# Patient Record
Sex: FEMALE | Race: WHITE | Employment: OTHER | URBAN - METROPOLITAN AREA
[De-identification: names, ages, dates, MRNs, and addresses within clinical notes are randomized per-mention and may not be internally consistent; named-entity substitution may affect disease eponyms.]

---

## 2018-01-08 PROBLEM — R09.02 HYPOXIA: Status: ACTIVE | Noted: 2018-01-08

## 2018-01-09 PROBLEM — T39.395A GI BLEED DUE TO NSAIDS: Status: ACTIVE | Noted: 2018-01-09

## 2018-01-09 PROBLEM — K92.2 GI BLEED DUE TO NSAIDS: Status: ACTIVE | Noted: 2018-01-09

## 2018-01-09 PROBLEM — I50.9 CHF (CONGESTIVE HEART FAILURE) (HCC): Status: ACTIVE | Noted: 2018-01-09

## 2018-01-09 PROBLEM — R10.9 ABDOMINAL PAIN: Status: ACTIVE | Noted: 2018-01-09

## 2018-01-10 PROBLEM — I48.91 ATRIAL FIBRILLATION (HCC): Status: ACTIVE | Noted: 2018-01-10

## 2018-01-10 PROBLEM — N28.9 RENAL INSUFFICIENCY: Status: ACTIVE | Noted: 2018-01-10

## 2018-01-10 PROBLEM — I48.0 PAROXYSMAL ATRIAL FIBRILLATION (HCC): Status: ACTIVE | Noted: 2018-01-10

## 2018-01-12 PROBLEM — I51.89 DIASTOLIC DYSFUNCTION: Status: ACTIVE | Noted: 2018-01-12

## 2018-04-26 ENCOUNTER — HOSPITAL ENCOUNTER (OUTPATIENT)
Dept: CARDIOLOGY | Age: 73
Discharge: HOME | End: 2018-04-26
Admitting: SURGERY
Payer: MEDICARE

## 2018-04-26 ENCOUNTER — OFFICE VISIT (OUTPATIENT)
Dept: VASCULAR SURGERY | Age: 73
End: 2018-04-26
Payer: MEDICARE

## 2018-04-26 DIAGNOSIS — I65.22 LEFT CAROTID STENOSIS: ICD-10-CM

## 2018-04-26 DIAGNOSIS — Z98.890 S/P CAROTID ENDARTERECTOMY: ICD-10-CM

## 2018-04-26 DIAGNOSIS — R09.89 BRUIT OF RIGHT CAROTID ARTERY: Primary | ICD-10-CM

## 2018-04-26 PROCEDURE — 99213 OFFICE O/P EST LOW 20 MIN: CPT | Performed by: SURGERY

## 2018-04-26 PROCEDURE — 93880 EXTRACRANIAL BILAT STUDY: CPT

## 2018-04-26 RX ORDER — VENLAFAXINE HYDROCHLORIDE 150 MG/1
300 CAPSULE, EXTENDED RELEASE ORAL DAILY
COMMUNITY
Start: 2018-04-22

## 2018-04-26 RX ORDER — LEVOTHYROXINE SODIUM 0.15 MG/1
175 TABLET ORAL DAILY
Status: ON HOLD | COMMUNITY
Start: 2018-04-22 | End: 2019-09-19 | Stop reason: DRUGHIGH

## 2019-04-30 DIAGNOSIS — Z98.890 S/P CAROTID ENDARTERECTOMY: ICD-10-CM

## 2019-04-30 DIAGNOSIS — I65.22 LEFT CAROTID STENOSIS: Primary | ICD-10-CM

## 2019-05-02 ENCOUNTER — HOSPITAL ENCOUNTER (OUTPATIENT)
Dept: CARDIOLOGY | Age: 74
Discharge: HOME | End: 2019-05-02
Payer: MEDICARE

## 2019-05-02 ENCOUNTER — OFFICE VISIT (OUTPATIENT)
Dept: VASCULAR SURGERY | Age: 74
End: 2019-05-02
Payer: MEDICARE

## 2019-05-02 DIAGNOSIS — R09.89 BRUIT OF RIGHT CAROTID ARTERY: ICD-10-CM

## 2019-05-02 DIAGNOSIS — I65.22 LEFT CAROTID STENOSIS: ICD-10-CM

## 2019-05-02 DIAGNOSIS — Z98.890 S/P CAROTID ENDARTERECTOMY: ICD-10-CM

## 2019-05-02 DIAGNOSIS — I65.22 LEFT CAROTID STENOSIS: Primary | ICD-10-CM

## 2019-05-02 PROCEDURE — 99213 OFFICE O/P EST LOW 20 MIN: CPT | Performed by: SURGERY

## 2019-05-02 PROCEDURE — 93880 EXTRACRANIAL BILAT STUDY: CPT

## 2019-05-02 NOTE — PROGRESS NOTES
Chief Complaint:   Chief Complaint   Patient presents with    Circulatory Problem     Follow up carotid stenosis         HPI:  Patient came to the office for evaluation of carotid artery disease, right carotid endarterectomy, is associated with left carotid stenosis, doing well      Patient denies any focal lateralizing neurological symptoms like loss of speech, vision or loss of function of extremity    Patient can walk a few blocks without difficulty, and denies any symptoms of rest pain    No Known Allergies    Current Outpatient Medications   Medication Sig Dispense Refill    venlafaxine (EFFEXOR XR) 150 MG extended release capsule Take 150 mg by mouth daily      levothyroxine (SYNTHROID) 150 MCG tablet Take 150 mcg by mouth daily      metoprolol succinate (TOPROL XL) 100 MG extended release tablet Take 1 tablet by mouth daily 30 tablet 3    pantoprazole (PROTONIX) 40 MG tablet Take 1 tablet by mouth daily 30 tablet 3    sucralfate (CARAFATE) 1 GM tablet Take 1 tablet by mouth 4 times daily 120 tablet 3    atorvastatin (LIPITOR) 20 MG tablet Take 20 mg by mouth daily      Multiple Vitamins-Minerals (THERAPEUTIC MULTIVITAMIN-MINERALS) tablet Take 1 tablet by mouth daily      Cholecalciferol (VITAMIN D) 2000 UNITS CAPS capsule Take 1 capsule by mouth daily      amLODIPine (NORVASC) 5 MG tablet Take 5 mg by mouth daily.  Levothyroxine Sodium (SYNTHROID PO) Take 150 mcg by mouth daily.  VORTIoxetine (TRINTELLIX) 10 MG TABS tablet Take 10 mg by mouth daily       No current facility-administered medications for this visit.         Past Medical History:   Diagnosis Date    Arthritis     Carotid stenosis, bilateral 5/21/2012    Hyperlipidemia     Hypertension     Left carotid stenosis 4/26/2018    S/P carotid endarterectomy 10/6/2016    Stomach ulcer     Thyroid disease        Past Surgical History:   Procedure Laterality Date    APPENDECTOMY      BACK SURGERY      CHOLECYSTECTOMY      Musculoskeletal:  No arthritis or weakness  Neurologic:  No paralysis, paresis, paresthesia, seizures or headach        Physical Exam:  General appearance:  Alert, awake, oriented x 3. No distress. Eyes:  Conjunctivae appear normal; PERRL  Neck:  No jugular venous distention, lymphadenopathy or thyromegaly. Carotid bruit noted  Lungs:  Clear to ausculation bilaterally. No rhonchi, crackles, wheezes  Heart:  Regular rate and rhythm. No rub or murmur  Abdomen:  Soft, non-tender. No masses, organomegaly. Musculoskeletal : No joint effusions, tenderness swelling    Neuro: Speech is intact. Moving all extremities. No focal motor or sensory deficits      Extremities:  Both feet are warm to touch. The color of both feet is normal.        Pulses Right  Left    Brachial 3 3    Radial    3=normal   Femoral 2 2  2=diminished   Popliteal    1=barely palpable   Dorsalis pedis    0=absent   Posterior tibial    4=aneurysmal             Other pertinent information:1. The past medical records were reviewed. Assessment:    1. Left carotid stenosis    2. Bruit of right carotid artery    3. S/P carotid endarterectomy              Plan:       Discussed with the patient regarding the results of the ultrasound, normal on the right, post endarterectomy, 40% stenosis of left, unchanged from last year, patient was reassured          Patient was instructed to continue walking program and to call if any worsening of symptoms and to call if any focal lateralizing neurological symptoms like loss of speech, vision or loss of function of extremity. All the questions were answered. Indicated follow-up: Return in about 1 year (around 5/2/2020), or if symptoms worsen or fail to improve.

## 2019-05-02 NOTE — PROGRESS NOTES
[facility] Heart & Vascular Lab - [facility]    This is a pre read worksheet - prior to official physician interpretation    Levon Stokes  1945  Date of study: 5/2/19  [id number]    Indication for study:  Carotid artery stenosis  Study : Bilateral Carotid Artery Duplex Examination    Duplex examination of the RIGHT carotid artery system identifies atherosclerotic plaque. The peak systolic velocity in internal carotid artery was 246 centimeters / second. The maximum end diastolic velocity was 50 centimeters / second. The ICA/CCA ratio is 1.5. The right vertebral artery has antegrade flow. Duplex examination of the LEFT carotid artery system identifies atherosclerotic plaque. The peak systolic velocity in internal carotid artery was 180 centimeters / second. The maximum end diastolic velocity was 42 centimeters / second. The ICA/CCA ratio is 1.8. The left vertebral artery has antegrade flow.     RIGHT mild thickening  LEFT 40%  Both distal ICAs very tortuous      LAST STUDY  4/26/2018  Rt mild  Lt 40

## 2019-09-19 ENCOUNTER — APPOINTMENT (OUTPATIENT)
Dept: CT IMAGING | Age: 74
DRG: 292 | End: 2019-09-19
Attending: FAMILY MEDICINE
Payer: MEDICARE

## 2019-09-19 ENCOUNTER — HOSPITAL ENCOUNTER (INPATIENT)
Age: 74
LOS: 7 days | Discharge: HOME | DRG: 292 | End: 2019-09-26
Attending: FAMILY MEDICINE | Admitting: FAMILY MEDICINE
Payer: MEDICARE

## 2019-09-19 ENCOUNTER — APPOINTMENT (OUTPATIENT)
Dept: GENERAL RADIOLOGY | Age: 74
DRG: 292 | End: 2019-09-19
Attending: FAMILY MEDICINE
Payer: MEDICARE

## 2019-09-19 LAB
ALBUMIN SERPL-MCNC: 4 G/DL (ref 3.5–5.2)
ALP BLD-CCNC: 100 U/L (ref 35–104)
ALT SERPL-CCNC: 10 U/L (ref 0–32)
ANION GAP SERPL CALCULATED.3IONS-SCNC: 9 MMOL/L (ref 7–16)
ANISOCYTOSIS: ABNORMAL
AST SERPL-CCNC: 16 U/L (ref 0–31)
B.E.: 6.1 MMOL/L (ref -3–3)
BASOPHILIC STIPPLING: ABNORMAL
BASOPHILS ABSOLUTE: 0.07 E9/L (ref 0–0.2)
BASOPHILS RELATIVE PERCENT: 0.9 % (ref 0–2)
BILIRUB SERPL-MCNC: 1.1 MG/DL (ref 0–1.2)
BUN BLDV-MCNC: 17 MG/DL (ref 8–23)
CALCIUM SERPL-MCNC: 8.8 MG/DL (ref 8.6–10.2)
CHLORIDE BLD-SCNC: 95 MMOL/L (ref 98–107)
CO2: 33 MMOL/L (ref 22–29)
COHB: 3.1 % (ref 0–1.5)
CREAT SERPL-MCNC: 0.7 MG/DL (ref 0.5–1)
CRITICAL: ABNORMAL
DATE ANALYZED: ABNORMAL
DATE OF COLLECTION: ABNORMAL
EOSINOPHILS ABSOLUTE: 0.06 E9/L (ref 0.05–0.5)
EOSINOPHILS RELATIVE PERCENT: 0.7 % (ref 0–6)
GFR AFRICAN AMERICAN: >60
GFR NON-AFRICAN AMERICAN: >60 ML/MIN/1.73
GLUCOSE BLD-MCNC: 121 MG/DL (ref 74–99)
HCO3: 33.1 MMOL/L (ref 22–26)
HCT VFR BLD CALC: 42.4 % (ref 34–48)
HEMOGLOBIN: 11.9 G/DL (ref 11.5–15.5)
HHB: 4.3 % (ref 0–5)
IMMATURE GRANULOCYTES #: 0.02 E9/L
IMMATURE GRANULOCYTES %: 0.2 % (ref 0–5)
INR BLD: 1.1
LAB: ABNORMAL
LYMPHOCYTES ABSOLUTE: 0.98 E9/L (ref 1.5–4)
LYMPHOCYTES RELATIVE PERCENT: 12.1 % (ref 20–42)
Lab: ABNORMAL
MCH RBC QN AUTO: 26.4 PG (ref 26–35)
MCHC RBC AUTO-ENTMCNC: 28.1 % (ref 32–34.5)
MCV RBC AUTO: 94 FL (ref 80–99.9)
METHB: 0.1 % (ref 0–1.5)
MODE: ABNORMAL
MONOCYTES ABSOLUTE: 0.65 E9/L (ref 0.1–0.95)
MONOCYTES RELATIVE PERCENT: 8.1 % (ref 2–12)
NEUTROPHILS ABSOLUTE: 6.29 E9/L (ref 1.8–7.3)
NEUTROPHILS RELATIVE PERCENT: 78 % (ref 43–80)
O2 CONTENT: 17.2 ML/DL
O2 SATURATION: 95.6 % (ref 92–98.5)
O2HB: 92.5 % (ref 94–97)
OPERATOR ID: 3114
OVALOCYTES: ABNORMAL
PATIENT TEMP: 37 C
PCO2: 58.4 MMHG (ref 35–45)
PDW BLD-RTO: 16.1 FL (ref 11.5–15)
PH BLOOD GAS: 7.37 (ref 7.35–7.45)
PLATELET # BLD: 199 E9/L (ref 130–450)
PMV BLD AUTO: 10.7 FL (ref 7–12)
PO2: 81.4 MMHG (ref 60–100)
POIKILOCYTES: ABNORMAL
POLYCHROMASIA: ABNORMAL
POTASSIUM REFLEX MAGNESIUM: 4.2 MMOL/L (ref 3.5–5)
PRO-BNP: 2484 PG/ML (ref 0–450)
PROTHROMBIN TIME: 12.1 SEC (ref 9.3–12.4)
RBC # BLD: 4.51 E12/L (ref 3.5–5.5)
SODIUM BLD-SCNC: 137 MMOL/L (ref 132–146)
SOURCE, BLOOD GAS: ABNORMAL
STOMATOCYTES: ABNORMAL
THB: 13.2 G/DL (ref 11.5–16.5)
TIME ANALYZED: 1834
TOTAL PROTEIN: 7.3 G/DL (ref 6.4–8.3)
TROPONIN: <0.01 NG/ML (ref 0–0.03)
WBC # BLD: 8.1 E9/L (ref 4.5–11.5)

## 2019-09-19 PROCEDURE — 85025 COMPLETE CBC W/AUTO DIFF WBC: CPT

## 2019-09-19 PROCEDURE — 2060000000 HC ICU INTERMEDIATE R&B

## 2019-09-19 PROCEDURE — 85610 PROTHROMBIN TIME: CPT

## 2019-09-19 PROCEDURE — 83880 ASSAY OF NATRIURETIC PEPTIDE: CPT

## 2019-09-19 PROCEDURE — 82805 BLOOD GASES W/O2 SATURATION: CPT

## 2019-09-19 PROCEDURE — 80053 COMPREHEN METABOLIC PANEL: CPT

## 2019-09-19 PROCEDURE — 71275 CT ANGIOGRAPHY CHEST: CPT

## 2019-09-19 PROCEDURE — 6360000002 HC RX W HCPCS: Performed by: FAMILY MEDICINE

## 2019-09-19 PROCEDURE — 6360000004 HC RX CONTRAST MEDICATION: Performed by: RADIOLOGY

## 2019-09-19 PROCEDURE — 36415 COLL VENOUS BLD VENIPUNCTURE: CPT

## 2019-09-19 PROCEDURE — 2580000003 HC RX 258: Performed by: FAMILY MEDICINE

## 2019-09-19 PROCEDURE — 84484 ASSAY OF TROPONIN QUANT: CPT

## 2019-09-19 PROCEDURE — 2700000000 HC OXYGEN THERAPY PER DAY

## 2019-09-19 PROCEDURE — 71046 X-RAY EXAM CHEST 2 VIEWS: CPT

## 2019-09-19 RX ORDER — METOPROLOL SUCCINATE 100 MG/1
100 TABLET, EXTENDED RELEASE ORAL DAILY
Status: DISCONTINUED | OUTPATIENT
Start: 2019-09-19 | End: 2019-09-20

## 2019-09-19 RX ORDER — SODIUM CHLORIDE 0.9 % (FLUSH) 0.9 %
10 SYRINGE (ML) INJECTION PRN
Status: COMPLETED | OUTPATIENT
Start: 2019-09-19 | End: 2019-09-22

## 2019-09-19 RX ORDER — PANTOPRAZOLE SODIUM 40 MG/1
40 TABLET, DELAYED RELEASE ORAL DAILY
Status: DISCONTINUED | OUTPATIENT
Start: 2019-09-19 | End: 2019-09-26 | Stop reason: HOSPADM

## 2019-09-19 RX ORDER — ONDANSETRON 2 MG/ML
4 INJECTION INTRAMUSCULAR; INTRAVENOUS EVERY 6 HOURS PRN
Status: DISCONTINUED | OUTPATIENT
Start: 2019-09-19 | End: 2019-09-26 | Stop reason: HOSPADM

## 2019-09-19 RX ORDER — VENLAFAXINE HYDROCHLORIDE 150 MG/1
150 CAPSULE, EXTENDED RELEASE ORAL DAILY
Status: DISCONTINUED | OUTPATIENT
Start: 2019-09-19 | End: 2019-09-26 | Stop reason: HOSPADM

## 2019-09-19 RX ORDER — SODIUM CHLORIDE 0.9 % (FLUSH) 0.9 %
10 SYRINGE (ML) INJECTION EVERY 12 HOURS SCHEDULED
Status: DISCONTINUED | OUTPATIENT
Start: 2019-09-19 | End: 2019-09-26

## 2019-09-19 RX ORDER — AMLODIPINE BESYLATE 5 MG/1
5 TABLET ORAL DAILY
Status: DISCONTINUED | OUTPATIENT
Start: 2019-09-19 | End: 2019-09-21

## 2019-09-19 RX ORDER — SODIUM CHLORIDE 0.9 % (FLUSH) 0.9 %
10 SYRINGE (ML) INJECTION PRN
Status: DISCONTINUED | OUTPATIENT
Start: 2019-09-19 | End: 2019-09-26 | Stop reason: HOSPADM

## 2019-09-19 RX ORDER — FUROSEMIDE 10 MG/ML
40 INJECTION INTRAMUSCULAR; INTRAVENOUS ONCE
Status: COMPLETED | OUTPATIENT
Start: 2019-09-19 | End: 2019-09-19

## 2019-09-19 RX ORDER — ATORVASTATIN CALCIUM 20 MG/1
20 TABLET, FILM COATED ORAL DAILY
Status: DISCONTINUED | OUTPATIENT
Start: 2019-09-19 | End: 2019-09-26 | Stop reason: HOSPADM

## 2019-09-19 RX ORDER — POTASSIUM CHLORIDE 20 MEQ/1
20 TABLET, EXTENDED RELEASE ORAL 2 TIMES DAILY WITH MEALS
Status: DISCONTINUED | OUTPATIENT
Start: 2019-09-20 | End: 2019-09-22

## 2019-09-19 RX ORDER — M-VIT,TX,IRON,MINS/CALC/FOLIC 27MG-0.4MG
1 TABLET ORAL DAILY
Status: DISCONTINUED | OUTPATIENT
Start: 2019-09-19 | End: 2019-09-26

## 2019-09-19 RX ORDER — CHOLECALCIFEROL (VITAMIN D3) 50 MCG
2000 TABLET ORAL DAILY
Status: DISCONTINUED | OUTPATIENT
Start: 2019-09-19 | End: 2019-09-26

## 2019-09-19 RX ADMIN — IOPAMIDOL 80 ML: 755 INJECTION, SOLUTION INTRAVENOUS at 20:23

## 2019-09-19 RX ADMIN — Medication 10 ML: at 20:20

## 2019-09-19 RX ADMIN — ENOXAPARIN SODIUM 40 MG: 40 INJECTION SUBCUTANEOUS at 22:46

## 2019-09-19 RX ADMIN — FUROSEMIDE 40 MG: 10 INJECTION, SOLUTION INTRAMUSCULAR; INTRAVENOUS at 22:46

## 2019-09-19 ASSESSMENT — PAIN SCALES - GENERAL
PAINLEVEL_OUTOF10: 0

## 2019-09-20 LAB
LV EF: 58 %
LVEF MODALITY: NORMAL

## 2019-09-20 PROCEDURE — 6370000000 HC RX 637 (ALT 250 FOR IP): Performed by: FAMILY MEDICINE

## 2019-09-20 PROCEDURE — 2060000000 HC ICU INTERMEDIATE R&B

## 2019-09-20 PROCEDURE — 2580000003 HC RX 258: Performed by: FAMILY MEDICINE

## 2019-09-20 PROCEDURE — 2700000000 HC OXYGEN THERAPY PER DAY

## 2019-09-20 PROCEDURE — 6360000002 HC RX W HCPCS: Performed by: FAMILY MEDICINE

## 2019-09-20 PROCEDURE — 93306 TTE W/DOPPLER COMPLETE: CPT

## 2019-09-20 RX ORDER — CARVEDILOL 25 MG/1
25 TABLET ORAL 2 TIMES DAILY WITH MEALS
Status: DISCONTINUED | OUTPATIENT
Start: 2019-09-20 | End: 2019-09-22

## 2019-09-20 RX ORDER — FUROSEMIDE 10 MG/ML
40 INJECTION INTRAMUSCULAR; INTRAVENOUS ONCE
Status: COMPLETED | OUTPATIENT
Start: 2019-09-20 | End: 2019-09-20

## 2019-09-20 RX ORDER — VALSARTAN 80 MG/1
80 TABLET ORAL DAILY
Status: DISCONTINUED | OUTPATIENT
Start: 2019-09-20 | End: 2019-09-25

## 2019-09-20 RX ADMIN — VALSARTAN 80 MG: 80 TABLET, FILM COATED ORAL at 17:09

## 2019-09-20 RX ADMIN — POTASSIUM CHLORIDE 20 MEQ: 20 TABLET, EXTENDED RELEASE ORAL at 16:24

## 2019-09-20 RX ADMIN — CHOLECALCIFEROL TAB 50 MCG (2000 UNIT) 2000 UNITS: 50 TAB at 09:48

## 2019-09-20 RX ADMIN — POTASSIUM CHLORIDE 20 MEQ: 20 TABLET, EXTENDED RELEASE ORAL at 09:48

## 2019-09-20 RX ADMIN — ATORVASTATIN CALCIUM 20 MG: 20 TABLET, FILM COATED ORAL at 09:48

## 2019-09-20 RX ADMIN — METOPROLOL SUCCINATE 100 MG: 100 TABLET, EXTENDED RELEASE ORAL at 09:48

## 2019-09-20 RX ADMIN — Medication 10 ML: at 20:15

## 2019-09-20 RX ADMIN — PANTOPRAZOLE SODIUM 40 MG: 40 TABLET, DELAYED RELEASE ORAL at 09:48

## 2019-09-20 RX ADMIN — CARVEDILOL 25 MG: 25 TABLET, FILM COATED ORAL at 16:24

## 2019-09-20 RX ADMIN — LEVOTHYROXINE SODIUM 175 MCG: 125 TABLET ORAL at 05:51

## 2019-09-20 RX ADMIN — AMLODIPINE BESYLATE 5 MG: 5 TABLET ORAL at 09:48

## 2019-09-20 RX ADMIN — Medication 10 ML: at 09:48

## 2019-09-20 RX ADMIN — Medication 1 TABLET: at 09:48

## 2019-09-20 RX ADMIN — ENOXAPARIN SODIUM 40 MG: 40 INJECTION SUBCUTANEOUS at 09:48

## 2019-09-20 RX ADMIN — VENLAFAXINE HYDROCHLORIDE 150 MG: 150 CAPSULE, EXTENDED RELEASE ORAL at 10:28

## 2019-09-20 RX ADMIN — FUROSEMIDE 40 MG: 10 INJECTION, SOLUTION INTRAMUSCULAR; INTRAVENOUS at 16:21

## 2019-09-20 ASSESSMENT — PAIN SCALES - GENERAL
PAINLEVEL_OUTOF10: 0

## 2019-09-21 LAB
ANION GAP SERPL CALCULATED.3IONS-SCNC: 4 MMOL/L (ref 7–16)
BUN BLDV-MCNC: 17 MG/DL (ref 8–23)
CALCIUM SERPL-MCNC: 8.7 MG/DL (ref 8.6–10.2)
CHLORIDE BLD-SCNC: 94 MMOL/L (ref 98–107)
CO2: 40 MMOL/L (ref 22–29)
CREAT SERPL-MCNC: 0.8 MG/DL (ref 0.5–1)
GFR AFRICAN AMERICAN: >60
GFR NON-AFRICAN AMERICAN: >60 ML/MIN/1.73
GLUCOSE BLD-MCNC: 135 MG/DL (ref 74–99)
MAGNESIUM: 1.7 MG/DL (ref 1.6–2.6)
POTASSIUM SERPL-SCNC: 4.4 MMOL/L (ref 3.5–5)
SODIUM BLD-SCNC: 138 MMOL/L (ref 132–146)

## 2019-09-21 PROCEDURE — 6360000002 HC RX W HCPCS: Performed by: FAMILY MEDICINE

## 2019-09-21 PROCEDURE — 2580000003 HC RX 258: Performed by: FAMILY MEDICINE

## 2019-09-21 PROCEDURE — 94664 DEMO&/EVAL PT USE INHALER: CPT

## 2019-09-21 PROCEDURE — 36415 COLL VENOUS BLD VENIPUNCTURE: CPT

## 2019-09-21 PROCEDURE — 80048 BASIC METABOLIC PNL TOTAL CA: CPT

## 2019-09-21 PROCEDURE — 83735 ASSAY OF MAGNESIUM: CPT

## 2019-09-21 PROCEDURE — 2700000000 HC OXYGEN THERAPY PER DAY

## 2019-09-21 PROCEDURE — 2060000000 HC ICU INTERMEDIATE R&B

## 2019-09-21 PROCEDURE — 6370000000 HC RX 637 (ALT 250 FOR IP): Performed by: FAMILY MEDICINE

## 2019-09-21 RX ORDER — ARFORMOTEROL TARTRATE 15 UG/2ML
15 SOLUTION RESPIRATORY (INHALATION) 2 TIMES DAILY
Status: DISCONTINUED | OUTPATIENT
Start: 2019-09-21 | End: 2019-09-21

## 2019-09-21 RX ORDER — BUDESONIDE 0.5 MG/2ML
0.5 INHALANT ORAL 2 TIMES DAILY
Status: DISCONTINUED | OUTPATIENT
Start: 2019-09-21 | End: 2019-09-22

## 2019-09-21 RX ORDER — FUROSEMIDE 10 MG/ML
40 INJECTION INTRAMUSCULAR; INTRAVENOUS ONCE
Status: COMPLETED | OUTPATIENT
Start: 2019-09-21 | End: 2019-09-21

## 2019-09-21 RX ORDER — FORMOTEROL FUMARATE 20 UG/2ML
20 SOLUTION RESPIRATORY (INHALATION) 2 TIMES DAILY
Status: DISCONTINUED | OUTPATIENT
Start: 2019-09-21 | End: 2019-09-23

## 2019-09-21 RX ADMIN — FUROSEMIDE 40 MG: 10 INJECTION, SOLUTION INTRAMUSCULAR; INTRAVENOUS at 13:19

## 2019-09-21 RX ADMIN — Medication 10 ML: at 09:11

## 2019-09-21 RX ADMIN — Medication 10 ML: at 21:05

## 2019-09-21 RX ADMIN — BUDESONIDE 500 MCG: 0.5 SUSPENSION RESPIRATORY (INHALATION) at 21:48

## 2019-09-21 RX ADMIN — POTASSIUM CHLORIDE 20 MEQ: 20 TABLET, EXTENDED RELEASE ORAL at 17:51

## 2019-09-21 RX ADMIN — CHOLECALCIFEROL TAB 50 MCG (2000 UNIT) 2000 UNITS: 50 TAB at 09:10

## 2019-09-21 RX ADMIN — ENOXAPARIN SODIUM 40 MG: 40 INJECTION SUBCUTANEOUS at 09:10

## 2019-09-21 RX ADMIN — AMLODIPINE BESYLATE 5 MG: 5 TABLET ORAL at 09:10

## 2019-09-21 RX ADMIN — PANTOPRAZOLE SODIUM 40 MG: 40 TABLET, DELAYED RELEASE ORAL at 09:10

## 2019-09-21 RX ADMIN — CARVEDILOL 25 MG: 25 TABLET, FILM COATED ORAL at 09:09

## 2019-09-21 RX ADMIN — VENLAFAXINE HYDROCHLORIDE 150 MG: 150 CAPSULE, EXTENDED RELEASE ORAL at 09:10

## 2019-09-21 RX ADMIN — CARVEDILOL 25 MG: 25 TABLET, FILM COATED ORAL at 17:51

## 2019-09-21 RX ADMIN — Medication 1 TABLET: at 09:10

## 2019-09-21 RX ADMIN — ATORVASTATIN CALCIUM 20 MG: 20 TABLET, FILM COATED ORAL at 09:10

## 2019-09-21 RX ADMIN — POTASSIUM CHLORIDE 20 MEQ: 20 TABLET, EXTENDED RELEASE ORAL at 09:10

## 2019-09-21 RX ADMIN — VALSARTAN 80 MG: 80 TABLET, FILM COATED ORAL at 09:10

## 2019-09-21 RX ADMIN — LEVOTHYROXINE SODIUM 175 MCG: 125 TABLET ORAL at 05:12

## 2019-09-21 RX ADMIN — FORMOTEROL FUMARATE DIHYDRATE 20 MCG: 20 SOLUTION RESPIRATORY (INHALATION) at 21:48

## 2019-09-21 ASSESSMENT — PAIN SCALES - GENERAL
PAINLEVEL_OUTOF10: 0

## 2019-09-22 ENCOUNTER — APPOINTMENT (OUTPATIENT)
Dept: GENERAL RADIOLOGY | Age: 74
DRG: 292 | End: 2019-09-22
Attending: FAMILY MEDICINE
Payer: MEDICARE

## 2019-09-22 PROBLEM — K92.2 GI BLEED DUE TO NSAIDS: Status: RESOLVED | Noted: 2018-01-09 | Resolved: 2019-09-22

## 2019-09-22 PROBLEM — T39.395A GI BLEED DUE TO NSAIDS: Status: RESOLVED | Noted: 2018-01-09 | Resolved: 2019-09-22

## 2019-09-22 PROBLEM — I48.0 PAROXYSMAL ATRIAL FIBRILLATION (HCC): Status: RESOLVED | Noted: 2018-01-10 | Resolved: 2019-09-22

## 2019-09-22 PROBLEM — N28.9 RENAL INSUFFICIENCY: Status: RESOLVED | Noted: 2018-01-10 | Resolved: 2019-09-22

## 2019-09-22 PROBLEM — J40 BRONCHITIS: Status: ACTIVE | Noted: 2019-09-22

## 2019-09-22 PROBLEM — R10.9 ABDOMINAL PAIN: Status: RESOLVED | Noted: 2018-01-09 | Resolved: 2019-09-22

## 2019-09-22 PROBLEM — J45.909 ASTHMA: Status: ACTIVE | Noted: 2019-09-22

## 2019-09-22 LAB
ANION GAP SERPL CALCULATED.3IONS-SCNC: 7 MMOL/L (ref 7–16)
BUN BLDV-MCNC: 19 MG/DL (ref 8–23)
CALCIUM SERPL-MCNC: 9.1 MG/DL (ref 8.6–10.2)
CHLORIDE BLD-SCNC: 97 MMOL/L (ref 98–107)
CO2: 39 MMOL/L (ref 22–29)
CREAT SERPL-MCNC: 0.9 MG/DL (ref 0.5–1)
FILM ARRAY ADENOVIRUS: NORMAL
FILM ARRAY BORDETELLA PERTUSSIS: NORMAL
FILM ARRAY CHLAMYDOPHILIA PNEUMONIAE: NORMAL
FILM ARRAY CORONAVIRUS 229E: NORMAL
FILM ARRAY CORONAVIRUS HKU1: NORMAL
FILM ARRAY CORONAVIRUS NL63: NORMAL
FILM ARRAY CORONAVIRUS OC43: NORMAL
FILM ARRAY INFLUENZA A VIRUS 09H1: NORMAL
FILM ARRAY INFLUENZA A VIRUS H1: NORMAL
FILM ARRAY INFLUENZA A VIRUS H3: NORMAL
FILM ARRAY INFLUENZA A VIRUS: NORMAL
FILM ARRAY INFLUENZA B: NORMAL
FILM ARRAY METAPNEUMOVIRUS: NORMAL
FILM ARRAY MYCOPLASMA PNEUMONIAE: NORMAL
FILM ARRAY PARAINFLUENZA VIRUS 1: NORMAL
FILM ARRAY PARAINFLUENZA VIRUS 2: NORMAL
FILM ARRAY PARAINFLUENZA VIRUS 3: NORMAL
FILM ARRAY PARAINFLUENZA VIRUS 4: NORMAL
FILM ARRAY RESPIRATORY SYNCITIAL VIRUS: NORMAL
FILM ARRAY RHINOVIRUS/ENTEROVIRUS: NORMAL
GFR AFRICAN AMERICAN: >60
GFR NON-AFRICAN AMERICAN: >60 ML/MIN/1.73
GLUCOSE BLD-MCNC: 138 MG/DL (ref 74–99)
POTASSIUM SERPL-SCNC: 4.7 MMOL/L (ref 3.5–5)
PRO-BNP: 286 PG/ML (ref 0–450)
SODIUM BLD-SCNC: 143 MMOL/L (ref 132–146)

## 2019-09-22 PROCEDURE — 83880 ASSAY OF NATRIURETIC PEPTIDE: CPT

## 2019-09-22 PROCEDURE — 87633 RESP VIRUS 12-25 TARGETS: CPT

## 2019-09-22 PROCEDURE — 87581 M.PNEUMON DNA AMP PROBE: CPT

## 2019-09-22 PROCEDURE — 36415 COLL VENOUS BLD VENIPUNCTURE: CPT

## 2019-09-22 PROCEDURE — 2580000003 HC RX 258: Performed by: FAMILY MEDICINE

## 2019-09-22 PROCEDURE — 2500000003 HC RX 250 WO HCPCS: Performed by: FAMILY MEDICINE

## 2019-09-22 PROCEDURE — 87486 CHLMYD PNEUM DNA AMP PROBE: CPT

## 2019-09-22 PROCEDURE — 6360000002 HC RX W HCPCS: Performed by: FAMILY MEDICINE

## 2019-09-22 PROCEDURE — 87070 CULTURE OTHR SPECIMN AEROBIC: CPT

## 2019-09-22 PROCEDURE — 87206 SMEAR FLUORESCENT/ACID STAI: CPT

## 2019-09-22 PROCEDURE — 97161 PT EVAL LOW COMPLEX 20 MIN: CPT

## 2019-09-22 PROCEDURE — 2700000000 HC OXYGEN THERAPY PER DAY

## 2019-09-22 PROCEDURE — 2580000003 HC RX 258: Performed by: RADIOLOGY

## 2019-09-22 PROCEDURE — 71046 X-RAY EXAM CHEST 2 VIEWS: CPT

## 2019-09-22 PROCEDURE — 87798 DETECT AGENT NOS DNA AMP: CPT

## 2019-09-22 PROCEDURE — 87450 HC DIRECT STREP B ANTIGEN: CPT

## 2019-09-22 PROCEDURE — 94640 AIRWAY INHALATION TREATMENT: CPT

## 2019-09-22 PROCEDURE — 80048 BASIC METABOLIC PNL TOTAL CA: CPT

## 2019-09-22 PROCEDURE — 2060000000 HC ICU INTERMEDIATE R&B

## 2019-09-22 PROCEDURE — 97530 THERAPEUTIC ACTIVITIES: CPT

## 2019-09-22 PROCEDURE — 6370000000 HC RX 637 (ALT 250 FOR IP): Performed by: FAMILY MEDICINE

## 2019-09-22 RX ORDER — DILTIAZEM HYDROCHLORIDE 240 MG/1
240 CAPSULE, COATED, EXTENDED RELEASE ORAL DAILY
Status: DISCONTINUED | OUTPATIENT
Start: 2019-09-22 | End: 2019-09-26

## 2019-09-22 RX ORDER — METHYLPREDNISOLONE SODIUM SUCCINATE 125 MG/2ML
125 INJECTION, POWDER, LYOPHILIZED, FOR SOLUTION INTRAMUSCULAR; INTRAVENOUS ONCE
Status: COMPLETED | OUTPATIENT
Start: 2019-09-22 | End: 2019-09-22

## 2019-09-22 RX ORDER — GUAIFENESIN 400 MG/1
400 TABLET ORAL 4 TIMES DAILY
Status: DISCONTINUED | OUTPATIENT
Start: 2019-09-22 | End: 2019-09-26

## 2019-09-22 RX ORDER — METHYLPREDNISOLONE SODIUM SUCCINATE 125 MG/2ML
80 INJECTION, POWDER, LYOPHILIZED, FOR SOLUTION INTRAMUSCULAR; INTRAVENOUS EVERY 8 HOURS
Status: DISCONTINUED | OUTPATIENT
Start: 2019-09-22 | End: 2019-09-23

## 2019-09-22 RX ORDER — BUDESONIDE 0.5 MG/2ML
1 INHALANT ORAL 2 TIMES DAILY
Status: DISCONTINUED | OUTPATIENT
Start: 2019-09-22 | End: 2019-09-26 | Stop reason: HOSPADM

## 2019-09-22 RX ADMIN — DILTIAZEM HYDROCHLORIDE 240 MG: 240 CAPSULE, COATED, EXTENDED RELEASE ORAL at 10:29

## 2019-09-22 RX ADMIN — ENOXAPARIN SODIUM 40 MG: 40 INJECTION SUBCUTANEOUS at 10:39

## 2019-09-22 RX ADMIN — GUAIFENESIN 400 MG: 400 TABLET ORAL at 20:48

## 2019-09-22 RX ADMIN — Medication 10 ML: at 23:16

## 2019-09-22 RX ADMIN — METHYLPREDNISOLONE SODIUM SUCCINATE 80 MG: 125 INJECTION, POWDER, LYOPHILIZED, FOR SOLUTION INTRAMUSCULAR; INTRAVENOUS at 23:16

## 2019-09-22 RX ADMIN — LEVOTHYROXINE SODIUM 175 MCG: 125 TABLET ORAL at 06:08

## 2019-09-22 RX ADMIN — METHYLPREDNISOLONE SODIUM SUCCINATE 125 MG: 125 INJECTION, POWDER, LYOPHILIZED, FOR SOLUTION INTRAMUSCULAR; INTRAVENOUS at 10:30

## 2019-09-22 RX ADMIN — FORMOTEROL FUMARATE DIHYDRATE 20 MCG: 20 SOLUTION RESPIRATORY (INHALATION) at 22:01

## 2019-09-22 RX ADMIN — GUAIFENESIN 400 MG: 400 TABLET ORAL at 16:53

## 2019-09-22 RX ADMIN — DOXYCYCLINE 100 MG: 100 INJECTION, POWDER, LYOPHILIZED, FOR SOLUTION INTRAVENOUS at 20:48

## 2019-09-22 RX ADMIN — Medication 10 ML: at 20:51

## 2019-09-22 RX ADMIN — FORMOTEROL FUMARATE DIHYDRATE 20 MCG: 20 SOLUTION RESPIRATORY (INHALATION) at 08:00

## 2019-09-22 RX ADMIN — METHYLPREDNISOLONE SODIUM SUCCINATE 80 MG: 125 INJECTION, POWDER, LYOPHILIZED, FOR SOLUTION INTRAMUSCULAR; INTRAVENOUS at 15:54

## 2019-09-22 RX ADMIN — GUAIFENESIN 400 MG: 400 TABLET ORAL at 10:29

## 2019-09-22 RX ADMIN — PANTOPRAZOLE SODIUM 40 MG: 40 TABLET, DELAYED RELEASE ORAL at 10:32

## 2019-09-22 RX ADMIN — POTASSIUM CHLORIDE 20 MEQ: 20 TABLET, EXTENDED RELEASE ORAL at 07:54

## 2019-09-22 RX ADMIN — DOXYCYCLINE 100 MG: 100 INJECTION, POWDER, LYOPHILIZED, FOR SOLUTION INTRAVENOUS at 10:30

## 2019-09-22 RX ADMIN — BUDESONIDE 500 MCG: 0.5 SUSPENSION RESPIRATORY (INHALATION) at 08:00

## 2019-09-22 RX ADMIN — VENLAFAXINE HYDROCHLORIDE 150 MG: 150 CAPSULE, EXTENDED RELEASE ORAL at 10:29

## 2019-09-22 RX ADMIN — Medication 10 ML: at 10:30

## 2019-09-22 RX ADMIN — GUAIFENESIN 400 MG: 400 TABLET ORAL at 14:32

## 2019-09-22 RX ADMIN — CARVEDILOL 25 MG: 25 TABLET, FILM COATED ORAL at 07:54

## 2019-09-22 RX ADMIN — CHOLECALCIFEROL TAB 50 MCG (2000 UNIT) 2000 UNITS: 50 TAB at 10:29

## 2019-09-22 RX ADMIN — Medication 1 TABLET: at 10:29

## 2019-09-22 RX ADMIN — VALSARTAN 80 MG: 80 TABLET, FILM COATED ORAL at 10:29

## 2019-09-22 RX ADMIN — ATORVASTATIN CALCIUM 20 MG: 20 TABLET, FILM COATED ORAL at 10:29

## 2019-09-22 RX ADMIN — BUDESONIDE 1000 MCG: 0.5 SUSPENSION RESPIRATORY (INHALATION) at 22:00

## 2019-09-22 ASSESSMENT — PAIN SCALES - GENERAL: PAINLEVEL_OUTOF10: 0

## 2019-09-23 ENCOUNTER — APPOINTMENT (OUTPATIENT)
Dept: CT IMAGING | Age: 74
DRG: 292 | End: 2019-09-23
Attending: FAMILY MEDICINE
Payer: MEDICARE

## 2019-09-23 PROBLEM — F09 COGNITIVE DYSFUNCTION: Status: ACTIVE | Noted: 2019-09-23

## 2019-09-23 LAB
ANION GAP SERPL CALCULATED.3IONS-SCNC: 10 MMOL/L (ref 7–16)
BUN BLDV-MCNC: 23 MG/DL (ref 8–23)
CALCIUM SERPL-MCNC: 8.8 MG/DL (ref 8.6–10.2)
CHLORIDE BLD-SCNC: 93 MMOL/L (ref 98–107)
CO2: 34 MMOL/L (ref 22–29)
CREAT SERPL-MCNC: 0.8 MG/DL (ref 0.5–1)
FOLATE: >20 NG/ML (ref 4.8–24.2)
GFR AFRICAN AMERICAN: >60
GFR NON-AFRICAN AMERICAN: >60 ML/MIN/1.73
GLUCOSE BLD-MCNC: 164 MG/DL (ref 74–99)
L. PNEUMOPHILA SEROGP 1 UR AG: NORMAL
POTASSIUM SERPL-SCNC: 5 MMOL/L (ref 3.5–5)
SODIUM BLD-SCNC: 137 MMOL/L (ref 132–146)
TSH SERPL DL<=0.05 MIU/L-ACNC: 0.27 UIU/ML (ref 0.27–4.2)
VITAMIN B-12: 550 PG/ML (ref 211–946)

## 2019-09-23 PROCEDURE — 6360000002 HC RX W HCPCS: Performed by: FAMILY MEDICINE

## 2019-09-23 PROCEDURE — 82607 VITAMIN B-12: CPT

## 2019-09-23 PROCEDURE — 6370000000 HC RX 637 (ALT 250 FOR IP): Performed by: FAMILY MEDICINE

## 2019-09-23 PROCEDURE — 2060000000 HC ICU INTERMEDIATE R&B

## 2019-09-23 PROCEDURE — 2700000000 HC OXYGEN THERAPY PER DAY

## 2019-09-23 PROCEDURE — 6360000004 HC RX CONTRAST MEDICATION: Performed by: RADIOLOGY

## 2019-09-23 PROCEDURE — 97535 SELF CARE MNGMENT TRAINING: CPT

## 2019-09-23 PROCEDURE — 2580000003 HC RX 258: Performed by: RADIOLOGY

## 2019-09-23 PROCEDURE — 84443 ASSAY THYROID STIM HORMONE: CPT

## 2019-09-23 PROCEDURE — 6360000002 HC RX W HCPCS: Performed by: INTERNAL MEDICINE

## 2019-09-23 PROCEDURE — 6370000000 HC RX 637 (ALT 250 FOR IP): Performed by: INTERNAL MEDICINE

## 2019-09-23 PROCEDURE — 82746 ASSAY OF FOLIC ACID SERUM: CPT

## 2019-09-23 PROCEDURE — 94640 AIRWAY INHALATION TREATMENT: CPT

## 2019-09-23 PROCEDURE — 97165 OT EVAL LOW COMPLEX 30 MIN: CPT

## 2019-09-23 PROCEDURE — 2580000003 HC RX 258: Performed by: FAMILY MEDICINE

## 2019-09-23 PROCEDURE — 36415 COLL VENOUS BLD VENIPUNCTURE: CPT

## 2019-09-23 PROCEDURE — 2500000003 HC RX 250 WO HCPCS: Performed by: FAMILY MEDICINE

## 2019-09-23 PROCEDURE — 80048 BASIC METABOLIC PNL TOTAL CA: CPT

## 2019-09-23 PROCEDURE — 70470 CT HEAD/BRAIN W/O & W/DYE: CPT

## 2019-09-23 RX ORDER — IPRATROPIUM BROMIDE AND ALBUTEROL SULFATE 2.5; .5 MG/3ML; MG/3ML
1 SOLUTION RESPIRATORY (INHALATION) EVERY 4 HOURS
Status: DISCONTINUED | OUTPATIENT
Start: 2019-09-23 | End: 2019-09-26

## 2019-09-23 RX ORDER — SODIUM CHLORIDE 0.9 % (FLUSH) 0.9 %
10 SYRINGE (ML) INJECTION ONCE
Status: COMPLETED | OUTPATIENT
Start: 2019-09-23 | End: 2019-09-23

## 2019-09-23 RX ORDER — METHYLPREDNISOLONE SODIUM SUCCINATE 40 MG/ML
40 INJECTION, POWDER, LYOPHILIZED, FOR SOLUTION INTRAMUSCULAR; INTRAVENOUS EVERY 8 HOURS
Status: DISCONTINUED | OUTPATIENT
Start: 2019-09-24 | End: 2019-09-26

## 2019-09-23 RX ADMIN — DILTIAZEM HYDROCHLORIDE 240 MG: 240 CAPSULE, COATED, EXTENDED RELEASE ORAL at 09:42

## 2019-09-23 RX ADMIN — METHYLPREDNISOLONE SODIUM SUCCINATE 40 MG: 40 INJECTION, POWDER, LYOPHILIZED, FOR SOLUTION INTRAMUSCULAR; INTRAVENOUS at 23:49

## 2019-09-23 RX ADMIN — BUDESONIDE 1000 MCG: 0.5 SUSPENSION RESPIRATORY (INHALATION) at 20:21

## 2019-09-23 RX ADMIN — GUAIFENESIN 400 MG: 400 TABLET ORAL at 16:24

## 2019-09-23 RX ADMIN — PANTOPRAZOLE SODIUM 40 MG: 40 TABLET, DELAYED RELEASE ORAL at 09:42

## 2019-09-23 RX ADMIN — VALSARTAN 80 MG: 80 TABLET, FILM COATED ORAL at 09:42

## 2019-09-23 RX ADMIN — GUAIFENESIN 400 MG: 400 TABLET ORAL at 12:17

## 2019-09-23 RX ADMIN — Medication 10 ML: at 22:00

## 2019-09-23 RX ADMIN — VENLAFAXINE HYDROCHLORIDE 150 MG: 150 CAPSULE, EXTENDED RELEASE ORAL at 09:42

## 2019-09-23 RX ADMIN — ATORVASTATIN CALCIUM 20 MG: 20 TABLET, FILM COATED ORAL at 09:42

## 2019-09-23 RX ADMIN — METHYLPREDNISOLONE SODIUM SUCCINATE 80 MG: 125 INJECTION, POWDER, LYOPHILIZED, FOR SOLUTION INTRAMUSCULAR; INTRAVENOUS at 16:24

## 2019-09-23 RX ADMIN — CHOLECALCIFEROL TAB 50 MCG (2000 UNIT) 2000 UNITS: 50 TAB at 09:42

## 2019-09-23 RX ADMIN — LEVOTHYROXINE SODIUM 175 MCG: 125 TABLET ORAL at 05:56

## 2019-09-23 RX ADMIN — Medication 10 ML: at 21:56

## 2019-09-23 RX ADMIN — BUDESONIDE 1000 MCG: 0.5 SUSPENSION RESPIRATORY (INHALATION) at 09:13

## 2019-09-23 RX ADMIN — IPRATROPIUM BROMIDE AND ALBUTEROL SULFATE 1 AMPULE: 2.5; .5 SOLUTION RESPIRATORY (INHALATION) at 20:21

## 2019-09-23 RX ADMIN — IOPAMIDOL 70 ML: 755 INJECTION, SOLUTION INTRAVENOUS at 21:08

## 2019-09-23 RX ADMIN — Medication 10 ML: at 23:49

## 2019-09-23 RX ADMIN — DOXYCYCLINE 100 MG: 100 INJECTION, POWDER, LYOPHILIZED, FOR SOLUTION INTRAVENOUS at 21:56

## 2019-09-23 RX ADMIN — Medication 10 ML: at 09:43

## 2019-09-23 RX ADMIN — METHYLPREDNISOLONE SODIUM SUCCINATE 80 MG: 125 INJECTION, POWDER, LYOPHILIZED, FOR SOLUTION INTRAMUSCULAR; INTRAVENOUS at 09:42

## 2019-09-23 RX ADMIN — FORMOTEROL FUMARATE DIHYDRATE 20 MCG: 20 SOLUTION RESPIRATORY (INHALATION) at 09:14

## 2019-09-23 RX ADMIN — Medication 1 TABLET: at 09:42

## 2019-09-23 RX ADMIN — GUAIFENESIN 400 MG: 400 TABLET ORAL at 09:42

## 2019-09-23 RX ADMIN — ENOXAPARIN SODIUM 40 MG: 40 INJECTION SUBCUTANEOUS at 09:43

## 2019-09-23 RX ADMIN — DOXYCYCLINE 100 MG: 100 INJECTION, POWDER, LYOPHILIZED, FOR SOLUTION INTRAVENOUS at 09:42

## 2019-09-23 RX ADMIN — GUAIFENESIN 400 MG: 400 TABLET ORAL at 21:56

## 2019-09-23 ASSESSMENT — PAIN SCALES - GENERAL
PAINLEVEL_OUTOF10: 0

## 2019-09-24 LAB
ANION GAP SERPL CALCULATED.3IONS-SCNC: 8 MMOL/L (ref 7–16)
BUN BLDV-MCNC: 23 MG/DL (ref 8–23)
CALCIUM SERPL-MCNC: 9.3 MG/DL (ref 8.6–10.2)
CHLORIDE BLD-SCNC: 91 MMOL/L (ref 98–107)
CO2: 35 MMOL/L (ref 22–29)
CREAT SERPL-MCNC: 0.8 MG/DL (ref 0.5–1)
CULTURE, RESPIRATORY: NORMAL
GFR AFRICAN AMERICAN: >60
GFR NON-AFRICAN AMERICAN: >60 ML/MIN/1.73
GLUCOSE BLD-MCNC: 156 MG/DL (ref 74–99)
POTASSIUM SERPL-SCNC: 4.8 MMOL/L (ref 3.5–5)
SMEAR, RESPIRATORY: NORMAL
SODIUM BLD-SCNC: 134 MMOL/L (ref 132–146)

## 2019-09-24 PROCEDURE — 97530 THERAPEUTIC ACTIVITIES: CPT

## 2019-09-24 PROCEDURE — 2580000003 HC RX 258: Performed by: FAMILY MEDICINE

## 2019-09-24 PROCEDURE — 80048 BASIC METABOLIC PNL TOTAL CA: CPT

## 2019-09-24 PROCEDURE — 6360000002 HC RX W HCPCS: Performed by: FAMILY MEDICINE

## 2019-09-24 PROCEDURE — 2700000000 HC OXYGEN THERAPY PER DAY

## 2019-09-24 PROCEDURE — 6370000000 HC RX 637 (ALT 250 FOR IP): Performed by: INTERNAL MEDICINE

## 2019-09-24 PROCEDURE — 6370000000 HC RX 637 (ALT 250 FOR IP): Performed by: NURSE PRACTITIONER

## 2019-09-24 PROCEDURE — 94640 AIRWAY INHALATION TREATMENT: CPT

## 2019-09-24 PROCEDURE — 36415 COLL VENOUS BLD VENIPUNCTURE: CPT

## 2019-09-24 PROCEDURE — 6370000000 HC RX 637 (ALT 250 FOR IP): Performed by: FAMILY MEDICINE

## 2019-09-24 PROCEDURE — 6360000002 HC RX W HCPCS: Performed by: INTERNAL MEDICINE

## 2019-09-24 PROCEDURE — 2500000003 HC RX 250 WO HCPCS: Performed by: FAMILY MEDICINE

## 2019-09-24 PROCEDURE — 2060000000 HC ICU INTERMEDIATE R&B

## 2019-09-24 RX ADMIN — VALSARTAN 80 MG: 80 TABLET, FILM COATED ORAL at 09:16

## 2019-09-24 RX ADMIN — BUDESONIDE 1000 MCG: 0.5 SUSPENSION RESPIRATORY (INHALATION) at 09:06

## 2019-09-24 RX ADMIN — IPRATROPIUM BROMIDE AND ALBUTEROL SULFATE 1 AMPULE: 2.5; .5 SOLUTION RESPIRATORY (INHALATION) at 12:01

## 2019-09-24 RX ADMIN — IPRATROPIUM BROMIDE AND ALBUTEROL SULFATE 1 AMPULE: 2.5; .5 SOLUTION RESPIRATORY (INHALATION) at 16:07

## 2019-09-24 RX ADMIN — Medication 10 ML: at 15:37

## 2019-09-24 RX ADMIN — METHYLPREDNISOLONE SODIUM SUCCINATE 40 MG: 40 INJECTION, POWDER, LYOPHILIZED, FOR SOLUTION INTRAMUSCULAR; INTRAVENOUS at 15:37

## 2019-09-24 RX ADMIN — BUDESONIDE 1000 MCG: 0.5 SUSPENSION RESPIRATORY (INHALATION) at 20:24

## 2019-09-24 RX ADMIN — CHOLECALCIFEROL TAB 50 MCG (2000 UNIT) 2000 UNITS: 50 TAB at 09:16

## 2019-09-24 RX ADMIN — Medication 10 ML: at 08:00

## 2019-09-24 RX ADMIN — GUAIFENESIN 400 MG: 400 TABLET ORAL at 13:53

## 2019-09-24 RX ADMIN — VENLAFAXINE HYDROCHLORIDE 150 MG: 150 CAPSULE, EXTENDED RELEASE ORAL at 09:17

## 2019-09-24 RX ADMIN — LEVOTHYROXINE SODIUM 175 MCG: 125 TABLET ORAL at 06:22

## 2019-09-24 RX ADMIN — ENOXAPARIN SODIUM 40 MG: 40 INJECTION SUBCUTANEOUS at 09:16

## 2019-09-24 RX ADMIN — GUAIFENESIN 400 MG: 400 TABLET ORAL at 15:36

## 2019-09-24 RX ADMIN — DOXYCYCLINE 100 MG: 100 INJECTION, POWDER, LYOPHILIZED, FOR SOLUTION INTRAVENOUS at 21:19

## 2019-09-24 RX ADMIN — IPRATROPIUM BROMIDE AND ALBUTEROL SULFATE 1 AMPULE: 2.5; .5 SOLUTION RESPIRATORY (INHALATION) at 20:24

## 2019-09-24 RX ADMIN — Medication 10 ML: at 21:19

## 2019-09-24 RX ADMIN — GUAIFENESIN 400 MG: 400 TABLET ORAL at 09:16

## 2019-09-24 RX ADMIN — ATORVASTATIN CALCIUM 20 MG: 20 TABLET, FILM COATED ORAL at 09:18

## 2019-09-24 RX ADMIN — APIXABAN 5 MG: 5 TABLET, FILM COATED ORAL at 21:19

## 2019-09-24 RX ADMIN — METHYLPREDNISOLONE SODIUM SUCCINATE 40 MG: 40 INJECTION, POWDER, LYOPHILIZED, FOR SOLUTION INTRAMUSCULAR; INTRAVENOUS at 08:00

## 2019-09-24 RX ADMIN — Medication 1 TABLET: at 09:16

## 2019-09-24 RX ADMIN — IPRATROPIUM BROMIDE AND ALBUTEROL SULFATE 1 AMPULE: 2.5; .5 SOLUTION RESPIRATORY (INHALATION) at 09:07

## 2019-09-24 RX ADMIN — GUAIFENESIN 400 MG: 400 TABLET ORAL at 21:18

## 2019-09-24 RX ADMIN — DILTIAZEM HYDROCHLORIDE 240 MG: 240 CAPSULE, COATED, EXTENDED RELEASE ORAL at 09:16

## 2019-09-24 RX ADMIN — DOXYCYCLINE 100 MG: 100 INJECTION, POWDER, LYOPHILIZED, FOR SOLUTION INTRAVENOUS at 09:16

## 2019-09-24 RX ADMIN — PANTOPRAZOLE SODIUM 40 MG: 40 TABLET, DELAYED RELEASE ORAL at 09:16

## 2019-09-24 ASSESSMENT — PAIN SCALES - GENERAL
PAINLEVEL_OUTOF10: 0

## 2019-09-25 ENCOUNTER — APPOINTMENT (OUTPATIENT)
Dept: GENERAL RADIOLOGY | Age: 74
DRG: 292 | End: 2019-09-25
Attending: FAMILY MEDICINE
Payer: MEDICARE

## 2019-09-25 LAB
ANION GAP SERPL CALCULATED.3IONS-SCNC: 12 MMOL/L (ref 7–16)
BUN BLDV-MCNC: 29 MG/DL (ref 8–23)
CALCIUM SERPL-MCNC: 9 MG/DL (ref 8.6–10.2)
CHLORIDE BLD-SCNC: 94 MMOL/L (ref 98–107)
CO2: 31 MMOL/L (ref 22–29)
CREAT SERPL-MCNC: 0.7 MG/DL (ref 0.5–1)
GFR AFRICAN AMERICAN: >60
GFR NON-AFRICAN AMERICAN: >60 ML/MIN/1.73
GLUCOSE BLD-MCNC: 131 MG/DL (ref 74–99)
POTASSIUM SERPL-SCNC: 4.4 MMOL/L (ref 3.5–5)
SODIUM BLD-SCNC: 137 MMOL/L (ref 132–146)

## 2019-09-25 PROCEDURE — 97530 THERAPEUTIC ACTIVITIES: CPT

## 2019-09-25 PROCEDURE — 6360000002 HC RX W HCPCS: Performed by: INTERNAL MEDICINE

## 2019-09-25 PROCEDURE — 94640 AIRWAY INHALATION TREATMENT: CPT

## 2019-09-25 PROCEDURE — 6370000000 HC RX 637 (ALT 250 FOR IP): Performed by: NURSE PRACTITIONER

## 2019-09-25 PROCEDURE — 2580000003 HC RX 258: Performed by: FAMILY MEDICINE

## 2019-09-25 PROCEDURE — 71045 X-RAY EXAM CHEST 1 VIEW: CPT

## 2019-09-25 PROCEDURE — 6370000000 HC RX 637 (ALT 250 FOR IP): Performed by: INTERNAL MEDICINE

## 2019-09-25 PROCEDURE — 6360000002 HC RX W HCPCS: Performed by: FAMILY MEDICINE

## 2019-09-25 PROCEDURE — 2700000000 HC OXYGEN THERAPY PER DAY

## 2019-09-25 PROCEDURE — 2500000003 HC RX 250 WO HCPCS: Performed by: FAMILY MEDICINE

## 2019-09-25 PROCEDURE — 97535 SELF CARE MNGMENT TRAINING: CPT

## 2019-09-25 PROCEDURE — 36415 COLL VENOUS BLD VENIPUNCTURE: CPT

## 2019-09-25 PROCEDURE — 6370000000 HC RX 637 (ALT 250 FOR IP): Performed by: FAMILY MEDICINE

## 2019-09-25 PROCEDURE — 2060000000 HC ICU INTERMEDIATE R&B

## 2019-09-25 PROCEDURE — 80048 BASIC METABOLIC PNL TOTAL CA: CPT

## 2019-09-25 RX ORDER — VALSARTAN 160 MG/1
160 TABLET ORAL DAILY
Status: DISCONTINUED | OUTPATIENT
Start: 2019-09-25 | End: 2019-09-26 | Stop reason: HOSPADM

## 2019-09-25 RX ORDER — BUDESONIDE 0.5 MG/2ML
1 INHALANT ORAL 2 TIMES DAILY
Qty: 60 AMPULE | Refills: 3 | Status: ON HOLD | OUTPATIENT
Start: 2019-09-25 | End: 2020-08-30 | Stop reason: HOSPADM

## 2019-09-25 RX ORDER — VALSARTAN 160 MG/1
160 TABLET ORAL DAILY
Qty: 30 TABLET | Refills: 3 | Status: SHIPPED | OUTPATIENT
Start: 2019-09-26

## 2019-09-25 RX ORDER — DILTIAZEM HYDROCHLORIDE 240 MG/1
240 CAPSULE, COATED, EXTENDED RELEASE ORAL DAILY
Qty: 30 CAPSULE | Refills: 3 | Status: SHIPPED | OUTPATIENT
Start: 2019-09-26 | End: 2019-09-26 | Stop reason: HOSPADM

## 2019-09-25 RX ORDER — IPRATROPIUM BROMIDE AND ALBUTEROL SULFATE 2.5; .5 MG/3ML; MG/3ML
3 SOLUTION RESPIRATORY (INHALATION) EVERY 4 HOURS
Qty: 360 ML | Refills: 0 | Status: SHIPPED | OUTPATIENT
Start: 2019-09-25 | End: 2019-09-26 | Stop reason: HOSPADM

## 2019-09-25 RX ORDER — GUAIFENESIN 400 MG/1
400 TABLET ORAL 4 TIMES DAILY
Qty: 56 TABLET | Refills: 0 | Status: ON HOLD | OUTPATIENT
Start: 2019-09-25 | End: 2020-08-30 | Stop reason: HOSPADM

## 2019-09-25 RX ORDER — DOXYCYCLINE HYCLATE 100 MG
100 TABLET ORAL 2 TIMES DAILY
Qty: 10 TABLET | Refills: 0 | Status: SHIPPED | OUTPATIENT
Start: 2019-09-25 | End: 2019-09-30

## 2019-09-25 RX ORDER — PREDNISONE 10 MG/1
40 TABLET ORAL DAILY
Qty: 20 TABLET | Refills: 0 | Status: SHIPPED | OUTPATIENT
Start: 2019-09-25 | End: 2019-09-30

## 2019-09-25 RX ADMIN — APIXABAN 5 MG: 5 TABLET, FILM COATED ORAL at 20:05

## 2019-09-25 RX ADMIN — Medication 10 ML: at 17:19

## 2019-09-25 RX ADMIN — Medication 10 ML: at 00:49

## 2019-09-25 RX ADMIN — BUDESONIDE 1000 MCG: 0.5 SUSPENSION RESPIRATORY (INHALATION) at 09:04

## 2019-09-25 RX ADMIN — APIXABAN 5 MG: 5 TABLET, FILM COATED ORAL at 08:52

## 2019-09-25 RX ADMIN — IPRATROPIUM BROMIDE AND ALBUTEROL SULFATE 1 AMPULE: 2.5; .5 SOLUTION RESPIRATORY (INHALATION) at 17:14

## 2019-09-25 RX ADMIN — VALSARTAN 160 MG: 160 TABLET, FILM COATED ORAL at 10:43

## 2019-09-25 RX ADMIN — METHYLPREDNISOLONE SODIUM SUCCINATE 40 MG: 40 INJECTION, POWDER, LYOPHILIZED, FOR SOLUTION INTRAMUSCULAR; INTRAVENOUS at 08:53

## 2019-09-25 RX ADMIN — DOXYCYCLINE 100 MG: 100 INJECTION, POWDER, LYOPHILIZED, FOR SOLUTION INTRAVENOUS at 19:44

## 2019-09-25 RX ADMIN — GUAIFENESIN 400 MG: 400 TABLET ORAL at 20:05

## 2019-09-25 RX ADMIN — Medication 1 TABLET: at 08:52

## 2019-09-25 RX ADMIN — DILTIAZEM HYDROCHLORIDE 240 MG: 240 CAPSULE, COATED, EXTENDED RELEASE ORAL at 08:52

## 2019-09-25 RX ADMIN — ATORVASTATIN CALCIUM 20 MG: 20 TABLET, FILM COATED ORAL at 08:52

## 2019-09-25 RX ADMIN — Medication 10 ML: at 20:05

## 2019-09-25 RX ADMIN — METHYLPREDNISOLONE SODIUM SUCCINATE 40 MG: 40 INJECTION, POWDER, LYOPHILIZED, FOR SOLUTION INTRAMUSCULAR; INTRAVENOUS at 00:49

## 2019-09-25 RX ADMIN — PANTOPRAZOLE SODIUM 40 MG: 40 TABLET, DELAYED RELEASE ORAL at 08:52

## 2019-09-25 RX ADMIN — IPRATROPIUM BROMIDE AND ALBUTEROL SULFATE 1 AMPULE: 2.5; .5 SOLUTION RESPIRATORY (INHALATION) at 09:04

## 2019-09-25 RX ADMIN — GUAIFENESIN 400 MG: 400 TABLET ORAL at 13:27

## 2019-09-25 RX ADMIN — GUAIFENESIN 400 MG: 400 TABLET ORAL at 08:52

## 2019-09-25 RX ADMIN — Medication 10 ML: at 10:06

## 2019-09-25 RX ADMIN — VENLAFAXINE HYDROCHLORIDE 150 MG: 150 CAPSULE, EXTENDED RELEASE ORAL at 08:52

## 2019-09-25 RX ADMIN — CHOLECALCIFEROL TAB 50 MCG (2000 UNIT) 2000 UNITS: 50 TAB at 08:52

## 2019-09-25 RX ADMIN — LEVOTHYROXINE SODIUM 175 MCG: 125 TABLET ORAL at 06:19

## 2019-09-25 RX ADMIN — IPRATROPIUM BROMIDE AND ALBUTEROL SULFATE 1 AMPULE: 2.5; .5 SOLUTION RESPIRATORY (INHALATION) at 21:25

## 2019-09-25 RX ADMIN — DOXYCYCLINE 100 MG: 100 INJECTION, POWDER, LYOPHILIZED, FOR SOLUTION INTRAVENOUS at 08:52

## 2019-09-25 RX ADMIN — IPRATROPIUM BROMIDE AND ALBUTEROL SULFATE 1 AMPULE: 2.5; .5 SOLUTION RESPIRATORY (INHALATION) at 13:12

## 2019-09-25 RX ADMIN — METHYLPREDNISOLONE SODIUM SUCCINATE 40 MG: 40 INJECTION, POWDER, LYOPHILIZED, FOR SOLUTION INTRAMUSCULAR; INTRAVENOUS at 17:19

## 2019-09-25 RX ADMIN — BUDESONIDE 1000 MCG: 0.5 SUSPENSION RESPIRATORY (INHALATION) at 21:25

## 2019-09-25 RX ADMIN — GUAIFENESIN 400 MG: 400 TABLET ORAL at 17:19

## 2019-09-25 RX ADMIN — Medication 10 ML: at 08:53

## 2019-09-25 ASSESSMENT — PAIN SCALES - GENERAL
PAINLEVEL_OUTOF10: 0
PAINLEVEL_OUTOF10: 0

## 2019-09-26 VITALS
DIASTOLIC BLOOD PRESSURE: 74 MMHG | TEMPERATURE: 98 F | BODY MASS INDEX: 40.66 KG/M2 | HEIGHT: 66 IN | OXYGEN SATURATION: 95 % | SYSTOLIC BLOOD PRESSURE: 172 MMHG | RESPIRATION RATE: 18 BRPM | WEIGHT: 253 LBS | HEART RATE: 71 BPM

## 2019-09-26 LAB
ANION GAP SERPL CALCULATED.3IONS-SCNC: 11 MMOL/L (ref 7–16)
BUN BLDV-MCNC: 26 MG/DL (ref 8–23)
CALCIUM SERPL-MCNC: 9.6 MG/DL (ref 8.6–10.2)
CHLORIDE BLD-SCNC: 93 MMOL/L (ref 98–107)
CO2: 32 MMOL/L (ref 22–29)
CREAT SERPL-MCNC: 0.7 MG/DL (ref 0.5–1)
GFR AFRICAN AMERICAN: >60
GFR NON-AFRICAN AMERICAN: >60 ML/MIN/1.73
GLUCOSE BLD-MCNC: 144 MG/DL (ref 74–99)
POTASSIUM SERPL-SCNC: 4.3 MMOL/L (ref 3.5–5)
SODIUM BLD-SCNC: 136 MMOL/L (ref 132–146)

## 2019-09-26 PROCEDURE — 2700000000 HC OXYGEN THERAPY PER DAY

## 2019-09-26 PROCEDURE — 6370000000 HC RX 637 (ALT 250 FOR IP): Performed by: NURSE PRACTITIONER

## 2019-09-26 PROCEDURE — 2580000003 HC RX 258: Performed by: FAMILY MEDICINE

## 2019-09-26 PROCEDURE — 6370000000 HC RX 637 (ALT 250 FOR IP): Performed by: INTERNAL MEDICINE

## 2019-09-26 PROCEDURE — 6370000000 HC RX 637 (ALT 250 FOR IP): Performed by: FAMILY MEDICINE

## 2019-09-26 PROCEDURE — 6360000002 HC RX W HCPCS: Performed by: FAMILY MEDICINE

## 2019-09-26 PROCEDURE — 94640 AIRWAY INHALATION TREATMENT: CPT

## 2019-09-26 PROCEDURE — 36415 COLL VENOUS BLD VENIPUNCTURE: CPT

## 2019-09-26 PROCEDURE — 2500000003 HC RX 250 WO HCPCS: Performed by: FAMILY MEDICINE

## 2019-09-26 PROCEDURE — 80048 BASIC METABOLIC PNL TOTAL CA: CPT

## 2019-09-26 PROCEDURE — 6360000002 HC RX W HCPCS: Performed by: INTERNAL MEDICINE

## 2019-09-26 RX ORDER — IPRATROPIUM BROMIDE AND ALBUTEROL SULFATE 2.5; .5 MG/3ML; MG/3ML
3 SOLUTION RESPIRATORY (INHALATION) 4 TIMES DAILY
Qty: 360 ML | Refills: 5 | Status: ON HOLD | OUTPATIENT
Start: 2019-09-26 | End: 2020-08-30 | Stop reason: HOSPADM

## 2019-09-26 RX ORDER — LEVOTHYROXINE SODIUM 0.07 MG/1
150 TABLET ORAL DAILY
Status: DISCONTINUED | OUTPATIENT
Start: 2019-09-27 | End: 2019-09-26 | Stop reason: HOSPADM

## 2019-09-26 RX ORDER — IPRATROPIUM BROMIDE AND ALBUTEROL SULFATE 2.5; .5 MG/3ML; MG/3ML
1 SOLUTION RESPIRATORY (INHALATION) 4 TIMES DAILY
Status: DISCONTINUED | OUTPATIENT
Start: 2019-09-26 | End: 2019-09-26 | Stop reason: HOSPADM

## 2019-09-26 RX ORDER — NADOLOL 20 MG/1
10 TABLET ORAL ONCE
Status: COMPLETED | OUTPATIENT
Start: 2019-09-26 | End: 2019-09-26

## 2019-09-26 RX ORDER — DILTIAZEM HYDROCHLORIDE 300 MG/1
300 CAPSULE, COATED, EXTENDED RELEASE ORAL DAILY
Qty: 30 CAPSULE | Refills: 3 | Status: SHIPPED
Start: 2019-09-27 | End: 2020-08-20 | Stop reason: DRUGHIGH

## 2019-09-26 RX ADMIN — CHOLECALCIFEROL TAB 50 MCG (2000 UNIT) 2000 UNITS: 50 TAB at 09:46

## 2019-09-26 RX ADMIN — ATORVASTATIN CALCIUM 20 MG: 20 TABLET, FILM COATED ORAL at 09:46

## 2019-09-26 RX ADMIN — Medication 10 ML: at 09:47

## 2019-09-26 RX ADMIN — METHYLPREDNISOLONE SODIUM SUCCINATE 40 MG: 40 INJECTION, POWDER, LYOPHILIZED, FOR SOLUTION INTRAMUSCULAR; INTRAVENOUS at 09:47

## 2019-09-26 RX ADMIN — DOXYCYCLINE 100 MG: 100 INJECTION, POWDER, LYOPHILIZED, FOR SOLUTION INTRAVENOUS at 09:47

## 2019-09-26 RX ADMIN — MAGNESIUM OXIDE TAB 400 MG (241.3 MG ELEMENTAL MG) 800 MG: 400 (241.3 MG) TAB at 01:11

## 2019-09-26 RX ADMIN — METHYLPREDNISOLONE SODIUM SUCCINATE 40 MG: 40 INJECTION, POWDER, LYOPHILIZED, FOR SOLUTION INTRAMUSCULAR; INTRAVENOUS at 00:34

## 2019-09-26 RX ADMIN — IPRATROPIUM BROMIDE AND ALBUTEROL SULFATE 1 AMPULE: 2.5; .5 SOLUTION RESPIRATORY (INHALATION) at 09:41

## 2019-09-26 RX ADMIN — Medication 1 TABLET: at 09:46

## 2019-09-26 RX ADMIN — DILTIAZEM HYDROCHLORIDE 240 MG: 240 CAPSULE, COATED, EXTENDED RELEASE ORAL at 09:46

## 2019-09-26 RX ADMIN — GUAIFENESIN 400 MG: 400 TABLET ORAL at 09:47

## 2019-09-26 RX ADMIN — IPRATROPIUM BROMIDE AND ALBUTEROL SULFATE 1 AMPULE: 2.5; .5 SOLUTION RESPIRATORY (INHALATION) at 13:06

## 2019-09-26 RX ADMIN — BUDESONIDE 1000 MCG: 0.5 SUSPENSION RESPIRATORY (INHALATION) at 09:41

## 2019-09-26 RX ADMIN — VENLAFAXINE HYDROCHLORIDE 150 MG: 150 CAPSULE, EXTENDED RELEASE ORAL at 09:46

## 2019-09-26 RX ADMIN — LEVOTHYROXINE SODIUM 175 MCG: 125 TABLET ORAL at 05:16

## 2019-09-26 RX ADMIN — APIXABAN 5 MG: 5 TABLET, FILM COATED ORAL at 09:47

## 2019-09-26 RX ADMIN — PANTOPRAZOLE SODIUM 40 MG: 40 TABLET, DELAYED RELEASE ORAL at 09:47

## 2019-09-26 RX ADMIN — NADOLOL 10 MG: 20 TABLET ORAL at 04:46

## 2019-09-26 RX ADMIN — VALSARTAN 160 MG: 160 TABLET, FILM COATED ORAL at 09:46

## 2019-09-26 RX ADMIN — GUAIFENESIN 400 MG: 400 TABLET ORAL at 13:42

## 2019-09-26 ASSESSMENT — PAIN SCALES - GENERAL
PAINLEVEL_OUTOF10: 0

## 2020-04-21 ENCOUNTER — HOSPITAL ENCOUNTER (OUTPATIENT)
Age: 75
Discharge: HOME | End: 2020-04-21
Payer: MEDICARE

## 2020-04-21 LAB
ALBUMIN SERPL-MCNC: 4.5 G/DL (ref 3.5–5.2)
ALP BLD-CCNC: 84 U/L (ref 35–104)
ALT SERPL-CCNC: 13 U/L (ref 0–32)
ANION GAP SERPL CALCULATED.3IONS-SCNC: 13 MMOL/L (ref 7–16)
AST SERPL-CCNC: 18 U/L (ref 0–31)
BILIRUB SERPL-MCNC: 0.9 MG/DL (ref 0–1.2)
BUN BLDV-MCNC: 26 MG/DL (ref 8–23)
CALCIUM SERPL-MCNC: 9.9 MG/DL (ref 8.6–10.2)
CHLORIDE BLD-SCNC: 98 MMOL/L (ref 98–107)
CHOLESTEROL, TOTAL: 191 MG/DL (ref 0–199)
CO2: 29 MMOL/L (ref 22–29)
CREAT SERPL-MCNC: 1 MG/DL (ref 0.5–1)
GFR AFRICAN AMERICAN: >60
GFR NON-AFRICAN AMERICAN: 54 ML/MIN/1.73
GLUCOSE BLD-MCNC: 132 MG/DL (ref 74–99)
HBA1C MFR BLD: 5.4 % (ref 4–5.6)
HDLC SERPL-MCNC: 66 MG/DL
LDL CHOLESTEROL CALCULATED: 83 MG/DL (ref 0–99)
POTASSIUM SERPL-SCNC: 4.7 MMOL/L (ref 3.5–5)
SODIUM BLD-SCNC: 140 MMOL/L (ref 132–146)
TOTAL PROTEIN: 7.7 G/DL (ref 6.4–8.3)
TRIGL SERPL-MCNC: 211 MG/DL (ref 0–149)
TSH SERPL DL<=0.05 MIU/L-ACNC: 1.29 UIU/ML (ref 0.27–4.2)
VITAMIN D 25-HYDROXY: 29 NG/ML (ref 30–100)
VLDLC SERPL CALC-MCNC: 42 MG/DL

## 2020-04-21 PROCEDURE — 83036 HEMOGLOBIN GLYCOSYLATED A1C: CPT

## 2020-04-21 PROCEDURE — 80061 LIPID PANEL: CPT

## 2020-04-21 PROCEDURE — 82306 VITAMIN D 25 HYDROXY: CPT

## 2020-04-21 PROCEDURE — 84443 ASSAY THYROID STIM HORMONE: CPT

## 2020-04-21 PROCEDURE — 36415 COLL VENOUS BLD VENIPUNCTURE: CPT

## 2020-04-21 PROCEDURE — 80053 COMPREHEN METABOLIC PANEL: CPT

## 2020-04-24 ENCOUNTER — HOSPITAL ENCOUNTER (OUTPATIENT)
Age: 75
Discharge: HOME | End: 2020-04-24
Payer: MEDICARE

## 2020-04-24 LAB
HCT VFR BLD CALC: 35.1 % (ref 34–48)
HEMOGLOBIN: 10.9 G/DL (ref 11.5–15.5)
MCH RBC QN AUTO: 30.3 PG (ref 26–35)
MCHC RBC AUTO-ENTMCNC: 31.1 % (ref 32–34.5)
MCV RBC AUTO: 97.5 FL (ref 80–99.9)
PDW BLD-RTO: 12.8 FL (ref 11.5–15)
PLATELET # BLD: 187 E9/L (ref 130–450)
PMV BLD AUTO: 11.4 FL (ref 7–12)
RBC # BLD: 3.6 E12/L (ref 3.5–5.5)
WBC # BLD: 7.7 E9/L (ref 4.5–11.5)

## 2020-04-24 PROCEDURE — 36415 COLL VENOUS BLD VENIPUNCTURE: CPT

## 2020-04-24 PROCEDURE — 85027 COMPLETE CBC AUTOMATED: CPT

## 2020-07-10 ENCOUNTER — HOSPITAL ENCOUNTER (OUTPATIENT)
Dept: CARDIOLOGY | Age: 75
Discharge: HOME | End: 2020-07-10
Payer: MEDICARE

## 2020-07-10 PROCEDURE — 93880 EXTRACRANIAL BILAT STUDY: CPT

## 2020-07-10 NOTE — PROGRESS NOTES
[facility] Heart & Vascular Lab - [facility]    This is a pre read worksheet - prior to official physician interpretation    Elisa Mendoza  1945  Date of study: 7/10/20  [id number]    Indication for study:  Carotid artery stenosis  Study : Bilateral Carotid Artery Duplex Examination    Duplex examination of the RIGHT carotid artery system identifies atherosclerotic plaque. The peak systolic velocity in internal carotid artery was 307 centimeters / second. The maximum end diastolic velocity was 47 centimeters / second. The ICA/CCA ratio is 2.0. The right vertebral artery has antegrade flow. Duplex examination of the LEFT carotid artery system identifies atherosclerotic plaque. The peak systolic velocity in internal carotid artery was 178 centimeters / second. The maximum end diastolic velocity was 39 centimeters / second. The ICA/CCA ratio is 1.8. The left vertebral artery has antegrade flow.     RIGHT 40%  LEFT 40%   Both distal ICAS very tortuous        LAST STUDY  5/2/2019  Rt mild  Lt 40

## 2020-07-13 NOTE — PROCEDURES
[address]                  [address] [facility],  [facility]                                VASCULAR REPORT    PATIENT NAME: Zachery Gavin                      :        1945  MED REC NO:   [id number]                            ROOM:  ACCOUNT NO:   [de-identified]                           ADMIT DATE: 07/10/2020  PROVIDER:     Otoniel Iqbal MD    DATE OF PROCEDURE:  07/10/2020    CAROTID ULTRASOUND REPORT    INDICATION:  Bilateral carotid stenosis. FINDINGS:  Duplex scanning of the right carotid artery revealed the  patient has a moderate plaque with a peak internal carotid velocity of  540, diastolic velocity of 40 cm/sec with 40% stenosis. Duplex scanning of the left carotid artery revealed moderate plaque with  a peak internal carotid velocity of 478, diastolic velocity of 40 cm/sec  with 40% stenosis. IMPRESSION:  Bilateral carotid stenosis of 40%, with tortuous internal  carotid arteries, with worsening on the right compared to last year.         Moshe Davis MD    D: 2020 20:49:52       T: 2020 0:03:40     [email or web address]  Job#: [id number]     Doc#: [id number]    CC:

## 2020-07-16 ENCOUNTER — OFFICE VISIT (OUTPATIENT)
Dept: VASCULAR SURGERY | Age: 75
End: 2020-07-16
Payer: MEDICARE

## 2020-07-16 VITALS — HEIGHT: 66 IN | RESPIRATION RATE: 18 BRPM | WEIGHT: 240 LBS | BODY MASS INDEX: 38.57 KG/M2

## 2020-07-16 PROBLEM — I65.23 BILATERAL CAROTID ARTERY STENOSIS: Status: ACTIVE | Noted: 2020-07-16

## 2020-07-16 PROBLEM — Z99.81 O2 DEPENDENT: Status: ACTIVE | Noted: 2020-07-16

## 2020-07-16 PROCEDURE — 99213 OFFICE O/P EST LOW 20 MIN: CPT | Performed by: SURGERY

## 2020-07-16 RX ORDER — SUCRALFATE 1 G/1
1 TABLET ORAL 4 TIMES DAILY
Status: ON HOLD | COMMUNITY
End: 2020-08-30 | Stop reason: HOSPADM

## 2020-07-16 NOTE — PROGRESS NOTES
Chief Complaint:   Chief Complaint   Patient presents with    Circulatory Problem     carotid stenosis         HPI: Patient came to the office, supplemental oxygen, 2 L/min, for chronic obstructive lung disease, doing well, also has underlying coronary artery disease with history of atrial fibrillation      Patient denies any focal lateralizing neurological symptoms like loss of speech, vision or loss of function of extremity    Patient can walk short distances slowly, and denies any symptoms of rest pain    No Known Allergies    Current Outpatient Medications   Medication Sig Dispense Refill    sucralfate (CARAFATE) 1 GM tablet Take 1 g by mouth 4 times daily      ipratropium-albuterol (DUONEB) 0.5-2.5 (3) MG/3ML SOLN nebulizer solution Inhale 3 mLs into the lungs 4 times daily 360 mL 5    diltiazem (CARDIZEM CD) 300 MG extended release capsule Take 1 capsule by mouth daily 30 capsule 3    apixaban (ELIQUIS) 5 MG TABS tablet Take 1 tablet by mouth 2 times daily 60 tablet 2    budesonide (PULMICORT) 0.5 MG/2ML nebulizer suspension Take 4 mLs by nebulization 2 times daily 60 ampule 3    valsartan (DIOVAN) 160 MG tablet Take 1 tablet by mouth daily 30 tablet 3    guaiFENesin 400 MG tablet Take 1 tablet by mouth 4 times daily 56 tablet 0    Respiratory Therapy Supplies (FULL KIT NEBULIZER SET) MISC Use as directed with nebulized medication. 1 each 0    venlafaxine (EFFEXOR XR) 150 MG extended release capsule Take 150 mg by mouth daily      pantoprazole (PROTONIX) 40 MG tablet Take 1 tablet by mouth daily 30 tablet 3    atorvastatin (LIPITOR) 20 MG tablet Take 20 mg by mouth daily      Multiple Vitamins-Minerals (THERAPEUTIC MULTIVITAMIN-MINERALS) tablet Take 1 tablet by mouth daily      Cholecalciferol (VITAMIN D) 2000 UNITS CAPS capsule Take 1 capsule by mouth daily      Levothyroxine Sodium (SYNTHROID PO) Take 175 mcg by mouth daily        No current facility-administered medications for this visit. Past Medical History:   Diagnosis Date    Arthritis     Atrial fibrillation ([facility] [address]     Bilateral carotid artery stenosis 2020    Bronchitis     Carotid stenosis, bilateral 2012    Hyperlipidemia     Hypertension     Left carotid stenosis 2018    O2 dependent 2020    S/P carotid endarterectomy 10/6/2016    Stomach ulcer     Thyroid disease        Past Surgical History:   Procedure Laterality Date    APPENDECTOMY      BACK SURGERY      CHOLECYSTECTOMY      COLONOSCOPY      ENDOSCOPY, COLON, DIAGNOSTIC  01/10/2018    upper endo    EYE SURGERY      BILATERAL CATARACT    HYSTERECTOMY      JOINT REPLACEMENT  2011    RIGHT KNEE       Family History   Problem Relation Age of Onset    Other Mother         rheumatic fever    Cancer Sister         lung       Social History     Socioeconomic History    Marital status:      Spouse name: Not on file    Number of children: Not on file    Years of education: Not on file    Highest education level: Not on file   Occupational History    Not on file   Social Needs    Financial resource strain: Not on file    Food insecurity     Worry: Not on file     Inability: Not on file    Transportation needs     Medical: Not on file     Non-medical: Not on file   Tobacco Use    Smoking status: Former Smoker     Packs/day: 0.50     Types: Cigarettes     Last attempt to quit: 2012     Years since quittin.2    Smokeless tobacco: Never Used   Substance and Sexual Activity    Alcohol use: Not Currently    Drug use: No    Sexual activity: Not on file   Lifestyle    Physical activity     Days per week: Not on file     Minutes per session: Not on file    Stress: Not on file   Relationships    Social connections     Talks on phone: Not on file     Gets together: Not on file     Attends [employer]alism service: Not on file     Active member of club or organization: Not on file     Attends meetings of clubs or organizations: Not on

## 2020-07-21 ENCOUNTER — HOSPITAL ENCOUNTER (OUTPATIENT)
Age: 75
Discharge: HOME | End: 2020-07-23
Payer: MEDICARE

## 2020-07-21 LAB
ALBUMIN SERPL-MCNC: 4.3 G/DL (ref 3.5–5.2)
ALP BLD-CCNC: 74 U/L (ref 35–104)
ALT SERPL-CCNC: 14 U/L (ref 0–32)
ANION GAP SERPL CALCULATED.3IONS-SCNC: 14 MMOL/L (ref 7–16)
AST SERPL-CCNC: 20 U/L (ref 0–31)
BASOPHILS ABSOLUTE: 0.06 E9/L (ref 0–0.2)
BASOPHILS RELATIVE PERCENT: 0.9 % (ref 0–2)
BILIRUB SERPL-MCNC: 0.7 MG/DL (ref 0–1.2)
BUN BLDV-MCNC: 20 MG/DL (ref 8–23)
CALCIUM SERPL-MCNC: 9.7 MG/DL (ref 8.6–10.2)
CHLORIDE BLD-SCNC: 100 MMOL/L (ref 98–107)
CHOLESTEROL, TOTAL: 179 MG/DL (ref 0–199)
CO2: 27 MMOL/L (ref 22–29)
CREAT SERPL-MCNC: 0.9 MG/DL (ref 0.5–1)
EOSINOPHILS ABSOLUTE: 0.19 E9/L (ref 0.05–0.5)
EOSINOPHILS RELATIVE PERCENT: 2.7 % (ref 0–6)
GFR AFRICAN AMERICAN: >60
GFR NON-AFRICAN AMERICAN: >60 ML/MIN/1.73
GLUCOSE BLD-MCNC: 123 MG/DL (ref 74–99)
HBA1C MFR BLD: 6 % (ref 4–5.6)
HCT VFR BLD CALC: 36 % (ref 34–48)
HDLC SERPL-MCNC: 63 MG/DL
HEMOGLOBIN: 11.1 G/DL (ref 11.5–15.5)
IMMATURE GRANULOCYTES #: 0.02 E9/L
IMMATURE GRANULOCYTES %: 0.3 % (ref 0–5)
LDL CHOLESTEROL CALCULATED: 66 MG/DL (ref 0–99)
LYMPHOCYTES ABSOLUTE: 1.48 E9/L (ref 1.5–4)
LYMPHOCYTES RELATIVE PERCENT: 21.3 % (ref 20–42)
MCH RBC QN AUTO: 30.3 PG (ref 26–35)
MCHC RBC AUTO-ENTMCNC: 30.8 % (ref 32–34.5)
MCV RBC AUTO: 98.4 FL (ref 80–99.9)
MONOCYTES ABSOLUTE: 0.57 E9/L (ref 0.1–0.95)
MONOCYTES RELATIVE PERCENT: 8.2 % (ref 2–12)
NEUTROPHILS ABSOLUTE: 4.62 E9/L (ref 1.8–7.3)
NEUTROPHILS RELATIVE PERCENT: 66.6 % (ref 43–80)
PDW BLD-RTO: 12.8 FL (ref 11.5–15)
PLATELET # BLD: 191 E9/L (ref 130–450)
PMV BLD AUTO: 11.9 FL (ref 7–12)
POTASSIUM SERPL-SCNC: 4.9 MMOL/L (ref 3.5–5)
RBC # BLD: 3.66 E12/L (ref 3.5–5.5)
SODIUM BLD-SCNC: 141 MMOL/L (ref 132–146)
TOTAL PROTEIN: 7.3 G/DL (ref 6.4–8.3)
TRIGL SERPL-MCNC: 251 MG/DL (ref 0–149)
TSH SERPL DL<=0.05 MIU/L-ACNC: 0.69 UIU/ML (ref 0.27–4.2)
VITAMIN D 25-HYDROXY: 34 NG/ML (ref 30–100)
VLDLC SERPL CALC-MCNC: 50 MG/DL
WBC # BLD: 6.9 E9/L (ref 4.5–11.5)

## 2020-07-21 PROCEDURE — 80053 COMPREHEN METABOLIC PANEL: CPT

## 2020-07-21 PROCEDURE — 80061 LIPID PANEL: CPT

## 2020-07-21 PROCEDURE — 83036 HEMOGLOBIN GLYCOSYLATED A1C: CPT

## 2020-07-21 PROCEDURE — 82306 VITAMIN D 25 HYDROXY: CPT

## 2020-07-21 PROCEDURE — 84443 ASSAY THYROID STIM HORMONE: CPT

## 2020-07-21 PROCEDURE — 85025 COMPLETE CBC W/AUTO DIFF WBC: CPT

## 2020-08-20 ENCOUNTER — OFFICE VISIT (OUTPATIENT)
Dept: ORTHOPEDIC SURGERY | Age: 75
End: 2020-08-20
Payer: MEDICARE

## 2020-08-20 VITALS — HEIGHT: 66 IN | BODY MASS INDEX: 38.57 KG/M2 | TEMPERATURE: 96.9 F | WEIGHT: 240 LBS

## 2020-08-20 PROBLEM — E66.812 CLASS 2 OBESITY DUE TO EXCESS CALORIES WITH BODY MASS INDEX (BMI) OF 38.0 TO 38.9 IN ADULT: Status: ACTIVE | Noted: 2020-08-20

## 2020-08-20 PROBLEM — M47.816 LUMBAR SPONDYLOSIS: Status: ACTIVE | Noted: 2020-08-20

## 2020-08-20 PROBLEM — M17.12 PRIMARY OSTEOARTHRITIS OF LEFT KNEE: Status: ACTIVE | Noted: 2020-08-20

## 2020-08-20 PROBLEM — E66.09 CLASS 2 OBESITY DUE TO EXCESS CALORIES WITH BODY MASS INDEX (BMI) OF 38.0 TO 38.9 IN ADULT: Status: ACTIVE | Noted: 2020-08-20

## 2020-08-20 PROCEDURE — 99204 OFFICE O/P NEW MOD 45 MIN: CPT | Performed by: ORTHOPAEDIC SURGERY

## 2020-08-20 PROCEDURE — 20610 DRAIN/INJ JOINT/BURSA W/O US: CPT | Performed by: ORTHOPAEDIC SURGERY

## 2020-08-20 RX ORDER — DILTIAZEM HYDROCHLORIDE 360 MG/1
CAPSULE, EXTENDED RELEASE ORAL
Status: ON HOLD | COMMUNITY
Start: 2020-07-24 | End: 2020-08-30 | Stop reason: HOSPADM

## 2020-08-20 RX ORDER — LEVOTHYROXINE SODIUM 175 UG/1
150 TABLET ORAL
COMMUNITY
Start: 2020-07-24 | End: 2021-08-02 | Stop reason: DRUGHIGH

## 2020-08-20 RX ORDER — TRIAMCINOLONE ACETONIDE 40 MG/ML
80 INJECTION, SUSPENSION INTRA-ARTICULAR; INTRAMUSCULAR ONCE
Status: COMPLETED | OUTPATIENT
Start: 2020-08-20 | End: 2020-08-20

## 2020-08-20 RX ORDER — BUPIVACAINE HYDROCHLORIDE 2.5 MG/ML
3 INJECTION, SOLUTION INFILTRATION; PERINEURAL ONCE
Status: COMPLETED | OUTPATIENT
Start: 2020-08-20 | End: 2020-08-20

## 2020-08-20 RX ADMIN — BUPIVACAINE HYDROCHLORIDE 7.5 MG: 2.5 INJECTION, SOLUTION INFILTRATION; PERINEURAL at 10:13

## 2020-08-20 RX ADMIN — TRIAMCINOLONE ACETONIDE 80 MG: 40 INJECTION, SUSPENSION INTRA-ARTICULAR; INTRAMUSCULAR at 10:13

## 2020-08-20 NOTE — PROGRESS NOTES
Chief Complaint:   Chief Complaint   Patient presents with    Knee Pain     Left knee pain radiates down shin, No X-rays. Hx of Right TKA        HPI   49-year-old woman with history of knee osteoarthritis. Right total knee arthroplasty by Dr. Yasmin Luna in 2011. She is complaining of several months of increasing left knee medial aspect pain especially in the past 2 weeks. No new injury recalled. She is walking now with a cane. She is also [place] and [place] [facility] States she has had multiple lumbar injections including \"burning\" followed by 2 additional injections yesterday. She has history of congestive heart failure and presents using home oxygen portable unit.     Patient Active Problem List   Diagnosis    Carotid bruit    Essential hypertension    Mixed hyperlipidemia    S/P carotid endarterectomy    Hypoxia    CHF (congestive heart failure) (HCC)    Atrial fibrillation (HCC)    Diastolic dysfunction    Asthma    Bronchitis    Cognitive dysfunction    Bilateral carotid artery stenosis    O2 dependent    Primary osteoarthritis of left knee    Lumbar spondylosis    Class 2 obesity due to excess calories with body mass index (BMI) of 38.0 to 38.9 in adult       Past Medical History:   Diagnosis Date    Arthritis     Atrial fibrillation ([address]     Bilateral carotid artery stenosis 7/16/2020    Bronchitis     Carotid stenosis, bilateral 5/21/2012    Hyperlipidemia     Hypertension     Left carotid stenosis 4/26/2018    O2 dependent 7/16/2020    S/P carotid endarterectomy 10/6/2016    Stomach ulcer     Thyroid disease        Past Surgical History:   Procedure Laterality Date    APPENDECTOMY      BACK SURGERY      CHOLECYSTECTOMY      COLONOSCOPY      ENDOSCOPY, COLON, DIAGNOSTIC  01/10/2018    upper endo    EYE SURGERY      BILATERAL CATARACT    HYSTERECTOMY      JOINT REPLACEMENT  2011    RIGHT KNEE       Current Outpatient Medications   Medication Sig Dispense Refill    dilTIAZem (CARDIZEM CD) 360 MG extended release capsule TAKE 1 CAPSULE BY MOUTH ONCE DAILY      levothyroxine (SYNTHROID) 175 MCG tablet TAKE 1 TABLET BY MOUTH ONCE DAILY      OXYGEN Inhale into the lungs      sucralfate (CARAFATE) 1 GM tablet Take 1 g by mouth 4 times daily      apixaban (ELIQUIS) 5 MG TABS tablet Take 1 tablet by mouth 2 times daily 60 tablet 2    valsartan (DIOVAN) 160 MG tablet Take 1 tablet by mouth daily 30 tablet 3    venlafaxine (EFFEXOR XR) 150 MG extended release capsule Take 150 mg by mouth daily      atorvastatin (LIPITOR) 20 MG tablet Take 20 mg by mouth daily      ipratropium-albuterol (DUONEB) 0.5-2.5 (3) MG/3ML SOLN nebulizer solution Inhale 3 mLs into the lungs 4 times daily 360 mL 5    budesonide (PULMICORT) 0.5 MG/2ML nebulizer suspension Take 4 mLs by nebulization 2 times daily 60 ampule 3    guaiFENesin 400 MG tablet Take 1 tablet by mouth 4 times daily 56 tablet 0    Respiratory Therapy Supplies (FULL KIT NEBULIZER SET) MISC Use as directed with nebulized medication.  1 each 0    pantoprazole (PROTONIX) 40 MG tablet Take 1 tablet by mouth daily 30 tablet 3    Multiple Vitamins-Minerals (THERAPEUTIC MULTIVITAMIN-MINERALS) tablet Take 1 tablet by mouth daily      Cholecalciferol (VITAMIN D) 2000 UNITS CAPS capsule Take 1 capsule by mouth daily       Current Facility-Administered Medications   Medication Dose Route Frequency Provider Last Rate Last Dose    triamcinolone acetonide (KENALOG-40) injection 80 mg  80 mg Intra-articular Once Bon Mcdonald MD        bupivacaine (MARCAINE) 0.25 % injection 7.5 mg  3 mL Intra-articular Once Bon Mcdonald MD           No Known Allergies    Social History     Socioeconomic History    Marital status:      Spouse name: None    Number of children: None    Years of education: None    Highest education level: None   Occupational History    None Social Needs    Financial resource strain: None    Food insecurity     Worry: None     Inability: None    Transportation needs     Medical: None     Non-medical: None   Tobacco Use    Smoking status: Former Smoker     Packs/day: 0.50     Types: Cigarettes     Last attempt to quit: 2012     Years since quittin.3    Smokeless tobacco: Never Used   Substance and Sexual Activity    Alcohol use: Not Currently    Drug use: No    Sexual activity: None   Lifestyle    Physical activity     Days per week: None     Minutes per session: None    Stress: None   Relationships    Social connections     Talks on phone: None     Gets together: None     Attends Catholic service: None     Active member of club or organization: None     Attends meetings of clubs or organizations: None     Relationship status: None    Intimate partner violence     Fear of current or ex partner: None     Emotionally abused: None     Physically abused: None     Forced sexual activity: None   Other Topics Concern    None   Social History Narrative    None       Family History   Problem Relation Age of Onset    Other Mother         rheumatic fever    Cancer Sister         lung         Review of Systems   No fever, chills, or other constitutionalsymptoms. No numbness or other neuro symptoms. Denies chest pain. Some chronic dyspnea but no acute shortness of breath. [unfilled]   Walking full weightbearing with antalgic limp favoring left knee. No cane with her today. Left knee exam limited by her body habitus obesity. But I am not able to appreciate any acute effusion. Range of motion 0 to 110 degrees. No pain with left hip rotation. Right knee demonstrates old surgical incision no effusion or crepitation with range of motion 0 to 120 degrees without pain. Physical Exam    Patient is alert and oriented. A bit upset and tearful at times during the discussion. Well-developed well-nourished.   Central and peripheral obesity BMI 39  Pupils equal and reactive. Scleraeanicteric. Neck supple  Lungs clear. Slightly dyspneic. Cardiac rate and rhythm regular. Abdomen soft and nontender. Skin warm and dry. XRAY:   Knee x-rays today including bilateral standing AP, flexion weightbearing with lateral view of the left knee. There is a cemented right total knee arthroplasty in place without evidence of loosening or failure. Left knee shows stage IV medial compartment joint space narrowing with varus. There is also severe patellofemoral degenerative narrowing sclerosis and osteophyte formation. Impression: Severe osteoarthritic changes left knee. Intact right total knee arthroplasty. ASSESSMENT/PLAN:    Maura Dubois was seen today for knee pain. Diagnoses and all orders for this visit:    Primary osteoarthritis of left knee  -     WI ARTHROCENTESIS ASPIR&/INJ MAJOR JT/BURSA W/O US  -     Amb External Referral To Physical Therapy    Chronic pain of left knee  -     XR KNEE BILATERAL STANDING; Future  -     XR KNEE LEFT (1-2 VIEWS); Future    Congestive heart failure, unspecified HF chronicity, unspecified heart failure type (HCC)    Lumbar spondylosis    Class 2 obesity due to excess calories with body mass index (BMI) of 38.0 to 38.9 in adult, unspecified whether serious comorbidity present    Other orders  -     triamcinolone acetonide (KENALOG-40) injection 80 mg  -     bupivacaine (MARCAINE) 0.25 % injection 7.5 mg    Findings and images reviewed with the patient. She is reluctant to consider surgical treatment such as knee replacement. I would agree since her obesity her congestive heart failure and need for oxygen therapy and her lumbar spondylosis by history all are all relative risk contraindications to surgical treatment. Nonoperative management includes physical therapy. Steroid injections may be  Also useful.   After lengthy discussion of risks option limitations she agreed to proceed with PT and requested left knee steroid injection today. After review of indications, risks and limitations, after alcohol prep, left knee injection with triamcinolone 80mg and 3 cc 0.25% marcaine given today. Pt tolerated without complication. Return in about 6 weeks (around 10/1/2020).        Martha Alfaro MD    8/20/2020  10:08 AM

## 2020-08-27 ENCOUNTER — APPOINTMENT (OUTPATIENT)
Dept: GENERAL RADIOLOGY | Age: 75
DRG: 309 | End: 2020-08-27
Payer: MEDICARE

## 2020-08-27 ENCOUNTER — HOSPITAL ENCOUNTER (INPATIENT)
Age: 75
LOS: 1 days | Discharge: HOME | DRG: 309 | End: 2020-08-30
Attending: EMERGENCY MEDICINE | Admitting: FAMILY MEDICINE
Payer: MEDICARE

## 2020-08-27 PROBLEM — R06.09 DOE (DYSPNEA ON EXERTION): Status: ACTIVE | Noted: 2020-08-27

## 2020-08-27 LAB
ALBUMIN SERPL-MCNC: 4.5 G/DL (ref 3.5–5.2)
ALP BLD-CCNC: 70 U/L (ref 35–104)
ALT SERPL-CCNC: 27 U/L (ref 0–32)
ANION GAP SERPL CALCULATED.3IONS-SCNC: 15 MMOL/L (ref 7–16)
APTT: 33.5 SEC (ref 24.5–35.1)
AST SERPL-CCNC: 16 U/L (ref 0–31)
BASOPHILS ABSOLUTE: 0.01 E9/L (ref 0–0.2)
BASOPHILS RELATIVE PERCENT: 0.1 % (ref 0–2)
BILIRUB SERPL-MCNC: 0.9 MG/DL (ref 0–1.2)
BUN BLDV-MCNC: 38 MG/DL (ref 8–23)
BURR CELLS: ABNORMAL
CALCIUM SERPL-MCNC: 9.6 MG/DL (ref 8.6–10.2)
CHLORIDE BLD-SCNC: 98 MMOL/L (ref 98–107)
CO2: 20 MMOL/L (ref 22–29)
CREAT SERPL-MCNC: 1.3 MG/DL (ref 0.5–1)
D DIMER: <200 NG/ML DDU
EOSINOPHILS ABSOLUTE: 0.04 E9/L (ref 0.05–0.5)
EOSINOPHILS RELATIVE PERCENT: 0.3 % (ref 0–6)
GFR AFRICAN AMERICAN: 48
GFR NON-AFRICAN AMERICAN: 40 ML/MIN/1.73
GLUCOSE BLD-MCNC: 148 MG/DL (ref 74–99)
HCT VFR BLD CALC: 41.4 % (ref 34–48)
HEMOGLOBIN: 13.6 G/DL (ref 11.5–15.5)
IMMATURE GRANULOCYTES #: 0.13 E9/L
IMMATURE GRANULOCYTES %: 1 % (ref 0–5)
INR BLD: 1.4
LACTIC ACID: 2.9 MMOL/L (ref 0.5–2.2)
LYMPHOCYTES ABSOLUTE: 1.57 E9/L (ref 1.5–4)
LYMPHOCYTES RELATIVE PERCENT: 12.1 % (ref 20–42)
MCH RBC QN AUTO: 30.4 PG (ref 26–35)
MCHC RBC AUTO-ENTMCNC: 32.9 % (ref 32–34.5)
MCV RBC AUTO: 92.6 FL (ref 80–99.9)
MONOCYTES ABSOLUTE: 0.74 E9/L (ref 0.1–0.95)
MONOCYTES RELATIVE PERCENT: 5.7 % (ref 2–12)
NEUTROPHILS ABSOLUTE: 10.45 E9/L (ref 1.8–7.3)
NEUTROPHILS RELATIVE PERCENT: 80.8 % (ref 43–80)
OVALOCYTES: ABNORMAL
PDW BLD-RTO: 12.8 FL (ref 11.5–15)
PLATELET # BLD: 250 E9/L (ref 130–450)
PMV BLD AUTO: 11.8 FL (ref 7–12)
POIKILOCYTES: ABNORMAL
POTASSIUM REFLEX MAGNESIUM: 4.8 MMOL/L (ref 3.5–5)
PROTHROMBIN TIME: 16.1 SEC (ref 9.3–12.4)
RBC # BLD: 4.47 E12/L (ref 3.5–5.5)
SODIUM BLD-SCNC: 133 MMOL/L (ref 132–146)
TOTAL PROTEIN: 7.3 G/DL (ref 6.4–8.3)
TROPONIN: <0.01 NG/ML (ref 0–0.03)
TROPONIN: <0.01 NG/ML (ref 0–0.03)
WBC # BLD: 12.9 E9/L (ref 4.5–11.5)

## 2020-08-27 PROCEDURE — 99284 EMERGENCY DEPT VISIT MOD MDM: CPT

## 2020-08-27 PROCEDURE — G0378 HOSPITAL OBSERVATION PER HR: HCPCS

## 2020-08-27 PROCEDURE — 6370000000 HC RX 637 (ALT 250 FOR IP): Performed by: EMERGENCY MEDICINE

## 2020-08-27 PROCEDURE — 84484 ASSAY OF TROPONIN QUANT: CPT

## 2020-08-27 PROCEDURE — 94664 DEMO&/EVAL PT USE INHALER: CPT

## 2020-08-27 PROCEDURE — 6360000002 HC RX W HCPCS: Performed by: NURSE PRACTITIONER

## 2020-08-27 PROCEDURE — 83605 ASSAY OF LACTIC ACID: CPT

## 2020-08-27 PROCEDURE — 85025 COMPLETE CBC W/AUTO DIFF WBC: CPT

## 2020-08-27 PROCEDURE — 85610 PROTHROMBIN TIME: CPT

## 2020-08-27 PROCEDURE — 85730 THROMBOPLASTIN TIME PARTIAL: CPT

## 2020-08-27 PROCEDURE — 6370000000 HC RX 637 (ALT 250 FOR IP): Performed by: NURSE PRACTITIONER

## 2020-08-27 PROCEDURE — 85378 FIBRIN DEGRADE SEMIQUANT: CPT

## 2020-08-27 PROCEDURE — 2580000003 HC RX 258: Performed by: INTERNAL MEDICINE

## 2020-08-27 PROCEDURE — 36415 COLL VENOUS BLD VENIPUNCTURE: CPT

## 2020-08-27 PROCEDURE — 71045 X-RAY EXAM CHEST 1 VIEW: CPT

## 2020-08-27 PROCEDURE — 2580000003 HC RX 258: Performed by: EMERGENCY MEDICINE

## 2020-08-27 PROCEDURE — 2580000003 HC RX 258: Performed by: NURSE PRACTITIONER

## 2020-08-27 PROCEDURE — 94640 AIRWAY INHALATION TREATMENT: CPT

## 2020-08-27 PROCEDURE — 96374 THER/PROPH/DIAG INJ IV PUSH: CPT

## 2020-08-27 PROCEDURE — 2500000003 HC RX 250 WO HCPCS: Performed by: EMERGENCY MEDICINE

## 2020-08-27 PROCEDURE — 93005 ELECTROCARDIOGRAM TRACING: CPT | Performed by: EMERGENCY MEDICINE

## 2020-08-27 PROCEDURE — 93005 ELECTROCARDIOGRAM TRACING: CPT | Performed by: INTERNAL MEDICINE

## 2020-08-27 PROCEDURE — 80053 COMPREHEN METABOLIC PANEL: CPT

## 2020-08-27 RX ORDER — ACETAMINOPHEN 650 MG/1
650 SUPPOSITORY RECTAL EVERY 6 HOURS PRN
Status: DISCONTINUED | OUTPATIENT
Start: 2020-08-27 | End: 2020-08-30 | Stop reason: HOSPADM

## 2020-08-27 RX ORDER — SODIUM CHLORIDE 0.9 % (FLUSH) 0.9 %
10 SYRINGE (ML) INJECTION PRN
Status: DISCONTINUED | OUTPATIENT
Start: 2020-08-27 | End: 2020-08-30 | Stop reason: HOSPADM

## 2020-08-27 RX ORDER — BUDESONIDE 0.5 MG/2ML
1 INHALANT ORAL 2 TIMES DAILY
Status: DISCONTINUED | OUTPATIENT
Start: 2020-08-27 | End: 2020-08-30 | Stop reason: HOSPADM

## 2020-08-27 RX ORDER — LEVOTHYROXINE SODIUM 175 UG/1
175 TABLET ORAL DAILY
Status: DISCONTINUED | OUTPATIENT
Start: 2020-08-27 | End: 2020-08-30 | Stop reason: HOSPADM

## 2020-08-27 RX ORDER — DILTIAZEM HYDROCHLORIDE 180 MG/1
180 CAPSULE, COATED, EXTENDED RELEASE ORAL DAILY
Status: DISCONTINUED | OUTPATIENT
Start: 2020-08-27 | End: 2020-08-27

## 2020-08-27 RX ORDER — SUCRALFATE 1 G/1
1 TABLET ORAL 4 TIMES DAILY
Status: DISCONTINUED | OUTPATIENT
Start: 2020-08-27 | End: 2020-08-30 | Stop reason: HOSPADM

## 2020-08-27 RX ORDER — VALSARTAN 160 MG/1
160 TABLET ORAL DAILY
Status: DISCONTINUED | OUTPATIENT
Start: 2020-08-27 | End: 2020-08-30 | Stop reason: HOSPADM

## 2020-08-27 RX ORDER — SODIUM CHLORIDE 9 MG/ML
1000 INJECTION, SOLUTION INTRAVENOUS CONTINUOUS
Status: DISCONTINUED | OUTPATIENT
Start: 2020-08-27 | End: 2020-08-28

## 2020-08-27 RX ORDER — DILTIAZEM HYDROCHLORIDE 120 MG/1
120 CAPSULE, COATED, EXTENDED RELEASE ORAL DAILY
Status: DISCONTINUED | OUTPATIENT
Start: 2020-08-27 | End: 2020-08-27

## 2020-08-27 RX ORDER — GUAIFENESIN 400 MG/1
400 TABLET ORAL 4 TIMES DAILY
Status: DISCONTINUED | OUTPATIENT
Start: 2020-08-27 | End: 2020-08-29

## 2020-08-27 RX ORDER — SODIUM CHLORIDE 0.9 % (FLUSH) 0.9 %
10 SYRINGE (ML) INJECTION EVERY 12 HOURS SCHEDULED
Status: DISCONTINUED | OUTPATIENT
Start: 2020-08-27 | End: 2020-08-30 | Stop reason: HOSPADM

## 2020-08-27 RX ORDER — ATORVASTATIN CALCIUM 20 MG/1
20 TABLET, FILM COATED ORAL DAILY
Status: DISCONTINUED | OUTPATIENT
Start: 2020-08-27 | End: 2020-08-30 | Stop reason: HOSPADM

## 2020-08-27 RX ORDER — ASPIRIN 81 MG/1
81 TABLET, CHEWABLE ORAL DAILY
Status: DISCONTINUED | OUTPATIENT
Start: 2020-08-28 | End: 2020-08-27

## 2020-08-27 RX ORDER — IPRATROPIUM BROMIDE AND ALBUTEROL SULFATE 2.5; .5 MG/3ML; MG/3ML
3 SOLUTION RESPIRATORY (INHALATION) 4 TIMES DAILY
Status: DISCONTINUED | OUTPATIENT
Start: 2020-08-27 | End: 2020-08-29

## 2020-08-27 RX ORDER — ONDANSETRON 2 MG/ML
4 INJECTION INTRAMUSCULAR; INTRAVENOUS EVERY 6 HOURS PRN
Status: DISCONTINUED | OUTPATIENT
Start: 2020-08-27 | End: 2020-08-27

## 2020-08-27 RX ORDER — DILTIAZEM HYDROCHLORIDE 5 MG/ML
10 INJECTION INTRAVENOUS ONCE
Status: COMPLETED | OUTPATIENT
Start: 2020-08-27 | End: 2020-08-27

## 2020-08-27 RX ORDER — DOFETILIDE 0.12 MG/1
125 CAPSULE ORAL EVERY 12 HOURS SCHEDULED
Status: DISCONTINUED | OUTPATIENT
Start: 2020-08-27 | End: 2020-08-28

## 2020-08-27 RX ORDER — PANTOPRAZOLE SODIUM 40 MG/1
40 TABLET, DELAYED RELEASE ORAL DAILY
Status: DISCONTINUED | OUTPATIENT
Start: 2020-08-27 | End: 2020-08-30 | Stop reason: HOSPADM

## 2020-08-27 RX ORDER — CHOLECALCIFEROL (VITAMIN D3) 50 MCG
2000 TABLET ORAL DAILY
Status: DISCONTINUED | OUTPATIENT
Start: 2020-08-27 | End: 2020-08-30 | Stop reason: HOSPADM

## 2020-08-27 RX ORDER — DILTIAZEM HYDROCHLORIDE 120 MG/1
120 CAPSULE, COATED, EXTENDED RELEASE ORAL ONCE
Status: COMPLETED | OUTPATIENT
Start: 2020-08-27 | End: 2020-08-27

## 2020-08-27 RX ORDER — ACETAMINOPHEN 325 MG/1
650 TABLET ORAL EVERY 6 HOURS PRN
Status: DISCONTINUED | OUTPATIENT
Start: 2020-08-27 | End: 2020-08-30 | Stop reason: HOSPADM

## 2020-08-27 RX ORDER — VENLAFAXINE HYDROCHLORIDE 150 MG/1
150 CAPSULE, EXTENDED RELEASE ORAL DAILY
Status: DISCONTINUED | OUTPATIENT
Start: 2020-08-27 | End: 2020-08-30 | Stop reason: HOSPADM

## 2020-08-27 RX ORDER — SODIUM CHLORIDE 9 MG/ML
1000 INJECTION, SOLUTION INTRAVENOUS CONTINUOUS
Status: DISCONTINUED | OUTPATIENT
Start: 2020-08-27 | End: 2020-08-27

## 2020-08-27 RX ORDER — POLYETHYLENE GLYCOL 3350 17 G/17G
17 POWDER, FOR SOLUTION ORAL DAILY PRN
Status: DISCONTINUED | OUTPATIENT
Start: 2020-08-27 | End: 2020-08-30 | Stop reason: HOSPADM

## 2020-08-27 RX ORDER — PROMETHAZINE HYDROCHLORIDE 25 MG/1
12.5 TABLET ORAL EVERY 6 HOURS PRN
Status: DISCONTINUED | OUTPATIENT
Start: 2020-08-27 | End: 2020-08-30 | Stop reason: HOSPADM

## 2020-08-27 RX ORDER — M-VIT,TX,IRON,MINS/CALC/FOLIC 27MG-0.4MG
1 TABLET ORAL DAILY
Status: DISCONTINUED | OUTPATIENT
Start: 2020-08-27 | End: 2020-08-30 | Stop reason: HOSPADM

## 2020-08-27 RX ADMIN — SODIUM CHLORIDE, PRESERVATIVE FREE 10 ML: 5 INJECTION INTRAVENOUS at 22:42

## 2020-08-27 RX ADMIN — GUAIFENESIN 400 MG: 400 TABLET, FILM COATED ORAL at 22:42

## 2020-08-27 RX ADMIN — IPRATROPIUM BROMIDE AND ALBUTEROL SULFATE 3 ML: .5; 3 SOLUTION RESPIRATORY (INHALATION) at 20:32

## 2020-08-27 RX ADMIN — APIXABAN 5 MG: 5 TABLET, FILM COATED ORAL at 22:42

## 2020-08-27 RX ADMIN — BUDESONIDE 1000 MCG: 0.5 INHALANT RESPIRATORY (INHALATION) at 20:32

## 2020-08-27 RX ADMIN — SODIUM CHLORIDE 1000 ML: 9 INJECTION, SOLUTION INTRAVENOUS at 13:27

## 2020-08-27 RX ADMIN — DILTIAZEM HYDROCHLORIDE 10 MG: 5 INJECTION INTRAVENOUS at 13:22

## 2020-08-27 RX ADMIN — ATORVASTATIN CALCIUM 20 MG: 20 TABLET, FILM COATED ORAL at 22:42

## 2020-08-27 RX ADMIN — SODIUM CHLORIDE 1000 ML: 9 INJECTION, SOLUTION INTRAVENOUS at 22:44

## 2020-08-27 RX ADMIN — DILTIAZEM HYDROCHLORIDE 120 MG: 120 CAPSULE, COATED, EXTENDED RELEASE ORAL at 14:50

## 2020-08-27 ASSESSMENT — PAIN SCALES - GENERAL
PAINLEVEL_OUTOF10: 0
PAINLEVEL_OUTOF10: 0

## 2020-08-27 NOTE — ED NOTES
Bed: 17B-17  Expected date:   Expected time:   Means of arrival:   Comments:  Mayi Guerrero RN  08/27/20 1523

## 2020-08-27 NOTE — ED PROVIDER NOTES
uvular edema  Neck: Supple, full ROM, non tender to palpation in the midline, no stridor, no crepitus, no meningeal signs  Respiratory: Lungs clear to auscultation bilaterally, no wheezes, rales, or rhonchi. Not in respiratory distress  Cardiovascular:  irr irr rate and rhythm. No murmurs, gallops, or rubs. 2+ distal pulses  Chest: No chest wall tenderness  GI:  Abdomen Soft, Non tender, Non distended. +BS. No organomegaly, no palpable masses,  No rebound, guarding, or rigidity. Musculoskeletal: Moves all extremities x 4. Warm and well perfused, no clubbing, cyanosis, or edema. Capillary refill <3 seconds  Integument: skin warm and dry. No rashes. Lymphatic: no lymphadenopathy noted  Neurologic: GCS 15, no focal deficits, symmetric strength 5/5 in the upper and lower extremities bilaterally  Psychiatric: Normal Affect    -------------------------------------------------- RESULTS -------------------------------------------------  I have personally reviewed all laboratory and imaging results for this patient. Results are listed below.      LABS:  Results for orders placed or performed during the hospital encounter of 08/27/20   CBC Auto Differential   Result Value Ref Range    WBC 12.9 (H) 4.5 - 11.5 E9/L    RBC 4.47 3.50 - 5.50 E12/L    Hemoglobin 13.6 11.5 - 15.5 g/dL    Hematocrit 41.4 34.0 - 48.0 %    MCV 92.6 80.0 - 99.9 fL    MCH 30.4 26.0 - 35.0 pg    MCHC 32.9 32.0 - 34.5 %    RDW 12.8 11.5 - 15.0 fL    Platelets 571 312 - 314 E9/L    MPV 11.8 7.0 - 12.0 fL    Neutrophils % 80.8 (H) 43.0 - 80.0 %    Immature Granulocytes % 1.0 0.0 - 5.0 %    Lymphocytes % 12.1 (L) 20.0 - 42.0 %    Monocytes % 5.7 2.0 - 12.0 %    Eosinophils % 0.3 0.0 - 6.0 %    Basophils % 0.1 0.0 - 2.0 %    Neutrophils Absolute 10.45 (H) 1.80 - 7.30 E9/L    Immature Granulocytes # 0.13 E9/L    Lymphocytes Absolute 1.57 1.50 - 4.00 E9/L    Monocytes Absolute 0.74 0.10 - 0.95 E9/L    Eosinophils Absolute 0.04 (L) 0.05 - 0.50 E9/L Basophils Absolute 0.01 0.00 - 0.20 E9/L    Poikilocytes 1+     Rochester Cells 1+     Ovalocytes 1+    Comprehensive Metabolic Panel w/ Reflex to MG   Result Value Ref Range    Sodium 133 132 - 146 mmol/L    Potassium reflex Magnesium 4.8 3.5 - 5.0 mmol/L    Chloride 98 98 - 107 mmol/L    CO2 20 (L) 22 - 29 mmol/L    Anion Gap 15 7 - 16 mmol/L    Glucose 148 (H) 74 - 99 mg/dL    BUN 38 (H) 8 - 23 mg/dL    CREATININE 1.3 (H) 0.5 - 1.0 mg/dL    GFR Non-African American 40 >=60 mL/min/1.73    GFR African American 48     Calcium 9.6 8.6 - 10.2 mg/dL    Total Protein 7.3 6.4 - 8.3 g/dL    Alb 4.5 3.5 - 5.2 g/dL    Total Bilirubin 0.9 0.0 - 1.2 mg/dL    Alkaline Phosphatase 70 35 - 104 U/L    ALT 27 0 - 32 U/L    AST 16 0 - 31 U/L   Troponin   Result Value Ref Range    Troponin <0.01 0.00 - 0.03 ng/mL   APTT   Result Value Ref Range    aPTT 33.5 24.5 - 35.1 sec   Protime-INR   Result Value Ref Range    Protime 16.1 (H) 9.3 - 12.4 sec    INR 1.4    D-Dimer, Quantitative   Result Value Ref Range    D-Dimer, Quant <200 ng/mL DDU   Lactic Acid, Plasma   Result Value Ref Range    Lactic Acid 2.9 (H) 0.5 - 2.2 mmol/L       RADIOLOGY:  Interpreted by Radiologist.  XR CHEST PORTABLE   Final Result   Limited study, due to a poor inspiratory effort, no gross   abnormality                         EKG:  This EKG is signed and interpreted by the EP. Time: 1221  Rate: 105  Rhythm: Atrial fibrillation  Interpretation: non-specific EKG  Comparison: None      ------------------------- NURSING NOTES AND VITALS REVIEWED ---------------------------   The nursing notes within the ED encounter and vital signs as below have been reviewed by myself. /77   Pulse 77   Temp 98.3 °F (36.8 °C) (Oral)   Resp 18   Ht 5' 6\" (1.676 m)   Wt 230 lb (104.3 kg)   SpO2 100%   BMI 37.12 kg/m²   Oxygen Saturation Interpretation: Normal    The patients available past medical records and past encounters were reviewed. ------------------------------ ED COURSE/MEDICAL DECISION MAKING----------------------  Medications   0.9 % sodium chloride infusion ( Intravenous Rate/Dose Verify 8/27/20 1607)   aspirin EC tablet 325 mg (has no administration in time range)   dilTIAZem injection 10 mg (10 mg Intravenous Given 8/27/20 1322)   dilTIAZem (CARDIZEM CD) extended release capsule 120 mg (120 mg Oral Given 8/27/20 1450)         ED COURSE:       Medical Decision Making:    No cough/fever/signs of covid. Intermittent isaac with diaphoresis, better with rate control afib, concern for anginal equivalent, obs for further care      This patient's ED course included: a personal history and physicial examination    This patient has remained hemodynamically stable during their ED course. Re-Evaluations:             Re-evaluation. Patients symptoms are improving    Re-examination  8/27/20   12:25 PM EDT          Vital Signs:   Vitals:    08/27/20 1211 08/27/20 1318 08/27/20 1450 08/27/20 1829   BP:  (!) 117/54 116/65 120/77   Pulse: 101 90 67 77   Resp: 24 17 18 18   Temp: 98 °F (36.7 °C)   98.3 °F (36.8 °C)   TempSrc: Oral   Oral   SpO2: 97% 99% 99% 100%   Weight: 230 lb (104.3 kg)      Height: 5' 6\" (1.676 m)        Card/Pulm:  Rhythm: regularly irregular. Heart Sounds: no murmurs, gallops, or rubs. clear to auscultation, no wheezes or rales and unlabored breathing. Capillary Refill: normal.  Radial Pulse:  equal.  Skin:  Warm. Consultations:             Dr Zelalem Gray:         Counseling: The emergency provider has spoken with the patient and discussed todays results, in addition to providing specific details for the plan of care and counseling regarding the diagnosis and prognosis. Questions are answered at this time and they are agreeable with the plan.       --------------------------------- IMPRESSION AND DISPOSITION ---------------------------------    IMPRESSION  1. Dyspnea, unspecified type    2. Diaphoresis    3. Longstanding persistent atrial fibrillation        DISPOSITION  Disposition: Admit to telemetry  Patient condition is stable    NOTE: This report was transcribed using voice recognition software.  Every effort was made to ensure accuracy; however, inadvertent computerized transcription errors may be present        Kady Hutchinson MD  08/27/20 5798

## 2020-08-28 ENCOUNTER — APPOINTMENT (OUTPATIENT)
Dept: CT IMAGING | Age: 75
DRG: 309 | End: 2020-08-28
Payer: MEDICARE

## 2020-08-28 LAB
ADENOVIRUS BY PCR: NOT DETECTED
ANION GAP SERPL CALCULATED.3IONS-SCNC: 11 MMOL/L (ref 7–16)
ANION GAP SERPL CALCULATED.3IONS-SCNC: 14 MMOL/L (ref 7–16)
BORDETELLA PARAPERTUSSIS BY PCR: NOT DETECTED
BORDETELLA PERTUSSIS BY PCR: NOT DETECTED
BUN BLDV-MCNC: 40 MG/DL (ref 8–23)
BUN BLDV-MCNC: 45 MG/DL (ref 8–23)
CALCIUM SERPL-MCNC: 8.8 MG/DL (ref 8.6–10.2)
CALCIUM SERPL-MCNC: 8.9 MG/DL (ref 8.6–10.2)
CHLAMYDOPHILIA PNEUMONIAE BY PCR: NOT DETECTED
CHLORIDE BLD-SCNC: 101 MMOL/L (ref 98–107)
CHLORIDE BLD-SCNC: 98 MMOL/L (ref 98–107)
CHOLESTEROL, TOTAL: 139 MG/DL (ref 0–199)
CO2: 19 MMOL/L (ref 22–29)
CO2: 23 MMOL/L (ref 22–29)
CORONAVIRUS 229E BY PCR: NOT DETECTED
CORONAVIRUS HKU1 BY PCR: NOT DETECTED
CORONAVIRUS NL63 BY PCR: NOT DETECTED
CORONAVIRUS OC43 BY PCR: NOT DETECTED
CREAT SERPL-MCNC: 1.1 MG/DL (ref 0.5–1)
CREAT SERPL-MCNC: 1.3 MG/DL (ref 0.5–1)
EKG ATRIAL RATE: 125 BPM
EKG ATRIAL RATE: 62 BPM
EKG ATRIAL RATE: 64 BPM
EKG P AXIS: 55 DEGREES
EKG P AXIS: 57 DEGREES
EKG P-R INTERVAL: 140 MS
EKG P-R INTERVAL: 146 MS
EKG Q-T INTERVAL: 304 MS
EKG Q-T INTERVAL: 402 MS
EKG Q-T INTERVAL: 414 MS
EKG QRS DURATION: 72 MS
EKG QRS DURATION: 74 MS
EKG QRS DURATION: 74 MS
EKG QTC CALCULATION (BAZETT): 401 MS
EKG QTC CALCULATION (BAZETT): 408 MS
EKG QTC CALCULATION (BAZETT): 427 MS
EKG R AXIS: 36 DEGREES
EKG R AXIS: 39 DEGREES
EKG R AXIS: 47 DEGREES
EKG T AXIS: 46 DEGREES
EKG T AXIS: 55 DEGREES
EKG T AXIS: 66 DEGREES
EKG VENTRICULAR RATE: 105 BPM
EKG VENTRICULAR RATE: 62 BPM
EKG VENTRICULAR RATE: 64 BPM
GFR AFRICAN AMERICAN: 48
GFR AFRICAN AMERICAN: 58
GFR NON-AFRICAN AMERICAN: 40 ML/MIN/1.73
GFR NON-AFRICAN AMERICAN: 48 ML/MIN/1.73
GLUCOSE BLD-MCNC: 100 MG/DL (ref 74–99)
GLUCOSE BLD-MCNC: 105 MG/DL (ref 74–99)
HCT VFR BLD CALC: 37.5 % (ref 34–48)
HDLC SERPL-MCNC: 69 MG/DL
HEMOGLOBIN: 12.1 G/DL (ref 11.5–15.5)
HUMAN METAPNEUMOVIRUS BY PCR: NOT DETECTED
HUMAN RHINOVIRUS/ENTEROVIRUS BY PCR: NOT DETECTED
INFLUENZA A BY PCR: NOT DETECTED
INFLUENZA B BY PCR: NOT DETECTED
LDL CHOLESTEROL CALCULATED: 44 MG/DL (ref 0–99)
MAGNESIUM: 2 MG/DL (ref 1.6–2.6)
MCH RBC QN AUTO: 30.3 PG (ref 26–35)
MCHC RBC AUTO-ENTMCNC: 32.3 % (ref 32–34.5)
MCV RBC AUTO: 93.8 FL (ref 80–99.9)
MYCOPLASMA PNEUMONIAE BY PCR: NOT DETECTED
PARAINFLUENZA VIRUS 1 BY PCR: NOT DETECTED
PARAINFLUENZA VIRUS 2 BY PCR: NOT DETECTED
PARAINFLUENZA VIRUS 3 BY PCR: NOT DETECTED
PARAINFLUENZA VIRUS 4 BY PCR: NOT DETECTED
PDW BLD-RTO: 12.8 FL (ref 11.5–15)
PLATELET # BLD: 214 E9/L (ref 130–450)
PMV BLD AUTO: 11.3 FL (ref 7–12)
POTASSIUM REFLEX MAGNESIUM: 5.1 MMOL/L (ref 3.5–5)
POTASSIUM SERPL-SCNC: 5.5 MMOL/L (ref 3.5–5)
PRO-BNP: 374 PG/ML (ref 0–450)
RBC # BLD: 4 E12/L (ref 3.5–5.5)
RESPIRATORY SYNCYTIAL VIRUS BY PCR: NOT DETECTED
SARS-COV-2, NAAT: NOT DETECTED
SODIUM BLD-SCNC: 132 MMOL/L (ref 132–146)
SODIUM BLD-SCNC: 134 MMOL/L (ref 132–146)
TRIGL SERPL-MCNC: 129 MG/DL (ref 0–149)
TSH SERPL DL<=0.05 MIU/L-ACNC: 0.56 UIU/ML (ref 0.27–4.2)
VLDLC SERPL CALC-MCNC: 26 MG/DL
WBC # BLD: 16 E9/L (ref 4.5–11.5)

## 2020-08-28 PROCEDURE — 2700000000 HC OXYGEN THERAPY PER DAY

## 2020-08-28 PROCEDURE — 80061 LIPID PANEL: CPT

## 2020-08-28 PROCEDURE — G0378 HOSPITAL OBSERVATION PER HR: HCPCS | Performed by: FAMILY MEDICINE

## 2020-08-28 PROCEDURE — 83735 ASSAY OF MAGNESIUM: CPT

## 2020-08-28 PROCEDURE — 83880 ASSAY OF NATRIURETIC PEPTIDE: CPT

## 2020-08-28 PROCEDURE — 6360000002 HC RX W HCPCS: Performed by: NURSE PRACTITIONER

## 2020-08-28 PROCEDURE — 6370000000 HC RX 637 (ALT 250 FOR IP): Performed by: NURSE PRACTITIONER

## 2020-08-28 PROCEDURE — 84443 ASSAY THYROID STIM HORMONE: CPT

## 2020-08-28 PROCEDURE — 6370000000 HC RX 637 (ALT 250 FOR IP): Performed by: INTERNAL MEDICINE

## 2020-08-28 PROCEDURE — G0378 HOSPITAL OBSERVATION PER HR: HCPCS

## 2020-08-28 PROCEDURE — 0100U HC RESPIRPTHGN MULT REV TRANS & AMP PRB TECH 21 TRGT: CPT

## 2020-08-28 PROCEDURE — 85027 COMPLETE CBC AUTOMATED: CPT

## 2020-08-28 PROCEDURE — 36415 COLL VENOUS BLD VENIPUNCTURE: CPT

## 2020-08-28 PROCEDURE — 2580000003 HC RX 258: Performed by: NURSE PRACTITIONER

## 2020-08-28 PROCEDURE — 80048 BASIC METABOLIC PNL TOTAL CA: CPT

## 2020-08-28 PROCEDURE — 93005 ELECTROCARDIOGRAM TRACING: CPT | Performed by: INTERNAL MEDICINE

## 2020-08-28 PROCEDURE — 94640 AIRWAY INHALATION TREATMENT: CPT

## 2020-08-28 PROCEDURE — U0002 COVID-19 LAB TEST NON-CDC: HCPCS

## 2020-08-28 PROCEDURE — 71250 CT THORAX DX C-: CPT

## 2020-08-28 RX ORDER — DOFETILIDE 0.12 MG/1
125 CAPSULE ORAL EVERY 12 HOURS SCHEDULED
Status: DISCONTINUED | OUTPATIENT
Start: 2020-08-28 | End: 2020-08-30 | Stop reason: HOSPADM

## 2020-08-28 RX ORDER — NADOLOL 20 MG/1
20 TABLET ORAL ONCE
Status: DISCONTINUED | OUTPATIENT
Start: 2020-08-29 | End: 2020-08-29

## 2020-08-28 RX ORDER — AMLODIPINE BESYLATE 5 MG/1
5 TABLET ORAL DAILY
Status: DISCONTINUED | OUTPATIENT
Start: 2020-08-28 | End: 2020-08-29

## 2020-08-28 RX ADMIN — MULTIPLE VITAMINS W/ MINERALS TAB 1 TABLET: TAB at 08:56

## 2020-08-28 RX ADMIN — AMLODIPINE BESYLATE 5 MG: 5 TABLET ORAL at 20:20

## 2020-08-28 RX ADMIN — BUDESONIDE 1000 MCG: 0.5 INHALANT RESPIRATORY (INHALATION) at 19:49

## 2020-08-28 RX ADMIN — SODIUM CHLORIDE, PRESERVATIVE FREE 10 ML: 5 INJECTION INTRAVENOUS at 20:20

## 2020-08-28 RX ADMIN — APIXABAN 5 MG: 5 TABLET, FILM COATED ORAL at 20:20

## 2020-08-28 RX ADMIN — GUAIFENESIN 400 MG: 400 TABLET, FILM COATED ORAL at 15:49

## 2020-08-28 RX ADMIN — DOFETILIDE 125 MCG: 0.12 CAPSULE ORAL at 18:39

## 2020-08-28 RX ADMIN — SUCRALFATE 1 G: 1 TABLET ORAL at 15:49

## 2020-08-28 RX ADMIN — GUAIFENESIN 400 MG: 400 TABLET, FILM COATED ORAL at 20:20

## 2020-08-28 RX ADMIN — IPRATROPIUM BROMIDE AND ALBUTEROL SULFATE 3 ML: .5; 3 SOLUTION RESPIRATORY (INHALATION) at 19:50

## 2020-08-28 RX ADMIN — GUAIFENESIN 400 MG: 400 TABLET, FILM COATED ORAL at 08:56

## 2020-08-28 RX ADMIN — SODIUM CHLORIDE, PRESERVATIVE FREE 10 ML: 5 INJECTION INTRAVENOUS at 08:56

## 2020-08-28 RX ADMIN — IPRATROPIUM BROMIDE AND ALBUTEROL SULFATE 3 ML: .5; 3 SOLUTION RESPIRATORY (INHALATION) at 11:54

## 2020-08-28 RX ADMIN — SUCRALFATE 1 G: 1 TABLET ORAL at 08:56

## 2020-08-28 RX ADMIN — LEVOTHYROXINE SODIUM 175 MCG: 0.17 TABLET ORAL at 08:56

## 2020-08-28 RX ADMIN — VENLAFAXINE HYDROCHLORIDE 150 MG: 150 CAPSULE, EXTENDED RELEASE ORAL at 08:56

## 2020-08-28 RX ADMIN — IPRATROPIUM BROMIDE AND ALBUTEROL SULFATE 3 ML: .5; 3 SOLUTION RESPIRATORY (INHALATION) at 16:19

## 2020-08-28 RX ADMIN — PANTOPRAZOLE SODIUM 40 MG: 40 TABLET, DELAYED RELEASE ORAL at 08:56

## 2020-08-28 RX ADMIN — VALSARTAN 160 MG: 160 TABLET, FILM COATED ORAL at 08:56

## 2020-08-28 RX ADMIN — ACETAMINOPHEN 650 MG: 325 TABLET, FILM COATED ORAL at 22:58

## 2020-08-28 RX ADMIN — ATORVASTATIN CALCIUM 20 MG: 20 TABLET, FILM COATED ORAL at 08:56

## 2020-08-28 RX ADMIN — Medication 2000 UNITS: at 08:56

## 2020-08-28 RX ADMIN — SUCRALFATE 1 G: 1 TABLET ORAL at 20:20

## 2020-08-28 RX ADMIN — DOFETILIDE 125 MCG: 0.12 CAPSULE ORAL at 06:02

## 2020-08-28 RX ADMIN — ACETAMINOPHEN 650 MG: 325 TABLET, FILM COATED ORAL at 15:51

## 2020-08-28 RX ADMIN — APIXABAN 5 MG: 5 TABLET, FILM COATED ORAL at 08:56

## 2020-08-28 ASSESSMENT — PAIN SCALES - GENERAL
PAINLEVEL_OUTOF10: 0
PAINLEVEL_OUTOF10: 7
PAINLEVEL_OUTOF10: 0
PAINLEVEL_OUTOF10: 2

## 2020-08-28 ASSESSMENT — PAIN DESCRIPTION - LOCATION: LOCATION: HEAD

## 2020-08-28 ASSESSMENT — PAIN DESCRIPTION - PAIN TYPE: TYPE: ACUTE PAIN

## 2020-08-28 ASSESSMENT — PAIN DESCRIPTION - ONSET: ONSET: GRADUAL

## 2020-08-28 ASSESSMENT — PAIN - FUNCTIONAL ASSESSMENT: PAIN_FUNCTIONAL_ASSESSMENT: ACTIVITIES ARE NOT PREVENTED

## 2020-08-28 ASSESSMENT — PAIN DESCRIPTION - FREQUENCY: FREQUENCY: INTERMITTENT

## 2020-08-28 ASSESSMENT — PAIN DESCRIPTION - DESCRIPTORS: DESCRIPTORS: HEADACHE;ACHING;DISCOMFORT

## 2020-08-28 ASSESSMENT — PAIN DESCRIPTION - PROGRESSION: CLINICAL_PROGRESSION: NOT CHANGED

## 2020-08-28 ASSESSMENT — PAIN DESCRIPTION - ORIENTATION: ORIENTATION: MID

## 2020-08-28 NOTE — PROGRESS NOTES
Dr. Jeanmarie Dang M.D. [facility])  Nurse Practitioner Progress Note    Subjective: The patient is awake and alert. No problems overnight. Denies chest pain, angina, and dyspnea. Denies abdominal pain. Tolerating diet. No nausea or vomiting. States yesterday evening, she started coughing, non productive.        Current Facility-Administered Medications   Medication Dose Route Frequency Provider Last Rate Last Dose    dofetilide (TIKOSYN) capsule 125 mcg  125 mcg Oral 2 times per day Winifred Pizano DO   125 mcg at 08/28/20 0602    apixaban (ELIQUIS) tablet 5 mg  5 mg Oral BID GIO Diaz CNP   5 mg at 08/27/20 2242    atorvastatin (LIPITOR) tablet 20 mg  20 mg Oral Daily GIO Diaz CNP   20 mg at 08/27/20 2242    budesonide (PULMICORT) nebulizer suspension 1,000 mcg  1 mg Nebulization BID GIO Diaz CNP   1,000 mcg at 08/27/20 2032    vitamin D (CHOLECALCIFEROL) tablet 2,000 Units  2,000 Units Oral Daily GIO Diaz CNP        guaiFENesin tablet 400 mg  400 mg Oral 4x Daily GIO Diaz CNP   400 mg at 08/27/20 2242    ipratropium-albuterol (DUONEB) nebulizer solution 3 mL  3 mL Inhalation 4x daily GIO Diaz CNP   3 mL at 08/27/20 2032    levothyroxine (SYNTHROID) tablet 175 mcg  175 mcg Oral Daily GIO Diaz CNP        therapeutic multivitamin-minerals 1 tablet  1 tablet Oral Daily GIO Diaz CNP        pantoprazole (PROTONIX) tablet 40 mg  40 mg Oral Daily GIO Diaz CNP        sucralfate (CARAFATE) tablet 1 g  1 g Oral 4x Daily GIO Diaz CNP        valsartan (DIOVAN) tablet 160 mg  160 mg Oral Daily GIO Diaz CNP        venlafaxine (EFFEXOR XR) extended release capsule 150 mg  150 mg Oral Daily GIO Diaz CNP        sodium chloride flush 0.9 % injection 10 mL  10 mL Intravenous 2 times per day GIO Diaz CNP   10 mL at 20 2242    sodium chloride flush 0.9 % injection 10 mL  10 mL Intravenous PRN Cody Olsen, APRN - CNP        acetaminophen (TYLENOL) tablet 650 mg  650 mg Oral Q6H PRN Cody Olsen, APRN - CNP        Or    acetaminophen (TYLENOL) suppository 650 mg  650 mg Rectal Q6H PRN Cody Olsen, APRN - CNP        polyethylene glycol (GLYCOLAX) packet 17 g  17 g Oral Daily PRN Cody Olsen, APRN - CNP        promethazine (PHENERGAN) tablet 12.5 mg  12.5 mg Oral Q6H PRN Cody Olsen, APRN - CNP        0.9 % sodium chloride infusion  1,000 mL Intravenous Continuous Collie Search, DO   Stopped at 20 06      sodium chloride Stopped (20 06)       Objective:    Vital Signs:  /60   Pulse 67   Temp 98.6 °F (37 °C) (Temporal)   Resp 18   Ht 5' 6\" (1.676 m)   Wt 240 lb 9.6 oz (109.1 kg)   SpO2 98%   BMI 38.83 kg/m²   Temp: 98.6 °F (37 °C) Temp  Av.9 °F (36.6 °C)  Min: 96.9 °F (36.1 °C)  Max: 98.6 °F (37 °C)  Systolic (19YBI), VNV:905 , Min:116 , SIJ:786    Diastolic (22HOQ), MXR:20, Min:54, Max:77      Intake/Output:    Intake/Output Summary (Last 24 hours) at 2020 0834  Last data filed at 2020 0606  Gross per 24 hour   Intake 100 ml   Output 200 ml   Net -100 ml     No intake/output data recorded.     Weight:     Wt Readings from Last 3 Encounters:   20 240 lb 9.6 oz (109.1 kg)   20 240 lb (108.9 kg)   20 240 lb (108.9 kg)         Physical Exam:   General Appearance: alert and oriented to person, place and time, well developed and well- nourished, in no acute distress  Skin: warm and dry, no rash or erythema  Head: normocephalic and atraumatic  Eyes: pupils equal, round, and reactive to light, extraocular eye movements intact, conjunctivae normal  ENT: hearing is  normal bilaterally, nose without deformity, nasal mucosa and turbinates normal without polyps  Neck: supple and non-tender without mass, no thyromegaly , no cervical lymphadenopathy  Pulmonary/Chest:  Bilateral air entry is equal - diminished bilateral lower bases  Cardiovascular: normal rate, regular rhythm, normal S1 and S2, no murmurs,  Abdomen: soft, non-tender, non-distended, normal bowel sounds, no masses or organomegaly,  Extremities: no cyanosis, clubbing or edema    Labs:    CBC:   Recent Labs     20  1320 20  0452   WBC 12.9* 16.0*   HGB 13.6 12.1   HCT 41.4 37.5    214     BMP:   Recent Labs     20  0115 20  0452    134   K 5.5* 5.1*   CO2 23 19*   BUN 45* 40*   CREATININE 1.3* 1.1*   LABGLOM 40 48   CALCIUM 8.9 8.8     Mag:   Recent Labs     20  0115   MG 2.0     Phos: No results for input(s): PHOS in the last 72 hours. TSH:   Recent Labs     20  0452   TSH 0.562     HgA1c:   Lab Results   Component Value Date    LABA1C 6.0 (H) 2020     No results found for: EAG    BNP: No results for input(s): BNP in the last 72 hours. PT/INR:   Recent Labs     20  1320   PROTIME 16.1*   INR 1.4     APTT:  Recent Labs     20  1320   APTT 33.5     CARDIAC ENZYMES:  Recent Labs     20  1320 20  2000   TROPONINI <0.01 <0.01     FASTING LIPID PANEL:  Lab Results   Component Value Date    CHOL 139 2020    HDL 69 2020    LDLCALC 44 2020    TRIG 129 2020     LIVER PROFILE:  Recent Labs     20  1320   AST 16   ALT 27   LABALBU 4.5     URINALYSIS: No results for input(s): BACTERIA, BLOODU, CLARITYU, COLORU, PHUR, PROTEINU, RBCUA, SPECGRAV, BILIRUBINUR, NITRU, WBCUA, LEUKOCYTESUR, GLUCOSEU in the last 72 hours. Radiology:    Xr Chest Portable    Result Date: 2020  Patient MRN: [id number] : 1945 Age:  66 years Gender: Female Order Date: 2020 1:40 PM Exam: XR CHEST PORTABLE Number of Images: 1 view Indication:   sob sob Comparison: 2019 Findings:  The heart appears prominent The pulmonary vasculature appears to be crowded The aorta appears to be tortuous There is a poor inspiratory effort     Limited study, due to a poor inspiratory effort, no gross abnormality   Assessment:    Patient Active Problem List   Diagnosis Code    Carotid bruit R09.89    Essential hypertension I10    Mixed hyperlipidemia E78.2    S/P carotid endarterectomy Z98.890    Hypoxia R09.02    CHF (congestive heart failure) ([facility]) I50.9    Atrial fibrillation ([facility]) O33.64    Diastolic dysfunction N30.06    Asthma J45.909    Bronchitis J40    Cognitive dysfunction F09    Bilateral carotid artery stenosis I65.23    O2 dependent Z99.81    Primary osteoarthritis of left knee M17.12    Lumbar spondylosis M47.816    Class 2 obesity due to excess calories with body mass index (BMI) of 38.0 to 38.9 in adult E66.09, Z68.38    MCCLELLAND (dyspnea on exertion) R06.09       Plan:    CT chest due to lekuocytosis, diminished lung sounds. R/o pneumonia. Viral panel, covid-19 swab    All chart data thoroughly reviewed during today's visit. Case discussed with Dr. Anival Stone, who agrees with the above plan.     Electronically signed by GIO Soriano CNP on 8/28/2020 at 8:34 AM

## 2020-08-28 NOTE — PROGRESS NOTES
Received patient at 0000 as a transfer from a unit that does not initate Tikosyn therapy. Tikosyn was due to be given at 8/27 at  2100. Per protocol to initiate Tikosyn, stat ekg done and stat labs ordered. STat labs resulted at 0200 and posted. Dr Shanon Pike paged at answering serve and messaged left to call unit. To call about Tikosyn and then for staff to ask about as to see what time he wants to initiate therapy as dosing time was 5 hours ago and Pharmacy wants to populate administration instructions for mar at proper times.   Enio Deshpande

## 2020-08-28 NOTE — CONSULTS
CARDIOLOGY CONSULTATION    Patient Name:  Owen Javier    :  1945    Reason for Consultation:   CHF MI: Elevated proBNP    History of Present Illness:   Owen Javier presents to [facility]  Upon the recommendation of her primary physician Dr. Ghulam Huitron is to find a history of recurrent cervical diaphoretic spells associated with a feeling of dyspnea. She  specifically denies any angina-like symptoms however. She has known carotid artery disease and underwent a previous right carotid endarterectomy by Dr. Karina Cisneros. Likewise her SaO2 was well below 90% and only after oxygen supplementation It has mproved. In review of previous cardiac testing, a nuclear stress test in  demonstrated a left ventricular ejection fraction of >80% with normal perfusion. Additionally a two-dimensional echocardiogram in 2018 demonstrated well preserved left ventricular systolic function with an estimated left ventricular ejection fraction >65%. Additionally there was no evidence of pulmonary hypertension. She does however have stage I diastolic dysfunction. Upon arrival to the emergency room she was placed on diltiazem drip following the discovery of atrial fibrillation with a rapid ventricular response. Despite her long-standing history of vascular disease she has continued her habit of smoking. She is now admitted for further observation and  therapeutic intervention. Past Medical History:   has a past medical history of Arthritis, Atrial fibrillation ([address] Bilateral carotid artery stenosis, Bronchitis, Carotid stenosis, bilateral, Hyperlipidemia, Hypertension, Left carotid stenosis, O2 dependent, S/P carotid endarterectomy, Stomach ulcer, and Thyroid disease. Surgical History:   has a past surgical history that includes Hysterectomy; Cholecystectomy; Appendectomy; back surgery;  Colonoscopy; joint replacement (); eye surgery; and Endoscopy, colon, diagnostic suspension Take 4 mLs by nebulization 2 times daily  Patient not taking: Reported on 8/27/2020 9/25/19   GIO Fried CNP   guaiFENesin 400 MG tablet Take 1 tablet by mouth 4 times daily  Patient not taking: Reported on 8/27/2020 9/25/19   GIO Fried CNP   Respiratory Therapy Supplies (FULL KIT NEBULIZER SET) MISC Use as directed with nebulized medication. 9/25/19   GIO Fried CNP   pantoprazole (PROTONIX) 40 MG tablet Take 1 tablet by mouth daily  Patient not taking: Reported on 8/27/2020 1/12/18   Avinash Robert MD       Allergies:  Patient has no known allergies. Review of Systems:   · Constitutional: there has been no unanticipated weight loss. There's been no significant change in energy level, sleep pattern or activity level. No fever chills or rigors. · Eyes: No visual changes or diplopia. No scleral icterus. · ENT: No Headaches, hearing loss or vertigo. No mouth sores or sore throat. No change in taste or smell. · Cardiovascular: No chest discomfort,  + dyspnea on minimal exertion,occasional palpitations, but no loss of consciousness, no phlebitis, no claudication. · Respiratory: No cough or wheezing, no sputum production. No hemoptysis, pleuritic pain. · Gastrointestinal: No abdominal pain, appetite loss, blood in stools. No change in bowel habits. No hematemesis  · Genitourinary: No dysuria, trouble voiding or hematuria. No nocturia or increased frequency. · Musculoskeletal:  No gait disturbance, weakness or joint complaints. · Integumentary: No rash or pruritis. · Neurological: No headache, diplopia, change in muscle strength, numbness or tingling. No change in gait, balance, coordination, mood, affect, memory, mentation, behavior. · Psychiatric: No anxiety or depression. · Endocrine: No temperature intolerance. No excessive thirst, fluid intake, or urination. No tremor.   · Hematologic/Lymphatic: No abnormal bruising or bleeding, blood clots or swollen lymph nodes. · Allergic/Immunologic: No nasal congestion or hives. Physical Examination:    Vital Signs: /77   Pulse 77   Temp 98.3 °F (36.8 °C) (Oral)   Resp 18   Ht 5' 6\" (1.676 m)   Wt 230 lb (104.3 kg)   SpO2 100%   BMI 37.12 kg/m²   General appearance: Well preserved, mesomorphic body habitus, alert, no distress. Skin: Skin color, texture, turgor normal. No rashes or lesions. No induration or tightening palpated. Head: Normocephalic. No masses, lesions, tenderness or abnormalities  Eyes: Conjunctivae/corneas clear. PERRL, EOMs intact. Sclera non icteric. Ears: External ears normal. Canals clear. TM's clear bilaterally. Hearing normal to finger rub. Nose/Sinuses: Nares normal. Septum midline. Mucosa normal. No drainage or sinus tenderness. Oropharynx: Lips, mucosa, and tongue normal. Oropharynx clear with no exudate seen. Neck: Neck supple and symmetric. No adenopathy. Thyroid symmetric, normal size, without nodules. Trachea is midline. Carotids brisk in upstroke without bruits, no abnormal JVP noted at 45°. Right carotid cicatrix noted. Chest: Even excursion  Lungs: Lungs grossly clear to auscultation bilaterally. No retractions or use of accessory muscles. No tactile vocal fremitus. No rhonchi, crackles or rales. Heart:  S1 > S2. Regular rhythm. No gallop or murmur. No rub, palpable thrill or heave noted. PMI 5th intercostal space midclavicular line. Abdomen: Abdomen soft, moderately protuberant, non-tender. BS normal. No masses, organomegaly. No hernia noted. Extremities: Extremities normal. No deformities, edema, or skin discoloration. No cyanosis or clubbing noted to the nails. Peripheral pulses present 2+ upper extremities and present 1+  lower extremities. Musculoskeletal: Spine ROM normal. Muscular strength intact. Neuro: Cranial nerves intact. Motor: Strength 5/5 in all extremities. Reflexes 2+ in all extremities. No focal weakness.  Sensory: grossly normal to touch. Coordination intact. Pertinent Labs:  CBC:   Ref Range & Units 19     WBC 4.5 - 11.5 E9/L 8.1    RBC 3.50 - 5.50 E12/L 4.51    Hemoglobin 11.5 - 15.5 g/dL 11.9    Hematocrit 34.0 - 48.0 % 42.4    MCV 80.0 - 99.9 fL 94.0    MCH 26.0 - 35.0 pg 26.4    MCHC 32.0 - 34.5 % 28.1Low     RDW 11.5 - 15.0 fL 16.1High     Platelets 423 - 632 O4/E 199    MPV 7.0 - 12.0 fL 10.7      BMP:   Ref Range & Units 19     Sodium 132 - 146 mmol/L 137    Potassium reflex Magnesium 3.5 - 5.0 mmol/L 4.2    Chloride 98 - 107 mmol/L 95Low     CO2 22 - 29 mmol/L 33High     Anion Gap 7 - 16 mmol/L 9    Glucose 74 - 99 mg/dL 121High     BUN 8 - 23 mg/dL 17    CREATININE 0.5 - 1.0 mg/dL 0.7    GFR Non-African American >=60 mL/min/1.73 >60          ABGs:   Lab Results   Component Value Date    PH 7.371 2019    PO2 81.4 2019    PCO2 58.4 2019     INR:   Recent Labs     20  1320   INR 1.4     PRO-BNP:   Lab Results   Component Value Date    PROBNP 2,484 (H) 2019      Cardiac Injury Profile:   Recent Labs     20  1320   TROPONINI <0.01      Lipid Profile:   Lab Results   Component Value Date    TRIG 251 2020    HDL 63 2020    LDLCALC 66 2020    CHOL 179 2020      Hemoglobin A1C: No components found for: HGBA1C   ECG:  See report  ECHO: 2018  Summary   Normal left ventricular systolic function.   Ejection fraction is visually estimated at > 65%.   Mild left ventricular hypertrophy.   Normal right ventricular size and function.   There is doppler evidence of stage I diastolic dysfunction.   Physiologic and/or trace tricuspid regurgitation.   PASP is estimated at 22 mmHg (normal estimated PASP).  The inferior vena cava is normal in size with normal respiratory   variation.   Radiology:  Xr Chest Standard (2 Vw)    Result Date: 2019  Patient MRN:  [id number] : 1945 Age: 74 years Gender: Female Order Date:  2019 8:30 PM TECHNIQUE/NUMBER OF adenopathy. Interstitial lung disease with patchy groundglass infiltrates bilaterally. Large hiatal hernia and partially intrathoracic stomach. Assessment:    Active Problems:    MCCLELLAND (dyspnea on exertion)  Resolved Problems:    * No resolved hospital problems. *      Plan:  After reviewing Mrs. Flynn's history , examining her as well as reviewing her laboratory data and two-dimensional echocardiogram Of 1/09/2018, it would appear that her nt proBNP is elevated despite having apparent normal left ventricular systolic function and merely grade 1 diastolic dysfunction and the presence of normal renal function. Thus a repeat two-dimensional cardiogram will be obtained and further adjustment of her medical regimen will be considered at that point. In the interim I have placed her on dofetilide 125 mcg twice a day and continue long-term anticoagulant therapy as her [id number] Score is >2. Continue to carefully monitor her electrolytes and initiate appropriate pulmonary intervention as well. We will also initiate dofetilide therapy with the hope of long-term suppression of her present  Recurrent persistent atrial fibrillation    I have spent more than 45 minutes face to face with Wesley Keith reviewing notes and laboratory data with greater than 50% of this time instructing and counseling the patient regarding my findings and recommendations and I have answered all questions as posed to me by Ms. Flynn. Thank you, Dotty Gamino MD for allowing me to consult in the care of this patient. Sophia Calloway DO, FACP, FACC, FSCAI    NOTE:  This report was transcribed using voice recognition software. Every effort was made to ensure accuracy; however, inadvertent computerized transcription errors may be present.

## 2020-08-28 NOTE — PROGRESS NOTES
Spoke with Dr. Jose Maria Velasquez about Tikosyn therapy, order obtained to initiate at 0600 this am. Time adjusted, message sent to pharmacy.  Gopi Castano

## 2020-08-28 NOTE — PLAN OF CARE
Problem: Falls - Risk of:  Goal: Will remain free from falls  Description: Will remain free from falls  Outcome: Met This Shift  Goal: Absence of physical injury  Description: Absence of physical injury  Outcome: Met This Shift     Problem: Cardiac Output - Decreased:  Goal: Hemodynamic stability will improve  Description: Hemodynamic stability will improve  8/28/2020 1502 by Jacqui Clifton RN  Outcome: Met This Shift  8/28/2020 0227 by Treasure Swift RN  Outcome: Met This Shift

## 2020-08-28 NOTE — PROGRESS NOTES
BP (!) 145/65   Pulse 72   Temp 96.3 °F (35.7 °C) (Temporal)   Resp 18   Ht 5' 6\" (1.676 m)   Wt 240 lb 9.6 oz (109.1 kg)   SpO2 99%   BMI 38.83 kg/m²   Patient Vitals for the past 96 hrs (Last 3 readings):   Weight   08/28/20 0446 240 lb 9.6 oz (109.1 kg)   08/27/20 2342 241 lb (109.3 kg)   08/27/20 1211 230 lb (104.3 kg)     OBJECTIVE:    HEENT: PERRL, EOM  Intact; sclera non-icteric, conjunctiva pink. Carotids are brisk in upstroke with normal contour. No carotid bruits. Normal jugular venous pulsation at 45°. No palpable cervical nor supraclavicular nodes. Thyroid not palpable. Trachea midline. Chest: Even excursion  Lungs: CTA B, no expiratory wheezes or rhonchi, no decreased tactile fremitus without inspiratory rales. Heart: Regular  rhythm; S1 > S2, no gallop or murmur. No clicks, rub, palpable thrills   or heaves. PMI nondisplaced, 5th intercostal space MCL. Abdomen: Soft, nontender, nondistended,  grossly protuberant, no masses or organomegaly. Bowel sounds active. Extremities: Without clubbing, cyanosis or edema. Pulses present 3+ upper extermities bilaterally; present 1+ DP and present 1+ PT bilaterally.      Data:   Scheduled Meds: Reviewed  Continuous Infusions:     Intake/Output Summary (Last 24 hours) at 8/28/2020 1749  Last data filed at 8/28/2020 1429  Gross per 24 hour   Intake 520 ml   Output 550 ml   Net -30 ml     CBC:   Recent Labs     08/27/20  1320 08/28/20  0452   WBC 12.9* 16.0*   HGB 13.6 12.1   HCT 41.4 37.5    214     BMP:  Recent Labs     08/27/20  1320 08/28/20  0115 08/28/20  0452    132 134   K 4.8 5.5* 5.1*   CL 98 98 101   CO2 20* 23 19*   BUN 38* 45* 40*   CREATININE 1.3* 1.3* 1.1*   LABGLOM 40 40 48     ABGs:   Lab Results   Component Value Date    PH 7.371 09/19/2019    PO2 81.4 09/19/2019    PCO2 58.4 09/19/2019     INR:   Recent Labs 2020  Patient MRN: [id number] : 1945 Age:  66 years Gender: Female Order Date: 2020 1:40 PM Exam: XR CHEST PORTABLE Number of Images: 1 view Indication:   sob sob Comparison: 2019 Findings: The heart appears prominent The pulmonary vasculature appears to be crowded The aorta appears to be tortuous There is a poor inspiratory effort     Limited study, due to a poor inspiratory effort, no gross abnormality       EKG: See Report  Echo: See Report      IMPRESSIONS:  Active Problems:    MCCLELLAND (dyspnea on exertion)  Resolved Problems:    * No resolved hospital problems. *      RECOMMENDATIONS:   It would appear that Mrs. Maria A Chacon  Indeed has symptomatic paroxysmal atrial fibrillation with a rapid ventricular response. Fortunately she is not experiencing any symptoms secondary to her medical treatment but I am also concerned as to why her BUN and creatinine are elevated presently. Will observe for the next 48 hours for QTc interval prolongation following initiation of dofetilide antiarrhythmic therapy    I have spent more than  25 minutes face to face with Umu Ryan and reviewing notes and laboratory data, with greater than 50% of this time instructing and counseling the patient  face to face regarding my findings and recommendations and I have answered all questions as posed to me by Ms. Flynn. Alejandra Cade, DO FACP,FACC,FSCAI      NOTE:  This report was transcribed using voice recognition software.   Every effort was made to ensure accuracy; however, inadvertent computerized transcription errors may be present

## 2020-08-28 NOTE — H&P
Dr. Kallie Vaz M.D. [facility])  Nurse Practitioner History and Physical  Date: 8/28/20,   Time: 8:37 AM EDT     CHIEF COMPLAINT:    Chief Complaint   Patient presents with    Shortness of Breath     always wears oxygen at home; started getting worse about a week ago    Palpitations     hx of Afib       Informant for H&P: patient      HISTORY OF PRESENT ILLNESS:     Petra Gann is a 66 y.o. female patient of Dr. Suleiman Palma who presented to the emergency room from home with complaints of 4 day history of worsening shortness of breath and intermitted diaphoresis. She denies any chest pain, light headedness, nausea, fever, or chills. She denies upper respiratory symptoms, but does have underlying COPD and does use supplemental oxygen when needed. In the ED, her initial troponin was negative. She was found to be in afib with RVR and was given a cardizem bolus. She will be admitted to telemetry with consultation to cardiology.     Past Medical History:    Past Medical History:   Diagnosis Date    Arthritis     Atrial fibrillation ([address]     Bilateral carotid artery stenosis 7/16/2020    Bronchitis     Carotid stenosis, bilateral 5/21/2012    Hyperlipidemia     Hypertension     Left carotid stenosis 4/26/2018    O2 dependent 7/16/2020    S/P carotid endarterectomy 10/6/2016    Stomach ulcer     Thyroid disease          Past Surgical History:    Past Surgical History:   Procedure Laterality Date    APPENDECTOMY      BACK SURGERY      CHOLECYSTECTOMY      COLONOSCOPY      ENDOSCOPY, COLON, DIAGNOSTIC  01/10/2018    upper endo    EYE SURGERY      BILATERAL CATARACT    HYSTERECTOMY      JOINT REPLACEMENT  2011    RIGHT KNEE       Medications Prior to Admission:    Medications Prior to Admission: dilTIAZem (CARDIZEM CD) 360 MG extended release capsule, TAKE 1 CAPSULE BY MOUTH ONCE DAILY  levothyroxine (SYNTHROID) 175 MCG tablet, TAKE 1 TABLET BY MOUTH ONCE DAILY  apixaban (ELIQUIS) 5 MG TABS tablet, Take 1 tablet by mouth 2 times daily  valsartan (DIOVAN) 160 MG tablet, Take 1 tablet by mouth daily  venlafaxine (EFFEXOR XR) 150 MG extended release capsule, Take 150 mg by mouth daily  atorvastatin (LIPITOR) 20 MG tablet, Take 20 mg by mouth daily  Multiple Vitamins-Minerals (THERAPEUTIC MULTIVITAMIN-MINERALS) tablet, Take 1 tablet by mouth daily  Cholecalciferol (VITAMIN D) 2000 UNITS CAPS capsule, Take 1 capsule by mouth daily  OXYGEN, Inhale into the lungs  sucralfate (CARAFATE) 1 GM tablet, Take 1 g by mouth 4 times daily  ipratropium-albuterol (DUONEB) 0.5-2.5 (3) MG/3ML SOLN nebulizer solution, Inhale 3 mLs into the lungs 4 times daily (Patient not taking: Reported on 2020)  budesonide (PULMICORT) 0.5 MG/2ML nebulizer suspension, Take 4 mLs by nebulization 2 times daily (Patient not taking: Reported on 2020)  guaiFENesin 400 MG tablet, Take 1 tablet by mouth 4 times daily (Patient not taking: Reported on 2020)  Respiratory Therapy Supplies (FULL KIT NEBULIZER SET) MISC, Use as directed with nebulized medication. pantoprazole (PROTONIX) 40 MG tablet, Take 1 tablet by mouth daily (Patient not taking: Reported on 2020)    Allergies:    Patient has no known allergies.     Social History:   Social History     Socioeconomic History    Marital status:      Spouse name: Not on file    Number of children: Not on file    Years of education: Not on file    Highest education level: Not on file   Occupational History    Not on file   Social Needs    Financial resource strain: Not on file    Food insecurity     Worry: Not on file     Inability: Not on file    Transportation needs     Medical: Not on file     Non-medical: Not on file   Tobacco Use    Smoking status: Current Every Day Smoker     Packs/day: 1.00     Types: Cigarettes     Last attempt to quit: 2012     Years since quittin.3    Smokeless tobacco: Never Used   Substance and Sexual Activity    Alcohol use: rashes [ - ]  Ulcers [ - ] . All the 14 systems reviewed in detail . PHYSICAL EXAM:    Vitals:     /60   Pulse 67   Temp 98.6 °F (37 °C) (Temporal)   Resp 18   Ht 5' 6\" (1.676 m)   Wt 240 lb 9.6 oz (109.1 kg)   SpO2 98%   BMI 38.83 kg/m²          Physical Exam:   General Appearance: alert and oriented to person, place and time, well developed and well- nourished, in no acute distress  Skin: warm and dry, no rash or erythema  Head: normocephalic and atraumatic  Eyes: pupils equal, round, and reactive to light, extraocular eye movements intact, conjunctivae normal  ENT: hearing is  normal bilaterally, nose without deformity, nasal mucosa and turbinates normal without polyps  Neck: supple and non-tender without mass, no thyromegaly , no cervical lymphadenopathy  Pulmonary/Chest:  Bilateral air entry is equal - diminished bilateral bases.    Cardiovascular: normal rate, regular rhythm, normal S1 and S2, no murmurs,  Abdomen: soft, non-tender, non-distended, normal bowel sounds, no masses or organomegaly,  Extremities: no cyanosis, clubbing or edema  Musculoskeletal: normal range of motion, no joint swelling, deformity or tenderness  Neurologic: reflexes normal and symmetric, no cranial nerve deficit, gait, coordination and speech normal  Rectal: Deferred  Genitalia: Deferred    LABS:    Results for orders placed or performed during the hospital encounter of 08/27/20   CBC Auto Differential   Result Value Ref Range    WBC 12.9 (H) 4.5 - 11.5 E9/L    RBC 4.47 3.50 - 5.50 E12/L    Hemoglobin 13.6 11.5 - 15.5 g/dL    Hematocrit 41.4 34.0 - 48.0 %    MCV 92.6 80.0 - 99.9 fL    MCH 30.4 26.0 - 35.0 pg    MCHC 32.9 32.0 - 34.5 %    RDW 12.8 11.5 - 15.0 fL    Platelets 397 616 - 311 E9/L    MPV 11.8 7.0 - 12.0 fL    Neutrophils % 80.8 (H) 43.0 - 80.0 %    Immature Granulocytes % 1.0 0.0 - 5.0 %    Lymphocytes % 12.1 (L) 20.0 - 42.0 %    Monocytes % 5.7 2.0 - 12.0 %    Eosinophils % 0.3 0.0 - 6.0 % Basophils % 0.1 0.0 - 2.0 %    Neutrophils Absolute 10.45 (H) 1.80 - 7.30 E9/L    Immature Granulocytes # 0.13 E9/L    Lymphocytes Absolute 1.57 1.50 - 4.00 E9/L    Monocytes Absolute 0.74 0.10 - 0.95 E9/L    Eosinophils Absolute 0.04 (L) 0.05 - 0.50 E9/L    Basophils Absolute 0.01 0.00 - 0.20 E9/L    Poikilocytes 1+     [place] Cells 1+     Ovalocytes 1+    Comprehensive Metabolic Panel w/ Reflex to MG   Result Value Ref Range    Sodium 133 132 - 146 mmol/L    Potassium reflex Magnesium 4.8 3.5 - 5.0 mmol/L    Chloride 98 98 - 107 mmol/L    CO2 20 (L) 22 - 29 mmol/L    Anion Gap 15 7 - 16 mmol/L    Glucose 148 (H) 74 - 99 mg/dL    BUN 38 (H) 8 - 23 mg/dL    CREATININE 1.3 (H) 0.5 - 1.0 mg/dL    GFR Non-African American 40 >=60 mL/min/1.73    GFR African American 48     Calcium 9.6 8.6 - 10.2 mg/dL    Total Protein 7.3 6.4 - 8.3 g/dL    Alb 4.5 3.5 - 5.2 g/dL    Total Bilirubin 0.9 0.0 - 1.2 mg/dL    Alkaline Phosphatase 70 35 - 104 U/L    ALT 27 0 - 32 U/L    AST 16 0 - 31 U/L   Troponin   Result Value Ref Range    Troponin <0.01 0.00 - 0.03 ng/mL   APTT   Result Value Ref Range    aPTT 33.5 24.5 - 35.1 sec   Protime-INR   Result Value Ref Range    Protime 16.1 (H) 9.3 - 12.4 sec    INR 1.4    D-Dimer, Quantitative   Result Value Ref Range    D-Dimer, Quant <200 ng/mL DDU   Lactic Acid, Plasma   Result Value Ref Range    Lactic Acid 2.9 (H) 0.5 - 2.2 mmol/L   Troponin   Result Value Ref Range    Troponin <0.01 0.00 - 0.03 ng/mL   CBC   Result Value Ref Range    WBC 16.0 (H) 4.5 - 11.5 E9/L    RBC 4.00 3.50 - 5.50 E12/L    Hemoglobin 12.1 11.5 - 15.5 g/dL    Hematocrit 37.5 34.0 - 48.0 %    MCV 93.8 80.0 - 99.9 fL    MCH 30.3 26.0 - 35.0 pg    MCHC 32.3 32.0 - 34.5 %    RDW 12.8 11.5 - 15.0 fL    Platelets 744 061 - 510 E9/L    MPV 11.3 7.0 - 12.0 fL   Lipid panel - fasting   Result Value Ref Range    Cholesterol, Total 139 0 - 199 mg/dL    Triglycerides 129 0 - 149 mg/dL    HDL 69 >40 mg/dL    LDL Calculated 44 0 - 99 mg/dL    VLDL Cholesterol Calculated 26 mg/dL   Basic Metabolic Panel w/ Reflex to MG   Result Value Ref Range    Sodium 134 132 - 146 mmol/L    Potassium reflex Magnesium 5.1 (H) 3.5 - 5.0 mmol/L    Chloride 101 98 - 107 mmol/L    CO2 19 (L) 22 - 29 mmol/L    Anion Gap 14 7 - 16 mmol/L    Glucose 100 (H) 74 - 99 mg/dL    BUN 40 (H) 8 - 23 mg/dL    CREATININE 1.1 (H) 0.5 - 1.0 mg/dL    GFR Non-African American 48 >=60 mL/min/1.73    GFR African American 58     Calcium 8.8 8.6 - 10.2 mg/dL   Brain Natriuretic Peptide   Result Value Ref Range    Pro- 0 - 450 pg/mL   TSH without Reflex   Result Value Ref Range    TSH 0.562 0.270 - 4.200 uIU/mL   MAGNESIUM   Result Value Ref Range    Magnesium 2.0 1.6 - 2.6 mg/dL   Basic metabolic panel   Result Value Ref Range    Sodium 132 132 - 146 mmol/L    Potassium 5.5 (H) 3.5 - 5.0 mmol/L    Chloride 98 98 - 107 mmol/L    CO2 23 22 - 29 mmol/L    Anion Gap 11 7 - 16 mmol/L    Glucose 105 (H) 74 - 99 mg/dL    BUN 45 (H) 8 - 23 mg/dL    CREATININE 1.3 (H) 0.5 - 1.0 mg/dL    GFR Non-African American 40 >=60 mL/min/1.73    GFR African American 48     Calcium 8.9 8.6 - 10.2 mg/dL   EKG 12 Lead   Result Value Ref Range    Ventricular Rate 62 BPM    Atrial Rate 62 BPM    P-R Interval 140 ms    QRS Duration 74 ms    Q-T Interval 402 ms    QTc Calculation (Bazett) 408 ms    P Axis 57 degrees    R Axis 39 degrees    T Axis 55 degrees       RADIOLOGY:    Xr Chest Portable    Result Date: 2020  Patient MRN: [id number] : 1945 Age:  66 years Gender: Female Order Date: 2020 1:40 PM Exam: XR CHEST PORTABLE Number of Images: 1 view Indication:   sob sob Comparison: 2019 Findings:  The heart appears prominent The pulmonary vasculature appears to be crowded The aorta appears to be tortuous There is a poor inspiratory effort     Limited study, due to a poor inspiratory effort, no gross abnormality           ASSESSMENT:      Patient Active Problem List   Diagnosis Code    Carotid bruit R09.89    Essential hypertension I10    Mixed hyperlipidemia E78.2    S/P carotid endarterectomy Z98.890    Hypoxia R09.02    CHF (congestive heart failure) (HCC) I50.9    Atrial fibrillation (HCC) P02.34    Diastolic dysfunction Y80.16    Asthma J45.909    Bronchitis J40    Cognitive dysfunction F09    Bilateral carotid artery stenosis I65.23    O2 dependent Z99.81    Primary osteoarthritis of left knee M17.12    Lumbar spondylosis M47.816    Class 2 obesity due to excess calories with body mass index (BMI) of 38.0 to 38.9 in adult E66.09, Z68.38    MCCLELLAND (dyspnea on exertion) R06.09       PLAN:    Cycle troponin  Consultation to cardiology  Supplemental oxygen    Review the  old chart , Case discussed  with other consultants and Dr. Nehal Valdez as required.       Electronically signed by GIO Rapp CNP on 8/28/2020 at 8:37 AM

## 2020-08-29 PROBLEM — F09 COGNITIVE DYSFUNCTION: Status: RESOLVED | Noted: 2019-09-23 | Resolved: 2020-08-29

## 2020-08-29 PROBLEM — I48.91 A-FIB (HCC): Status: ACTIVE | Noted: 2020-08-29

## 2020-08-29 PROBLEM — I65.23 BILATERAL CAROTID ARTERY STENOSIS: Status: RESOLVED | Noted: 2020-07-16 | Resolved: 2020-08-29

## 2020-08-29 PROBLEM — N18.30 CKD (CHRONIC KIDNEY DISEASE) STAGE 3, GFR 30-59 ML/MIN (HCC): Status: ACTIVE | Noted: 2020-08-29

## 2020-08-29 PROBLEM — I48.91 ATRIAL FIBRILLATION (HCC): Status: RESOLVED | Noted: 2018-01-10 | Resolved: 2020-08-29

## 2020-08-29 PROBLEM — J40 BRONCHITIS: Status: RESOLVED | Noted: 2019-09-22 | Resolved: 2020-08-29

## 2020-08-29 LAB
BACTERIA: ABNORMAL /HPF
BILIRUBIN URINE: NEGATIVE
BLOOD, URINE: NEGATIVE
CLARITY: CLEAR
COLOR: YELLOW
GLUCOSE URINE: NEGATIVE MG/DL
KETONES, URINE: NEGATIVE MG/DL
LACTIC ACID, SEPSIS: 0.9 MMOL/L (ref 0.5–1.9)
LACTIC ACID, SEPSIS: 1.2 MMOL/L (ref 0.5–1.9)
LEUKOCYTE ESTERASE, URINE: ABNORMAL
NITRITE, URINE: NEGATIVE
PH UA: 6 (ref 5–9)
PROTEIN UA: NEGATIVE MG/DL
RBC UA: ABNORMAL /HPF (ref 0–2)
SPECIFIC GRAVITY UA: 1.02 (ref 1–1.03)
UROBILINOGEN, URINE: 0.2 E.U./DL
WBC UA: >20 /HPF (ref 0–5)

## 2020-08-29 PROCEDURE — 81001 URINALYSIS AUTO W/SCOPE: CPT

## 2020-08-29 PROCEDURE — 6370000000 HC RX 637 (ALT 250 FOR IP): Performed by: NURSE PRACTITIONER

## 2020-08-29 PROCEDURE — 87040 BLOOD CULTURE FOR BACTERIA: CPT

## 2020-08-29 PROCEDURE — G0378 HOSPITAL OBSERVATION PER HR: HCPCS | Performed by: FAMILY MEDICINE

## 2020-08-29 PROCEDURE — 93005 ELECTROCARDIOGRAM TRACING: CPT | Performed by: INTERNAL MEDICINE

## 2020-08-29 PROCEDURE — 36415 COLL VENOUS BLD VENIPUNCTURE: CPT

## 2020-08-29 PROCEDURE — 87088 URINE BACTERIA CULTURE: CPT

## 2020-08-29 PROCEDURE — 2580000003 HC RX 258: Performed by: NURSE PRACTITIONER

## 2020-08-29 PROCEDURE — 6360000002 HC RX W HCPCS: Performed by: FAMILY MEDICINE

## 2020-08-29 PROCEDURE — 6370000000 HC RX 637 (ALT 250 FOR IP): Performed by: INTERNAL MEDICINE

## 2020-08-29 PROCEDURE — G0378 HOSPITAL OBSERVATION PER HR: HCPCS

## 2020-08-29 PROCEDURE — 2580000003 HC RX 258: Performed by: FAMILY MEDICINE

## 2020-08-29 PROCEDURE — 6360000002 HC RX W HCPCS: Performed by: NURSE PRACTITIONER

## 2020-08-29 PROCEDURE — 1200000000 HC SEMI PRIVATE

## 2020-08-29 PROCEDURE — 83605 ASSAY OF LACTIC ACID: CPT

## 2020-08-29 PROCEDURE — 87186 SC STD MICRODIL/AGAR DIL: CPT

## 2020-08-29 PROCEDURE — 2700000000 HC OXYGEN THERAPY PER DAY

## 2020-08-29 PROCEDURE — 94640 AIRWAY INHALATION TREATMENT: CPT

## 2020-08-29 RX ORDER — SODIUM CHLORIDE 0.9 % (FLUSH) 0.9 %
10 SYRINGE (ML) INJECTION EVERY 12 HOURS SCHEDULED
Status: DISCONTINUED | OUTPATIENT
Start: 2020-08-29 | End: 2020-08-29 | Stop reason: SDUPTHER

## 2020-08-29 RX ORDER — SODIUM CHLORIDE 0.9 % (FLUSH) 0.9 %
10 SYRINGE (ML) INJECTION PRN
Status: DISCONTINUED | OUTPATIENT
Start: 2020-08-29 | End: 2020-08-29 | Stop reason: SDUPTHER

## 2020-08-29 RX ORDER — AMLODIPINE BESYLATE 2.5 MG/1
2.5 TABLET ORAL DAILY
Status: DISCONTINUED | OUTPATIENT
Start: 2020-08-30 | End: 2020-08-30 | Stop reason: HOSPADM

## 2020-08-29 RX ORDER — ARFORMOTEROL TARTRATE 15 UG/2ML
15 SOLUTION RESPIRATORY (INHALATION) 2 TIMES DAILY
Status: DISCONTINUED | OUTPATIENT
Start: 2020-08-29 | End: 2020-08-30 | Stop reason: HOSPADM

## 2020-08-29 RX ORDER — NADOLOL 20 MG/1
20 TABLET ORAL ONCE
Status: COMPLETED | OUTPATIENT
Start: 2020-08-29 | End: 2020-08-29

## 2020-08-29 RX ADMIN — SODIUM CHLORIDE, PRESERVATIVE FREE 10 ML: 5 INJECTION INTRAVENOUS at 08:12

## 2020-08-29 RX ADMIN — SUCRALFATE 1 G: 1 TABLET ORAL at 21:09

## 2020-08-29 RX ADMIN — BUDESONIDE 1000 MCG: 0.5 INHALANT RESPIRATORY (INHALATION) at 19:22

## 2020-08-29 RX ADMIN — VALSARTAN 160 MG: 160 TABLET, FILM COATED ORAL at 08:13

## 2020-08-29 RX ADMIN — SODIUM CHLORIDE, PRESERVATIVE FREE 10 ML: 5 INJECTION INTRAVENOUS at 21:09

## 2020-08-29 RX ADMIN — ATORVASTATIN CALCIUM 20 MG: 20 TABLET, FILM COATED ORAL at 08:14

## 2020-08-29 RX ADMIN — VENLAFAXINE HYDROCHLORIDE 150 MG: 150 CAPSULE, EXTENDED RELEASE ORAL at 08:13

## 2020-08-29 RX ADMIN — MULTIPLE VITAMINS W/ MINERALS TAB 1 TABLET: TAB at 08:13

## 2020-08-29 RX ADMIN — AMLODIPINE BESYLATE 5 MG: 5 TABLET ORAL at 08:13

## 2020-08-29 RX ADMIN — DOFETILIDE 125 MCG: 0.12 CAPSULE ORAL at 06:00

## 2020-08-29 RX ADMIN — IPRATROPIUM BROMIDE AND ALBUTEROL SULFATE 3 ML: .5; 3 SOLUTION RESPIRATORY (INHALATION) at 09:00

## 2020-08-29 RX ADMIN — Medication 2000 UNITS: at 08:13

## 2020-08-29 RX ADMIN — PANTOPRAZOLE SODIUM 40 MG: 40 TABLET, DELAYED RELEASE ORAL at 08:12

## 2020-08-29 RX ADMIN — GUAIFENESIN 400 MG: 400 TABLET, FILM COATED ORAL at 08:13

## 2020-08-29 RX ADMIN — NADOLOL 20 MG: 20 TABLET ORAL at 06:54

## 2020-08-29 RX ADMIN — APIXABAN 5 MG: 5 TABLET, FILM COATED ORAL at 08:12

## 2020-08-29 RX ADMIN — ARFORMOTEROL TARTRATE 15 MCG: 15 SOLUTION RESPIRATORY (INHALATION) at 19:22

## 2020-08-29 RX ADMIN — SUCRALFATE 1 G: 1 TABLET ORAL at 13:03

## 2020-08-29 RX ADMIN — BUDESONIDE 1000 MCG: 0.5 INHALANT RESPIRATORY (INHALATION) at 09:00

## 2020-08-29 RX ADMIN — SODIUM CHLORIDE, PRESERVATIVE FREE 10 ML: 5 INJECTION INTRAVENOUS at 13:02

## 2020-08-29 RX ADMIN — SUCRALFATE 1 G: 1 TABLET ORAL at 08:17

## 2020-08-29 RX ADMIN — LEVOTHYROXINE SODIUM 175 MCG: 0.17 TABLET ORAL at 08:13

## 2020-08-29 RX ADMIN — DOFETILIDE 125 MCG: 0.12 CAPSULE ORAL at 18:10

## 2020-08-29 RX ADMIN — SUCRALFATE 1 G: 1 TABLET ORAL at 17:23

## 2020-08-29 RX ADMIN — APIXABAN 5 MG: 5 TABLET, FILM COATED ORAL at 21:09

## 2020-08-29 ASSESSMENT — PAIN SCALES - GENERAL
PAINLEVEL_OUTOF10: 0

## 2020-08-29 NOTE — PROGRESS NOTES
Patient instructed on need for urine speciman, container at bedside and patient verbalized understanding. New orders explained to patient .

## 2020-08-29 NOTE — PROGRESS NOTES
PROGRESS NOTE       PATIENT PROBLEM LIST:  Active Problems:    MCCLELLAND (dyspnea on exertion)    A-fib ([facility])    CKD (chronic kidney disease) stage 3, GFR 30-59 ml/min ([facility])  Resolved Problems:    * No resolved hospital problems. *      SUBJECTIVE:  Thais Mood states she feels fine and is anxious to be discharged. She denies any further posterior cervical diaphoresis nor palpitations or lightheadedness. Her blood pressure is now well controlled. REVIEW OF SYSTEMS:  General ROS: negative for - fatigue, malaise,  weight gain or weight loss  Psychological ROS: negative for - anxiety , depression  Ophthalmic ROS: negative for - decreased vision or visual distortion. ENT ROS: negative  Allergy and Immunology ROS: negative  Hematological and Lymphatic ROS: negative  Endocrine: no heat or cold intolerance and no polyphagia, polydipsia, or polyuria  Respiratory ROS: positive for - shortness of breath  Cardiovascular ROS: positive for - dyspnea on exertion, irregular heartbeat, rapid heart rate and shortness of breath. Gastrointestinal ROS: no abdominal pain, change in bowel habits, or black or bloody stools  Genito-Urinary ROS: no nocturia, dysuria, trouble voiding, frequency or hematuria  Musculoskeletal ROS: negative for- myalgias, arthralgias, or claudication  Neurological ROS: no TIA or stroke symptoms otherwise no significant change in symptoms or problems since yesterday as documented in previous progress notes.     SCHEDULED MEDICATIONS:   sodium chloride flush  10 mL Intravenous 2 times per day    Arformoterol Tartrate  15 mcg Nebulization BID    [START ON 8/30/2020] amLODIPine  2.5 mg Oral Daily    dofetilide  125 mcg Oral 2 times per day    apixaban  5 mg Oral BID    atorvastatin  20 mg Oral Daily    budesonide  1 mg Nebulization BID    vitamin D  2,000 Units Oral Daily    levothyroxine  175 mcg Oral Daily    therapeutic multivitamin-minerals  1 tablet Oral Daily    pantoprazole  40 mg Oral Daily    sucralfate  1 g Oral 4x Daily    valsartan  160 mg Oral Daily    venlafaxine  150 mg Oral Daily    sodium chloride flush  10 mL Intravenous 2 times per day       VITAL SIGNS:                                                                                                                          BP (!) 114/59   Pulse 66   Temp 97.9 °F (36.6 °C) (Oral)   Resp 18   Ht 5' 6\" (1.676 m)   Wt 240 lb 6.4 oz (109 kg)   SpO2 98%   BMI 38.80 kg/m²   Patient Vitals for the past 96 hrs (Last 3 readings):   Weight   08/29/20 0526 240 lb 6.4 oz (109 kg)   08/28/20 0446 240 lb 9.6 oz (109.1 kg)   08/27/20 2342 241 lb (109.3 kg)     OBJECTIVE:    HEENT: PERRL, EOM  Intact; sclera non-icteric, conjunctiva pink. Carotids are brisk in upstroke with normal contour. No carotid bruits. Normal jugular venous pulsation at 45°. No palpable cervical nor supraclavicular nodes. Thyroid not palpable. Trachea midline. Chest: Even excursion  Lungs: CTA B, no expiratory wheezes or rhonchi, no decreased tactile fremitus without inspiratory rales. Heart: Regular  rhythm; S1 > S2, no gallop or murmur. No clicks, rub, palpable thrills   or heaves. PMI nondisplaced, 5th intercostal space MCL. Abdomen: Soft, nontender, nondistended,  grossly protuberant, no masses or organomegaly. Bowel sounds active. Extremities: Without clubbing, cyanosis or edema. Pulses present 3+ upper extermities bilaterally; present 1+ DP and present 1+ PT bilaterally.      Data:   Scheduled Meds: Reviewed  Continuous Infusions:     Intake/Output Summary (Last 24 hours) at 8/29/2020 1951  Last data filed at 8/29/2020 1924  Gross per 24 hour   Intake 1020 ml   Output 2360 ml   Net -1340 ml     CBC:   Recent Labs     08/27/20  1320 08/28/20  0452   WBC 12.9* 16.0*   HGB 13.6 12.1   HCT 41.4 37.5    214     BMP:  Recent Labs     08/27/20  1320 08/28/20  0115 08/28/20  0452    132 134   K 4.8 5.5* 5.1*   CL 98 98 101   CO2 20* 23 19*   BUN 38* 45* 40* CREATININE 1.3* 1.3* 1.1*   LABGLOM 40 40 48     ABGs:   Lab Results   Component Value Date    PH 7.371 2019    PO2 81.4 2019    PCO2 58.4 2019     INR:   Recent Labs     20  1320   INR 1.4     PRO-BNP:   Lab Results   Component Value Date    PROBNP 374 2020    PROBNP 286 2019      TSH:   Lab Results   Component Value Date    TSH 0.562 2020      Cardiac Injury Profile:   Recent Labs     20  1320 20   TROPONINI <0.01 <0.01      Lipid Profile:   Lab Results   Component Value Date    TRIG 129 2020    HDL 69 2020    LDLCALC 44 2020    CHOL 139 2020      Hemoglobin A1C: No components found for: HGBA1C     RAD:   Xr Knee Left (1-2 Views)    Result Date: 2020  Radiology exam is complete. No Radiologist dictation. Please follow up with ordering provider. Xr Knee Bilateral Standing    Result Date: 2020  Radiology exam is complete. No Radiologist dictation. Please follow up with ordering provider. Ct Chest Wo Contrast    Result Date: 2020  Patient MRN:  [id number] : 1945 Age: 66 years Gender: Female Order Date:  2020 10:52 AM EXAM: CT CHEST WO CONTRAST number of images 291. Technique: Low-dose CT  acquisition technique included one of following options; 1 . Automated exposure control, 2. Adjustment of MA and or KV according to patient's size or 3. Use of iterative reconstruction. INDICATION:  leukocytosis, diminished lung sounds leukocytosis, diminished lung sounds COMPARISON: 2019 FINDINGS: There is cardiomegaly with  coronary artery calcification. There is mild ectasia of the ascending thoracic aorta measuring 3.5 cm. The trachea and major bronchi are patent. There is COPD with atelectasis in the lung bases. There is no focal consolidation or pleural effusion. The liver is of normal architecture. There is biliary dilatation. Degenerative changes are identified in the thoracic spine.  Large

## 2020-08-29 NOTE — PROGRESS NOTES
Subjective: The patient is awake and alert. No problems overnight. Denies chest pain, angina, and dyspnea. Denies abdominal pain. Tolerating diet. No nausea or vomiting. Objective:    Vitals:  BP (!) 118/59   Pulse 68   Temp 96.6 °F (35.9 °C) (Temporal)   Resp 18   Ht 5' 6\" (1.676 m)   Wt 240 lb 6.4 oz (109 kg)   SpO2 99%   BMI 38.80 kg/m²     Physical Exam:  Heart:  RRR, no murmurs, gallops, or rubs. Lungs:  CTA bilaterally, no wheeze, rales or rhonchi  Abd: bowel sounds present, nontender, nondistended, no masses  Extrem:  No clubbing, cyanosis, or edema    Labs:  CBC:   Lab Results   Component Value Date    WBC 16.0 2020    RBC 4.00 2020    HGB 12.1 2020    HCT 37.5 2020    MCV 93.8 2020    MCH 30.3 2020    MCHC 32.3 2020    RDW 12.8 2020     2020    MPV 11.3 2020     BMP:    Lab Results   Component Value Date     2020    K 5.1 2020     2020    CO2 19 2020    BUN 40 2020    LABALBU 4.5 2020    LABALBU 4.8 2012    CREATININE 1.1 2020    CALCIUM 8.8 2020    GFRAA 58 2020    LABGLOM 48 2020    GLUCOSE 100 2020    GLUCOSE 156 2012        Radiology:  Ct Chest Wo Contrast    Result Date: 2020  Patient MRN:  [id number] : 1945 Age: 66 years Gender: Female Order Date:  2020 10:52 AM EXAM: CT CHEST WO CONTRAST number of images 291. Technique: Low-dose CT  acquisition technique included one of following options; 1 . Automated exposure control, 2. Adjustment of MA and or KV according to patient's size or 3. Use of iterative reconstruction. INDICATION:  leukocytosis, diminished lung sounds leukocytosis, diminished lung sounds COMPARISON: 2019 FINDINGS: There is cardiomegaly with  coronary artery calcification. There is mild ectasia of the ascending thoracic aorta measuring 3.5 cm. The trachea and major bronchi are patent.

## 2020-08-29 NOTE — PROGRESS NOTES
Dr Zully Britt paged re: EKG results. Will await return call. Kavitha Fenton RN    2893: EKG image sent to Dr Zully Britt. Order to obtain EKG prior to am dose. If qtc remains >500; hold am dose of Tikosyn. Kavitha Fenton RN

## 2020-08-29 NOTE — PLAN OF CARE
Problem: Falls - Risk of:  Goal: Will remain free from falls  Description: Will remain free from falls  8/28/2020 2015 by Sadia Neal RN  Outcome: Met This Shift  8/28/2020 1502 by Declan Abraham RN  Outcome: Met This Shift  Goal: Absence of physical injury  Description: Absence of physical injury  8/28/2020 1502 by Declan Abraham RN  Outcome: Met This Shift     Problem: Cardiac Output - Decreased:  Goal: Hemodynamic stability will improve  Description: Hemodynamic stability will improve  8/28/2020 2015 by Sadia Neal RN  Outcome: Met This Shift  8/28/2020 1502 by Declan Abraham RN  Outcome: Met This Shift     Problem: Falls - Risk of:  Goal: Will remain free from falls  Description: Will remain free from falls  8/28/2020 2015 by Sadia Neal RN  Outcome: Met This Shift  8/28/2020 1502 by Declan Abraham RN  Outcome: Met This Shift  Goal: Absence of physical injury  Description: Absence of physical injury  8/28/2020 1502 by Declan Abraham RN  Outcome: Met This Shift     Problem: Cardiac Output - Decreased:  Goal: Hemodynamic stability will improve  Description: Hemodynamic stability will improve  8/28/2020 2015 by Sadia Neal RN  Outcome: Met This Shift  8/28/2020 1502 by Declan Abraham RN  Outcome: Met This Shift

## 2020-08-30 VITALS
TEMPERATURE: 97.2 F | HEART RATE: 79 BPM | RESPIRATION RATE: 18 BRPM | WEIGHT: 238.2 LBS | OXYGEN SATURATION: 99 % | SYSTOLIC BLOOD PRESSURE: 113 MMHG | BODY MASS INDEX: 38.28 KG/M2 | DIASTOLIC BLOOD PRESSURE: 57 MMHG | HEIGHT: 66 IN

## 2020-08-30 PROBLEM — I48.91 ATRIAL FIBRILLATION WITH RVR (HCC): Status: ACTIVE | Noted: 2020-08-30

## 2020-08-30 PROBLEM — N39.0 UTI (URINARY TRACT INFECTION): Status: ACTIVE | Noted: 2020-08-30

## 2020-08-30 LAB
ANION GAP SERPL CALCULATED.3IONS-SCNC: 12 MMOL/L (ref 7–16)
BUN BLDV-MCNC: 36 MG/DL (ref 8–23)
CALCIUM SERPL-MCNC: 9.2 MG/DL (ref 8.6–10.2)
CHLORIDE BLD-SCNC: 101 MMOL/L (ref 98–107)
CO2: 21 MMOL/L (ref 22–29)
CREAT SERPL-MCNC: 1.1 MG/DL (ref 0.5–1)
EKG ATRIAL RATE: 300 BPM
EKG ATRIAL RATE: 64 BPM
EKG ATRIAL RATE: 68 BPM
EKG ATRIAL RATE: 73 BPM
EKG ATRIAL RATE: 82 BPM
EKG P AXIS: -2 DEGREES
EKG P AXIS: 50 DEGREES
EKG P AXIS: 51 DEGREES
EKG P-R INTERVAL: 142 MS
EKG P-R INTERVAL: 144 MS
EKG P-R INTERVAL: 150 MS
EKG P-R INTERVAL: 150 MS
EKG Q-T INTERVAL: 368 MS
EKG Q-T INTERVAL: 376 MS
EKG Q-T INTERVAL: 376 MS
EKG Q-T INTERVAL: 386 MS
EKG Q-T INTERVAL: 398 MS
EKG QRS DURATION: 66 MS
EKG QRS DURATION: 72 MS
EKG QRS DURATION: 76 MS
EKG QTC CALCULATION (BAZETT): 379 MS
EKG QTC CALCULATION (BAZETT): 414 MS
EKG QTC CALCULATION (BAZETT): 423 MS
EKG QTC CALCULATION (BAZETT): 439 MS
EKG QTC CALCULATION (BAZETT): 507 MS
EKG R AXIS: 142 DEGREES
EKG R AXIS: 33 DEGREES
EKG R AXIS: 41 DEGREES
EKG R AXIS: 42 DEGREES
EKG R AXIS: 53 DEGREES
EKG T AXIS: 119 DEGREES
EKG T AXIS: 52 DEGREES
EKG T AXIS: 55 DEGREES
EKG T AXIS: 58 DEGREES
EKG T AXIS: 63 DEGREES
EKG VENTRICULAR RATE: 104 BPM
EKG VENTRICULAR RATE: 64 BPM
EKG VENTRICULAR RATE: 68 BPM
EKG VENTRICULAR RATE: 73 BPM
EKG VENTRICULAR RATE: 82 BPM
GFR AFRICAN AMERICAN: 58
GFR NON-AFRICAN AMERICAN: 48 ML/MIN/1.73
GLUCOSE BLD-MCNC: 94 MG/DL (ref 74–99)
HCT VFR BLD CALC: 36.6 % (ref 34–48)
HEMOGLOBIN: 11.8 G/DL (ref 11.5–15.5)
MCH RBC QN AUTO: 30 PG (ref 26–35)
MCHC RBC AUTO-ENTMCNC: 32.2 % (ref 32–34.5)
MCV RBC AUTO: 93.1 FL (ref 80–99.9)
PDW BLD-RTO: 12.7 FL (ref 11.5–15)
PLATELET # BLD: 197 E9/L (ref 130–450)
PMV BLD AUTO: 10.7 FL (ref 7–12)
POTASSIUM SERPL-SCNC: 5.2 MMOL/L (ref 3.5–5)
RBC # BLD: 3.93 E12/L (ref 3.5–5.5)
SODIUM BLD-SCNC: 134 MMOL/L (ref 132–146)
WBC # BLD: 10.4 E9/L (ref 4.5–11.5)

## 2020-08-30 PROCEDURE — 2700000000 HC OXYGEN THERAPY PER DAY

## 2020-08-30 PROCEDURE — 80048 BASIC METABOLIC PNL TOTAL CA: CPT

## 2020-08-30 PROCEDURE — 6360000002 HC RX W HCPCS: Performed by: NURSE PRACTITIONER

## 2020-08-30 PROCEDURE — 93005 ELECTROCARDIOGRAM TRACING: CPT | Performed by: INTERNAL MEDICINE

## 2020-08-30 PROCEDURE — 6370000000 HC RX 637 (ALT 250 FOR IP): Performed by: NURSE PRACTITIONER

## 2020-08-30 PROCEDURE — 85027 COMPLETE CBC AUTOMATED: CPT

## 2020-08-30 PROCEDURE — 6370000000 HC RX 637 (ALT 250 FOR IP): Performed by: INTERNAL MEDICINE

## 2020-08-30 PROCEDURE — 36415 COLL VENOUS BLD VENIPUNCTURE: CPT

## 2020-08-30 PROCEDURE — 94640 AIRWAY INHALATION TREATMENT: CPT

## 2020-08-30 PROCEDURE — 2580000003 HC RX 258: Performed by: NURSE PRACTITIONER

## 2020-08-30 RX ORDER — DOFETILIDE 0.12 MG/1
125 CAPSULE ORAL EVERY 12 HOURS SCHEDULED
Qty: 60 CAPSULE | Refills: 3 | Status: SHIPPED | OUTPATIENT
Start: 2020-08-30 | End: 2020-08-30

## 2020-08-30 RX ORDER — CEFDINIR 300 MG/1
300 CAPSULE ORAL EVERY 12 HOURS SCHEDULED
Status: DISCONTINUED | OUTPATIENT
Start: 2020-08-30 | End: 2020-08-30 | Stop reason: HOSPADM

## 2020-08-30 RX ORDER — DOFETILIDE 0.12 MG/1
125 CAPSULE ORAL EVERY 12 HOURS SCHEDULED
Qty: 180 CAPSULE | Refills: 3 | Status: ON HOLD | OUTPATIENT
Start: 2020-08-30 | End: 2022-03-25 | Stop reason: HOSPADM

## 2020-08-30 RX ORDER — CEFDINIR 300 MG/1
300 CAPSULE ORAL EVERY 12 HOURS SCHEDULED
Qty: 20 CAPSULE | Refills: 0 | Status: SHIPPED | OUTPATIENT
Start: 2020-08-30 | End: 2020-09-09

## 2020-08-30 RX ORDER — AMLODIPINE BESYLATE 2.5 MG/1
2.5 TABLET ORAL DAILY
Qty: 30 TABLET | Refills: 3 | Status: SHIPPED | OUTPATIENT
Start: 2020-08-31 | End: 2022-08-23 | Stop reason: DRUGHIGH

## 2020-08-30 RX ADMIN — LEVOTHYROXINE SODIUM 175 MCG: 0.17 TABLET ORAL at 06:14

## 2020-08-30 RX ADMIN — SODIUM CHLORIDE, PRESERVATIVE FREE 10 ML: 5 INJECTION INTRAVENOUS at 08:15

## 2020-08-30 RX ADMIN — VALSARTAN 160 MG: 160 TABLET, FILM COATED ORAL at 08:14

## 2020-08-30 RX ADMIN — VENLAFAXINE HYDROCHLORIDE 150 MG: 150 CAPSULE, EXTENDED RELEASE ORAL at 08:14

## 2020-08-30 RX ADMIN — APIXABAN 5 MG: 5 TABLET, FILM COATED ORAL at 08:21

## 2020-08-30 RX ADMIN — BUDESONIDE 1000 MCG: 0.5 INHALANT RESPIRATORY (INHALATION) at 08:20

## 2020-08-30 RX ADMIN — ATORVASTATIN CALCIUM 20 MG: 20 TABLET, FILM COATED ORAL at 08:14

## 2020-08-30 RX ADMIN — ACETAMINOPHEN 650 MG: 325 TABLET, FILM COATED ORAL at 01:44

## 2020-08-30 RX ADMIN — DOFETILIDE 125 MCG: 0.12 CAPSULE ORAL at 06:14

## 2020-08-30 RX ADMIN — ARFORMOTEROL TARTRATE 15 MCG: 15 SOLUTION RESPIRATORY (INHALATION) at 08:20

## 2020-08-30 RX ADMIN — PANTOPRAZOLE SODIUM 40 MG: 40 TABLET, DELAYED RELEASE ORAL at 06:14

## 2020-08-30 RX ADMIN — AMLODIPINE BESYLATE 2.5 MG: 2.5 TABLET ORAL at 08:14

## 2020-08-30 RX ADMIN — MULTIPLE VITAMINS W/ MINERALS TAB 1 TABLET: TAB at 08:15

## 2020-08-30 RX ADMIN — SUCRALFATE 1 G: 1 TABLET ORAL at 12:38

## 2020-08-30 RX ADMIN — SUCRALFATE 1 G: 1 TABLET ORAL at 08:15

## 2020-08-30 RX ADMIN — Medication 2000 UNITS: at 08:14

## 2020-08-30 ASSESSMENT — PAIN SCALES - GENERAL
PAINLEVEL_OUTOF10: 0
PAINLEVEL_OUTOF10: 7

## 2020-08-30 NOTE — PROGRESS NOTES
PROGRESS NOTE       PATIENT PROBLEM LIST:  Principal Problem:    Atrial fibrillation with RVR (Formerly [facility])  Active Problems:    Essential hypertension    Mixed hyperlipidemia    Class 2 obesity due to excess calories with body mass index (BMI) of 38.0 to 38.9 in adult    MCCLELLAND (dyspnea on exertion)    A-fib ([facility])    CKD (chronic kidney disease) stage 3, GFR 30-59 ml/min ([facility])    UTI (urinary tract infection)  Resolved Problems:    * No resolved hospital problems. *      SUBJECTIVE:  Matt Heck states she feels generally okay but did complained of mild shortness of breath this morning but without posterior cervical diaphoresis. A recheck of her rhythm monitor at that time did not demonstrate any evidence of arrhythmia. REVIEW OF SYSTEMS:  General ROS: negative for - fatigue, malaise,  weight gain or weight loss  Psychological ROS: negative for - anxiety , depression  Ophthalmic ROS: negative for - decreased vision or visual distortion. ENT ROS: negative  Allergy and Immunology ROS: negative  Hematological and Lymphatic ROS: negative  Endocrine: no heat or cold intolerance and no polyphagia, polydipsia, or polyuria  Respiratory ROS: positive for - shortness of breath  Cardiovascular ROS: positive for - dyspnea on exertion, irregular heartbeat, rapid heart rate and shortness of breath. Gastrointestinal ROS: no abdominal pain, change in bowel habits, or black or bloody stools  Genito-Urinary ROS: no nocturia, dysuria, trouble voiding, frequency or hematuria  Musculoskeletal ROS: negative for- myalgias, arthralgias, or claudication  Neurological ROS: no TIA or stroke symptoms otherwise no significant change in symptoms or problems since yesterday as documented in previous progress notes.     SCHEDULED MEDICATIONS:   cefdinir  300 mg Oral 2 times per day    Arformoterol Tartrate  15 mcg Nebulization BID    amLODIPine  2.5 mg Oral Daily    dofetilide  125 mcg Oral 2 times per day    apixaban  5 mg Oral BID    atorvastatin  20 mg Oral Daily    budesonide  1 mg Nebulization BID    vitamin D  2,000 Units Oral Daily    levothyroxine  175 mcg Oral Daily    therapeutic multivitamin-minerals  1 tablet Oral Daily    pantoprazole  40 mg Oral Daily    sucralfate  1 g Oral 4x Daily    valsartan  160 mg Oral Daily    venlafaxine  150 mg Oral Daily    sodium chloride flush  10 mL Intravenous 2 times per day       VITAL SIGNS:                                                                                                                          BP (!) 113/57   Pulse 79   Temp 97.2 °F (36.2 °C) (Temporal)   Resp 18   Ht 5' 6\" (1.676 m)   Wt 238 lb 3.2 oz (108 kg)   SpO2 99%   BMI 38.45 kg/m²   Patient Vitals for the past 96 hrs (Last 3 readings):   Weight   08/30/20 0657 238 lb 3.2 oz (108 kg)   08/29/20 0526 240 lb 6.4 oz (109 kg)   08/28/20 0446 240 lb 9.6 oz (109.1 kg)     OBJECTIVE:    HEENT: PERRL, EOM  Intact; sclera non-icteric, conjunctiva pink. Carotids are brisk in upstroke with normal contour. No carotid bruits. Normal jugular venous pulsation at 45°. No palpable cervical nor supraclavicular nodes. Thyroid not palpable. Trachea midline. Chest: Even excursion  Lungs: CTA B, no expiratory wheezes or rhonchi, no decreased tactile fremitus without inspiratory rales. Heart: Regular  rhythm; S1 > S2, no gallop or murmur. No clicks, rub, palpable thrills   or heaves. PMI nondisplaced, 5th intercostal space MCL. Abdomen: Soft, nontender, nondistended,  grossly protuberant, no masses or organomegaly. Bowel sounds active. Extremities: Without clubbing, cyanosis or edema. Pulses present 3+ upper extermities bilaterally; present 1+ DP and present 1+ PT bilaterally.      Data:   Scheduled Meds: Reviewed  Continuous Infusions:     Intake/Output Summary (Last 24 hours) at 8/30/2020 1434  Last data filed at 8/30/2020 1405  Gross per 24 hour   Intake 720 ml   Output 1350 ml   Net -630 ml     CBC:   Recent Labs 20  0452 20  0533   WBC 16.0* 10.4   HGB 12.1 11.8   HCT 37.5 36.6    197     BMP:  Recent Labs     20  0115 20  0452 20  0532    134 134   K 5.5* 5.1* 5.2*   CL 98 101 101   CO2 23 19* 21*   BUN 45* 40* 36*   CREATININE 1.3* 1.1* 1.1*   LABGLOM 40 48 48     ABGs:   Lab Results   Component Value Date    PH 7.371 2019    PO2 81.4 2019    PCO2 58.4 2019     INR:   No results for input(s): INR in the last 72 hours. PRO-BNP:   Lab Results   Component Value Date    PROBNP 374 2020    PROBNP 286 2019      TSH:   Lab Results   Component Value Date    TSH 0.562 2020      Cardiac Injury Profile:   Recent Labs     20   TROPONINI <0.01      Lipid Profile:   Lab Results   Component Value Date    TRIG 129 2020    HDL 69 2020    LDLCALC 44 2020    CHOL 139 2020      Hemoglobin A1C: No components found for: HGBA1C     RAD:   Xr Knee Left (1-2 Views)    Result Date: 2020  Radiology exam is complete. No Radiologist dictation. Please follow up with ordering provider. Xr Knee Bilateral Standing    Result Date: 2020  Radiology exam is complete. No Radiologist dictation. Please follow up with ordering provider. Ct Chest Wo Contrast    Result Date: 2020  Patient MRN:  [id number] : 1945 Age: 66 years Gender: Female Order Date:  2020 10:52 AM EXAM: CT CHEST WO CONTRAST number of images 291. Technique: Low-dose CT  acquisition technique included one of following options; 1 . Automated exposure control, 2. Adjustment of MA and or KV according to patient's size or 3. Use of iterative reconstruction. INDICATION:  leukocytosis, diminished lung sounds leukocytosis, diminished lung sounds COMPARISON: 2019 FINDINGS: There is cardiomegaly with  coronary artery calcification. There is mild ectasia of the ascending thoracic aorta measuring 3.5 cm. The trachea and major bronchi are patent. There is COPD with atelectasis in the lung bases. There is no focal consolidation or pleural effusion. The liver is of normal architecture. There is biliary dilatation. Degenerative changes are identified in the thoracic spine. Large hiatal hernia is present. COPD with atelectasis in lung bases. There is no focal consolidation. Cardiomegaly with coronary artery calcification. Xr Chest Portable    Result Date: 2020  Patient MRN: [id number] : 1945 Age:  66 years Gender: Female Order Date: 2020 1:40 PM Exam: XR CHEST PORTABLE Number of Images: 1 view Indication:   sob sob Comparison: 2019 Findings: The heart appears prominent The pulmonary vasculature appears to be crowded The aorta appears to be tortuous There is a poor inspiratory effort     Limited study, due to a poor inspiratory effort, no gross abnormality       EKG: See Report  Echo: See Report      IMPRESSIONS:  Principal Problem:    Atrial fibrillation with RVR ([address]  Active Problems:    Essential hypertension    Mixed hyperlipidemia    Class 2 obesity due to excess calories with body mass index (BMI) of 38.0 to 38.9 in adult    MCCLELLAND (dyspnea on exertion)    A-fib (HCC)    CKD (chronic kidney disease) stage 3, GFR 30-59 ml/min (HCC)    UTI (urinary tract infection)  Resolved Problems:    * No resolved hospital problems. *      RECOMMENDATIONS:  Mrs. Symone Velarde seems significantly improved since admission with the exception that she does experience mild shortness of breath and indeed has underlying COPD as well. Blood pressure is now well controlled and she is on amlodipine with the hope of coronary microvascular dilatation considering her CAT scan that suggests coronary artery calcifications. She was instructed to follow-up within 1 week with Dr. Nehal Valdez and I will see her again in 1-2 months or sooner as clinically warranted. QTc interval remains < 500 ms.   I have spent more than  25 minutes face to face with Angelina Samano and reviewing notes and laboratory data, with greater than 50% of this time instructing and counseling the patient  face to face regarding my findings and recommendations and I have answered all questions as posed to me by Ms. Flynn. Michelle Kurtz, DO FACP,FACC,[facility]      NOTE:  This report was transcribed using voice recognition software.   Every effort was made to ensure accuracy; however, inadvertent computerized transcription errors may be present

## 2020-08-30 NOTE — PLAN OF CARE
Problem: Falls - Risk of:  Goal: Will remain free from falls  Description: Will remain free from falls  8/29/2020 2308 by Orlando Louise RN  Outcome: Met This Shift  8/29/2020 1704 by Dorothy Bradley RN  Outcome: Met This Shift     Problem: Cardiac Output - Decreased:  Goal: Hemodynamic stability will improve  Description: Hemodynamic stability will improve  Outcome: Met This Shift     Problem: Pain:  Goal: Pain level will decrease  Description: Pain level will decrease  8/29/2020 2308 by Orlando Louise RN  Outcome: Met This Shift  8/29/2020 1704 by Dorothy Bradley RN  Outcome: Met This Shift

## 2020-08-31 LAB
ORGANISM: ABNORMAL
ORGANISM: ABNORMAL
URINE CULTURE, ROUTINE: ABNORMAL
URINE CULTURE, ROUTINE: ABNORMAL

## 2020-08-31 NOTE — DISCHARGE SUMMARY
[address]                  [address] Formerly [facility],  [facility]                               DISCHARGE SUMMARY    PATIENT NAME: Kenny Telles                      :        1945  MED REC NO:   [id number]                            ROOM:       6505  ACCOUNT NO:   [de-identified]                           ADMIT DATE: 2020  PROVIDER:     Ladan Dietz MD                  DISCHARGE DATE:  2020    The patient was admitted because of dyspnea on exertion and was found to  be in atrial fib/flutter with RVR. Dr. Micah Wise was consulted and  started the patient on Tikosyn which the patient tolerated well. At the  time of discharge, she was in sinus rhythm and was asymptomatic. During  her stay, she was found to have a urinary tract infection and there was  greater than 100,000 E. coli with the sensitivities pending from her  urine specimen. At the time of discharge, her BUN was 36 and creatinine  1.1. White count 10,400; hemoglobin 11.8; and platelet count 277,402. She did have a CT scan of her thorax which revealed COPD with  atelectasis in the bases but no evidence of focal consolidation. DISCHARGE DIAGNOSES:  As follows:  1. Dyspnea on exertion. 2.  Atrial fibrillation with RVR. 3.  CKD stage III. 4.  UTI. 5.  Hyperlipidemia. 6.  Hypertension. 7.  Diastolic dysfunction. 8.  Class II obesity. 9.  CHF. DISCHARGE MEDICATIONS:  Tikosyn 125 mcg every 12 hours, amlodipine 2.5  mg daily, Omnicef 300 mg b.i.d. for 10 days, levothyroxine 175 mcg  daily, Eliquis 5 mg b.i.d., valsartan 160 mg daily, venlafaxine   mg daily, atorvastatin 20 mg daily, a multivitamin daily, vitamin D3  2000 units per day, and pantoprazole 40 mg daily. The patient is to continue oxygen at 2 liters nasal cannula continuously  and to follow up in the office in one week. Her condition at discharge  was stable and she was discharged home.         Nina Tenorio MD    D: 08/30/2020 14:04:15       T: 08/30/2020 14:05:57     [email or web address]  Job#: [id number]     Doc#: [id number]    CC:

## 2020-09-03 LAB — BLOOD CULTURE, ROUTINE: NORMAL

## 2020-09-08 ENCOUNTER — HOSPITAL ENCOUNTER (OUTPATIENT)
Age: 75
Discharge: HOME | End: 2020-09-10
Payer: MEDICARE

## 2020-09-08 PROCEDURE — 85025 COMPLETE CBC W/AUTO DIFF WBC: CPT

## 2020-09-08 PROCEDURE — 80053 COMPREHEN METABOLIC PANEL: CPT

## 2020-09-09 LAB
ALBUMIN SERPL-MCNC: 4 G/DL (ref 3.5–5.2)
ALP BLD-CCNC: 63 U/L (ref 35–104)
ALT SERPL-CCNC: 33 U/L (ref 0–32)
ANION GAP SERPL CALCULATED.3IONS-SCNC: 10 MMOL/L (ref 7–16)
AST SERPL-CCNC: 24 U/L (ref 0–31)
BASOPHILS ABSOLUTE: 0.04 E9/L (ref 0–0.2)
BASOPHILS RELATIVE PERCENT: 0.8 % (ref 0–2)
BILIRUB SERPL-MCNC: 0.7 MG/DL (ref 0–1.2)
BUN BLDV-MCNC: 18 MG/DL (ref 8–23)
CALCIUM SERPL-MCNC: 9.6 MG/DL (ref 8.6–10.2)
CHLORIDE BLD-SCNC: 101 MMOL/L (ref 98–107)
CO2: 25 MMOL/L (ref 22–29)
CREAT SERPL-MCNC: 1 MG/DL (ref 0.5–1)
EOSINOPHILS ABSOLUTE: 0.15 E9/L (ref 0.05–0.5)
EOSINOPHILS RELATIVE PERCENT: 2.9 % (ref 0–6)
GFR AFRICAN AMERICAN: >60
GFR NON-AFRICAN AMERICAN: 54 ML/MIN/1.73
GLUCOSE BLD-MCNC: 94 MG/DL (ref 74–99)
HCT VFR BLD CALC: 36.1 % (ref 34–48)
HEMOGLOBIN: 11.2 G/DL (ref 11.5–15.5)
IMMATURE GRANULOCYTES #: 0.02 E9/L
IMMATURE GRANULOCYTES %: 0.4 % (ref 0–5)
LYMPHOCYTES ABSOLUTE: 0.82 E9/L (ref 1.5–4)
LYMPHOCYTES RELATIVE PERCENT: 15.8 % (ref 20–42)
MCH RBC QN AUTO: 30.4 PG (ref 26–35)
MCHC RBC AUTO-ENTMCNC: 31 % (ref 32–34.5)
MCV RBC AUTO: 97.8 FL (ref 80–99.9)
MONOCYTES ABSOLUTE: 0.53 E9/L (ref 0.1–0.95)
MONOCYTES RELATIVE PERCENT: 10.2 % (ref 2–12)
NEUTROPHILS ABSOLUTE: 3.64 E9/L (ref 1.8–7.3)
NEUTROPHILS RELATIVE PERCENT: 69.9 % (ref 43–80)
PDW BLD-RTO: 13.2 FL (ref 11.5–15)
PLATELET # BLD: 151 E9/L (ref 130–450)
PMV BLD AUTO: 12.2 FL (ref 7–12)
POTASSIUM SERPL-SCNC: 5.4 MMOL/L (ref 3.5–5)
RBC # BLD: 3.69 E12/L (ref 3.5–5.5)
SODIUM BLD-SCNC: 136 MMOL/L (ref 132–146)
TOTAL PROTEIN: 6.5 G/DL (ref 6.4–8.3)
WBC # BLD: 5.2 E9/L (ref 4.5–11.5)

## 2020-09-29 PROBLEM — N39.0 UTI (URINARY TRACT INFECTION): Status: RESOLVED | Noted: 2020-08-30 | Resolved: 2020-09-29

## 2020-10-23 ENCOUNTER — HOSPITAL ENCOUNTER (OUTPATIENT)
Age: 75
Discharge: HOME | End: 2020-10-25
Payer: MEDICARE

## 2020-10-23 LAB
ALBUMIN SERPL-MCNC: 4.1 G/DL (ref 3.5–5.2)
ALP BLD-CCNC: 71 U/L (ref 35–104)
ALT SERPL-CCNC: 19 U/L (ref 0–32)
ANION GAP SERPL CALCULATED.3IONS-SCNC: 14 MMOL/L (ref 7–16)
AST SERPL-CCNC: 23 U/L (ref 0–31)
BILIRUB SERPL-MCNC: 0.7 MG/DL (ref 0–1.2)
BUN BLDV-MCNC: 16 MG/DL (ref 8–23)
CALCIUM SERPL-MCNC: 9.5 MG/DL (ref 8.6–10.2)
CHLORIDE BLD-SCNC: 99 MMOL/L (ref 98–107)
CHOLESTEROL, TOTAL: 146 MG/DL (ref 0–199)
CO2: 24 MMOL/L (ref 22–29)
CREAT SERPL-MCNC: 0.8 MG/DL (ref 0.5–1)
GFR AFRICAN AMERICAN: >60
GFR NON-AFRICAN AMERICAN: >60 ML/MIN/1.73
GLUCOSE BLD-MCNC: 118 MG/DL (ref 74–99)
HDLC SERPL-MCNC: 69 MG/DL
LDL CHOLESTEROL CALCULATED: 55 MG/DL (ref 0–99)
POTASSIUM SERPL-SCNC: 5 MMOL/L (ref 3.5–5)
SODIUM BLD-SCNC: 137 MMOL/L (ref 132–146)
TOTAL PROTEIN: 6.8 G/DL (ref 6.4–8.3)
TRIGL SERPL-MCNC: 109 MG/DL (ref 0–149)
TSH SERPL DL<=0.05 MIU/L-ACNC: 0.02 UIU/ML (ref 0.27–4.2)
VITAMIN D 25-HYDROXY: 40 NG/ML (ref 30–100)
VLDLC SERPL CALC-MCNC: 22 MG/DL

## 2020-10-23 PROCEDURE — 82306 VITAMIN D 25 HYDROXY: CPT

## 2020-10-23 PROCEDURE — 84443 ASSAY THYROID STIM HORMONE: CPT

## 2020-10-23 PROCEDURE — 80061 LIPID PANEL: CPT

## 2020-10-23 PROCEDURE — 80053 COMPREHEN METABOLIC PANEL: CPT

## 2020-11-03 PROBLEM — I48.91 A-FIB (HCC): Status: RESOLVED | Noted: 2020-08-29 | Resolved: 2020-11-03

## 2020-12-07 LAB — TSH SERPL DL<=0.05 MIU/L-ACNC: 8.98 UIU/ML (ref 0.27–4.2)

## 2021-01-06 ENCOUNTER — OFFICE VISIT (OUTPATIENT)
Dept: ORTHOPEDIC SURGERY | Age: 76
End: 2021-01-06
Payer: MEDICARE

## 2021-01-06 VITALS — BODY MASS INDEX: 33.75 KG/M2 | TEMPERATURE: 96.6 F | HEIGHT: 66 IN | WEIGHT: 210 LBS

## 2021-01-06 DIAGNOSIS — M17.12 PRIMARY OSTEOARTHRITIS OF LEFT KNEE: Primary | ICD-10-CM

## 2021-01-06 PROCEDURE — 20610 DRAIN/INJ JOINT/BURSA W/O US: CPT | Performed by: ORTHOPAEDIC SURGERY

## 2021-01-06 PROCEDURE — 99213 OFFICE O/P EST LOW 20 MIN: CPT | Performed by: ORTHOPAEDIC SURGERY

## 2021-01-06 RX ORDER — BUPIVACAINE HYDROCHLORIDE 2.5 MG/ML
3 INJECTION, SOLUTION INFILTRATION; PERINEURAL ONCE
Status: COMPLETED | OUTPATIENT
Start: 2021-01-06 | End: 2021-01-06

## 2021-01-06 RX ORDER — TRIAMCINOLONE ACETONIDE 40 MG/ML
80 INJECTION, SUSPENSION INTRA-ARTICULAR; INTRAMUSCULAR ONCE
Status: COMPLETED | OUTPATIENT
Start: 2021-01-06 | End: 2021-01-06

## 2021-01-06 RX ORDER — UMECLIDINIUM BROMIDE AND VILANTEROL TRIFENATATE 62.5; 25 UG/1; UG/1
POWDER RESPIRATORY (INHALATION)
COMMUNITY
Start: 2020-12-14

## 2021-01-06 RX ADMIN — BUPIVACAINE HYDROCHLORIDE 7.5 MG: 2.5 INJECTION, SOLUTION INFILTRATION; PERINEURAL at 15:43

## 2021-01-06 RX ADMIN — TRIAMCINOLONE ACETONIDE 80 MG: 40 INJECTION, SUSPENSION INTRA-ARTICULAR; INTRAMUSCULAR at 15:43

## 2021-01-06 NOTE — PROGRESS NOTES
Chief Complaint:   Chief Complaint   Patient presents with    Knee Pain     FU left knee pain. Requesting injection. Injection given 8/20/2020 lasted until mid December. HPI   38-year-old woman with known osteoarthritis left knee. Did well after steroid injection last August until recently. She has history of failed lumbar surgery, has considered but is trying to avoid revision surgery. On home oxygen therapy. Does not wish to consider knee replacement, understandably so.     She does state that she has lost 33 pounds since she saw me last.    Patient Active Problem List   Diagnosis    Essential hypertension    Mixed hyperlipidemia    Hypoxia    CHF (congestive heart failure) ([facility])    Diastolic dysfunction    Asthma    O2 dependent    Primary osteoarthritis of left knee    Lumbar spondylosis    Class 2 obesity due to excess calories with body mass index (BMI) of 38.0 to 38.9 in adult    MCCLELLAND (dyspnea on exertion)    CKD (chronic kidney disease) stage 3, GFR 30-59 ml/min    Atrial fibrillation with RVR ([facility])       Past Medical History:   Diagnosis Date    Arthritis     Atrial fibrillation ([address]     Bilateral carotid artery stenosis 7/16/2020    Bronchitis     Carotid stenosis, bilateral 5/21/2012    Hyperlipidemia     Hypertension     Left carotid stenosis 4/26/2018    O2 dependent 7/16/2020    S/P carotid endarterectomy 10/6/2016    Stomach ulcer     Thyroid disease        Past Surgical History:   Procedure Laterality Date    APPENDECTOMY      BACK SURGERY      CHOLECYSTECTOMY      COLONOSCOPY      ENDOSCOPY, COLON, DIAGNOSTIC  01/10/2018    upper endo    EYE SURGERY      BILATERAL CATARACT    HYSTERECTOMY      JOINT REPLACEMENT  2011    RIGHT KNEE       Current Outpatient Medications   Medication Sig Dispense Refill    ANORO ELLIPTA 62.5-25 MCG/INH AEPB inhaler INHALE 1 PUFF BY MOUTH ONCE DAILY      amLODIPine (NORVASC) 2.5 MG tablet Take 1 tablet by mouth daily 30 tablet 3    dofetilide (TIKOSYN) 125 MCG capsule Take 1 capsule by mouth every 12 hours 180 capsule 3    levothyroxine (SYNTHROID) 175 MCG tablet TAKE 1 TABLET BY MOUTH ONCE DAILY      OXYGEN Inhale into the lungs      apixaban (ELIQUIS) 5 MG TABS tablet Take 1 tablet by mouth 2 times daily 60 tablet 2    valsartan (DIOVAN) 160 MG tablet Take 1 tablet by mouth daily 30 tablet 3    venlafaxine (EFFEXOR XR) 150 MG extended release capsule Take 150 mg by mouth daily      pantoprazole (PROTONIX) 40 MG tablet Take 1 tablet by mouth daily 30 tablet 3    atorvastatin (LIPITOR) 20 MG tablet Take 20 mg by mouth daily      Multiple Vitamins-Minerals (THERAPEUTIC MULTIVITAMIN-MINERALS) tablet Take 1 tablet by mouth daily      Cholecalciferol (VITAMIN D) 2000 UNITS CAPS capsule Take 1 capsule by mouth daily       Current Facility-Administered Medications   Medication Dose Route Frequency Provider Last Rate Last Admin    triamcinolone acetonide (KENALOG-40) injection 80 mg  80 mg Intra-articular Once Vanessa Rosenbaum MD        bupivacaine (MARCAINE) 0.25 % injection 7.5 mg  3 mL Intra-articular Once Vanessa Rosenbaum MD           No Known Allergies    Social History     Socioeconomic History    Marital status:      Spouse name: None    Number of children: None    Years of education: None    Highest education level: None   Occupational History    None   Social Needs    Financial resource strain: None    Food insecurity     Worry: None     Inability: None    Transportation needs     Medical: None     Non-medical: None   Tobacco Use    Smoking status: Former Smoker     Packs/day: 1.00     Years: 30.00     Pack years: 30.00     Types: Cigarettes     Quit date: 2018     Years since quittin.5    Smokeless tobacco: Never Used   Substance and Sexual Activity    Alcohol use: Not Currently    Drug use: No    Sexual activity: None   Lifestyle    Physical activity     Days per week: None Minutes per session: None    Stress: None   Relationships    Social connections     Talks on phone: None     Gets together: None     Attends Buddhism service: None     Active member of club or organization: None     Attends meetings of clubs or organizations: None     Relationship status: None    Intimate partner violence     Fear of current or ex partner: None     Emotionally abused: None     Physically abused: None     Forced sexual activity: None   Other Topics Concern    None   Social History Narrative    None       Family History   Problem Relation Age of Onset    Other Mother         rheumatic fever    Cancer Sister         lung         Review of Systems   No fever, chills, or other constitutionalsymptoms. No numbness or other neuro symptoms. No chest pain. No dyspnea. [unfilled]   Left knee has no effusion or erythema. Full range of motion. Clinical varus suggested. No pain with left hip rotation. She ambulates with a cane. Physical Exam    Patient is alert and oriented. Well-developed well-nourished. BMI 34. Pupils equal and reactive. Scleraeanicteric. Neck supple  Lungs clear. Carries nasal oxygen supply. Cardiac rate and rhythm regular. Abdomen soft and nontender. Skin warm and dry. XRAY: Previous left knee x-rays have demonstrated significant osteoarthritic changes left knee. ASSESSMENT/PLAN:    Rasheed Luke was seen today for knee pain. Diagnoses and all orders for this visit:    Primary osteoarthritis of left knee  -     NM ARTHROCENTESIS ASPIR&/INJ MAJOR JT/BURSA W/O US    Other orders  -     triamcinolone acetonide (KENALOG-40) injection 80 mg  -     bupivacaine (MARCAINE) 0.25 % injection 7.5 mg    Agree with current nonoperative management. Has been an appropriate interval for repeat steroid injection as she requests.   After review of indications, risks and limitations, after alcohol prep, left knee injection with triamcinolone 80mg and 3 cc

## 2021-01-19 LAB
ALBUMIN SERPL-MCNC: 4 G/DL (ref 3.5–5.2)
ALP BLD-CCNC: 72 U/L (ref 35–104)
ALT SERPL-CCNC: 23 U/L (ref 0–32)
ANION GAP SERPL CALCULATED.3IONS-SCNC: 10 MMOL/L (ref 7–16)
AST SERPL-CCNC: 18 U/L (ref 0–31)
BILIRUB SERPL-MCNC: 0.5 MG/DL (ref 0–1.2)
BUN BLDV-MCNC: 21 MG/DL (ref 8–23)
CALCIUM SERPL-MCNC: 9.4 MG/DL (ref 8.6–10.2)
CHLORIDE BLD-SCNC: 101 MMOL/L (ref 98–107)
CHOLESTEROL, TOTAL: 162 MG/DL (ref 0–199)
CO2: 27 MMOL/L (ref 22–29)
CREAT SERPL-MCNC: 0.8 MG/DL (ref 0.5–1)
GFR AFRICAN AMERICAN: >60
GFR NON-AFRICAN AMERICAN: >60 ML/MIN/1.73
GLUCOSE BLD-MCNC: 103 MG/DL (ref 74–99)
HDLC SERPL-MCNC: 81 MG/DL
LDL CHOLESTEROL CALCULATED: 65 MG/DL (ref 0–99)
POTASSIUM SERPL-SCNC: 4.9 MMOL/L (ref 3.5–5)
SODIUM BLD-SCNC: 138 MMOL/L (ref 132–146)
TOTAL PROTEIN: 6.8 G/DL (ref 6.4–8.3)
TRIGL SERPL-MCNC: 78 MG/DL (ref 0–149)
TSH SERPL DL<=0.05 MIU/L-ACNC: 0.8 UIU/ML (ref 0.27–4.2)
VITAMIN D 25-HYDROXY: 38 NG/ML (ref 30–100)
VLDLC SERPL CALC-MCNC: 16 MG/DL

## 2021-04-19 LAB
ALBUMIN SERPL-MCNC: 3.9 G/DL (ref 3.5–5.2)
ALP BLD-CCNC: 62 U/L (ref 35–104)
ALT SERPL-CCNC: 11 U/L (ref 0–32)
ANION GAP SERPL CALCULATED.3IONS-SCNC: 14 MMOL/L (ref 7–16)
AST SERPL-CCNC: 22 U/L (ref 0–31)
BILIRUB SERPL-MCNC: 0.5 MG/DL (ref 0–1.2)
BUN BLDV-MCNC: 15 MG/DL (ref 8–23)
CALCIUM SERPL-MCNC: 9.2 MG/DL (ref 8.6–10.2)
CHLORIDE BLD-SCNC: 100 MMOL/L (ref 98–107)
CHOLESTEROL, TOTAL: 177 MG/DL (ref 0–199)
CO2: 26 MMOL/L (ref 22–29)
CREAT SERPL-MCNC: 0.7 MG/DL (ref 0.5–1)
GFR AFRICAN AMERICAN: >60
GFR NON-AFRICAN AMERICAN: >60 ML/MIN/1.73
GLUCOSE BLD-MCNC: 95 MG/DL (ref 74–99)
HDLC SERPL-MCNC: 71 MG/DL
LDL CHOLESTEROL CALCULATED: 71 MG/DL (ref 0–99)
POTASSIUM SERPL-SCNC: 4.5 MMOL/L (ref 3.5–5)
SODIUM BLD-SCNC: 140 MMOL/L (ref 132–146)
TOTAL PROTEIN: 6.5 G/DL (ref 6.4–8.3)
TRIGL SERPL-MCNC: 173 MG/DL (ref 0–149)
TSH SERPL DL<=0.05 MIU/L-ACNC: 7.47 UIU/ML (ref 0.27–4.2)
VITAMIN D 25-HYDROXY: 34 NG/ML (ref 30–100)
VLDLC SERPL CALC-MCNC: 35 MG/DL

## 2021-04-23 LAB
HBA1C MFR BLD: 4.9 % (ref 4–5.6)
PARATHYROID HORMONE INTACT: 33 PG/ML (ref 15–65)

## 2021-06-07 LAB — TSH SERPL DL<=0.05 MIU/L-ACNC: 1.31 UIU/ML (ref 0.27–4.2)

## 2021-07-15 DIAGNOSIS — Z98.890 S/P CAROTID ENDARTERECTOMY: ICD-10-CM

## 2021-07-15 DIAGNOSIS — I65.23 BILATERAL CAROTID ARTERY STENOSIS: Primary | ICD-10-CM

## 2021-07-21 ENCOUNTER — TELEPHONE (OUTPATIENT)
Dept: VASCULAR SURGERY | Age: 76
End: 2021-07-21

## 2021-07-21 LAB
ALBUMIN SERPL-MCNC: 4.2 G/DL (ref 3.5–5.2)
ALP BLD-CCNC: 66 U/L (ref 35–104)
ALT SERPL-CCNC: 11 U/L (ref 0–32)
ANION GAP SERPL CALCULATED.3IONS-SCNC: 10 MMOL/L (ref 7–16)
AST SERPL-CCNC: 21 U/L (ref 0–31)
BILIRUB SERPL-MCNC: 0.6 MG/DL (ref 0–1.2)
BUN BLDV-MCNC: 18 MG/DL (ref 6–23)
CALCIUM SERPL-MCNC: 9.4 MG/DL (ref 8.6–10.2)
CHLORIDE BLD-SCNC: 101 MMOL/L (ref 98–107)
CHOLESTEROL, TOTAL: 160 MG/DL (ref 0–199)
CO2: 26 MMOL/L (ref 22–29)
CREAT SERPL-MCNC: 0.9 MG/DL (ref 0.5–1)
GFR AFRICAN AMERICAN: >60
GFR NON-AFRICAN AMERICAN: >60 ML/MIN/1.73
GLUCOSE BLD-MCNC: 94 MG/DL (ref 74–99)
HDLC SERPL-MCNC: 76 MG/DL
LDL CHOLESTEROL CALCULATED: 55 MG/DL (ref 0–99)
POTASSIUM SERPL-SCNC: 5.2 MMOL/L (ref 3.5–5)
SODIUM BLD-SCNC: 137 MMOL/L (ref 132–146)
TOTAL PROTEIN: 6.8 G/DL (ref 6.4–8.3)
TRIGL SERPL-MCNC: 147 MG/DL (ref 0–149)
TSH SERPL DL<=0.05 MIU/L-ACNC: 1.07 UIU/ML (ref 0.27–4.2)
VITAMIN D 25-HYDROXY: 34 NG/ML (ref 30–100)
VLDLC SERPL CALC-MCNC: 29 MG/DL

## 2021-07-22 ENCOUNTER — OFFICE VISIT (OUTPATIENT)
Dept: VASCULAR SURGERY | Age: 76
End: 2021-07-22
Payer: MEDICARE

## 2021-07-22 ENCOUNTER — HOSPITAL ENCOUNTER (OUTPATIENT)
Dept: CARDIOLOGY | Age: 76
Discharge: HOME | End: 2021-07-22
Payer: MEDICARE

## 2021-07-22 VITALS — HEIGHT: 65 IN | WEIGHT: 200 LBS | BODY MASS INDEX: 33.32 KG/M2

## 2021-07-22 DIAGNOSIS — I65.23 BILATERAL CAROTID ARTERY STENOSIS: Primary | ICD-10-CM

## 2021-07-22 DIAGNOSIS — I65.23 BILATERAL CAROTID ARTERY STENOSIS: ICD-10-CM

## 2021-07-22 DIAGNOSIS — E78.2 MIXED HYPERLIPIDEMIA: ICD-10-CM

## 2021-07-22 DIAGNOSIS — Z98.890 S/P CAROTID ENDARTERECTOMY: ICD-10-CM

## 2021-07-22 DIAGNOSIS — Z99.81 O2 DEPENDENT: ICD-10-CM

## 2021-07-22 PROCEDURE — 99213 OFFICE O/P EST LOW 20 MIN: CPT | Performed by: SURGERY

## 2021-07-22 PROCEDURE — 93880 EXTRACRANIAL BILAT STUDY: CPT

## 2021-07-22 NOTE — PROGRESS NOTES
[facility] - [facility]    This is a pre read worksheet - prior to official physician interpretation    Fatoumata Marie  1945  Date of study: 7/22/21    Indication for study:  Carotid artery stenosis  Study : Bilateral Carotid Artery Duplex Examination    Duplex examination of the RIGHT carotid artery system identifies atherosclerotic plaque. The peak systolic velocity in internal carotid artery was 240 centimeters / second. The maximum end diastolic velocity was 41 centimeters / second. The ICA/CCA ratio is 1.8. The right vertebral artery has antegrade flow. Duplex examination of the LEFT carotid artery system identifies atherosclerotic plaque. The peak systolic velocity in internal carotid artery was 172 centimeters / second. The maximum end diastolic velocity was 37 centimeters / second. The ICA/CCA ratio is 1.7. The left vertebral artery has antegrade flow.     BOTH distal ICAS very tortuous  RIGHT 40%  LEFT 40%        LAST STUDY  7/10/2020  Rt 36  Lt 40

## 2021-07-22 NOTE — PROGRESS NOTES
Chief Complaint:   Chief Complaint   Patient presents with    Circulatory Problem     s/p carotid endaterectomy         HPI: Patient came to the office, for evaluation of carotid artery disease, recurrent mild to moderate stenosis of the right associated 40% left carotid stenosis, overall doing well, recently lost about 40 pounds weight, trying to lose additional 40 pounds    Her cardiopulmonary condition is stable, currently uses 2 L of oxygen per minute      Patient denies any focal lateralizing neurological symptoms like loss of speech, vision or loss of function of extremity    Patient can walk short distances slowly, and denies any symptoms of rest pain    No Known Allergies    Current Outpatient Medications   Medication Sig Dispense Refill    ANORO ELLIPTA 62.5-25 MCG/INH AEPB inhaler INHALE 1 PUFF BY MOUTH ONCE DAILY      amLODIPine (NORVASC) 2.5 MG tablet Take 1 tablet by mouth daily 30 tablet 3    dofetilide (TIKOSYN) 125 MCG capsule Take 1 capsule by mouth every 12 hours 180 capsule 3    levothyroxine (SYNTHROID) 175 MCG tablet 150 mcg       OXYGEN Inhale into the lungs      apixaban (ELIQUIS) 5 MG TABS tablet Take 1 tablet by mouth 2 times daily 60 tablet 2    valsartan (DIOVAN) 160 MG tablet Take 1 tablet by mouth daily 30 tablet 3    venlafaxine (EFFEXOR XR) 150 MG extended release capsule Take 150 mg by mouth daily      pantoprazole (PROTONIX) 40 MG tablet Take 1 tablet by mouth daily 30 tablet 3    atorvastatin (LIPITOR) 20 MG tablet Take 20 mg by mouth daily      Multiple Vitamins-Minerals (THERAPEUTIC MULTIVITAMIN-MINERALS) tablet Take 1 tablet by mouth daily      Cholecalciferol (VITAMIN D) 2000 UNITS CAPS capsule Take 1 capsule by mouth daily       No current facility-administered medications for this visit.        Past Medical History:   Diagnosis Date    Arthritis     Atrial fibrillation ([facility] [address]     Bilateral carotid artery stenosis 7/16/2020    Bronchitis     Carotid stenosis, bilateral 5/21/2012    Hyperlipidemia     Hypertension     Left carotid stenosis 4/26/2018    O2 dependent 7/16/2020    S/P carotid endarterectomy 10/6/2016    Stomach ulcer     Thyroid disease        Past Surgical History:   Procedure Laterality Date    APPENDECTOMY      BACK SURGERY      CHOLECYSTECTOMY      COLONOSCOPY      ENDOSCOPY, COLON, DIAGNOSTIC  01/10/2018    upper endo    EYE SURGERY      BILATERAL CATARACT    HYSTERECTOMY      JOINT REPLACEMENT  2011    RIGHT KNEE       Family History   Problem Relation Age of Onset    Other Mother         rheumatic fever    Cancer Sister         lung       Social History     Socioeconomic History    Marital status:      Spouse name: Not on file    Number of children: Not on file    Years of education: Not on file    Highest education level: Not on file   Occupational History    Not on file   Tobacco Use    Smoking status: Former Smoker     Packs/day: 1.00     Years: 30.00     Pack years: 30.00     Types: Cigarettes     Quit date: 7/4/2018     Years since quitting: 3.0    Smokeless tobacco: Never Used   Vaping Use    Vaping Use: Never used   Substance and Sexual Activity    Alcohol use: Not Currently    Drug use: No    Sexual activity: Not on file   Other Topics Concern    Not on file   Social History Narrative    Not on file     Social Determinants of Health     Financial Resource Strain:     Difficulty of Paying Living Expenses:    Food Insecurity:     Worried About Running Out of Food in the Last Year:     Ran Out of Food in the Last Year:    Transportation Needs:     Lack of Transportation (Medical):      Lack of Transportation (Non-Medical):    Physical Activity:     Days of Exercise per Week:     Minutes of Exercise per Session:    Stress:     Feeling of Stress :    Social Connections:     Frequency of Communication with Friends and Family:     Frequency of Social Gatherings with Friends and Family:     Attends Spiritism Services:     Active Member of Clubs or Organizations:     Attends Club or Organization Meetings:     Marital Status:    Intimate Partner Violence:     Fear of Current or Ex-Partner:     Emotionally Abused:     Physically Abused:     Sexually Abused:        Review of Systems:    Eyes:  No blurring, diplopia or vision loss. Respiratory:  No cough, pleuritic chest pain, dyspnea, or wheezing. Chronic obstructive lung disease, 2 L of oxygen  Cardiovascular: No angina, palpitations . Coronary artery disease, history of congestive heart failure, atrial fibrillation, hypertension, hyperlipidemia  Musculoskeletal:  No arthritis or weakness. Neurologic:  No paralysis, paresis, paresthesia, seizures or headache. Endocrinology:           Physical Exam:  General appearance:  Alert, awake, oriented x 3. No distress. Eyes:  Conjunctivae appear normal; PERRL  Neck:  No jugular venous distention, lymphadenopathy or thyromegaly. Carotid bruit noted  Lungs:  Clear to ausculation bilaterally. No rhonchi, crackles, wheezes  Heart:  Regular rate and rhythm. No rub or murmur  Abdomen:  Soft, non-tender. No masses, organomegaly. Musculoskeletal : No joint effusions, tenderness swelling    Neuro: Speech is intact. Moving all extremities. No focal motor or sensory deficits      Extremities:  Both feet are warm to touch. The color of both feet is normal.        Pulses Right  Left    Brachial 3 3    Radial    3=normal   Femoral 2 2  2=diminished   Popliteal    1=barely palpable   Dorsalis pedis    0=absent   Posterior tibial    4=aneurysmal             Other pertinent information:1. The past medical records were reviewed. Assessment:    1. Bilateral carotid artery stenosis    2. Mixed hyperlipidemia    3. O2 dependent    4.  S/P carotid endarterectomy              Plan:       Discussed the patient regarding the rest of the carotid ultrasound, bilaterally 40%, unchanged from last year, the option of evaluation every 2 years were discussed, patient is concerned, wanted to come to see me every year, to call me as needed if any symptoms, explained              Patient was instructed to continue walking program and to call if any worsening of symptoms and to call if any focal lateralizing neurological symptoms like loss of speech, vision or loss of function of extremity. All the questions were answered. Indicated follow-up: Return in about 1 year (around 7/22/2022), or if symptoms worsen or fail to improve.

## 2021-07-26 NOTE — PROCEDURES
[address]                  [address] [facility],  [facility]                                VASCULAR REPORT    PATIENT NAME: Jeremias Bryant                      :        1945  MED REC NO:   [id number]                            ROOM:  ACCOUNT NO:   [de-identified]                           ADMIT DATE: 2021  PROVIDER:     Marina Siegel MD    DATE OF PROCEDURE:  2021    CAROTID ULTRASOUND REPORT    INDICATION:  Bilateral carotid stenosis. Duplex scan of the right carotid artery revealed the patient does have  moderate plaque with a peak internal carotid velocity of 558, diastolic  velocity of 41 cm/sec with approximately 40% stenosis. Duplex scan of the left carotid artery revealed moderate plaque with a  peak internal carotid velocity of 050, diastolic velocity of 37 cm/sec  with approximately 40% stenosis. IMPRESSION:  Bilateral carotid stenosis of 40%, unchanged from last  study.         Benny Velázquez MD    D: 2021 16:54:30       T: 2021 16:56:28     [id number]  Job#: [id number]     Doc#: [id number]

## 2021-08-02 ENCOUNTER — OFFICE VISIT (OUTPATIENT)
Dept: ORTHOPEDIC SURGERY | Age: 76
End: 2021-08-02
Payer: MEDICARE

## 2021-08-02 VITALS — WEIGHT: 200 LBS | BODY MASS INDEX: 33.32 KG/M2 | HEIGHT: 65 IN

## 2021-08-02 DIAGNOSIS — M17.12 PRIMARY OSTEOARTHRITIS OF LEFT KNEE: Primary | ICD-10-CM

## 2021-08-02 DIAGNOSIS — I50.9 CONGESTIVE HEART FAILURE, UNSPECIFIED HF CHRONICITY, UNSPECIFIED HEART FAILURE TYPE (HCC): ICD-10-CM

## 2021-08-02 DIAGNOSIS — M47.816 LUMBAR SPONDYLOSIS: ICD-10-CM

## 2021-08-02 PROCEDURE — 99213 OFFICE O/P EST LOW 20 MIN: CPT | Performed by: ORTHOPAEDIC SURGERY

## 2021-08-02 PROCEDURE — 20610 DRAIN/INJ JOINT/BURSA W/O US: CPT | Performed by: ORTHOPAEDIC SURGERY

## 2021-08-02 RX ORDER — LEVOTHYROXINE SODIUM 0.15 MG/1
TABLET ORAL
COMMUNITY
Start: 2021-07-26

## 2021-08-02 RX ORDER — BUPIVACAINE HYDROCHLORIDE 2.5 MG/ML
3 INJECTION, SOLUTION INFILTRATION; PERINEURAL ONCE
Status: COMPLETED | OUTPATIENT
Start: 2021-08-02 | End: 2021-08-02

## 2021-08-02 RX ORDER — TRIAMCINOLONE ACETONIDE 40 MG/ML
80 INJECTION, SUSPENSION INTRA-ARTICULAR; INTRAMUSCULAR ONCE
Status: COMPLETED | OUTPATIENT
Start: 2021-08-02 | End: 2021-08-02

## 2021-08-02 RX ADMIN — TRIAMCINOLONE ACETONIDE 80 MG: 40 INJECTION, SUSPENSION INTRA-ARTICULAR; INTRAMUSCULAR at 10:52

## 2021-08-02 RX ADMIN — BUPIVACAINE HYDROCHLORIDE 7.5 MG: 2.5 INJECTION, SOLUTION INFILTRATION; PERINEURAL at 10:52

## 2021-08-02 NOTE — PROGRESS NOTES
Chief Complaint:   Chief Complaint   Patient presents with    Knee Pain     Left knee pain. Requesting injection left knee. States good relief of pain with previous injection 1/6/2021         HPI 24-year-old woman severe osteoarthritis left knee. Has had relief from steroid injections most recently about 6 months ago. Recent increased recurrent left knee pain. Requesting left knee steroid injection today.     Patient Active Problem List   Diagnosis    Essential hypertension    Mixed hyperlipidemia    S/P carotid endarterectomy    Hypoxia    [facility]    Diastolic dysfunction    Asthma    Bilateral carotid artery stenosis    O2 dependent    Primary osteoarthritis of left knee    Lumbar spondylosis    Class 2 obesity due to excess calories with body mass index (BMI) of 38.0 to 38.9 in adult    MCCLELLAND (dyspnea on exertion)    CKD (chronic kidney disease) stage 3, GFR 30-59 ml/min ([facility])    Atrial fibrillation with RVR ([facility])       Past Medical History:   Diagnosis Date    Arthritis     Atrial fibrillation ([facility] [address]     Bilateral carotid artery stenosis 7/16/2020    Bronchitis     Carotid stenosis, bilateral 5/21/2012    Hyperlipidemia     Hypertension     Left carotid stenosis 4/26/2018    O2 dependent 7/16/2020    S/P carotid endarterectomy 10/6/2016    Stomach ulcer     Thyroid disease        Past Surgical History:   Procedure Laterality Date    APPENDECTOMY      BACK SURGERY      CHOLECYSTECTOMY      COLONOSCOPY      ENDOSCOPY, COLON, DIAGNOSTIC  01/10/2018    upper endo    EYE SURGERY      BILATERAL CATARACT    HYSTERECTOMY      JOINT REPLACEMENT  2011    RIGHT KNEE       Current Outpatient Medications   Medication Sig Dispense Refill    levothyroxine (SYNTHROID) 150 MCG tablet TAKE 1 TABLET BY MOUTH ONCE DAILY      ANORO ELLIPTA 62.5-25 MCG/INH AEPB inhaler INHALE 1 PUFF BY MOUTH ONCE DAILY      amLODIPine (NORVASC) 2.5 MG tablet Take 1 tablet by mouth Social Connections:     Frequency of Communication with Friends and Family:     Frequency of Social Gatherings with Friends and Family:     Attends Temple Services:     Active Member of Clubs or Organizations:     Attends Club or Organization Meetings:     Marital Status:    Intimate Partner Violence:     Fear of Current or Ex-Partner:     Emotionally Abused:     Physically Abused:     Sexually Abused:        Family History   Problem Relation Age of Onset    Other Mother         rheumatic fever    Cancer Sister         lung         Review of Systems   No fever, chills, or other constitutionalsymptoms. No numbness or other neuro symptoms. No chest pain. No dyspnea. [unfilled]   No acute distress. Antalgic gait full weightbearing left knee. Left knee has synovitis but no erythema or drainable effusion. Range of motion 0 to 120 degrees. No pain with left hip rotation. Physical Exam    Patient is alert and oriented. Well-developed well-nourished. BMI 33. Working on weight reduction. Pupils equal and reactive. Scleraeanicteric. Neck supple  Lungs clear. Cardiac rate and rhythm regular. Abdomen soft and nontender. Skin warm and dry. XRAY: Previous x-rays have demonstrated significant osteoarthritic changes left knee. ASSESSMENT/PLAN:    Chris Bajwa was seen today for knee pain. Diagnoses and all orders for this visit:    Primary osteoarthritis of left knee  -     MD ARTHROCENTESIS ASPIR&/INJ MAJOR JT/BURSA W/O US    Lumbar spondylosis    Congestive heart failure, unspecified HF chronicity, unspecified heart failure type (HCC)    Other orders  -     triamcinolone acetonide (KENALOG-40) injection 80 mg  -     bupivacaine (MARCAINE) 0.25 % injection 7.5 mg    Patient does not wish to consider knee replacement surgery, at least in part due to her comorbidities. She request left knee steroid injection today.   After review of indications, risks and limitations, after alcohol prep, left knee injection with triamcinolone 80mg and 3 cc 0.25% marcaine given today. Pt tolerated without complication. Return if symptoms worsen or fail to improve.        Glo Estrella MD    8/2/2021  12:59 PM

## 2021-10-19 LAB
ALBUMIN SERPL-MCNC: 4.1 G/DL (ref 3.5–5.2)
ALP BLD-CCNC: 67 U/L (ref 35–104)
ALT SERPL-CCNC: 9 U/L (ref 0–32)
ANION GAP SERPL CALCULATED.3IONS-SCNC: 11 MMOL/L (ref 7–16)
AST SERPL-CCNC: 21 U/L (ref 0–31)
BASOPHILS ABSOLUTE: 0.05 E9/L (ref 0–0.2)
BASOPHILS RELATIVE PERCENT: 0.9 % (ref 0–2)
BILIRUB SERPL-MCNC: 0.6 MG/DL (ref 0–1.2)
BUN BLDV-MCNC: 15 MG/DL (ref 6–23)
CALCIUM SERPL-MCNC: 9.7 MG/DL (ref 8.6–10.2)
CHLORIDE BLD-SCNC: 99 MMOL/L (ref 98–107)
CHOLESTEROL, TOTAL: 187 MG/DL (ref 0–199)
CO2: 26 MMOL/L (ref 22–29)
CREAT SERPL-MCNC: 0.8 MG/DL (ref 0.5–1)
EOSINOPHILS ABSOLUTE: 0.16 E9/L (ref 0.05–0.5)
EOSINOPHILS RELATIVE PERCENT: 2.9 % (ref 0–6)
GFR AFRICAN AMERICAN: >60
GFR NON-AFRICAN AMERICAN: >60 ML/MIN/1.73
GLUCOSE BLD-MCNC: 98 MG/DL (ref 74–99)
HCT VFR BLD CALC: 32.8 % (ref 34–48)
HDLC SERPL-MCNC: 77 MG/DL
HEMOGLOBIN: 9.9 G/DL (ref 11.5–15.5)
IMMATURE GRANULOCYTES #: 0.01 E9/L
IMMATURE GRANULOCYTES %: 0.2 % (ref 0–5)
LDL CHOLESTEROL CALCULATED: 65 MG/DL (ref 0–99)
LYMPHOCYTES ABSOLUTE: 1.36 E9/L (ref 1.5–4)
LYMPHOCYTES RELATIVE PERCENT: 24.8 % (ref 20–42)
MCH RBC QN AUTO: 28.4 PG (ref 26–35)
MCHC RBC AUTO-ENTMCNC: 30.2 % (ref 32–34.5)
MCV RBC AUTO: 94.3 FL (ref 80–99.9)
MONOCYTES ABSOLUTE: 0.42 E9/L (ref 0.1–0.95)
MONOCYTES RELATIVE PERCENT: 7.7 % (ref 2–12)
NEUTROPHILS ABSOLUTE: 3.48 E9/L (ref 1.8–7.3)
NEUTROPHILS RELATIVE PERCENT: 63.5 % (ref 43–80)
PDW BLD-RTO: 13.4 FL (ref 11.5–15)
PLATELET # BLD: 223 E9/L (ref 130–450)
PMV BLD AUTO: 12.3 FL (ref 7–12)
POTASSIUM SERPL-SCNC: 4.8 MMOL/L (ref 3.5–5)
RBC # BLD: 3.48 E12/L (ref 3.5–5.5)
SODIUM BLD-SCNC: 136 MMOL/L (ref 132–146)
TOTAL PROTEIN: 6.8 G/DL (ref 6.4–8.3)
TRIGL SERPL-MCNC: 224 MG/DL (ref 0–149)
TSH SERPL DL<=0.05 MIU/L-ACNC: 1.83 UIU/ML (ref 0.27–4.2)
VITAMIN D 25-HYDROXY: 30 NG/ML (ref 30–100)
VLDLC SERPL CALC-MCNC: 45 MG/DL
WBC # BLD: 5.5 E9/L (ref 4.5–11.5)

## 2021-10-29 LAB
BASOPHILS ABSOLUTE: 0.07 E9/L (ref 0–0.2)
BASOPHILS RELATIVE PERCENT: 1.4 % (ref 0–2)
EOSINOPHILS ABSOLUTE: 0.21 E9/L (ref 0.05–0.5)
EOSINOPHILS RELATIVE PERCENT: 4.3 % (ref 0–6)
FERRITIN: 18 NG/ML
HCT VFR BLD CALC: 32.9 % (ref 34–48)
HEMOGLOBIN: 9.8 G/DL (ref 11.5–15.5)
IMMATURE GRANULOCYTES #: 0.01 E9/L
IMMATURE GRANULOCYTES %: 0.2 % (ref 0–5)
IRON SATURATION: 10 % (ref 15–50)
IRON: 39 MCG/DL (ref 37–145)
LYMPHOCYTES ABSOLUTE: 1.36 E9/L (ref 1.5–4)
LYMPHOCYTES RELATIVE PERCENT: 27.8 % (ref 20–42)
MCH RBC QN AUTO: 28.2 PG (ref 26–35)
MCHC RBC AUTO-ENTMCNC: 29.8 % (ref 32–34.5)
MCV RBC AUTO: 94.5 FL (ref 80–99.9)
MONOCYTES ABSOLUTE: 0.37 E9/L (ref 0.1–0.95)
MONOCYTES RELATIVE PERCENT: 7.6 % (ref 2–12)
NEUTROPHILS ABSOLUTE: 2.87 E9/L (ref 1.8–7.3)
NEUTROPHILS RELATIVE PERCENT: 58.7 % (ref 43–80)
PDW BLD-RTO: 13.4 FL (ref 11.5–15)
PLATELET # BLD: 203 E9/L (ref 130–450)
PMV BLD AUTO: 12.5 FL (ref 7–12)
RBC # BLD: 3.48 E12/L (ref 3.5–5.5)
TOTAL IRON BINDING CAPACITY: 402 MCG/DL (ref 250–450)
VITAMIN B-12: 326 PG/ML (ref 211–946)
WBC # BLD: 4.9 E9/L (ref 4.5–11.5)

## 2022-01-19 LAB
BASOPHILS ABSOLUTE: 0.06 E9/L (ref 0–0.2)
BASOPHILS RELATIVE PERCENT: 1 % (ref 0–2)
EOSINOPHILS ABSOLUTE: 0.23 E9/L (ref 0.05–0.5)
EOSINOPHILS RELATIVE PERCENT: 3.8 % (ref 0–6)
FERRITIN: 31 NG/ML
HCT VFR BLD CALC: 37.2 % (ref 34–48)
HEMOGLOBIN: 11.4 G/DL (ref 11.5–15.5)
IMMATURE GRANULOCYTES #: 0.02 E9/L
IMMATURE GRANULOCYTES %: 0.3 % (ref 0–5)
IRON SATURATION: 19 % (ref 15–50)
IRON: 73 MCG/DL (ref 37–145)
LYMPHOCYTES ABSOLUTE: 1.28 E9/L (ref 1.5–4)
LYMPHOCYTES RELATIVE PERCENT: 21 % (ref 20–42)
MCH RBC QN AUTO: 29.3 PG (ref 26–35)
MCHC RBC AUTO-ENTMCNC: 30.6 % (ref 32–34.5)
MCV RBC AUTO: 95.6 FL (ref 80–99.9)
MONOCYTES ABSOLUTE: 0.44 E9/L (ref 0.1–0.95)
MONOCYTES RELATIVE PERCENT: 7.2 % (ref 2–12)
NEUTROPHILS ABSOLUTE: 4.06 E9/L (ref 1.8–7.3)
NEUTROPHILS RELATIVE PERCENT: 66.7 % (ref 43–80)
PDW BLD-RTO: 14 FL (ref 11.5–15)
PLATELET # BLD: 200 E9/L (ref 130–450)
PMV BLD AUTO: 12 FL (ref 7–12)
RBC # BLD: 3.89 E12/L (ref 3.5–5.5)
TOTAL IRON BINDING CAPACITY: 375 MCG/DL (ref 250–450)
VITAMIN B-12: 357 PG/ML (ref 211–946)
WBC # BLD: 6.1 E9/L (ref 4.5–11.5)

## 2022-02-08 ENCOUNTER — OFFICE VISIT (OUTPATIENT)
Dept: ORTHOPEDIC SURGERY | Age: 77
End: 2022-02-08
Payer: MEDICARE

## 2022-02-08 VITALS — BODY MASS INDEX: 33.32 KG/M2 | HEIGHT: 65 IN | WEIGHT: 200 LBS

## 2022-02-08 DIAGNOSIS — M25.562 CHRONIC PAIN OF LEFT KNEE: ICD-10-CM

## 2022-02-08 DIAGNOSIS — G89.29 CHRONIC PAIN OF LEFT KNEE: ICD-10-CM

## 2022-02-08 DIAGNOSIS — M17.12 PRIMARY OSTEOARTHRITIS OF LEFT KNEE: Primary | ICD-10-CM

## 2022-02-08 PROCEDURE — 99213 OFFICE O/P EST LOW 20 MIN: CPT | Performed by: ORTHOPAEDIC SURGERY

## 2022-02-08 PROCEDURE — 20610 DRAIN/INJ JOINT/BURSA W/O US: CPT | Performed by: ORTHOPAEDIC SURGERY

## 2022-02-08 RX ORDER — IRON POLYSACCHARIDE COMPLEX 150 MG
CAPSULE ORAL
COMMUNITY
Start: 2022-01-06 | End: 2022-07-21

## 2022-02-08 RX ORDER — SUCRALFATE 1 G/1
TABLET ORAL
COMMUNITY
Start: 2022-01-24 | End: 2022-07-21

## 2022-02-08 RX ORDER — BUPIVACAINE HYDROCHLORIDE 2.5 MG/ML
3 INJECTION, SOLUTION INFILTRATION; PERINEURAL ONCE
Status: COMPLETED | OUTPATIENT
Start: 2022-02-08 | End: 2022-02-08

## 2022-02-08 RX ORDER — TRIAMCINOLONE ACETONIDE 40 MG/ML
80 INJECTION, SUSPENSION INTRA-ARTICULAR; INTRAMUSCULAR ONCE
Status: COMPLETED | OUTPATIENT
Start: 2022-02-08 | End: 2022-02-08

## 2022-02-08 RX ADMIN — TRIAMCINOLONE ACETONIDE 80 MG: 40 INJECTION, SUSPENSION INTRA-ARTICULAR; INTRAMUSCULAR at 12:14

## 2022-02-08 RX ADMIN — BUPIVACAINE HYDROCHLORIDE 7.5 MG: 2.5 INJECTION, SOLUTION INFILTRATION; PERINEURAL at 12:13

## 2022-02-09 NOTE — PROGRESS NOTES
Chief Complaint:   Chief Complaint   Patient presents with    Knee Pain     TSB for RJB Left knee pain. Requesting repeat injection. Last injection given 8/2/2021 gave her 5 months of pain relief. Denny Lee presents with recurring left knee pain, had injection about 5 months ago that she said gave her lasting relief. Pain is recurring more constant and more frequently disabling her from simple ADLs including stand walk stairs and transfers. No new injury, no other joint complaints. Patient request for injection left knee today. Allergies; medications; past medical, surgical, family, and social history; and problem list have been reviewed today and updated as indicated in this encounter seen below. Exam: Leg lengths equal hip motion painless right knee benign, left knee tender to palpation more at the medial and lateral joint line, there is some synovitis but no erythema or effusion. Knee is stable through range of motion of 0 to 120 degrees of flexion. Radiographs: X-rays of the knees today show intact right total knee, left knee shows varus deformity with complete loss of medial joint space subchondral sclerosis and osteophyte formation consistent with end-stage varus DJD left knee. Ruth Ann Drummond was seen today for knee pain.     Diagnoses and all orders for this visit:    Primary osteoarthritis of left knee  -     [place] ARTHROCENTESIS ASPIR&/INJ MAJOR JT/BURSA W/O US    Chronic pain of left knee  -     XR KNEE BILATERAL STANDING  -     XR KNEE LEFT (1-2 VIEWS)    Other orders  -     triamcinolone acetonide (KENALOG-40) injection 80 mg  -     bupivacaine (MARCAINE) 0.25 % injection 7.5 mg       Diagnosis and treatment options were again reviewed with the patient, although she did well with the right total knee she prefers to avoid surgery if possible as long as injections are helping so at her request I performed injection of Kenalog and Marcaine into the left knee today no difficulty or complication tolerated well. Questions asked and answered follow-up as needed. Return if symptoms worsen or fail to improve. Current Outpatient Medications   Medication Sig Dispense Refill    FERREX 150 150 MG capsule TAKE 1 CAPSULE BY MOUTH TWICE DAILY      sucralfate (CARAFATE) 1 GM tablet TAKE 1 TABLET BY MOUTH ONCE DAILY      levothyroxine (SYNTHROID) 150 MCG tablet TAKE 1 TABLET BY MOUTH ONCE DAILY      ANORO ELLIPTA 62.5-25 MCG/INH AEPB inhaler INHALE 1 PUFF BY MOUTH ONCE DAILY      amLODIPine (NORVASC) 2.5 MG tablet Take 1 tablet by mouth daily 30 tablet 3    dofetilide (TIKOSYN) 125 MCG capsule Take 1 capsule by mouth every 12 hours 180 capsule 3    OXYGEN Inhale into the lungs      apixaban (ELIQUIS) 5 MG TABS tablet Take 1 tablet by mouth 2 times daily 60 tablet 2    valsartan (DIOVAN) 160 MG tablet Take 1 tablet by mouth daily 30 tablet 3    venlafaxine (EFFEXOR XR) 150 MG extended release capsule Take 150 mg by mouth daily      pantoprazole (PROTONIX) 40 MG tablet Take 1 tablet by mouth daily 30 tablet 3    atorvastatin (LIPITOR) 20 MG tablet Take 20 mg by mouth daily      Multiple Vitamins-Minerals (THERAPEUTIC MULTIVITAMIN-MINERALS) tablet Take 1 tablet by mouth daily      Cholecalciferol (VITAMIN D) 2000 UNITS CAPS capsule Take 1 capsule by mouth daily       No current facility-administered medications for this visit.        Patient Active Problem List   Diagnosis    Essential hypertension    Mixed hyperlipidemia    S/P carotid endarterectomy    Hypoxia    [facility]est[facility] failure[facility]    Diastolic dysfunction    Asthma    Bilateral carotid artery stenosis    O2 dependent    Primary osteoarthritis of left knee    Lumbar spondylosis    Class 2 obesity due to excess calories with body mass index (BMI) of 38.0 to 38.9 in adult    MCCLELLAND (dyspnea on exertion)    CKD (chronic kidney disease) stage 3, GFR 30-59 ml/min ([facility])    Atrial fibrillation with RVR ([facility] [address] Past Medical History:   Diagnosis Date    Arthritis     Atrial fibrillation ([facility] [address]     Bilateral carotid artery stenosis 7/16/2020    Bronchitis     Carotid stenosis, bilateral 5/21/2012    Hyperlipidemia     Hypertension     Left carotid stenosis 4/26/2018    O2 dependent 7/16/2020    S/P carotid endarterectomy 10/6/2016    Stomach ulcer     Thyroid disease        Past Surgical History:   Procedure Laterality Date    APPENDECTOMY      BACK SURGERY      CHOLECYSTECTOMY      COLONOSCOPY      ENDOSCOPY, COLON, DIAGNOSTIC  01/10/2018    upper endo    EYE SURGERY      BILATERAL CATARACT    HYSTERECTOMY      JOINT REPLACEMENT  2011    RIGHT KNEE       No Known Allergies    Social History     Socioeconomic History    Marital status:      Spouse name: None    Number of children: None    Years of education: None    Highest education level: None   Occupational History    None   Tobacco Use    Smoking status: Former Smoker     Packs/day: 1.00     Years: 30.00     Pack years: 30.00     Types: Cigarettes     Quit date: 7/4/2018     Years since quitting: 3.6    Smokeless tobacco: Never Used   Vaping Use    Vaping Use: Never used   Substance and Sexual Activity    Alcohol use: Not Currently    Drug use: No    Sexual activity: None   Other Topics Concern    None   Social History Narrative    None     Social Determinants of Health     Financial Resource Strain:     Difficulty of Paying Living Expenses: Not on file   Food Insecurity:     Worried About Running Out of Food in the Last Year: Not on file    Diann of Food in the Last Year: Not on file   Transportation Needs:     Lack of Transportation (Medical): Not on file    Lack of Transportation (Non-Medical):  Not on file   Physical Activity:     Days of Exercise per Week: Not on file    Minutes of Exercise per Session: Not on file   Stress:     Feeling of Stress : Not on file   Social Connections:     Frequency of Communication with Friends and Family: Not on file    Frequency of Social Gatherings with Friends and Family: Not on file    Attends Muslim Services: Not on file    Active Member of Clubs or Organizations: Not on file    Attends Club or Organization Meetings: Not on file    Marital Status: Not on file   Intimate Partner Violence:     Fear of Current or Ex-Partner: Not on file    Emotionally Abused: Not on file    Physically Abused: Not on file    Sexually Abused: Not on file   Housing Stability:     Unable to Pay for Housing in the Last Year: Not on file    Number of [place] in the Last Year: Not on file    Unstable Housing in the Last Year: Not on file       Family History   Problem Relation Age of Onset    Other Mother         rheumatic fever    Cancer Sister         lung         Review of Systems  As follows except as previously noted in HPI:  Constitutional: Negative for chills, diaphoresis, fatigue, fever and unexpected weight change. Respiratory: Negative for cough, shortness of breath and wheezing. Cardiovascular: Negative for chest pain and palpitations. Neurological: Negative for dizziness, syncope, cephalgia. GI / : negative  Musculoskeletal: see HPI       Objective:   Physical Exam   Constitutional: Oriented to person, place, and time. and appears well-developed and well-nourished. :   Head: Normocephalic and atraumatic. Eyes: EOM are normal.   Neck: Neck supple. Cardiovascular: Normal rate and regular rhythm. Pulmonary/Chest: Effort normal. No stridor. No respiratory distress, no wheezes. Abdominal:  No abnormal distension. Neurological: Alert and oriented to person, place, and time. Skin: Skin is warm and dry. Psychiatric: Normal mood and affect.  Behavior is normal. Thought content normal.    2/8/2022  8:13 PM

## 2022-03-22 ENCOUNTER — HOSPITAL ENCOUNTER (INPATIENT)
Age: 77
LOS: 4 days | Discharge: HOME | DRG: 309 | End: 2022-03-26
Attending: EMERGENCY MEDICINE | Admitting: INTERNAL MEDICINE
Payer: MEDICARE

## 2022-03-22 ENCOUNTER — APPOINTMENT (OUTPATIENT)
Dept: GENERAL RADIOLOGY | Age: 77
DRG: 309 | End: 2022-03-22
Payer: MEDICARE

## 2022-03-22 DIAGNOSIS — R79.89 ELEVATED BRAIN NATRIURETIC PEPTIDE (BNP) LEVEL: ICD-10-CM

## 2022-03-22 DIAGNOSIS — I48.91 ATRIAL FIBRILLATION WITH RAPID VENTRICULAR RESPONSE (HCC): Primary | ICD-10-CM

## 2022-03-22 LAB
ANION GAP SERPL CALCULATED.3IONS-SCNC: 12 MMOL/L (ref 7–16)
BUN BLDV-MCNC: 23 MG/DL (ref 6–23)
CALCIUM SERPL-MCNC: 9.1 MG/DL (ref 8.6–10.2)
CHLORIDE BLD-SCNC: 98 MMOL/L (ref 98–107)
CO2: 26 MMOL/L (ref 22–29)
CREAT SERPL-MCNC: 1 MG/DL (ref 0.5–1)
GFR AFRICAN AMERICAN: >60
GFR NON-AFRICAN AMERICAN: 54 ML/MIN/1.73
GLUCOSE BLD-MCNC: 107 MG/DL (ref 74–99)
HCT VFR BLD CALC: 36.3 % (ref 34–48)
HEMOGLOBIN: 11.5 G/DL (ref 11.5–15.5)
MCH RBC QN AUTO: 31.2 PG (ref 26–35)
MCHC RBC AUTO-ENTMCNC: 31.7 % (ref 32–34.5)
MCV RBC AUTO: 98.4 FL (ref 80–99.9)
PDW BLD-RTO: 13.2 FL (ref 11.5–15)
PLATELET # BLD: 199 E9/L (ref 130–450)
PMV BLD AUTO: 10.6 FL (ref 7–12)
POTASSIUM SERPL-SCNC: 4.7 MMOL/L (ref 3.5–5)
PRO-BNP: 869 PG/ML (ref 0–450)
RBC # BLD: 3.69 E12/L (ref 3.5–5.5)
SODIUM BLD-SCNC: 136 MMOL/L (ref 132–146)
TROPONIN, HIGH SENSITIVITY: 17 NG/L (ref 0–9)
TROPONIN, HIGH SENSITIVITY: 18 NG/L (ref 0–9)
WBC # BLD: 6.2 E9/L (ref 4.5–11.5)

## 2022-03-22 PROCEDURE — 6370000000 HC RX 637 (ALT 250 FOR IP): Performed by: PHYSICIAN ASSISTANT

## 2022-03-22 PROCEDURE — 94664 DEMO&/EVAL PT USE INHALER: CPT

## 2022-03-22 PROCEDURE — 84484 ASSAY OF TROPONIN QUANT: CPT

## 2022-03-22 PROCEDURE — 99284 EMERGENCY DEPT VISIT MOD MDM: CPT

## 2022-03-22 PROCEDURE — 85027 COMPLETE CBC AUTOMATED: CPT

## 2022-03-22 PROCEDURE — 94640 AIRWAY INHALATION TREATMENT: CPT

## 2022-03-22 PROCEDURE — 6360000002 HC RX W HCPCS: Performed by: NURSE PRACTITIONER

## 2022-03-22 PROCEDURE — 83880 ASSAY OF NATRIURETIC PEPTIDE: CPT

## 2022-03-22 PROCEDURE — 71046 X-RAY EXAM CHEST 2 VIEWS: CPT

## 2022-03-22 PROCEDURE — 2060000000 HC ICU INTERMEDIATE R&B

## 2022-03-22 PROCEDURE — 93005 ELECTROCARDIOGRAM TRACING: CPT | Performed by: PHYSICIAN ASSISTANT

## 2022-03-22 PROCEDURE — 80048 BASIC METABOLIC PNL TOTAL CA: CPT

## 2022-03-22 PROCEDURE — 2580000003 HC RX 258: Performed by: NURSE PRACTITIONER

## 2022-03-22 PROCEDURE — 6370000000 HC RX 637 (ALT 250 FOR IP): Performed by: NURSE PRACTITIONER

## 2022-03-22 PROCEDURE — 6360000002 HC RX W HCPCS

## 2022-03-22 RX ORDER — ONDANSETRON 2 MG/ML
4 INJECTION INTRAMUSCULAR; INTRAVENOUS EVERY 6 HOURS PRN
Status: CANCELLED | OUTPATIENT
Start: 2022-03-22

## 2022-03-22 RX ORDER — ACETAMINOPHEN 650 MG/1
650 SUPPOSITORY RECTAL EVERY 6 HOURS PRN
Status: DISCONTINUED | OUTPATIENT
Start: 2022-03-22 | End: 2022-03-26 | Stop reason: HOSPADM

## 2022-03-22 RX ORDER — SODIUM CHLORIDE 0.9 % (FLUSH) 0.9 %
10 SYRINGE (ML) INJECTION PRN
Status: DISCONTINUED | OUTPATIENT
Start: 2022-03-22 | End: 2022-03-26 | Stop reason: HOSPADM

## 2022-03-22 RX ORDER — SODIUM CHLORIDE 0.9 % (FLUSH) 0.9 %
10 SYRINGE (ML) INJECTION EVERY 12 HOURS SCHEDULED
Status: DISCONTINUED | OUTPATIENT
Start: 2022-03-22 | End: 2022-03-26 | Stop reason: HOSPADM

## 2022-03-22 RX ORDER — SODIUM CHLORIDE 9 MG/ML
25 INJECTION, SOLUTION INTRAVENOUS PRN
Status: DISCONTINUED | OUTPATIENT
Start: 2022-03-22 | End: 2022-03-26 | Stop reason: HOSPADM

## 2022-03-22 RX ORDER — IRON POLYSACCHARIDE COMPLEX 150 MG
150 CAPSULE ORAL DAILY
Status: DISCONTINUED | OUTPATIENT
Start: 2022-03-22 | End: 2022-03-26 | Stop reason: HOSPADM

## 2022-03-22 RX ORDER — ACETAMINOPHEN 325 MG/1
650 TABLET ORAL EVERY 6 HOURS PRN
Status: DISCONTINUED | OUTPATIENT
Start: 2022-03-22 | End: 2022-03-26 | Stop reason: HOSPADM

## 2022-03-22 RX ORDER — ARFORMOTEROL TARTRATE 15 UG/2ML
15 SOLUTION RESPIRATORY (INHALATION) 2 TIMES DAILY
Status: DISCONTINUED | OUTPATIENT
Start: 2022-03-22 | End: 2022-03-26 | Stop reason: HOSPADM

## 2022-03-22 RX ORDER — AMLODIPINE BESYLATE 5 MG/1
2.5 TABLET ORAL DAILY
Status: DISCONTINUED | OUTPATIENT
Start: 2022-03-22 | End: 2022-03-26 | Stop reason: HOSPADM

## 2022-03-22 RX ORDER — POTASSIUM CHLORIDE 7.45 MG/ML
10 INJECTION INTRAVENOUS PRN
Status: DISCONTINUED | OUTPATIENT
Start: 2022-03-22 | End: 2022-03-26 | Stop reason: HOSPADM

## 2022-03-22 RX ORDER — DOFETILIDE 0.12 MG/1
125 CAPSULE ORAL
Status: COMPLETED | OUTPATIENT
Start: 2022-03-22 | End: 2022-03-22

## 2022-03-22 RX ORDER — LEVOTHYROXINE SODIUM 0.07 MG/1
150 TABLET ORAL DAILY
Status: DISCONTINUED | OUTPATIENT
Start: 2022-03-23 | End: 2022-03-26 | Stop reason: HOSPADM

## 2022-03-22 RX ORDER — PANTOPRAZOLE SODIUM 40 MG/1
40 TABLET, DELAYED RELEASE ORAL DAILY
Status: DISCONTINUED | OUTPATIENT
Start: 2022-03-22 | End: 2022-03-26 | Stop reason: HOSPADM

## 2022-03-22 RX ORDER — DOFETILIDE 0.12 MG/1
125 CAPSULE ORAL EVERY 12 HOURS SCHEDULED
Status: DISCONTINUED | OUTPATIENT
Start: 2022-03-23 | End: 2022-03-23

## 2022-03-22 RX ORDER — KETOROLAC TROMETHAMINE 30 MG/ML
15 INJECTION, SOLUTION INTRAMUSCULAR; INTRAVENOUS ONCE
Status: COMPLETED | OUTPATIENT
Start: 2022-03-22 | End: 2022-03-22

## 2022-03-22 RX ORDER — VENLAFAXINE HYDROCHLORIDE 150 MG/1
150 CAPSULE, EXTENDED RELEASE ORAL DAILY
Status: DISCONTINUED | OUTPATIENT
Start: 2022-03-22 | End: 2022-03-26 | Stop reason: HOSPADM

## 2022-03-22 RX ORDER — DOFETILIDE 0.12 MG/1
125 CAPSULE ORAL EVERY 12 HOURS SCHEDULED
Status: DISCONTINUED | OUTPATIENT
Start: 2022-03-22 | End: 2022-03-22

## 2022-03-22 RX ORDER — ONDANSETRON 4 MG/1
4 TABLET, ORALLY DISINTEGRATING ORAL EVERY 8 HOURS PRN
Status: CANCELLED | OUTPATIENT
Start: 2022-03-22

## 2022-03-22 RX ORDER — POTASSIUM CHLORIDE 20 MEQ/1
40 TABLET, EXTENDED RELEASE ORAL PRN
Status: DISCONTINUED | OUTPATIENT
Start: 2022-03-22 | End: 2022-03-26 | Stop reason: HOSPADM

## 2022-03-22 RX ORDER — VITAMIN B COMPLEX
2000 TABLET ORAL DAILY
Status: DISCONTINUED | OUTPATIENT
Start: 2022-03-22 | End: 2022-03-26 | Stop reason: HOSPADM

## 2022-03-22 RX ORDER — SENNA PLUS 8.6 MG/1
1 TABLET ORAL DAILY PRN
Status: DISCONTINUED | OUTPATIENT
Start: 2022-03-22 | End: 2022-03-26 | Stop reason: HOSPADM

## 2022-03-22 RX ORDER — ATORVASTATIN CALCIUM 40 MG/1
20 TABLET, FILM COATED ORAL DAILY
Status: DISCONTINUED | OUTPATIENT
Start: 2022-03-22 | End: 2022-03-26 | Stop reason: HOSPADM

## 2022-03-22 RX ORDER — M-VIT,TX,IRON,MINS/CALC/FOLIC 27MG-0.4MG
1 TABLET ORAL DAILY
Status: DISCONTINUED | OUTPATIENT
Start: 2022-03-22 | End: 2022-03-26 | Stop reason: HOSPADM

## 2022-03-22 RX ORDER — VALSARTAN 160 MG/1
160 TABLET ORAL DAILY
Status: DISCONTINUED | OUTPATIENT
Start: 2022-03-22 | End: 2022-03-26 | Stop reason: HOSPADM

## 2022-03-22 RX ORDER — SUCRALFATE 1 G/1
1 TABLET ORAL DAILY
Status: DISCONTINUED | OUTPATIENT
Start: 2022-03-22 | End: 2022-03-26 | Stop reason: HOSPADM

## 2022-03-22 RX ADMIN — ATORVASTATIN CALCIUM 20 MG: 40 TABLET, FILM COATED ORAL at 22:02

## 2022-03-22 RX ADMIN — PANTOPRAZOLE SODIUM 40 MG: 40 TABLET, DELAYED RELEASE ORAL at 22:02

## 2022-03-22 RX ADMIN — IPRATROPIUM BROMIDE 0.5 MG: 0.5 SOLUTION RESPIRATORY (INHALATION) at 23:23

## 2022-03-22 RX ADMIN — Medication 10 ML: at 22:03

## 2022-03-22 RX ADMIN — KETOROLAC TROMETHAMINE 15 MG: 30 INJECTION, SOLUTION INTRAMUSCULAR at 20:37

## 2022-03-22 RX ADMIN — APIXABAN 5 MG: 5 TABLET, FILM COATED ORAL at 22:02

## 2022-03-22 RX ADMIN — DOFETILIDE 125 MCG: 0.12 CAPSULE ORAL at 19:41

## 2022-03-22 RX ADMIN — ARFORMOTEROL TARTRATE 15 MCG: 15 SOLUTION RESPIRATORY (INHALATION) at 23:24

## 2022-03-22 RX ADMIN — Medication 150 MG: at 22:03

## 2022-03-22 ASSESSMENT — ENCOUNTER SYMPTOMS
WHEEZING: 0
BLOOD IN STOOL: 0
COUGH: 0
CONSTIPATION: 0
ABDOMINAL PAIN: 0
BACK PAIN: 0
SHORTNESS OF BREATH: 1
VOMITING: 0
DIARRHEA: 0
CHEST TIGHTNESS: 0
NAUSEA: 0

## 2022-03-22 ASSESSMENT — PAIN SCALES - GENERAL
PAINLEVEL_OUTOF10: 0
PAINLEVEL_OUTOF10: 6

## 2022-03-22 NOTE — ED PROVIDER NOTES
[facility] [address] [facility] [address] [address] ENCOUNTER      Pt Name: Jesusita Choi  MRN: [id number]  [place] 1945  Date of evaluation: 3/22/2022      CHIEF COMPLAINT       Chief Complaint   Patient presents with    Atrial Fibrillation     HR 140s, hx of afib, takes eliquis     Dizziness    Shortness of Breath     94% 2L, wears all the time         HPI  Jesusita Choi is a 64 y.o. female afib(on tikosyn, eliquis), HTN, HLD, CKD, hypothyroidism, COPD(Home 3L NC) presents from Dr. Imani Serrato office due to afib with RVR(). Patient endorses shortness of breath, fatigue, palpitations for the past week. Had some headache today but completely subsided after tylenol. Describes symptoms moderate in severity with no known alleviating or exacerbating factors. Denies any fever, chills, n/v, dizziness, weakness, cp, cough, abd pain, dysuria, hematuria, diarrhea, constipation. Except as noted above the remainder of the review of systems was reviewed and negative. Review of Systems   Constitutional: Positive for fatigue. Negative for activity change, appetite change, chills and fever. HENT: Negative for congestion, nosebleeds and tinnitus. Eyes: Negative for visual disturbance. Respiratory: Positive for shortness of breath. Negative for cough, chest tightness and wheezing. Cardiovascular: Positive for palpitations. Negative for chest pain and leg swelling. Gastrointestinal: Negative for abdominal pain, blood in stool, constipation, diarrhea, nausea and vomiting. Endocrine: Negative for polydipsia and polyphagia. Genitourinary: Negative for dysuria, flank pain, hematuria, vaginal bleeding, vaginal discharge and vaginal pain. Musculoskeletal: Negative for back pain, gait problem, joint swelling and myalgias. Skin: Negative for rash. Allergic/Immunologic: Negative for immunocompromised state. Neurological: Negative for dizziness, syncope, weakness, numbness and headaches. Hematological: Negative for adenopathy. Psychiatric/Behavioral: Negative for behavioral problems, confusion and hallucinations. Physical Exam  Constitutional:       General: She is not in acute distress. Appearance: Normal appearance. She is normal weight. She is not ill-appearing, toxic-appearing or diaphoretic. HENT:      Head: Normocephalic and atraumatic. Right Ear: External ear normal.      Left Ear: External ear normal.      Nose: Nose normal. No congestion or rhinorrhea. Mouth/Throat:      Mouth: Mucous membranes are moist.      Pharynx: Oropharynx is clear. No oropharyngeal exudate or posterior oropharyngeal erythema. Eyes:      Conjunctiva/sclera: Conjunctivae normal.      Pupils: Pupils are equal, round, and reactive to light. Cardiovascular:      Rate and Rhythm: Tachycardia present. Rhythm irregular. Pulses: Normal pulses. Heart sounds: Normal heart sounds. Pulmonary:      Effort: Pulmonary effort is normal.      Breath sounds: Decreased breath sounds and rhonchi present. No wheezing or rales. Abdominal:      General: Abdomen is flat. Bowel sounds are normal. There is no distension. Palpations: Abdomen is soft. There is no mass. Tenderness: There is no abdominal tenderness. There is no right CVA tenderness, left CVA tenderness or guarding. Hernia: No hernia is present. Musculoskeletal:      Cervical back: Normal range of motion. Right lower leg: No tenderness. Left lower leg: No tenderness. Skin:     General: Skin is warm and dry. Capillary Refill: Capillary refill takes less than 2 seconds. Neurological:      General: No focal deficit present. Mental Status: She is alert and oriented to person, place, and time. Mental status is at baseline. Psychiatric:         Mood and Affect: Mood normal.         Behavior: Behavior normal.         Thought Content: Thought content normal.          Procedures     [facility]    64 y.o. female afib(on tikosyn, eliquis), HTN, HLD, CKD, hypothyroidism, COPD(Home 3L NC) presents from Dr. Yulissa Jeffers office due to afib with RVR(). Patient endorses shortness of breath, fatigue, palpitations for the past week. While in the ED patient was hemodynamically stable, nontoxic-appearing, in no respiratory distress. Physical exam remarkable for tachycardia with a regular rhythm concerning for A. fib with RVR. Diminished breath sounds with scattered rhonchi bilaterally. Labs remarkable for , elevated troponin with delta troponin trending down. EKG A. fib with RVR with a rate of 133. Chest X-ray negative for any acute pathologies. Patient was sent from Dr. Yulissa Jeffers, who directed us to only give patient Tikosyn and Bumex in case of an elevated BNP over 2000. He stated that he would like patient to be admitted to Dr. Jacobo Obregon and he will follow during this admission. Patient's heart rate normalized after Tikosyn. Patient is in agreement with plan of admission. She is admitted to Dr. Nikky Landeros service. ED Course as of 03/23/22 0110   Tue Mar 22, 2022   1846 EKG: This EKG is signed by emergency department physician. Rate: 133  Rhythm: Atrial fibrillation and Rapid ventricular response  Interpretation: no acute changes  Comparison: changes compared to previous EKG      [TC]      ED Course User Index  [TC] Hussein Finch MD       --------------------------------------------- PAST HISTORY ---------------------------------------------  Past Medical History:  has a past medical history of Arthritis, Atrial fibrillation ([address] Bilateral carotid artery stenosis, Bronchitis, Carotid stenosis, bilateral, Hyperlipidemia, Hypertension, Left carotid stenosis, O2 dependent, S/P carotid endarterectomy, Stomach ulcer, and Thyroid disease. Past Surgical History:  has a past surgical history that includes Hysterectomy; Cholecystectomy; Appendectomy; back surgery;  Colonoscopy; joint replacement (2011); eye surgery; and Endoscopy, colon, diagnostic (01/10/2018). Social History:  reports that she quit smoking about 3 years ago. Her smoking use included cigarettes. She has a 30.00 pack-year smoking history. She has never used smokeless tobacco. She reports previous alcohol use. She reports that she does not use drugs. Family History: family history includes Cancer in her sister; Other in her mother. The patients home medications have been reviewed. Allergies: Patient has no known allergies.     -------------------------------------------------- RESULTS -------------------------------------------------    LABS:  Results for orders placed or performed during the hospital encounter of 91/19/21   Basic metabolic panel   Result Value Ref Range    Sodium 136 132 - 146 mmol/L    Potassium 4.7 3.5 - 5.0 mmol/L    Chloride 98 98 - 107 mmol/L    CO2 26 22 - 29 mmol/L    Anion Gap 12 7 - 16 mmol/L    Glucose 107 (H) 74 - 99 mg/dL    BUN 23 6 - 23 mg/dL    CREATININE 1.0 0.5 - 1.0 mg/dL    GFR Non-African American 54 >=60 mL/min/1.73    GFR African American >60     Calcium 9.1 8.6 - 10.2 mg/dL   CBC   Result Value Ref Range    WBC 6.2 4.5 - 11.5 E9/L    RBC 3.69 3.50 - 5.50 E12/L    Hemoglobin 11.5 11.5 - 15.5 g/dL    Hematocrit 36.3 34.0 - 48.0 %    MCV 98.4 80.0 - 99.9 fL    MCH 31.2 26.0 - 35.0 pg    MCHC 31.7 (L) 32.0 - 34.5 %    RDW 13.2 11.5 - 15.0 fL    Platelets 570 286 - 424 E9/L    MPV 10.6 7.0 - 12.0 fL   Troponin I   Result Value Ref Range    Troponin, High Sensitivity 18 (H) 0 - 9 ng/L   Brain Natriuretic Peptide   Result Value Ref Range    Pro- (H) 0 - 450 pg/mL   Troponin   Result Value Ref Range    Troponin, High Sensitivity 17 (H) 0 - 9 ng/L   EKG 12 Lead   Result Value Ref Range    Ventricular Rate 133 BPM    Atrial Rate 166 BPM    QRS Duration 72 ms    Q-T Interval 304 ms    QTc Calculation (Bazett) 452 ms    R Axis 36 degrees    T Axis 93 degrees       RADIOLOGY:  XR CHEST (2 VW)   Final Result   No pneumonia or pleural effusion.             ------------------------- NURSING NOTES AND VITALS REVIEWED ---------------------------  Date / Time Roomed:  3/22/2022  6:09 PM  ED Bed Assignment:  [id number]    The nursing notes within the ED encounter and vital signs as below have been reviewed. Patient Vitals for the past 24 hrs:   BP Temp Temp src Pulse Resp SpO2 Height Weight   03/22/22 2230 (!) 116/54 98.5 °F (36.9 °C) -- 79 16 96 % -- --   03/22/22 2000 122/62 97.2 °F (36.2 °C) -- 105 16 99 % -- --   03/22/22 1909 (!) 140/68 -- -- 121 16 99 % -- --   03/22/22 1757 (!) 142/77 96.9 °F (36.1 °C) Temporal 130 16 97 % 5' 6\" (1.676 m) 200 lb (90.7 kg)       Oxygen Saturation Interpretation: Normal on 3L home oxygen    ------------------------------------------ PROGRESS NOTES ------------------------------------------  Re-evaluation(s):  Time: 0700  Patients symptoms are improving  Repeat physical examination is improved    Counseling:  I have spoken with the patient and discussed todays results, in addition to providing specific details for the plan of care and counseling regarding the diagnosis and prognosis. Their questions are answered at this time and they are agreeable with the plan of admission.    --------------------------------- ADDITIONAL PROVIDER NOTES ---------------------------------  Consultations:  Spoke with Claudia for Dr. Robbie Thompson. Discussed case. They will admit the patient. This patient's ED course included: a personal history and physicial examination, re-evaluation prior to disposition, multiple bedside re-evaluations, IV medications, cardiac monitoring and continuous pulse oximetry    This patient has remained hemodynamically stable during their ED course. Diagnosis:  1.  Atrial fibrillation with rapid ventricular response (HCC)    2. Elevated brain natriuretic peptide (BNP) level        Disposition:  Patient's disposition: Admit to telemetry  Patient's condition is stable.           .1     Iraida More MD  Resident  03/23/22 6187

## 2022-03-22 NOTE — CARE COORDINATION
Social Work/Transition of Care:    PT discharged home this AM and returned to the ED this evening. SW met with pt at bedside introduced self and role. Pt reports when she arrived home from the hospital she was very weak and lost her balance which caused her to fall. SW discussed pt going to a [facility] for [facility] if she qualifies, pt was agreeable. SW provided pt with a freedom of choice list and recommended she review and provide 3 choices to the assigned SW/CM that will follow up with pt in the AM.  Plan is for pt to be re-admitted and will need to be evaluated by PT/OT in order to assist with disposition.     Electronically signed by Paula Quijano on 2/07/1662 at 7:11 PM

## 2022-03-23 LAB
ADENOVIRUS BY PCR: NOT DETECTED
ALBUMIN SERPL-MCNC: 4 G/DL (ref 3.5–5.2)
ALP BLD-CCNC: 69 U/L (ref 35–104)
ALT SERPL-CCNC: 12 U/L (ref 0–32)
ANION GAP SERPL CALCULATED.3IONS-SCNC: 7 MMOL/L (ref 7–16)
AST SERPL-CCNC: 17 U/L (ref 0–31)
BASOPHILS ABSOLUTE: 0.04 E9/L (ref 0–0.2)
BASOPHILS RELATIVE PERCENT: 0.7 % (ref 0–2)
BILIRUB SERPL-MCNC: 0.5 MG/DL (ref 0–1.2)
BORDETELLA PARAPERTUSSIS BY PCR: NOT DETECTED
BORDETELLA PERTUSSIS BY PCR: NOT DETECTED
BUN BLDV-MCNC: 23 MG/DL (ref 6–23)
CALCIUM SERPL-MCNC: 8.8 MG/DL (ref 8.6–10.2)
CHLAMYDOPHILIA PNEUMONIAE BY PCR: NOT DETECTED
CHLORIDE BLD-SCNC: 100 MMOL/L (ref 98–107)
CO2: 28 MMOL/L (ref 22–29)
CORONAVIRUS 229E BY PCR: NOT DETECTED
CORONAVIRUS HKU1 BY PCR: NOT DETECTED
CORONAVIRUS NL63 BY PCR: NOT DETECTED
CORONAVIRUS OC43 BY PCR: NOT DETECTED
CREAT SERPL-MCNC: 0.9 MG/DL (ref 0.5–1)
EKG ATRIAL RATE: 166 BPM
EKG Q-T INTERVAL: 304 MS
EKG QRS DURATION: 72 MS
EKG QTC CALCULATION (BAZETT): 452 MS
EKG R AXIS: 36 DEGREES
EKG T AXIS: 93 DEGREES
EKG VENTRICULAR RATE: 133 BPM
EOSINOPHILS ABSOLUTE: 0.24 E9/L (ref 0.05–0.5)
EOSINOPHILS RELATIVE PERCENT: 4 % (ref 0–6)
FOLATE: >20 NG/ML (ref 4.8–24.2)
GFR AFRICAN AMERICAN: >60
GFR NON-AFRICAN AMERICAN: >60 ML/MIN/1.73
GLUCOSE BLD-MCNC: 103 MG/DL (ref 74–99)
HCT VFR BLD CALC: 33.4 % (ref 34–48)
HEMOGLOBIN: 10.3 G/DL (ref 11.5–15.5)
HUMAN METAPNEUMOVIRUS BY PCR: NOT DETECTED
HUMAN RHINOVIRUS/ENTEROVIRUS BY PCR: NOT DETECTED
IMMATURE GRANULOCYTES #: 0.03 E9/L
IMMATURE GRANULOCYTES %: 0.5 % (ref 0–5)
INFLUENZA A BY PCR: NOT DETECTED
INFLUENZA B BY PCR: NOT DETECTED
LYMPHOCYTES ABSOLUTE: 1.34 E9/L (ref 1.5–4)
LYMPHOCYTES RELATIVE PERCENT: 22.3 % (ref 20–42)
MCH RBC QN AUTO: 30.5 PG (ref 26–35)
MCHC RBC AUTO-ENTMCNC: 30.8 % (ref 32–34.5)
MCV RBC AUTO: 98.8 FL (ref 80–99.9)
MONOCYTES ABSOLUTE: 0.57 E9/L (ref 0.1–0.95)
MONOCYTES RELATIVE PERCENT: 9.5 % (ref 2–12)
MYCOPLASMA PNEUMONIAE BY PCR: NOT DETECTED
NEUTROPHILS ABSOLUTE: 3.8 E9/L (ref 1.8–7.3)
NEUTROPHILS RELATIVE PERCENT: 63 % (ref 43–80)
PARAINFLUENZA VIRUS 1 BY PCR: NOT DETECTED
PARAINFLUENZA VIRUS 2 BY PCR: NOT DETECTED
PARAINFLUENZA VIRUS 3 BY PCR: NOT DETECTED
PARAINFLUENZA VIRUS 4 BY PCR: NOT DETECTED
PDW BLD-RTO: 13.3 FL (ref 11.5–15)
PLATELET # BLD: 180 E9/L (ref 130–450)
PMV BLD AUTO: 10.9 FL (ref 7–12)
POTASSIUM REFLEX MAGNESIUM: 4.7 MMOL/L (ref 3.5–5)
PROCALCITONIN: 0.06 NG/ML (ref 0–0.08)
RBC # BLD: 3.38 E12/L (ref 3.5–5.5)
RESPIRATORY SYNCYTIAL VIRUS BY PCR: NOT DETECTED
SARS-COV-2, PCR: NOT DETECTED
SODIUM BLD-SCNC: 135 MMOL/L (ref 132–146)
T4 TOTAL: 6.1 MCG/DL (ref 4.5–11.7)
TOTAL PROTEIN: 6.6 G/DL (ref 6.4–8.3)
TSH SERPL DL<=0.05 MIU/L-ACNC: 5.45 UIU/ML (ref 0.27–4.2)
VITAMIN B-12: 394 PG/ML (ref 211–946)
VITAMIN D 25-HYDROXY: 28 NG/ML (ref 30–100)
WBC # BLD: 6 E9/L (ref 4.5–11.5)

## 2022-03-23 PROCEDURE — 36415 COLL VENOUS BLD VENIPUNCTURE: CPT

## 2022-03-23 PROCEDURE — 84145 PROCALCITONIN (PCT): CPT

## 2022-03-23 PROCEDURE — 94640 AIRWAY INHALATION TREATMENT: CPT

## 2022-03-23 PROCEDURE — 2700000000 HC OXYGEN THERAPY PER DAY

## 2022-03-23 PROCEDURE — 82607 VITAMIN B-12: CPT

## 2022-03-23 PROCEDURE — 97165 OT EVAL LOW COMPLEX 30 MIN: CPT

## 2022-03-23 PROCEDURE — 2580000003 HC RX 258: Performed by: NURSE PRACTITIONER

## 2022-03-23 PROCEDURE — 84436 ASSAY OF TOTAL THYROXINE: CPT

## 2022-03-23 PROCEDURE — 2060000000 HC ICU INTERMEDIATE R&B

## 2022-03-23 PROCEDURE — 6370000000 HC RX 637 (ALT 250 FOR IP): Performed by: INTERNAL MEDICINE

## 2022-03-23 PROCEDURE — 6360000002 HC RX W HCPCS: Performed by: NURSE PRACTITIONER

## 2022-03-23 PROCEDURE — 82746 ASSAY OF FOLIC ACID SERUM: CPT

## 2022-03-23 PROCEDURE — 85025 COMPLETE CBC W/AUTO DIFF WBC: CPT

## 2022-03-23 PROCEDURE — 6370000000 HC RX 637 (ALT 250 FOR IP): Performed by: NURSE PRACTITIONER

## 2022-03-23 PROCEDURE — 82306 VITAMIN D 25 HYDROXY: CPT

## 2022-03-23 PROCEDURE — 93005 ELECTROCARDIOGRAM TRACING: CPT | Performed by: INTERNAL MEDICINE

## 2022-03-23 PROCEDURE — 80053 COMPREHEN METABOLIC PANEL: CPT

## 2022-03-23 PROCEDURE — 97161 PT EVAL LOW COMPLEX 20 MIN: CPT

## 2022-03-23 PROCEDURE — 0202U NFCT DS 22 TRGT SARS-COV-2: CPT

## 2022-03-23 PROCEDURE — 84443 ASSAY THYROID STIM HORMONE: CPT

## 2022-03-23 RX ORDER — DOFETILIDE 0.25 MG/1
250 CAPSULE ORAL EVERY MORNING
Status: DISCONTINUED | OUTPATIENT
Start: 2022-03-24 | End: 2022-03-25

## 2022-03-23 RX ORDER — DOFETILIDE 0.12 MG/1
125 CAPSULE ORAL ONCE
Status: COMPLETED | OUTPATIENT
Start: 2022-03-23 | End: 2022-03-23

## 2022-03-23 RX ORDER — DOFETILIDE 0.12 MG/1
125 CAPSULE ORAL NIGHTLY
Status: DISCONTINUED | OUTPATIENT
Start: 2022-03-23 | End: 2022-03-25

## 2022-03-23 RX ORDER — ONDANSETRON 2 MG/ML
4 INJECTION INTRAMUSCULAR; INTRAVENOUS EVERY 6 HOURS PRN
Status: DISCONTINUED | OUTPATIENT
Start: 2022-03-23 | End: 2022-03-25

## 2022-03-23 RX ADMIN — AMLODIPINE BESYLATE 2.5 MG: 5 TABLET ORAL at 08:00

## 2022-03-23 RX ADMIN — Medication 2000 UNITS: at 08:02

## 2022-03-23 RX ADMIN — DOFETILIDE 125 MCG: 0.12 CAPSULE ORAL at 12:07

## 2022-03-23 RX ADMIN — APIXABAN 5 MG: 5 TABLET, FILM COATED ORAL at 08:00

## 2022-03-23 RX ADMIN — Medication 150 MG: at 08:01

## 2022-03-23 RX ADMIN — LEVOTHYROXINE SODIUM 150 MCG: 75 TABLET ORAL at 06:31

## 2022-03-23 RX ADMIN — ATORVASTATIN CALCIUM 20 MG: 40 TABLET, FILM COATED ORAL at 21:18

## 2022-03-23 RX ADMIN — PANTOPRAZOLE SODIUM 40 MG: 40 TABLET, DELAYED RELEASE ORAL at 08:01

## 2022-03-23 RX ADMIN — Medication 10 ML: at 09:23

## 2022-03-23 RX ADMIN — MULTIPLE VITAMINS W/ MINERALS TAB 1 TABLET: TAB at 08:01

## 2022-03-23 RX ADMIN — IPRATROPIUM BROMIDE 0.5 MG: 0.5 SOLUTION RESPIRATORY (INHALATION) at 12:31

## 2022-03-23 RX ADMIN — IPRATROPIUM BROMIDE 0.5 MG: 0.5 SOLUTION RESPIRATORY (INHALATION) at 09:21

## 2022-03-23 RX ADMIN — ARFORMOTEROL TARTRATE 15 MCG: 15 SOLUTION RESPIRATORY (INHALATION) at 20:06

## 2022-03-23 RX ADMIN — Medication 10 ML: at 21:13

## 2022-03-23 RX ADMIN — ARFORMOTEROL TARTRATE 15 MCG: 15 SOLUTION RESPIRATORY (INHALATION) at 09:20

## 2022-03-23 RX ADMIN — SUCRALFATE 1 G: 1 TABLET ORAL at 08:01

## 2022-03-23 RX ADMIN — APIXABAN 5 MG: 5 TABLET, FILM COATED ORAL at 21:12

## 2022-03-23 RX ADMIN — DOFETILIDE 125 MCG: 0.12 CAPSULE ORAL at 08:01

## 2022-03-23 RX ADMIN — VALSARTAN 160 MG: 160 TABLET, FILM COATED ORAL at 08:00

## 2022-03-23 RX ADMIN — IPRATROPIUM BROMIDE 0.5 MG: 0.5 SOLUTION RESPIRATORY (INHALATION) at 20:06

## 2022-03-23 RX ADMIN — VENLAFAXINE HYDROCHLORIDE 150 MG: 150 CAPSULE, EXTENDED RELEASE ORAL at 08:02

## 2022-03-23 RX ADMIN — DOFETILIDE 125 MCG: 0.12 CAPSULE ORAL at 21:12

## 2022-03-23 ASSESSMENT — PAIN SCALES - GENERAL
PAINLEVEL_OUTOF10: 0

## 2022-03-23 NOTE — PROGRESS NOTES
Occupational Therapy  OCCUPATIONAL THERAPY INITIAL EVALUATION     Vanessa Gillespie [facility], [place]        Date:3/23/2022                                                  Patient Name: Kaylah Montes    MRN: [id number]    : 1945    Room: [address] [place], [place] [address]      Evaluating OT: Jasmyne Sotelo, OTR/L [id number]      Referring Provider: GIO Jones CNP    Specific Provider Orders/Date: OT eval and treat 3/22/22      Diagnosis: Atrial fibrillation with rapid ventricular response ([facility] [address] [I48.91]      Pertinent Medical History: arthritis, a-fib, HTN       Precautions:  Fall Risk, O2     Assessment of current deficits    [x] Functional mobility  [x]ADLs  [x] Strength               []Cognition    [x] Functional transfers   [x] IADLs         [x] Safety Awareness   [x]Endurance    [] Fine Coordination              [x] Balance      [] Vision/perception   []Sensation     []Gross Motor Coordination  [] ROM  [] Delirium                   [] Motor Control     OT PLAN OF CARE   OT POC based on physician orders, patient diagnosis and results of clinical assessment    Frequency/Duration 1-3 days/wk for 2 weeks PRN   Specific OT Treatment Interventions to include:   * Instruction/training on adapted ADL techniques and AE recommendations to increase functional independence within precautions       * Training on energy conservation strategies, correct breathing pattern and techniques to improve independence/tolerance for self-care routine  * Functional transfer/mobility training/DME recommendations for increased independence, safety, and fall prevention  * Patient/Family education to increase follow through with safety techniques and functional independence  * Recommendation of environmental modifications for increased safety with functional transfers/mobility and ADLs  * Therapeutic exercise to improve motor endurance, ROM, and functional strength for ADLs/functional transfers  * Therapeutic activities to facilitate/challenge dynamic balance, stand tolerance for increased safety and independence with ADLs        Recommended Adaptive Equipment: TBD     Home Living: Pt lives with  in 2 story house . Pt's bedroom and bathroom are on the 1st floor. Bathroom setup: tub/shower with grab bars, elevated commode   Equipment owned: Home O2 2L    Prior Level of Function: independent with ADLs , independent with IADLs; functional mobility: no device    Pain Level: Pt did not report pain this date; pt agreeable to therapy  Cognition: A&O: 4/4; WFL command follow demonstrated. Memory:  fair   Sequencing:  fair   Problem solving:  fair   Judgement/safety:  fair     Functional Assessment:  AM-PAC Daily Activity Raw Score: 19/24   Initial Eval Status  Date: 3/23/22 Treatment Status  Date: STGs = LTGs  Time frame: 10-14 days   Feeding Independent      Grooming Sup  Mod I/I   UB Dressing Sup  Mod I/I   LB Dressing Min A  To adjust socks sitting EOB   Mod I/I-with use of AD as appropriate/needed   Bathing Min A  Mod I/I -with use of AD as appropriate/needed   Toileting Sup  Mod I/I    Bed Mobility  Supine to sit: independent    Sit to supine: independent       Functional Transfers Sup with no device  Sit to stand from EOB  Stand to sit to chair  Independent     Functional Mobility Sup with no device  Short distance in room  Independent  -with device as needed to maximize independence with ADLs and functional task completion   Balance Sitting:     Static:  Independent     Dynamic:Sup  Standing: Sup  I for dynamic sitting balance to maximize independence with ADLs and functional task completion    I for standing balance to maximize independence with ADLs and functional task completion   Activity Tolerance Fair with light activity  good with ADL activity.  Pt will demonstrate good understanding of education provided on EC/WS techniques    Visual/  Perceptual Glasses: yes Additional long-term goal: Pt will increase functional independence to PLOF to allow pt to live in least restrictive environment. Hand Dominance R   AROM (PROM) Strength Additional Info:    RUE  WFL WFL good  and wfl FMC/dexterity noted during ADL tasks and functional bed mobility     LUE WFL WFL good  and wfl FMC/dexterity noted during ADL tasks and functional bed mobility     Hearing: WFL   Sensation:  No c/o numbness or tingling   Tone: WFL   Edema: none noted    Comments: Upon arrival patient lying in bed. At end of session, patient returned to bed with call light and phone within reach, all lines and tubes intact. Overall patient demonstrated decreased independence and safety during completion of ADL/functional transfer/mobility tasks. Pt would benefit from continued skilled OT to increase safety and independence with completion of ADL/IADL tasks for functional independence and quality of life. Rehab Potential: Good for established goals     Patient / Family Goal: none stated    Patient and/or family were instructed on functional diagnosis, prognosis/goals and OT plan of care. Demonstrated fair understanding. Eval Complexity: Low    Time In: 0907  Time Out: 0921    Min Units   OT Eval Low 97165  x  1   OT Eval Medium 81052      OT Eval High 84838      OT Re-Eval [id number]       Therapeutic Ex [id number]       Therapeutic Activities 07135       ADL/Self Care 81040       Orthotic Management 22872       Manual 17171     Neuro Re-Ed [id number]       Non-Billable Time          Evaluation Time additionally includes thorough review of current medical information, gathering information on past medical history/social history and prior level of function, interpretation of standardized testing/informal observation of tasks, assessment of data and development of plan of care and goals.             Bianka Howe, OTR/L, [id number]

## 2022-03-23 NOTE — PROGRESS NOTES
I was called by the ED because the patient presented from Dr. Pam Fernandez office earlier today with afib RVR. The patient has a history of afib on tikosyn and eliquis. The patient was given an extra dose of tikosyn in the ED. Per ED the patient has had shortness of breath, fatigue, palpitations and headache for about 1 week. The patient also has a history of COPD and wears 3L NC at home. I have placed admission orders. Dr. Trixie Woo or Dr. Mitali Godoy will see the patient in the morning. Please contact me with any urgent matters.

## 2022-03-23 NOTE — CARE COORDINATION
Met w/ patient. Explained role of  and plan of care. Lives w/ her  in a 2 story house- resides on 1st floor- no steps to entrance. Independent PTA. Uses cane occasionally. Requiring O2 2l NC- wears home O2 3lNC provided by [facility] [employer] portable concentrator that can go up to 5 liters. PT/OT evals pending. [facility]. On Eliquis and Tikosyn PTA. Per pt, plan is to return home on discharge- states has no needs. Will follow Magi Rosenbaum RN case manager    ER  notified of documentation error placed on chart 3/22/22 @ 5127.  Magi Rosenbaum RN case manager

## 2022-03-23 NOTE — H&P
Internal Medicine History & Physical     Name: Kaylah Montes  : 1945  Chief Complaint: Atrial Fibrillation (HR 140s, hx of afib, takes eliquis ), Dizziness, and Shortness of Breath (94% 2L, wears all the time )  Primary Care Physician: GIO Mueller CNP  Admission date: 3/22/2022  Date of service: 3/23/2022     History of Present Illness  Stuart Verma is a 64y.o. year old female with a PMH of Afib, HTN, HLD, CKD, hypothyroidism, COPD chronically on 3L NC who presented from Dr. Valentine Morales office yesterday to the ED due to A. fib RVR with heart rate of 154. Patient states that for the past 2 weeks she has been getting intermittent episodes of palpitations associated with shortness of breath as well as fatigue. She states that her symptoms are moderate in severity and progressively getting worse over time. She states that her symptoms are exacerbated with exertion and somewhat better with rest.  She also states reports associated dizziness that she describes as a room spinning sensation. She denies any syncopal episodes but states that she has to sit down because she feels like she is going to pass out. She denies any recent changes to her medications and has been compliant with as well as with her outpatient visits. In the ED patient was initially in A. fib RVR. Per Dr. Valentine Morales recommendation patient was given an additional dose of her home Tikosyn. Patient was hemodynamically stable.     The patient was admitted for uncontrolled A. fib RVR      Past Medical History:   Diagnosis Date    Arthritis     Atrial fibrillation ([address]     Bilateral carotid artery stenosis 2020    Bronchitis     Carotid stenosis, bilateral 2012    Hyperlipidemia     Hypertension     Left carotid stenosis 2018    O2 dependent 2020    S/P carotid endarterectomy 10/6/2016    Stomach ulcer     Thyroid disease        Past Surgical History:   Procedure Laterality Date    APPENDECTOMY      BACK SURGERY      CHOLECYSTECTOMY      COLONOSCOPY      ENDOSCOPY, COLON, DIAGNOSTIC  01/10/2018    upper endo    EYE SURGERY      BILATERAL CATARACT    HYSTERECTOMY      JOINT REPLACEMENT  2011    RIGHT KNEE       Family History   Problem Relation Age of Onset    Other Mother         rheumatic fever    Cancer Sister         lung         Social History  Patient lives at home with her . At baseline patient ambulates with no assitance  Illicit drugs: Denies   TOBACCO:   reports that she quit smoking about 3 years ago. Her smoking use included cigarettes. She has a 30.00 pack-year smoking history. She has never used smokeless tobacco.  ETOH:   reports previous alcohol use. Home Medications  Prior to Admission medications    Medication Sig Start Date End Date Taking?  Authorizing Provider   FERREX 150 150 MG capsule TAKE 1 CAPSULE BY MOUTH TWICE DAILY 1/6/22   Historical Provider, MD   sucralfate (CARAFATE) 1 GM tablet TAKE 1 TABLET BY MOUTH ONCE DAILY 1/24/22   Historical Provider, MD   levothyroxine (SYNTHROID) 150 MCG tablet TAKE 1 TABLET BY MOUTH ONCE DAILY 7/26/21   Historical Provider, MD Macrina Garcia ELLIPTA 62.5-25 MCG/INH AEPB inhaler INHALE 1 PUFF BY MOUTH ONCE DAILY 12/14/20   Historical Provider, MD   amLODIPine (NORVASC) 2.5 MG tablet Take 1 tablet by mouth daily 8/31/20   Daysi Gtz MD   dofetilide [facility]) 125 MCG capsule Take 1 capsule by mouth every 12 hours 8/30/20   Olga Curry DO   OXYGEN Inhale into the lungs    Historical Provider, MD   apixaban (ELIQUIS) 5 MG TABS tablet Take 1 tablet by mouth 2 times daily 9/25/19   Piper Milner APRN - CNP   valsartan (DIOVAN) 160 MG tablet Take 1 tablet by mouth daily 9/26/19   GIO Abdul - CNP   venlafaxine (EFFEXOR XR) 150 MG extended release capsule Take 150 mg by mouth daily 4/22/18   Historical Provider, MD   pantoprazole (PROTONIX) 40 MG tablet Take 1 tablet by mouth daily 1/12/18   Daysi Gtz MD   atorvastatin (LIPITOR) 20 MG tablet Take 20 mg by mouth daily    Historical Provider, MD   Multiple Vitamins-Minerals (THERAPEUTIC MULTIVITAMIN-MINERALS) tablet Take 1 tablet by mouth daily    Historical Provider, MD   Cholecalciferol (VITAMIN D) 2000 UNITS CAPS capsule Take 1 capsule by mouth daily    Historical Provider, MD       Allergies  No Known Allergies    Review of Systems  Please see HPI above. All bolded are positive. All un-bolded are negative.   Constitutional Symptoms: fever, chills, fatigue, generalized weakness, diaphoresis, increase in thirst, loss of appetite  Eyes: vision change   Ears, Nose, Mouth, Throat: hearing loss, nasal congestion, sores in the mouth  Cardiovascular: chest pain, chest heaviness, palpitations  Respiratory: shortness of breath, wheezing, coughing  Gastrointestinal: abdominal pain, nausea, vomiting, diarrhea, constipation, melena, hematochezia, hematemesis  Genitourinary: dysuria, hematuria, increased frequency  Musculoskeletal: lower extremity edema, myalgias, arthralgias, back pain  Integumentary: rashes, itching   Neurological: headache, lightheadedness, dizziness, confusion, syncope, numbness, tingling, focal weakness  Psychiatric: depression, suicidal ideation, anxiety  Endocrine: unintentional weight change  Hematologic/Lymphatic: lymphadenopathy, easy bruising, easy bleeding   Allergic/Immunologic: recurrent infections      Objective  VITALS:  /63   Pulse 59   Temp 97.7 °F (36.5 °C)   Resp 18   Ht 5' 6\" (1.676 m)   Wt 200 lb (90.7 kg)   SpO2 99%   BMI 32.28 kg/m²     Physical Exam:  General: awake, alert, oriented to person, place, time, and purpose, appears stated age, cooperative, no acute distress, pleasant, appropriate mood  Eyes: conjunctivae/corneas clear, sclera non icteric, EOMI  Ears: no obvious scars, no lesions, no masses, hearing intact  Mouth: mucous membranes moist, no obvious oral sores  Head: normocephalic, atraumatic  Neck: no JVD, no adenopathy, no thyromegaly, neck is supple, trachea is midline  Back: ROM normal, no CVA tenderness. Chest: no pain on palpation  Lungs: clear to auscultation bilaterally, without rhonchi, crackle, wheezing, or rale, no retractions or use of accessory muscles  Heart: regular rate and regular rhythm, no murmur, normal S1, S2  Abdomen: soft, non-tender; bowel sounds normal; no masses, no organomegaly  : Deferred   Extremities: no lower extremity edema, extremities atraumatic, no cyanosis, no clubbing, 2+ pedal pulses palpated  Skin: normal color, normal texture, normal turgor, no rashes, no lesions  Neurologic:5/5 muscle strength throughout, normal muscle tone throughout, face symmetric, hearing intact, tongue midline, speech appropriate without slurring, sensation to fine touch intact in upper and lower extremities    Labs-   Lab Results   Component Value Date    WBC 6.2 03/22/2022    HGB 11.5 03/22/2022    HCT 36.3 03/22/2022     03/22/2022     03/22/2022    K 4.7 03/22/2022    CL 98 03/22/2022    CREATININE 1.0 03/22/2022    BUN 23 03/22/2022    CO2 26 03/22/2022    GLUCOSE 107 (H) 03/22/2022    ALT 9 10/19/2021    AST 21 10/19/2021    INR 1.4 08/27/2020     Lab Results   Component Value Date    TROPONINI <0.01 08/27/2020       Last echocardiogram: 2019      Recent Radiological Studies:  XR CHEST (2 VW)   Final Result   No pneumonia or pleural effusion.              Assessment  Active Hospital Problems    Diagnosis     Atrial fibrillation with rapid ventricular response (HCC) [I48.91]     CKD (chronic kidney disease) stage 3, GFR 30-59 ml/min ([facility]) [N18.30]     MCCLELLAND (dyspnea on exertion) [R06.00]     Bilateral carotid artery stenosis [I65.23]     Asthma [W47.162]     Diastolic dysfunction [I25.69]     CHF (congestive heart failure) ([facility] [I50.9]     S/P carotid endarterectomy [Z98.890]     Essential hypertension [I10]     Mixed hyperlipidemia [E78.2]        Patient Active Problem List    Diagnosis Date Noted    [facility] with [facility] ([facility] 03/22/2022    Atrial fibrillation with RVR ([facility] 08/30/2020    CKD (chronic kidney disease) stage 3, GFR 30-59 ml/min ([facility]) 08/29/2020    MCCLELLAND (dyspnea on exertion) 08/27/2020    Primary osteoarthritis of left knee 08/20/2020    Lumbar spondylosis 08/20/2020    Class 2 obesity due to excess calories with body mass index (BMI) of 38.0 to 38.9 in adult 08/20/2020    Bilateral carotid artery stenosis 07/16/2020    O2 dependent 07/16/2020    Asthma 42/86/9147    Diastolic dysfunction 34/95/9980    CHF (congestive heart failure) ([facility] 01/09/2018    Hypoxia 01/08/2018    S/P carotid endarterectomy 10/06/2016    Essential hypertension 09/13/2016    Mixed hyperlipidemia 09/13/2016       Plan  · Atrial Fibrillation with RVR  · Telemetry, continuous   · Check TSH, T4   · ECHO not done since 2019 defer repeat to cardiology   · Check Viral PCR to rule out COVID infection  · Dr Debra Edwards on board   · Continue Eliquis   · Fatigue   · Likely related to above  · Check B12, folate, thyroid function, vitamin d   · Consider op MIKHAIL testing   · Asthma   · Continue Brovana and Duoneb as needed   · Hypothyroidism   · Check Levels   · Continue home 150 mcg dose for now   · COPD   · PT/OT  · Consults-cardiology  · Home medications to be reconciled and confirmed prior to being ordered  · DVT prophylaxis   · Code Status   · Discharge plan: TBD pending clinical improvement     Fred Hawkins MD  Internal medicine   3/23/2022  8:00 AM     Igor Sheth, PGY-1  3/23/2022 8:27 AM  Patient was seen and evaluated independently prior to being seen with attending physician Dr. Mirian Burns. Patient was independently examined and discussed with Dr. Mirian Burns. All plans discussed with Dr. Mirian Burns. Dr. Mirian Burns  can be reached through [facility]. NOTE:  This report was transcribed using voice recognition software.   Every effort was made to ensure accuracy; however, inadvertent computerized transcription errors may be present. Addendum: I have personally participated in a face-to-face history and physical exam on the date of service with the patient. I have discussed the case with the resident. I also participated in medical decision making with the resident on the date of service and I agree with all of the pertinent clinical information unless indicated in my editing of the note. I have reviewed and edited the note above based on my findings during my history, exam, and decision making on the same day of service. The vitals, labs, imaging, medications and treatment plan were reviewed independently by myself in addition to with the resident doctor. I agree with the above documentation and treatment plan     Electronically signed by James Pinon MD on 3/23/2022 at 8:33 PM    I can be reached through [facility].

## 2022-03-23 NOTE — PROGRESS NOTES
Physical Therapy    Facility/Department: [address] INTERNAL MEDICINE 2  Initial Assessment    NAME: Devyn Vega  : 1945  MRN: [id number]    Date of Service: 3/23/2022      Patient Diagnosis(es): The primary encounter diagnosis was Atrial fibrillation with rapid ventricular response ([address] A diagnosis of Elevated brain natriuretic peptide (BNP) level was also pertinent to this visit. has a past medical history of Arthritis, Atrial fibrillation ([address] Bilateral carotid artery stenosis, Bronchitis, Carotid stenosis, bilateral, Hyperlipidemia, Hypertension, Left carotid stenosis, O2 dependent, S/P carotid endarterectomy, Stomach ulcer, and Thyroid disease. has a past surgical history that includes Hysterectomy; Cholecystectomy; Appendectomy; back surgery; Colonoscopy; joint replacement (); eye surgery; and Endoscopy, colon, diagnostic (01/10/2018). Evaluating Therapist: Brandon Dominguez PT      Room #:  6604/3579-W  Diagnosis:  Atrial fibrillation with rapid ventricular response ([facility] [I48.91]  Precautions:  Falls, O2      Social:  Pt lives with  in a 2 floor plan but has first floor setup. Prior to admission independent without device uses home O2     Initial Evaluation  Date: 3/23/22 Treatment      Short Term/ Long Term   Goals   Was pt agreeable to Eval/treatment?  yes     Does pt have pain? no     Bed Mobility  Rolling: independent  Supine to sit: independent  Sit to supine: NT  Scooting: independent  independent   Transfers Sit to stand: supervision  Stand to sit: supervision  Stand pivot: supervsion  independent   Ambulation    40 feet with no device with supervison  100 feet with no device independent   Stair Negotiation  Ascended and descended  NT   4 steps with 1 rail independent   LE strength     4-/5    4/5   balance      good     AM-PAC Raw score               20/24         Pt is alert and Oriented   LE ROM: WFL  Sensation: intact  Edema: none  Endurance: fair ASSESSMENT:    Pt displays functional ability as noted in the objective portion of this evaluation. Patient education  Pt educated on PT objectives    Patient response to education:   Pt verbalized understanding Pt demonstrated skill Pt requires further education in this area   yes           Comments:  No loss of balance with ambulation, cues for attention to O2 line. Pt's/ family goals   1. To return home    Conditions Requiring Skilled Therapeutic Intervention:    [x]Decreased strength     []Decreased ROM  [x]Decreased functional mobility  []Decreased balance   [x]Decreased endurance   []Decreased posture  []Decreased sensation  []Decreased coordination   []Decreased vision  []Decreased safety awareness   []Increased pain       Patient and or family understand(s) diagnosis, prognosis, and plan of care. Prognosis is good for reaching above PT goals    PHYSICAL THERAPY PLAN OF CARE:    PT POC is established based on physician order and patient diagnosis     Referring provider/PT Order: GIO Lopez CNP/ PT eval and treat      Current Treatment Recommendations:     [x] Strengthening to improve independence with functional mobility   [] ROM to improve independence with functional mobility   [x] Balance Training to improve static/dynamic balance and to reduce fall risk  [x] Endurance Training to improve activity tolerance during functional mobility   [x] Transfer Training to improve safety and independence with all functional transfers   [x] Gait Training to improve gait mechanics, endurance and assess need for appropriate assistive device  [x] Stair Training in preparation for safe discharge home and/or into the community   [] Positioning to prevent skin breakdown and contractures  [x] Safety and Education Training   [x] Patient/Caregiver Education   [] HEP  [] Other      PT long term treatment goals are located in above grid    Frequency of treatments: 2-5x/week x 3 days.     Time in 7738  Time out  0920        Evaluation Time includes thorough review of current medical information, gathering information on past medical history/social history and prior level of function, completion of standardized testing/informal observation of tasks, assessment of data and education on plan of care and goals.       CPT codes:  [x] Low Complexity PT evaluation 87001  [] Moderate Complexity PT evaluation 33993  [] High Complexity PT evaluation 63652  [] PT Re-evaluation 80051  [] Gait training 67072 minutes  [] Manual therapy 50699 minutes  [] Therapeutic activities 59799 minutes  [] Therapeutic exercises 65141 minutes  [] Neuromuscular reeducation 43527 minutes     [facility] PT [id number]

## 2022-03-24 LAB
ALBUMIN SERPL-MCNC: 4 G/DL (ref 3.5–5.2)
ALP BLD-CCNC: 68 U/L (ref 35–104)
ALT SERPL-CCNC: 12 U/L (ref 0–32)
ANION GAP SERPL CALCULATED.3IONS-SCNC: 11 MMOL/L (ref 7–16)
AST SERPL-CCNC: 16 U/L (ref 0–31)
BASOPHILS ABSOLUTE: 0.03 E9/L (ref 0–0.2)
BASOPHILS RELATIVE PERCENT: 0.5 % (ref 0–2)
BILIRUB SERPL-MCNC: 0.7 MG/DL (ref 0–1.2)
BUN BLDV-MCNC: 26 MG/DL (ref 6–23)
CALCIUM SERPL-MCNC: 9.2 MG/DL (ref 8.6–10.2)
CHLORIDE BLD-SCNC: 99 MMOL/L (ref 98–107)
CO2: 27 MMOL/L (ref 22–29)
CREAT SERPL-MCNC: 1 MG/DL (ref 0.5–1)
EOSINOPHILS ABSOLUTE: 0.17 E9/L (ref 0.05–0.5)
EOSINOPHILS RELATIVE PERCENT: 2.6 % (ref 0–6)
GFR AFRICAN AMERICAN: >60
GFR NON-AFRICAN AMERICAN: 54 ML/MIN/1.73
GLUCOSE BLD-MCNC: 95 MG/DL (ref 74–99)
HCT VFR BLD CALC: 32.9 % (ref 34–48)
HEMOGLOBIN: 10.5 G/DL (ref 11.5–15.5)
IMMATURE GRANULOCYTES #: 0.02 E9/L
IMMATURE GRANULOCYTES %: 0.3 % (ref 0–5)
LV EF: 64 %
LVEF MODALITY: NORMAL
LYMPHOCYTES ABSOLUTE: 1.37 E9/L (ref 1.5–4)
LYMPHOCYTES RELATIVE PERCENT: 21 % (ref 20–42)
MCH RBC QN AUTO: 30.8 PG (ref 26–35)
MCHC RBC AUTO-ENTMCNC: 31.9 % (ref 32–34.5)
MCV RBC AUTO: 96.5 FL (ref 80–99.9)
MONOCYTES ABSOLUTE: 0.57 E9/L (ref 0.1–0.95)
MONOCYTES RELATIVE PERCENT: 8.7 % (ref 2–12)
NEUTROPHILS ABSOLUTE: 4.37 E9/L (ref 1.8–7.3)
NEUTROPHILS RELATIVE PERCENT: 66.9 % (ref 43–80)
PDW BLD-RTO: 13.2 FL (ref 11.5–15)
PLATELET # BLD: 175 E9/L (ref 130–450)
PMV BLD AUTO: 10.7 FL (ref 7–12)
POTASSIUM REFLEX MAGNESIUM: 4.8 MMOL/L (ref 3.5–5)
RBC # BLD: 3.41 E12/L (ref 3.5–5.5)
SODIUM BLD-SCNC: 137 MMOL/L (ref 132–146)
T4 FREE: 1.07 NG/DL (ref 0.93–1.7)
TOTAL PROTEIN: 6.5 G/DL (ref 6.4–8.3)
WBC # BLD: 6.5 E9/L (ref 4.5–11.5)

## 2022-03-24 PROCEDURE — 6370000000 HC RX 637 (ALT 250 FOR IP): Performed by: NURSE PRACTITIONER

## 2022-03-24 PROCEDURE — 2700000000 HC OXYGEN THERAPY PER DAY

## 2022-03-24 PROCEDURE — 6360000002 HC RX W HCPCS: Performed by: NURSE PRACTITIONER

## 2022-03-24 PROCEDURE — 94640 AIRWAY INHALATION TREATMENT: CPT

## 2022-03-24 PROCEDURE — 2580000003 HC RX 258: Performed by: NURSE PRACTITIONER

## 2022-03-24 PROCEDURE — 2060000000 HC ICU INTERMEDIATE R&B

## 2022-03-24 PROCEDURE — 85025 COMPLETE CBC W/AUTO DIFF WBC: CPT

## 2022-03-24 PROCEDURE — 80053 COMPREHEN METABOLIC PANEL: CPT

## 2022-03-24 PROCEDURE — 36415 COLL VENOUS BLD VENIPUNCTURE: CPT

## 2022-03-24 PROCEDURE — 84439 ASSAY OF FREE THYROXINE: CPT

## 2022-03-24 PROCEDURE — 6370000000 HC RX 637 (ALT 250 FOR IP): Performed by: INTERNAL MEDICINE

## 2022-03-24 PROCEDURE — 93306 TTE W/DOPPLER COMPLETE: CPT

## 2022-03-24 RX ADMIN — Medication 10 ML: at 07:46

## 2022-03-24 RX ADMIN — DOFETILIDE 250 MCG: 0.25 CAPSULE ORAL at 08:16

## 2022-03-24 RX ADMIN — VENLAFAXINE HYDROCHLORIDE 150 MG: 150 CAPSULE, EXTENDED RELEASE ORAL at 08:15

## 2022-03-24 RX ADMIN — IPRATROPIUM BROMIDE 0.5 MG: 0.5 SOLUTION RESPIRATORY (INHALATION) at 15:57

## 2022-03-24 RX ADMIN — SUCRALFATE 1 G: 1 TABLET ORAL at 08:15

## 2022-03-24 RX ADMIN — VALSARTAN 160 MG: 160 TABLET, FILM COATED ORAL at 08:14

## 2022-03-24 RX ADMIN — APIXABAN 5 MG: 5 TABLET, FILM COATED ORAL at 21:06

## 2022-03-24 RX ADMIN — AMLODIPINE BESYLATE 2.5 MG: 5 TABLET ORAL at 08:15

## 2022-03-24 RX ADMIN — Medication 2000 UNITS: at 08:16

## 2022-03-24 RX ADMIN — MULTIPLE VITAMINS W/ MINERALS TAB 1 TABLET: TAB at 08:16

## 2022-03-24 RX ADMIN — DOFETILIDE 125 MCG: 0.12 CAPSULE ORAL at 21:06

## 2022-03-24 RX ADMIN — IPRATROPIUM BROMIDE 0.5 MG: 0.5 SOLUTION RESPIRATORY (INHALATION) at 08:53

## 2022-03-24 RX ADMIN — PANTOPRAZOLE SODIUM 40 MG: 40 TABLET, DELAYED RELEASE ORAL at 08:16

## 2022-03-24 RX ADMIN — ATORVASTATIN CALCIUM 20 MG: 40 TABLET, FILM COATED ORAL at 21:06

## 2022-03-24 RX ADMIN — IPRATROPIUM BROMIDE 0.5 MG: 0.5 SOLUTION RESPIRATORY (INHALATION) at 19:41

## 2022-03-24 RX ADMIN — ARFORMOTEROL TARTRATE 15 MCG: 15 SOLUTION RESPIRATORY (INHALATION) at 19:41

## 2022-03-24 RX ADMIN — ACETAMINOPHEN 650 MG: 325 TABLET ORAL at 22:45

## 2022-03-24 RX ADMIN — Medication 10 ML: at 21:07

## 2022-03-24 RX ADMIN — APIXABAN 5 MG: 5 TABLET, FILM COATED ORAL at 08:15

## 2022-03-24 RX ADMIN — IPRATROPIUM BROMIDE 0.5 MG: 0.5 SOLUTION RESPIRATORY (INHALATION) at 12:24

## 2022-03-24 RX ADMIN — Medication 150 MG: at 08:16

## 2022-03-24 RX ADMIN — LEVOTHYROXINE SODIUM 150 MCG: 75 TABLET ORAL at 06:22

## 2022-03-24 RX ADMIN — ARFORMOTEROL TARTRATE 15 MCG: 15 SOLUTION RESPIRATORY (INHALATION) at 08:53

## 2022-03-24 ASSESSMENT — PAIN SCALES - GENERAL
PAINLEVEL_OUTOF10: 0
PAINLEVEL_OUTOF10: 4
PAINLEVEL_OUTOF10: 0
PAINLEVEL_OUTOF10: 4
PAINLEVEL_OUTOF10: 0
PAINLEVEL_OUTOF10: 0

## 2022-03-24 NOTE — PROGRESS NOTES
Physical Therapy  Facility/Department: [facility] INTERNAL MEDICINE 2    NAME: Leah Lindsay  : 1945  MRN: [id number]    Chart reviewed and PT treatment attempted this pm.  Pt kindly declined at this time stating she has been ambulating already today. Will continue to follow.      Jesus Rosas, [phone]

## 2022-03-24 NOTE — CONSULTS
CARDIOLOGY CONSULTATION    Patient Name:  Sherly Vitale    :  1945    Reason for Consultation:   Recurrent atrial fibrillation rapid ventricular response    History of Present Illness:   Sherly Vitale presents to [facility] following history of feeling tired and short of breath and upon visitation to my office as an outpatient was found to be in atrial fibrillation with a rapid ventricular response despite being on dofetilide 125 mcg every 12 hours. She was advised to go to the hospital for reassessment and adjustment of her antiarrhythmic medication. She denies exertional chest discomfort but does note profound fatigue. She is now admitted for up titration of her dofetilide dosage in accordance with FDA requirements. Past Medical History:   has a past medical history of Arthritis, Atrial fibrillation ([facility] [address] Bilateral carotid artery stenosis, Bronchitis, Carotid stenosis, bilateral, Hyperlipidemia, Hypertension, Left carotid stenosis, O2 dependent, On continuous oral anticoagulation, S/P carotid endarterectomy, Stomach ulcer, and Thyroid disease. Surgical History:   has a past surgical history that includes Hysterectomy; Cholecystectomy; Appendectomy; back surgery; Colonoscopy; joint replacement (); eye surgery; and Endoscopy, colon, diagnostic (01/10/2018). Social History:   reports that she quit smoking about 3 years ago. Her smoking use included cigarettes. She has a 30.00 pack-year smoking history. She has never used smokeless tobacco. She reports previous alcohol use. She reports that she does not use drugs. Family History:  family history includes Cancer in her sister; Other in her mother. Mother  secondary to complications of rheumatic heart disease and father  in his [de-identified] secondary to complications of Alzheimer's disease and infirmities of old age. Medications:  Prior to Admission medications    Medication Sig Start Date End Date Taking? Authorizing Provider   FERREX 150 150 MG capsule TAKE 1 CAPSULE BY MOUTH TWICE DAILY 1/6/22   Historical Provider, MD   sucralfate (CARAFATE) 1 GM tablet TAKE 1 TABLET BY MOUTH ONCE DAILY 1/24/22   Historical Provider, MD   levothyroxine (SYNTHROID) 150 MCG tablet TAKE 1 TABLET BY MOUTH ONCE DAILY 7/26/21   Historical Provider, MD Silvano Wang ELLIPTA 62.5-25 MCG/INH AEPB inhaler INHALE 1 PUFF BY MOUTH ONCE DAILY 12/14/20   Historical Provider, MD   amLODIPine (NORVASC) 2.5 MG tablet Take 1 tablet by mouth daily 8/31/20   Clint Mendoza MD   dofetilide formerly [facility]) 125 MCG capsule Take 1 capsule by mouth every 12 hours 8/30/20   Piyush Monteiro DO   OXYGEN Inhale into the lungs    Historical Provider, MD   apixaban (ELIQUIS) 5 MG TABS tablet Take 1 tablet by mouth 2 times daily 9/25/19   GIO Lepe CNP   valsartan (DIOVAN) 160 MG tablet Take 1 tablet by mouth daily 9/26/19   GIO Lepe - CNP   venlafaxine (EFFEXOR XR) 150 MG extended release capsule Take 150 mg by mouth daily 4/22/18   Historical Provider, MD   pantoprazole (PROTONIX) 40 MG tablet Take 1 tablet by mouth daily 1/12/18   Clint Mendoza MD   atorvastatin (LIPITOR) 20 MG tablet Take 20 mg by mouth daily    Historical Provider, MD   Multiple Vitamins-Minerals (THERAPEUTIC MULTIVITAMIN-MINERALS) tablet Take 1 tablet by mouth daily    Historical Provider, MD   Cholecalciferol (VITAMIN D) 2000 UNITS CAPS capsule Take 1 capsule by mouth daily    Historical Provider, MD       Allergies:  Patient has no known allergies. Review of Systems:   · Constitutional: there has been no unanticipated weight loss. There's been no significant change in energy level, sleep pattern or activity level. No fever chills or rigors. · Eyes: No visual changes or diplopia. No scleral icterus. · ENT: No Headaches, hearing loss or vertigo. No mouth sores or sore throat. No change in taste or smell.   · Cardiovascular: No chest discomfort,  + dyspnea on minimal exertion,occasional palpitations, but no loss of consciousness, no phlebitis, no claudication. · Respiratory: No cough or wheezing, no sputum production. No hemoptysis, pleuritic pain. · Gastrointestinal: No abdominal pain, appetite loss, blood in stools. No change in bowel habits. No hematemesis  · Genitourinary: No dysuria, trouble voiding or hematuria. No nocturia or increased frequency. · Musculoskeletal:  No gait disturbance, weakness or joint complaints. · Integumentary: No rash or pruritis. · Neurological: No headache, diplopia, change in muscle strength, numbness or tingling. No change in gait, balance, coordination, mood, affect, memory, mentation, behavior. · Psychiatric: No anxiety or depression. · Endocrine: No temperature intolerance. No excessive thirst, fluid intake, or urination. No tremor. · Hematologic/Lymphatic: No abnormal bruising or bleeding, blood clots or swollen lymph nodes. · Allergic/Immunologic: No nasal congestion or hives. Physical Examination:    Vital Signs: BP (!) 137/52   Pulse 68   Temp 98 °F (36.7 °C) (Oral)   Resp 17   Ht 5' 6\" (1.676 m)   Wt 200 lb (90.7 kg)   SpO2 96%   BMI 32.28 kg/m²   General appearance: Well preserved, mesomorphic body habitus, alert, no distress. Skin: Skin color, texture, turgor normal. No rashes or lesions. No induration or tightening palpated. Head: Normocephalic. No masses, lesions, tenderness or abnormalities  Eyes: Conjunctivae/corneas clear. PERRL, EOMs intact. Sclera non icteric. Ears: External ears normal. Canals clear. TM's clear bilaterally. Hearing normal to finger rub. Nose/Sinuses: Nares normal. Septum midline. Mucosa normal. No drainage or sinus tenderness. Oropharynx: Lips, mucosa, and tongue normal. Oropharynx clear with no exudate seen. Neck: Neck supple and symmetric. No adenopathy. Thyroid symmetric, normal size, without nodules. Trachea is midline.  Carotids brisk in upstroke without bruits, no abnormal JVP noted at 45°. Right carotid cicatrix noted. Chest: Even excursion  Lungs: Lungs grossly clear to auscultation bilaterally. No retractions or use of accessory muscles. No tactile vocal fremitus. No rhonchi, crackles or rales. Heart:  S1 > S2. Irregular, irregular rhythm. No gallop grade 2/6 systolic murmur second right and left intercostal space. No rub, palpable thrill or heave noted. PMI 5th intercostal space midclavicular line. Abdomen: Abdomen soft, moderately protuberant, non-tender. BS normal. No masses, organomegaly. No hernia noted. Extremities: Extremities normal. No deformities, edema, or skin discoloration. No cyanosis or clubbing noted to the nails. Peripheral pulses present 2+ upper extremities and present 1+  lower extremities. Musculoskeletal: Spine ROM normal. Muscular strength intact. Neuro: Cranial nerves intact. Motor: Strength 5/5 in all extremities. Reflexes 2+ in all extremities. No focal weakness. Sensory: grossly normal to touch. Coordination intact. Pertinent Labs:  CBC:     03/22/22  1859 03/23/22  0820   WBC 6.2 6.0   RBC 3.69 3.38*   HGB 11.5 10.3*   HCT 36.3 33.4*   MCV 98.4 98.8   MCH 31.2 30.5   MCHC 31.7* 30.8*   RDW 13.2 13.3    180   MPV 10.6 10.9     BMP:     03/22/22  1859 03/23/22  0820    135   K 4.7 4.7   CL 98 100   CO2 26 28   BUN 23 23   CREATININE 1.0 0.9   GLUCOSE 107* 103*   CALCIUM 9.1 8.8         ABGs:   Lab Results   Component Value Date    PH 7.371 09/19/2019    PO2 81.4 09/19/2019    PCO2 58.4 09/19/2019     INR:   No results for input(s): INR in the last 72 hours. PRO-BNP:   Lab Results   Component Value Date    PROBNP 2,484 (H) 09/19/2019      Cardiac Injury Profile:   No results for input(s): CKTOTAL, CKMB, CKMBINDEX, TROPONINI in the last 72 hours.    Lipid Profile:   Lab Results   Component Value Date    TRIG 224 10/19/2021    HDL 77 10/19/2021    LDLCALC 65 10/19/2021    CHOL 187 10/19/2021      Hemoglobin A1C: No components found for: HGBA1C   ECG:  See report  ECHO: 1/9/2018  Summary   Normal left ventricular systolic function.   Ejection fraction is visually estimated at > 65%.   Mild left ventricular hypertrophy.   Normal right ventricular size and function.   There is doppler evidence of stage I diastolic dysfunction.   Physiologic and/or trace tricuspid regurgitation.   PASP is estimated at 22 mmHg (normal estimated PASP).  The inferior vena cava is normal in size with normal respiratory   variation. Radiology:  XR CHEST (2 VW)    Result Date: 3/22/2022  No pneumonia or pleural effusion. Assessment:    Principal Problem:    Atrial fibrillation with rapid ventricular response ([facility])  Active Problems:    Essential hypertension    Mixed hyperlipidemia    S/P carotid endarterectomy    CHF (congestive heart failure) ([facility])    Diastolic dysfunction    Asthma    Bilateral carotid artery stenosis    MCCLELLAND (dyspnea on exertion)    CKD (chronic kidney disease) stage 3, GFR 30-59 ml/min ([facility])  Resolved Problems:    * No resolved hospital problems. *      Plan:  After reviewing Mrs. Flynn's most recent history it would appear that she has been in atrial fibrillation with a rapid ventricular response despite being on dofetilide. Thus her dosage of dofetilide will be increased to 250 mcg a.m. and 125 mcg p.m. with the hope of avoiding any significant increase in QTC or increase in bradycardia. Will continue to monitor over the next 72 hours. Will obtain ECG 2 hours following each dose. Maintain strict anticoagulation indefinitely. I have spent more than 50 minutes face to face with Peace Hoff reviewing notes and laboratory data with greater than 50% of this time instructing and counseling the patient regarding my findings and recommendations and I have answered all questions as posed to me by Ms. Flynn. Thank you, GIO Goldstein - CNP for allowing me to consult in the care of this patient.     Merna Farmer,  DO, FACP, [address] [facility], FSCAI    NOTE:  This report was transcribed using voice recognition software. Every effort was made to ensure accuracy; however, inadvertent computerized transcription errors may be present.

## 2022-03-24 NOTE — PLAN OF CARE
Problem: Safety:  Goal: Free from accidental physical injury  Description: Free from accidental physical injury  3/24/2022 1401 by Shayan Land RN  Outcome: Met This Shift  3/24/2022 0356 by Kristyn Roblero  Outcome: Met This Shift     Problem: Safety:  Goal: Free from intentional harm  Description: Free from intentional harm  3/24/2022 1401 by Shayan Land RN  Outcome: Met This Shift  3/24/2022 0356 by Kristyn Roblero  Outcome: Met This Shift     Problem: Daily Care:  Goal: Daily care needs are met  Description: Daily care needs are met  3/24/2022 1401 by Shayan Land RN  Outcome: Met This Shift  3/24/2022 0356 by Kristyn Roblero  Outcome: Met This Shift     Problem: Pain:  Goal: Patient's pain/discomfort is manageable  Description: Patient's pain/discomfort is manageable  3/24/2022 1401 by Shayan Land RN  Outcome: Met This Shift  3/24/2022 0356 by Kristyn Roblero  Outcome: Met This Shift

## 2022-03-24 NOTE — PROGRESS NOTES
PROGRESS NOTE       PATIENT PROBLEM LIST:  Principal Problem:    Atrial fibrillation with rapid ventricular response ([facility])  Active Problems:    Essential hypertension    Mixed hyperlipidemia    S/P carotid endarterectomy    CHF (congestive heart failure) ([facility])    Diastolic dysfunction    Asthma    Bilateral carotid artery stenosis    MCCLELLAND (dyspnea on exertion)    CKD (chronic kidney disease) stage 3, GFR 30-59 ml/min ([facility])  Resolved Problems:    * No resolved hospital problems. *      SUBJECTIVE:  Denny Lee states she feels much better but her lower back is aching from her previous surgery. She denies any shortness of breath today and denies any obvious side effects from her increased dosage of dofetilide. Likewise she is maintaining a QTc interval of 425 ms. REVIEW OF SYSTEMS:  General ROS: negative for - fatigue, malaise,  weight gain or weight loss  Psychological ROS: negative for - anxiety , depression  Ophthalmic ROS: positive for - decreased vision and utilizes corrective lenses for visual acuity. ENT ROS: negative  Allergy and Immunology ROS: negative  Hematological and Lymphatic ROS: negative  Endocrine: no heat or cold intolerance and no polyphagia, polydipsia, or polyuria  Respiratory ROS: positive for - shortness of breath  Cardiovascular ROS: positive for - dyspnea on exertion, irregular heartbeat, palpitations and shortness of breath. Gastrointestinal ROS: no abdominal pain, change in bowel habits, or black or bloody stools  Genito-Urinary ROS: no nocturia, dysuria, trouble voiding, frequency or hematuria  Musculoskeletal ROS: negative for- myalgias, arthralgias, or claudication  Neurological ROS: no TIA or stroke symptoms otherwise no significant change in symptoms or problems since yesterday as documented in previous progress notes.     SCHEDULED MEDICATIONS:   dofetilide  250 mcg Oral QAM    dofetilide  125 mcg Oral Nightly    sodium chloride flush  10 mL IntraVENous 2 times per day    amLODIPine  2.5 mg Oral Daily    apixaban  5 mg Oral BID    atorvastatin  20 mg Oral Daily    Vitamin D  2,000 Units Oral Daily    levothyroxine  150 mcg Oral Daily    therapeutic multivitamin-minerals  1 tablet Oral Daily    pantoprazole  40 mg Oral Daily    sucralfate  1 g Oral Daily    valsartan  160 mg Oral Daily    venlafaxine  150 mg Oral Daily    iron polysaccharides  150 mg Oral Daily    ipratropium  0.5 mg Nebulization 4x daily    Arformoterol Tartrate  15 mcg Nebulization BID       VITAL SIGNS:                                                                                                                          BP (!) 137/52   Pulse 68   Temp 98 °F (36.7 °C) (Oral)   Resp 17   Ht 5' 6\" (1.676 m)   Wt 200 lb (90.7 kg)   SpO2 96%   BMI 32.28 kg/m²   Patient Vitals for the past 96 hrs (Last 3 readings):   Weight   03/22/22 1757 200 lb (90.7 kg)     OBJECTIVE:    HEENT: PERRL, EOM  Intact; sclera non-icteric, conjunctiva pink. Carotids are brisk in upstroke with normal contour. No carotid bruits. Normal jugular venous pulsation at 45°. No palpable cervical nor supraclavicular nodes. Thyroid not palpable. Trachea midline. Chest: Even excursion  Lungs: CTA B, no expiratory wheezes or rhonchi, no decreased tactile fremitus without inspiratory rales. Heart: Regular, irregular rhythm; S1 > S2, no gallop grade 2/6 early systolic murmur second right and left intercostal space. No clicks, rub, palpable thrills   or heaves. PMI nondisplaced, 5th intercostal space MCL. Abdomen: Soft, nontender, nondistended,  moderately protuberant, no masses or organomegaly. Bowel sounds active. Extremities: Without clubbing, cyanosis 1-2+ bilateral lower pretibial edema. Pulses present 3+ upper extermities bilaterally; present 1+ DP and present 1+ PT bilaterally.      Data:   Scheduled Meds: Reviewed  Continuous Infusions:    sodium chloride         Intake/Output Summary (Last 24 hours) at 3/24/2022 Cameron filed at 3/24/2022 0826  Gross per 24 hour   Intake 240 ml   Output --   Net 240 ml     CBC:   Recent Labs     03/22/22  1859 03/23/22  0820 03/24/22  0425   WBC 6.2 6.0 6.5   HGB 11.5 10.3* 10.5*   HCT 36.3 33.4* 32.9*    180 175     BMP:  Recent Labs     03/22/22  1859 03/22/22  1859 03/23/22  0820 03/23/22  0820 03/24/22  0425     --  135  --  137   K 4.7  --  4.7  --  4.8   CL 98  --  100  --  99   CO2 26  --  28  --  27   BUN 23  --  23  --  26*   CREATININE 1.0  --  0.9  --  1.0   LABGLOM 54   < > >60   < > 54    < > = values in this interval not displayed. ABGs:   Lab Results   Component Value Date    PH 7.371 09/19/2019    PO2 81.4 09/19/2019    PCO2 58.4 09/19/2019     INR: No results for input(s): INR in the last 72 hours. PRO-BNP:   Lab Results   Component Value Date    PROBNP 869 (H) 03/22/2022    PROBNP 374 08/28/2020      TSH:   Lab Results   Component Value Date    TSH 5.450 (H) 03/23/2022      Cardiac Injury Profile:   Recent Labs     03/22/22 1859 03/22/22  2011   TROPHS 18* 17*      Lipid Profile:   Lab Results   Component Value Date    TRIG 224 10/19/2021    HDL 77 10/19/2021    LDLCALC 65 10/19/2021    CHOL 187 10/19/2021      Hemoglobin A1C: No components found for: HGBA1C      RAD:   XR CHEST (2 VW)    Result Date: 3/22/2022  EXAMINATION: TWO XRAY VIEWS OF THE CHEST 3/22/2022 7:26 pm COMPARISON: August 27, 2020 HISTORY: ORDERING SYSTEM PROVIDED HISTORY: chf TECHNOLOGIST PROVIDED HISTORY: Reason for exam:->chf FINDINGS: No airspace opacity or pleural effusion. The heart is normal size. No pneumothorax. No free air beneath the hemidiaphragms. No pneumonia or pleural effusion.          EKG: See Report  Echo: See Report      IMPRESSIONS:  Principal Problem:    Atrial fibrillation with rapid ventricular response ([facility])  Active Problems:    Essential hypertension    Mixed hyperlipidemia    S/P carotid endarterectomy    [facility] Diastolic dysfunction    Asthma    Bilateral carotid artery stenosis    MCCLELLAND (dyspnea on exertion)    CKD (chronic kidney disease) stage 3, GFR 30-59 ml/min ([facility])  Resolved Problems:    * No resolved hospital problems. *      RECOMMENDATIONS:  Will continue to monitor for total of 72 hours following increase dosage of dofetilide and discharge in the morning. I have also answered all questions asked regarding her lower back pain is well which is unrelated to her underlying atrial fibrillation. Continue to monitor QTc interval.    I have spent more than 25 minutes face to face with Heidi Cea and reviewing notes and laboratory data, with greater than 50% of this time instructing and counseling the patient face to face regarding my findings and recommendations and I have answered all questions as posed to me by Ms. Flynn. Fredy Lacy, DO FACP,FACC,FSCAI      NOTE:  This report was transcribed using voice recognition software.   Every effort was made to ensure accuracy; however, inadvertent computerized transcription errors may be present

## 2022-03-24 NOTE — CARE COORDINATION
Requiring O2 2lNC - pt's home O2 baseline is 3lNC provided by [facility]. Plan remains to return home w/  on discharge. On Eliquis PTA. No needs.  Will follow Mike Kim RN case manager

## 2022-03-24 NOTE — PROGRESS NOTES
Internal Medicine Progress Note    Patient's name: Brayden Edwards  : 1945  Chief complaints (on day of admission): Atrial Fibrillation (HR 140s, hx of afib, takes eliquis ), Dizziness, and Shortness of Breath (94% 2L, wears all the time )  Admission date: 3/22/2022  Date of service: 3/24/2022   Room: [address] INTERNAL MEDICINE   Primary care physician: GIO Oden CNP  Reason for visit: Follow-up for a fib rvr     Subjective  Jorge Jewell was seen and examined at bedside     She is doing quite well today   She has no new complaints   We discussed her treatment plan and I answered all of her questions  Discussed with nursing staff no new issues      Current treatment plan discussed and all questions answered     Current medications being prescribed discussed and patient expresses verbal understanding     Review of Systems  There are no new complaints of chest pain, shortness of breath, abdominal pain, nausea, vomiting, diarrhea, constipation unless otherwise mentioned above.      Hospital Medications  Current Facility-Administered Medications   Medication Dose Route Frequency Provider Last Rate Last Admin    dofetilide (TIKOSYN) capsule 250 mcg  250 mcg Oral QAM Miguel Ángel Armas,         dofetilide (TIKOSYN) capsule 125 mcg  125 mcg Oral Nightly Rohitalylolly Armas, DO   125 mcg at 22    perflutren lipid microspheres (DEFINITY) injection 1.65 mg  1.5 mL IntraVENous ONCE PRN Miguel Ángel Armas,         [facility]etron [facility]) injection 4 mg  4 mg IntraVENous Q6H PRN Jimy Chadwick MD        trimethobenzamide Chevis Smoker) injection 200 mg  200 mg IntraMUSCular Q6H PRN Jimy Chadwick MD        sodium chloride flush 0.9 % injection 10 mL  10 mL IntraVENous PRN REJI Jackson        sodium chloride flush 0.9 % injection 10 mL  10 mL IntraVENous 2 times per day GIO Pantoja CNP   10 mL at 22    sodium chloride flush 0.9 % injection 10 mL  10 mL IntraVENous PRN GIO Pantoja - CNP        0.9 % sodium chloride infusion  25 mL IntraVENous PRN Dejanee Olsen, APRN - CNP        potassium chloride (KLOR-CON M) extended release tablet 40 mEq  40 mEq Oral PRN Melanee Olsen, APRN - CNP        Or    potassium bicarb-citric acid (EFFER-K) effervescent tablet 40 mEq  40 mEq Oral PRN Melanee Oslen, APRN - CNP        Or    potassium chloride 10 mEq/100 mL IVPB (Peripheral Line)  10 mEq IntraVENous PRN Dejanee Olsen, APRN - CNP        senna (SENOKOT) tablet 8.6 mg  1 tablet Oral Daily PRN Melanee Olsen, APRN - CNP        acetaminophen (TYLENOL) tablet 650 mg  650 mg Oral Q6H PRN Dejanee Olsen, APRN - CNP        Or    acetaminophen (TYLENOL) suppository 650 mg  650 mg Rectal Q6H PRN Dejanee Olsen, APRN - CNP        amLODIPine (NORVASC) tablet 2.5 mg  2.5 mg Oral Daily Dejanparas Olsen, APRN - CNP   2.5 mg at 03/23/22 0800    apixaban (ELIQUIS) tablet 5 mg  5 mg Oral BID Dejanparas Hutchinsoner, APRN - CNP   5 mg at 03/23/22 2112    atorvastatin (LIPITOR) tablet 20 mg  20 mg Oral Daily Dejanparas Hutchinsoner, APRN - CNP   20 mg at 03/23/22 2118    Vitamin D (CHOLECALCIFEROL) tablet 2,000 Units  2,000 Units Oral Daily Dejanee Olsen, APRN - CNP   2,000 Units at 03/23/22 0802    levothyroxine (SYNTHROID) tablet 150 mcg  150 mcg Oral Daily Dejanparas Olsen, APRN - CNP   150 mcg at 03/23/22 0631    therapeutic multivitamin-minerals 1 tablet  1 tablet Oral Daily Dejanee Olsen, APRN - CNP   1 tablet at 03/23/22 0801    pantoprazole (PROTONIX) tablet 40 mg  40 mg Oral Daily Dejanee Olsen, APRN - CNP   40 mg at 03/23/22 0801    sucralfate (CARAFATE) tablet 1 g  1 g Oral Daily Dejanee Olsen, APRN - CNP   1 g at 03/23/22 0801    valsartan (DIOVAN) tablet 160 mg  160 mg Oral Daily Dejanee Olsen, APRN - CNP   160 mg at 03/23/22 0800    venlafaxine (EFFEXOR XR) extended release capsule 150 mg  150 mg Oral Daily GIO oRsario CNP   150 mg at 03/23/22 0802    iron polysaccharides (NIFEREX) capsule 150 mg  150 mg Oral Daily GIO Brown - CNP   150 mg at 03/23/22 0801    ipratropium (ATROVENT) 0.02 % nebulizer solution 0.5 mg  0.5 mg Nebulization 4x daily GIO Brown - CNP   0.5 mg at 03/23/22 2006    Arformoterol Tartrate (BROVANA) nebulizer solution 15 mcg  15 mcg Nebulization BID GIO Brown - CNP   15 mcg at 03/23/22 2006       PRN Medications  perflutren lipid microspheres, ondansetron, trimethobenzamide, sodium chloride flush, sodium chloride flush, sodium chloride, potassium chloride **OR** potassium alternative oral replacement **OR** potassium chloride, senna, acetaminophen **OR** acetaminophen    Objective  Most Recent Recorded Vitals  BP 97/77   Pulse 68   Temp 98.1 °F (36.7 °C) (Oral)   Resp 18   Ht 5' 6\" (1.676 m)   Wt 200 lb (90.7 kg)   SpO2 96%   BMI 32.28 kg/m²   No intake/output data recorded. No intake/output data recorded. Physical Exam:  General: AAO to person/place/time/purpose, NAD, no labored breathing  Eyes: conjunctivae/corneas clear, sclera non icteric  Skin: color/texture/turgor normal, no rashes or lesions  Lungs: CTAB, no retractions/use of accessory muscles, no vocal fremitus, no rhonchi, no crackle, no rales  Heart: regular rate, regular rhythm, no murmur  Abdomen: soft, NT, bowel sounds normal  Extremities: atraumatic, no edema  Neurologic: cranial nerves 2-12 grossly intact, no slurred speech    Most Recent Labs  Lab Results   Component Value Date    WBC 6.5 03/24/2022    HGB 10.5 (L) 03/24/2022    HCT 32.9 (L) 03/24/2022     03/24/2022     03/24/2022    K 4.8 03/24/2022    CL 99 03/24/2022    CREATININE 1.0 03/24/2022    BUN 26 (H) 03/24/2022    CO2 27 03/24/2022    GLUCOSE 95 03/24/2022    ALT 12 03/24/2022    AST 16 03/24/2022    INR 1.4 08/27/2020    TSH 5.450 (H) 03/23/2022    LABA1C 4.9 04/23/2021       XR CHEST (2 VW)   Final Result   No pneumonia or pleural effusion.              Echocardiogram Assessment   Active Hospital Problems    Diagnosis     Atrial fibrillation with rapid ventricular response ([facility]) [I48.91]     CKD (chronic kidney disease) stage 3, GFR 30-59 ml/min ([facility]) [N18.30]     MCCLELLAND (dyspnea on exertion) [R06.00]     Bilateral carotid artery stenosis [I65.23]     Asthma [N36.600]     Diastolic dysfunction [T96.91]     CHF (congestive heart failure) ([facility]) [I50.9]     S/P carotid endarterectomy [Z98.890]     Essential hypertension [I10]     Mixed hyperlipidemia [E78.2]          Plan  · Atrial Fibrillation with RVR  ? Telemetry, continuous   ? TSH, Free T4   ? ECHO not done since 2019 defer repeat to cardiology   ? Viral PCR negative   ? Dr Lynnette Manzo on board   ? Continue Eliquis   ? She will need to be monitored on her dose adjusted Dofetilide likely over the weekend   · Fatigue   ? Likely related to above  ? Check B12, folate, thyroid function, vitamin d   ? Consider op MIKHAIL testing   · Asthma   ? Continue Brovana and Duoneb as needed   · Hypothyroidism   ? Check Levels   ? Continue home 150 mcg dose for now   · COPD   · PT/OT  · Consults-cardiology  · Home medications to be reconciled and confirmed prior to being ordered  · DVT prophylaxis   · Code Status   · Medications, labs and imaging reviewed   · Discharge plan: 48-72 hours     Electronically signed by New Argueta MD on 3/24/2022 at 6:10 AM    I can be reached through [facility].

## 2022-03-25 LAB
ALBUMIN SERPL-MCNC: 4.1 G/DL (ref 3.5–5.2)
ALP BLD-CCNC: 70 U/L (ref 35–104)
ALT SERPL-CCNC: 12 U/L (ref 0–32)
ANION GAP SERPL CALCULATED.3IONS-SCNC: 9 MMOL/L (ref 7–16)
AST SERPL-CCNC: 17 U/L (ref 0–31)
BASOPHILS ABSOLUTE: 0.03 E9/L (ref 0–0.2)
BASOPHILS RELATIVE PERCENT: 0.5 % (ref 0–2)
BILIRUB SERPL-MCNC: 0.7 MG/DL (ref 0–1.2)
BUN BLDV-MCNC: 27 MG/DL (ref 6–23)
CALCIUM SERPL-MCNC: 9.2 MG/DL (ref 8.6–10.2)
CHLORIDE BLD-SCNC: 97 MMOL/L (ref 98–107)
CO2: 28 MMOL/L (ref 22–29)
CREAT SERPL-MCNC: 1.2 MG/DL (ref 0.5–1)
EOSINOPHILS ABSOLUTE: 0.18 E9/L (ref 0.05–0.5)
EOSINOPHILS RELATIVE PERCENT: 2.8 % (ref 0–6)
GFR AFRICAN AMERICAN: 53
GFR NON-AFRICAN AMERICAN: 43 ML/MIN/1.73
GLUCOSE BLD-MCNC: 110 MG/DL (ref 74–99)
HCT VFR BLD CALC: 34.1 % (ref 34–48)
HEMOGLOBIN: 10.6 G/DL (ref 11.5–15.5)
IMMATURE GRANULOCYTES #: 0.03 E9/L
IMMATURE GRANULOCYTES %: 0.5 % (ref 0–5)
LYMPHOCYTES ABSOLUTE: 1.35 E9/L (ref 1.5–4)
LYMPHOCYTES RELATIVE PERCENT: 21.3 % (ref 20–42)
MCH RBC QN AUTO: 30.6 PG (ref 26–35)
MCHC RBC AUTO-ENTMCNC: 31.1 % (ref 32–34.5)
MCV RBC AUTO: 98.6 FL (ref 80–99.9)
MONOCYTES ABSOLUTE: 0.59 E9/L (ref 0.1–0.95)
MONOCYTES RELATIVE PERCENT: 9.3 % (ref 2–12)
NEUTROPHILS ABSOLUTE: 4.16 E9/L (ref 1.8–7.3)
NEUTROPHILS RELATIVE PERCENT: 65.6 % (ref 43–80)
PDW BLD-RTO: 13.2 FL (ref 11.5–15)
PLATELET # BLD: 181 E9/L (ref 130–450)
PMV BLD AUTO: 10.9 FL (ref 7–12)
POTASSIUM REFLEX MAGNESIUM: 4.6 MMOL/L (ref 3.5–5)
RBC # BLD: 3.46 E12/L (ref 3.5–5.5)
SODIUM BLD-SCNC: 134 MMOL/L (ref 132–146)
TOTAL PROTEIN: 6.9 G/DL (ref 6.4–8.3)
WBC # BLD: 6.3 E9/L (ref 4.5–11.5)

## 2022-03-25 PROCEDURE — 93005 ELECTROCARDIOGRAM TRACING: CPT | Performed by: INTERNAL MEDICINE

## 2022-03-25 PROCEDURE — 94640 AIRWAY INHALATION TREATMENT: CPT

## 2022-03-25 PROCEDURE — 80053 COMPREHEN METABOLIC PANEL: CPT

## 2022-03-25 PROCEDURE — 6360000002 HC RX W HCPCS: Performed by: NURSE PRACTITIONER

## 2022-03-25 PROCEDURE — 6370000000 HC RX 637 (ALT 250 FOR IP): Performed by: INTERNAL MEDICINE

## 2022-03-25 PROCEDURE — 6370000000 HC RX 637 (ALT 250 FOR IP): Performed by: NURSE PRACTITIONER

## 2022-03-25 PROCEDURE — 85025 COMPLETE CBC W/AUTO DIFF WBC: CPT

## 2022-03-25 PROCEDURE — 2580000003 HC RX 258: Performed by: NURSE PRACTITIONER

## 2022-03-25 PROCEDURE — 36415 COLL VENOUS BLD VENIPUNCTURE: CPT

## 2022-03-25 PROCEDURE — 2700000000 HC OXYGEN THERAPY PER DAY

## 2022-03-25 PROCEDURE — 2060000000 HC ICU INTERMEDIATE R&B

## 2022-03-25 RX ORDER — ARFORMOTEROL TARTRATE 15 UG/2ML
15 SOLUTION RESPIRATORY (INHALATION) 2 TIMES DAILY
Qty: 120 ML | Refills: 0 | Status: SHIPPED | OUTPATIENT
Start: 2022-03-25 | End: 2022-07-21

## 2022-03-25 RX ORDER — DOFETILIDE 0.25 MG/1
250 CAPSULE ORAL EVERY 12 HOURS SCHEDULED
Qty: 60 CAPSULE | Refills: 3 | Status: ON HOLD | OUTPATIENT
Start: 2022-03-25 | End: 2022-04-16 | Stop reason: HOSPADM

## 2022-03-25 RX ORDER — DOFETILIDE 0.25 MG/1
250 CAPSULE ORAL EVERY 12 HOURS SCHEDULED
Status: DISCONTINUED | OUTPATIENT
Start: 2022-03-25 | End: 2022-03-26 | Stop reason: HOSPADM

## 2022-03-25 RX ADMIN — IPRATROPIUM BROMIDE 0.5 MG: 0.5 SOLUTION RESPIRATORY (INHALATION) at 12:19

## 2022-03-25 RX ADMIN — Medication 10 ML: at 20:11

## 2022-03-25 RX ADMIN — PANTOPRAZOLE SODIUM 40 MG: 40 TABLET, DELAYED RELEASE ORAL at 07:59

## 2022-03-25 RX ADMIN — VENLAFAXINE HYDROCHLORIDE 150 MG: 150 CAPSULE, EXTENDED RELEASE ORAL at 07:59

## 2022-03-25 RX ADMIN — AMLODIPINE BESYLATE 2.5 MG: 5 TABLET ORAL at 07:58

## 2022-03-25 RX ADMIN — Medication 10 ML: at 07:57

## 2022-03-25 RX ADMIN — APIXABAN 5 MG: 5 TABLET, FILM COATED ORAL at 20:11

## 2022-03-25 RX ADMIN — DOFETILIDE 250 MCG: 0.25 CAPSULE ORAL at 07:58

## 2022-03-25 RX ADMIN — ARFORMOTEROL TARTRATE 15 MCG: 15 SOLUTION RESPIRATORY (INHALATION) at 07:48

## 2022-03-25 RX ADMIN — IPRATROPIUM BROMIDE 0.5 MG: 0.5 SOLUTION RESPIRATORY (INHALATION) at 20:48

## 2022-03-25 RX ADMIN — ATORVASTATIN CALCIUM 20 MG: 40 TABLET, FILM COATED ORAL at 20:11

## 2022-03-25 RX ADMIN — Medication 150 MG: at 07:59

## 2022-03-25 RX ADMIN — IPRATROPIUM BROMIDE 0.5 MG: 0.5 SOLUTION RESPIRATORY (INHALATION) at 16:41

## 2022-03-25 RX ADMIN — LEVOTHYROXINE SODIUM 150 MCG: 75 TABLET ORAL at 06:04

## 2022-03-25 RX ADMIN — ARFORMOTEROL TARTRATE 15 MCG: 15 SOLUTION RESPIRATORY (INHALATION) at 20:48

## 2022-03-25 RX ADMIN — IPRATROPIUM BROMIDE 0.5 MG: 0.5 SOLUTION RESPIRATORY (INHALATION) at 07:48

## 2022-03-25 RX ADMIN — DOFETILIDE 250 MCG: 0.25 CAPSULE ORAL at 20:11

## 2022-03-25 RX ADMIN — MULTIPLE VITAMINS W/ MINERALS TAB 1 TABLET: TAB at 08:00

## 2022-03-25 RX ADMIN — Medication 2000 UNITS: at 07:59

## 2022-03-25 RX ADMIN — SUCRALFATE 1 G: 1 TABLET ORAL at 07:59

## 2022-03-25 RX ADMIN — APIXABAN 5 MG: 5 TABLET, FILM COATED ORAL at 07:58

## 2022-03-25 RX ADMIN — VALSARTAN 160 MG: 160 TABLET, FILM COATED ORAL at 07:58

## 2022-03-25 ASSESSMENT — PAIN SCALES - GENERAL
PAINLEVEL_OUTOF10: 0

## 2022-03-25 NOTE — PROGRESS NOTES
Internal Medicine Progress Note    Patient's name: Carl Rao  : 1945  Chief complaints (on day of admission): Atrial Fibrillation (HR 140s, hx of afib, takes eliquis ), Dizziness, and Shortness of Breath (94% 2L, wears all the time )  Admission date: 3/22/2022  Date of service: 3/25/2022   Room: [address] INTERNAL MEDICINE   Primary care physician: GIO Bautista - CNP  Reason for visit: Follow-up for a fib rvr     Subjective  Debbie Steele was seen and examined at bedside     She is doing ok today   She became very emotional after we discussed her discharge home, which previously she had been anxious for, but now she states she is worried about having symptoms at home   Discussed with Dr Tri Reed she can stay one more night and discharge tomorrow     Current treatment plan discussed and all questions answered     Current medications being prescribed discussed and patient expresses verbal understanding     Review of Systems  There are no new complaints of chest pain, shortness of breath, abdominal pain, nausea, vomiting, diarrhea, constipation unless otherwise mentioned above.      Hospital Medications  Current Facility-Administered Medications   Medication Dose Route Frequency Provider Last Rate Last Admin    dofetilide (TIKOSYN) capsule 250 mcg  250 mcg Oral QAM Nieves Wythe, DO   250 mcg at 22 [id number]    dofetilide (TIKOSYN) capsule 125 mcg  125 mcg Oral Nightly Nieves Wythe, DO   125 mcg at 22    perflutren lipid microspheres (DEFINITY) injection 1.65 mg  1.5 mL IntraVENous ONCE PRN Nieves Wythe, DO        ondansetron [facility]) injection 4 mg  4 mg IntraVENous Q6H PRN Donya Wilhelm MD        trimethobenzamide Pipe Lynn) injection 200 mg  200 mg IntraMUSCular Q6H PRN Donya Wilhelm MD        sodium chloride flush 0.9 % injection 10 mL  10 mL IntraVENous PRN REJI Dupree        sodium chloride flush 0.9 % injection 10 mL  10 mL IntraVENous 2 times per day GIO Lopez - CNP   10 mL at 03/24/22 2107    sodium chloride flush 0.9 % injection 10 mL  10 mL IntraVENous PRN Sharmin Manners, APRN - CNP        0.9 % sodium chloride infusion  25 mL IntraVENous PRN Sharmin Manners, APRN - CNP        potassium chloride (KLOR-CON M) extended release tablet 40 mEq  40 mEq Oral PRN Sharmin Manners, APRN - CNP        Or    potassium bicarb-citric acid (EFFER-K) effervescent tablet 40 mEq  40 mEq Oral PRN Sharmin Manners, APRN - CNP        Or    potassium chloride 10 mEq/100 mL IVPB (Peripheral Line)  10 mEq IntraVENous PRN Sharmin Manners, APRN - CNP        senna (SENOKOT) tablet 8.6 mg  1 tablet Oral Daily PRN Sharmin Manners, APRN - CNP        acetaminophen (TYLENOL) tablet 650 mg  650 mg Oral Q6H PRN Sharmin Manners, APRN - CNP   650 mg at 03/24/22 2245    Or    acetaminophen (TYLENOL) suppository 650 mg  650 mg Rectal Q6H PRN Sharmin Manners, APRN - CNP        amLODIPine (NORVASC) tablet 2.5 mg  2.5 mg Oral Daily Sharmin Manners, APRN - CNP   2.5 mg at 03/24/22 0815    apixaban (ELIQUIS) tablet 5 mg  5 mg Oral BID Sharmin Manners, APRN - CNP   5 mg at 03/24/22 2106    atorvastatin (LIPITOR) tablet 20 mg  20 mg Oral Daily Sharmin Manners, APRN - CNP   20 mg at 03/24/22 2106    Vitamin D (CHOLECALCIFEROL) tablet 2,000 Units  2,000 Units Oral Daily Sharmin Manners, APRN - CNP   2,000 Units at 03/24/22 0816    levothyroxine (SYNTHROID) tablet 150 mcg  150 mcg Oral Daily Sharmin Manners, APRN - CNP   150 mcg at 03/25/22 0604    therapeutic multivitamin-minerals 1 tablet  1 tablet Oral Daily Sharmin Manners, APRN - CNP   1 tablet at 03/24/22 0816    pantoprazole (PROTONIX) tablet 40 mg  40 mg Oral Daily Sharmin Manners, APRN - CNP   40 mg at 03/24/22 0816    sucralfate (CARAFATE) tablet 1 g  1 g Oral Daily Sharmin Manners, APRN - CNP   1 g at 03/24/22 0815    valsartan (DIOVAN) tablet 160 mg  160 mg Oral Daily GIO Lowry - CNP   160 mg at 03/24/22 0814    venlafaxine (EFFEXOR XR) extended release capsule 150 mg  150 mg Oral Daily Lindy Lutz APRN - CNP   150 mg at 03/24/22 0815    iron polysaccharides (NIFEREX) capsule 150 mg  150 mg Oral Daily Lindy Lutz APRN - CNP   150 mg at 03/24/22 0816    ipratropium (ATROVENT) 0.02 % nebulizer solution 0.5 mg  0.5 mg Nebulization 4x daily Lindy Lutz APRN - CNP   0.5 mg at 03/24/22 1941    Arformoterol Tartrate (BROVANA) nebulizer solution 15 mcg  15 mcg Nebulization BID Lindy Lutz APRN - CNP   15 mcg at 03/24/22 1941       PRN Medications  perflutren lipid microspheres, ondansetron, trimethobenzamide, sodium chloride flush, sodium chloride flush, sodium chloride, potassium chloride **OR** potassium alternative oral replacement **OR** potassium chloride, senna, acetaminophen **OR** acetaminophen    Objective  Most Recent Recorded Vitals  BP (!) 108/58   Pulse 123   Temp 98.2 °F (36.8 °C) (Oral)   Resp 18   Ht 5' 6\" (1.676 m)   Wt 200 lb (90.7 kg)   SpO2 95%   BMI 32.28 kg/m²   I/O last 3 completed shifts: In: 240 [P.O.:240]  Out: -   No intake/output data recorded.     Physical Exam:  General: AAO to person/place/time/purpose, NAD, no labored breathing  Eyes: conjunctivae/corneas clear, sclera non icteric  Skin: color/texture/turgor normal, no rashes or lesions  Lungs: CTAB, no retractions/use of accessory muscles, no vocal fremitus, no rhonchi, no crackle, no rales  Heart: regular rate, regular rhythm, no murmur  Abdomen: soft, NT, bowel sounds normal  Extremities: atraumatic, no edema  Neurologic: cranial nerves 2-12 grossly intact, no slurred speech    Most Recent Labs  Lab Results   Component Value Date    WBC 6.3 03/25/2022    HGB 10.6 (L) 03/25/2022    HCT 34.1 03/25/2022     03/25/2022     03/25/2022    K 4.6 03/25/2022    CL 97 (L) 03/25/2022    CREATININE 1.2 (H) 03/25/2022    BUN 27 (H) 03/25/2022    CO2 28 03/25/2022    GLUCOSE 110 (H) 03/25/2022    ALT 12 03/25/2022    AST 17 03/25/2022    INR 1.4 08/27/2020    TSH 5.450 (H) 03/23/2022    LABA1C 4.9 04/23/2021       XR CHEST (2 VW)   Final Result   No pneumonia or pleural effusion. Echocardiogram       Assessment   Active Hospital Problems    Diagnosis     Atrial fibrillation with rapid ventricular response (HCC) [I48.91]     CKD (chronic kidney disease) stage 3, GFR 30-59 ml/min (HCC) [N18.30]     MCCLELLAND (dyspnea on exertion) [R06.00]     Bilateral carotid artery stenosis [I65.23]     Asthma [S36.759]     Diastolic dysfunction [Q72.36]     CHF [facility]congestive heart failure) ([facility] [address] [I50.9]     S/P carotid endarterectomy [Z98.890]     Essential hypertension [I10]     Mixed hyperlipidemia [E78.2]          Plan  · Atrial Fibrillation with RVR  ? Telemetry, continuous   ? TSH, Free T4   ? ECHO not done since 2019 defer repeat to cardiology   ? Viral PCR negative   ? Dr Lobo Cardoso on board   ? Continue Eliquis   ? She will need to be monitored on her dose adjusted Dofetilide likely over the weekend   ? Dofetilide 250mg PO BID at home now   · Fatigue   ? Likely related to above  ? Check B12, folate, thyroid function, vitamin d   ? Consider op MIKHAIL testing   · Asthma   ? Continue Brovana and Duoneb as needed   · Hypothyroidism   ? Check Levels   ? Continue home 150 mcg dose for now   · COPD   · PT/OT  · Consults-cardiology  · Home medications to be reconciled and confirmed prior to being ordered  · DVT prophylaxis   · Code Status   · Medications, labs and imaging reviewed   · Discharge plan: Cleared for DC, she was anxious today about going home, told her she can stay one more night and be discharged home tomorrow     Electronically signed by Dub Hodgkin, MD on 3/25/2022 at 7:48 AM    I can be reached through [facility].

## 2022-03-25 NOTE — PROGRESS NOTES
No discharge today per Dr. Renny Ellis and Dr. Jose Ramires,   Patient very anxious and tearful.  notified to see if patient has any home needs.

## 2022-03-25 NOTE — PROGRESS NOTES
Notified Dr. Cait Stewart regarding patient converting back to afib rvr. No new orders. Will continue to monitor.

## 2022-03-25 NOTE — CARE COORDINATION
Requiring O2 2.5 lNC - pt's home O2 baseline is 3lNC provided by [id number]. Plan remains to return home w/  on discharge- anticipating discharge tomorrow. On Eliquis PTA. Spoke w/ patient- states her  found her home nebulizer-declining HHC- no needs. Will follow  Nhung Arreaga RN case manager    4261: Received call from pt's - he did not find pt's nebulizer - found a few breathing attachments only.  Informed nebulizer out of pocket cost at [facility] is approximately $40- patient is agreeable to cost- [facility] is open today until 5 pm and tomorrow 3/26 from [id number] given signed script to take to [facility] to obtain nebulizer- nursing updated Nhung Arreaga RN case manager

## 2022-03-25 NOTE — PROGRESS NOTES
PROGRESS NOTE       PATIENT PROBLEM LIST:  Principal Problem:    Atrial fibrillation with rapid ventricular response ([facility])  Active Problems:    Essential hypertension    Mixed hyperlipidemia    S/P carotid endarterectomy    CHF (congestive heart failure) ([facility])    Diastolic dysfunction    Asthma    Bilateral carotid artery stenosis    MCCLELLAND (dyspnea on exertion)    CKD (chronic kidney disease) stage 3, GFR 30-59 ml/min ([facility])  Resolved Problems:    * No resolved hospital problems. *      SUBJECTIVE:  Urban Brush states she feels about the same and has been ambulating in the hallway without significant shortness of breath nor lightheadedness. Unfortunately she reverted back to atrial fibrillation but with a controlled response. REVIEW OF SYSTEMS:  General ROS: negative for - fatigue, malaise,  weight gain or weight loss  Psychological ROS: negative for - anxiety , depression  Ophthalmic ROS: positive for - decreased vision and utilizes corrective lenses for visual acuity. ENT ROS: negative  Allergy and Immunology ROS: negative  Hematological and Lymphatic ROS: negative  Endocrine: no heat or cold intolerance and no polyphagia, polydipsia, or polyuria  Respiratory ROS: positive for - shortness of breath  Cardiovascular ROS: positive for - dyspnea on exertion, irregular heartbeat, palpitations and shortness of breath. Gastrointestinal ROS: no abdominal pain, change in bowel habits, or black or bloody stools  Genito-Urinary ROS: no nocturia, dysuria, trouble voiding, frequency or hematuria  Musculoskeletal ROS: negative for- myalgias, arthralgias, or claudication  Neurological ROS: no TIA or stroke symptoms otherwise no significant change in symptoms or problems since yesterday as documented in previous progress notes.     SCHEDULED MEDICATIONS:   dofetilide  250 mcg Oral 2 times per day    sodium chloride flush  10 mL IntraVENous 2 times per day    amLODIPine  2.5 mg Oral Daily    apixaban  5 mg Oral BID    atorvastatin  20 mg Oral Daily    Vitamin D  2,000 Units Oral Daily    levothyroxine  150 mcg Oral Daily    therapeutic multivitamin-minerals  1 tablet Oral Daily    pantoprazole  40 mg Oral Daily    sucralfate  1 g Oral Daily    valsartan  160 mg Oral Daily    venlafaxine  150 mg Oral Daily    iron polysaccharides  150 mg Oral Daily    ipratropium  0.5 mg Nebulization 4x daily    Arformoterol Tartrate  15 mcg Nebulization BID       VITAL SIGNS:                                                                                                                          BP (!) 113/97   Pulse 63   Temp 98 °F (36.7 °C) (Axillary)   Resp 18   Ht 5' 6\" (1.676 m)   Wt 200 lb (90.7 kg)   SpO2 100%   BMI 32.28 kg/m²   Patient Vitals for the past 96 hrs (Last 3 readings):   Weight   03/22/22 1757 200 lb (90.7 kg)     OBJECTIVE:    HEENT: PERRL, EOM  Intact; sclera non-icteric, conjunctiva pink. Carotids are brisk in upstroke with normal contour. No carotid bruits. Normal jugular venous pulsation at 45°. No palpable cervical nor supraclavicular nodes. Thyroid not palpable. Trachea midline. Chest: Even excursion  Lungs: Grossly clear to auscultation bilaterally, no expiratory wheezes or rhonchi, no decreased tactile fremitus without inspiratory rales. Heart: Irregular, irregular rhythm; S1 > S2, no gallop grade 2/6 early systolic murmur second right and left intercostal space. No clicks, rub, palpable thrills   or heaves. PMI nondisplaced, 5th intercostal space MCL. Abdomen: Soft, nontender, nondistended,  moderately protuberant, no masses or organomegaly. Bowel sounds active. Extremities: Without clubbing, cyanosis 1-2+ bilateral lower pretibial edema. Pulses present 3+ upper extermities bilaterally; present 1+ DP and present 1+ PT bilaterally.      Data:   Scheduled Meds: Reviewed  Continuous Infusions:    sodium chloride       No intake or output data in the 24 hours ending 03/25/22 1055  CBC:   Recent Labs 03/22/22  1859 03/23/22  0820 03/24/22  0425 03/25/22  0305   WBC 6.2 6.0 6.5 6.3   HGB 11.5 10.3* 10.5* 10.6*   HCT 36.3 33.4* 32.9* 34.1    180 175 181     BMP:  Recent Labs     03/22/22  1859 03/22/22  1859 03/23/22  0820 03/23/22  0820 03/24/22  0425 03/24/22  0425 03/25/22  0305     --  135  --  137  --  134   K 4.7  --  4.7  --  4.8  --  4.6   CL 98  --  100  --  99  --  97*   CO2 26  --  28  --  27  --  28   BUN 23  --  23  --  26*  --  27*   CREATININE 1.0  --  0.9  --  1.0  --  1.2*   LABGLOM 54   < > >60   < > 54   < > 43    < > = values in this interval not displayed. ABGs:   Lab Results   Component Value Date    PH 7.371 09/19/2019    PO2 81.4 09/19/2019    PCO2 58.4 09/19/2019     INR: No results for input(s): INR in the last 72 hours. PRO-BNP:   Lab Results   Component Value Date    PROBNP 869 (H) 03/22/2022    PROBNP 374 08/28/2020      TSH:   Lab Results   Component Value Date    TSH 5.450 (H) 03/23/2022      Cardiac Injury Profile:   Recent Labs     03/22/22 1859 03/22/22  2011   TROPHS 18* 17*      Lipid Profile:   Lab Results   Component Value Date    TRIG 224 10/19/2021    HDL 77 10/19/2021    LDLCALC 65 10/19/2021    CHOL 187 10/19/2021      Hemoglobin A1C: No components found for: HGBA1C      RAD:   XR CHEST (2 VW)    Result Date: 3/22/2022  EXAMINATION: TWO XRAY VIEWS OF THE CHEST 3/22/2022 7:26 pm COMPARISON: August 27, 2020 HISTORY: ORDERING SYSTEM PROVIDED HISTORY: chf TECHNOLOGIST PROVIDED HISTORY: Reason for exam:->chf FINDINGS: No airspace opacity or pleural effusion. The heart is normal size. No pneumothorax. No free air beneath the hemidiaphragms. No pneumonia or pleural effusion.          EKG: See Report  Echo: See Report      IMPRESSIONS:  Principal Problem:    Atrial fibrillation with rapid ventricular response (HCC)  Active Problems:    Essential hypertension    Mixed hyperlipidemia    S/P carotid endarterectomy    CHF (congestive heart failure) ([address] Diastolic dysfunction    Asthma    Bilateral carotid artery stenosis    MCCLELLAND (dyspnea on exertion)    CKD (chronic kidney disease) stage 3, GFR 30-59 ml/min ([facility])  Resolved Problems:    * No resolved hospital problems. *      RECOMMENDATIONS:al.  Continue present dosage of dofetilide 250 mcg every 12 hours and discharge to be followed up on an outpatient basis with both BESSIE Alvarez and I will see her again as well. She is to maintain her other medications. I have spent more than 25 minutes face to face with Maria Isabel Officer and reviewing notes and laboratory data, with greater than 50% of this time instructing and counseling the patient face to face regarding my findings and recommendations and I have answered all questions as posed to me by Ms. Flynn. Gael Hammer,  FACP,FACC,FSCAI      NOTE:  This report was transcribed using voice recognition software.   Every effort was made to ensure accuracy; however, inadvertent computerized transcription errors may be present

## 2022-03-26 VITALS
HEIGHT: 66 IN | WEIGHT: 200 LBS | RESPIRATION RATE: 16 BRPM | TEMPERATURE: 97.3 F | OXYGEN SATURATION: 97 % | BODY MASS INDEX: 32.14 KG/M2 | DIASTOLIC BLOOD PRESSURE: 60 MMHG | HEART RATE: 65 BPM | SYSTOLIC BLOOD PRESSURE: 136 MMHG

## 2022-03-26 LAB
ALBUMIN SERPL-MCNC: 4.1 G/DL (ref 3.5–5.2)
ALP BLD-CCNC: 69 U/L (ref 35–104)
ALT SERPL-CCNC: 12 U/L (ref 0–32)
ANION GAP SERPL CALCULATED.3IONS-SCNC: 7 MMOL/L (ref 7–16)
AST SERPL-CCNC: 17 U/L (ref 0–31)
BASOPHILS ABSOLUTE: 0.04 E9/L (ref 0–0.2)
BASOPHILS RELATIVE PERCENT: 0.6 % (ref 0–2)
BILIRUB SERPL-MCNC: 0.7 MG/DL (ref 0–1.2)
BUN BLDV-MCNC: 33 MG/DL (ref 6–23)
CALCIUM SERPL-MCNC: 9.3 MG/DL (ref 8.6–10.2)
CHLORIDE BLD-SCNC: 100 MMOL/L (ref 98–107)
CO2: 29 MMOL/L (ref 22–29)
CREAT SERPL-MCNC: 1.2 MG/DL (ref 0.5–1)
EKG ATRIAL RATE: 62 BPM
EKG P AXIS: 131 DEGREES
EKG P-R INTERVAL: 130 MS
EKG Q-T INTERVAL: 420 MS
EKG QRS DURATION: 76 MS
EKG QTC CALCULATION (BAZETT): 426 MS
EKG R AXIS: 150 DEGREES
EKG T AXIS: 109 DEGREES
EKG VENTRICULAR RATE: 62 BPM
EOSINOPHILS ABSOLUTE: 0.23 E9/L (ref 0.05–0.5)
EOSINOPHILS RELATIVE PERCENT: 3.4 % (ref 0–6)
GFR AFRICAN AMERICAN: 53
GFR NON-AFRICAN AMERICAN: 43 ML/MIN/1.73
GLUCOSE BLD-MCNC: 103 MG/DL (ref 74–99)
HCT VFR BLD CALC: 32 % (ref 34–48)
HEMOGLOBIN: 10.2 G/DL (ref 11.5–15.5)
IMMATURE GRANULOCYTES #: 0.02 E9/L
IMMATURE GRANULOCYTES %: 0.3 % (ref 0–5)
LYMPHOCYTES ABSOLUTE: 1.39 E9/L (ref 1.5–4)
LYMPHOCYTES RELATIVE PERCENT: 20.4 % (ref 20–42)
MCH RBC QN AUTO: 31.2 PG (ref 26–35)
MCHC RBC AUTO-ENTMCNC: 31.9 % (ref 32–34.5)
MCV RBC AUTO: 97.9 FL (ref 80–99.9)
MONOCYTES ABSOLUTE: 0.67 E9/L (ref 0.1–0.95)
MONOCYTES RELATIVE PERCENT: 9.8 % (ref 2–12)
NEUTROPHILS ABSOLUTE: 4.46 E9/L (ref 1.8–7.3)
NEUTROPHILS RELATIVE PERCENT: 65.5 % (ref 43–80)
PDW BLD-RTO: 13.2 FL (ref 11.5–15)
PLATELET # BLD: 155 E9/L (ref 130–450)
PMV BLD AUTO: 10.4 FL (ref 7–12)
POTASSIUM REFLEX MAGNESIUM: 5.4 MMOL/L (ref 3.5–5)
RBC # BLD: 3.27 E12/L (ref 3.5–5.5)
SODIUM BLD-SCNC: 136 MMOL/L (ref 132–146)
TOTAL PROTEIN: 6.6 G/DL (ref 6.4–8.3)
WBC # BLD: 6.8 E9/L (ref 4.5–11.5)

## 2022-03-26 PROCEDURE — 94640 AIRWAY INHALATION TREATMENT: CPT

## 2022-03-26 PROCEDURE — 6370000000 HC RX 637 (ALT 250 FOR IP): Performed by: INTERNAL MEDICINE

## 2022-03-26 PROCEDURE — 6370000000 HC RX 637 (ALT 250 FOR IP): Performed by: NURSE PRACTITIONER

## 2022-03-26 PROCEDURE — 2700000000 HC OXYGEN THERAPY PER DAY

## 2022-03-26 PROCEDURE — 36415 COLL VENOUS BLD VENIPUNCTURE: CPT

## 2022-03-26 PROCEDURE — 80053 COMPREHEN METABOLIC PANEL: CPT

## 2022-03-26 PROCEDURE — 6360000002 HC RX W HCPCS: Performed by: NURSE PRACTITIONER

## 2022-03-26 PROCEDURE — 85025 COMPLETE CBC W/AUTO DIFF WBC: CPT

## 2022-03-26 PROCEDURE — 2580000003 HC RX 258: Performed by: NURSE PRACTITIONER

## 2022-03-26 RX ADMIN — IPRATROPIUM BROMIDE 0.5 MG: 0.5 SOLUTION RESPIRATORY (INHALATION) at 12:08

## 2022-03-26 RX ADMIN — IPRATROPIUM BROMIDE 0.5 MG: 0.5 SOLUTION RESPIRATORY (INHALATION) at 08:52

## 2022-03-26 RX ADMIN — VENLAFAXINE HYDROCHLORIDE 150 MG: 150 CAPSULE, EXTENDED RELEASE ORAL at 09:08

## 2022-03-26 RX ADMIN — MULTIPLE VITAMINS W/ MINERALS TAB 1 TABLET: TAB at 09:08

## 2022-03-26 RX ADMIN — Medication 150 MG: at 09:08

## 2022-03-26 RX ADMIN — Medication 2000 UNITS: at 09:08

## 2022-03-26 RX ADMIN — DOFETILIDE 250 MCG: 0.25 CAPSULE ORAL at 09:08

## 2022-03-26 RX ADMIN — ARFORMOTEROL TARTRATE 15 MCG: 15 SOLUTION RESPIRATORY (INHALATION) at 08:51

## 2022-03-26 RX ADMIN — APIXABAN 5 MG: 5 TABLET, FILM COATED ORAL at 09:08

## 2022-03-26 RX ADMIN — AMLODIPINE BESYLATE 2.5 MG: 5 TABLET ORAL at 09:08

## 2022-03-26 RX ADMIN — PANTOPRAZOLE SODIUM 40 MG: 40 TABLET, DELAYED RELEASE ORAL at 09:08

## 2022-03-26 RX ADMIN — SUCRALFATE 1 G: 1 TABLET ORAL at 09:08

## 2022-03-26 RX ADMIN — VALSARTAN 160 MG: 160 TABLET, FILM COATED ORAL at 09:08

## 2022-03-26 RX ADMIN — Medication 10 ML: at 09:08

## 2022-03-26 RX ADMIN — LEVOTHYROXINE SODIUM 150 MCG: 75 TABLET ORAL at 06:21

## 2022-03-26 ASSESSMENT — PAIN SCALES - GENERAL
PAINLEVEL_OUTOF10: 0
PAINLEVEL_OUTOF10: 0

## 2022-03-26 NOTE — PROGRESS NOTES
paged regarding tikosyn administration due to potassium 5.4 this morning, OK to give tikosyn this AM.

## 2022-03-26 NOTE — PROGRESS NOTES
PROGRESS NOTE       PATIENT PROBLEM LIST:  Principal Problem:    Atrial fibrillation with rapid ventricular response ([facility])  Active Problems:    Essential hypertension    Mixed hyperlipidemia    S/P carotid endarterectomy    CHF (congestive heart failure) ([facility])    Diastolic dysfunction    Asthma    Bilateral carotid artery stenosis    MCCLELLAND (dyspnea on exertion)    CKD (chronic kidney disease) stage 3, GFR 30-59 ml/min ([facility])  Resolved Problems:    * No resolved hospital problems. *      SUBJECTIVE:  Kaylah Montes states she feels about the same and has been ambulating in the hallway without significant shortness of breath nor lightheadedness. Unfortunately she reverted back to atrial fibrillation but with a controlled response. REVIEW OF SYSTEMS:  General ROS: negative for - fatigue, malaise,  weight gain or weight loss  Psychological ROS: positive for - anxiety. Ophthalmic ROS: positive for - decreased vision and utilizes corrective lenses for visual acuity. ENT ROS: negative  Allergy and Immunology ROS: negative  Hematological and Lymphatic ROS: negative  Endocrine: no heat or cold intolerance and no polyphagia, polydipsia, or polyuria  Respiratory ROS: positive for - shortness of breath  Cardiovascular ROS: positive for - dyspnea on exertion, irregular heartbeat, palpitations and shortness of breath. Gastrointestinal ROS: no abdominal pain, change in bowel habits, or black or bloody stools  Genito-Urinary ROS: no nocturia, dysuria, trouble voiding, frequency or hematuria  Musculoskeletal ROS: negative for- myalgias, arthralgias, or claudication  Neurological ROS: no TIA or stroke symptoms otherwise no significant change in symptoms or problems since yesterday as documented in previous progress notes.     SCHEDULED MEDICATIONS:   dofetilide  250 mcg Oral 2 times per day    sodium chloride flush  10 mL IntraVENous 2 times per day    amLODIPine  2.5 mg Oral Daily    apixaban  5 mg Oral BID    atorvastatin 20 mg Oral Daily    Vitamin D  2,000 Units Oral Daily    levothyroxine  150 mcg Oral Daily    therapeutic multivitamin-minerals  1 tablet Oral Daily    pantoprazole  40 mg Oral Daily    sucralfate  1 g Oral Daily    valsartan  160 mg Oral Daily    venlafaxine  150 mg Oral Daily    iron polysaccharides  150 mg Oral Daily    ipratropium  0.5 mg Nebulization 4x daily    Arformoterol Tartrate  15 mcg Nebulization BID       VITAL SIGNS:                                                                                                                          /60   Pulse 65   Temp 97.3 °F (36.3 °C) (Oral)   Resp 16   Ht 5' 6\" (1.676 m)   Wt 200 lb (90.7 kg)   SpO2 97%   BMI 32.28 kg/m²   Patient Vitals for the past 96 hrs (Last 3 readings):   Weight   03/22/22 1757 200 lb (90.7 kg)     OBJECTIVE:    HEENT: PERRL, EOM  Intact; sclera non-icteric, conjunctiva pink. Carotids are brisk in upstroke with normal contour. No carotid bruits. Normal jugular venous pulsation at 45°. No palpable cervical nor supraclavicular nodes. Thyroid not palpable. Trachea midline. Chest: Even excursion  Lungs: Grossly clear to auscultation bilaterally, no expiratory wheezes or rhonchi, no decreased tactile fremitus without inspiratory rales. Heart: Irregular, irregular rhythm; S1 > S2, no gallop grade 2/6 early systolic murmur second right and left intercostal space. No clicks, rub, palpable thrills   or heaves. PMI nondisplaced, 5th intercostal space MCL. Abdomen: Soft, nontender, nondistended,  moderately protuberant, no masses or organomegaly. Bowel sounds active. Extremities: Without clubbing, cyanosis 1-2+ bilateral lower pretibial edema. Pulses present 3+ upper extermities bilaterally; present 1+ DP and present 1+ PT bilaterally.      Data:   Scheduled Meds: Reviewed  Continuous Infusions:    sodium chloride         Intake/Output Summary (Last 24 hours) at 3/26/2022 1423  Last data filed at 3/26/2022 5056  Gross per 24 hour   Intake 290 ml   Output --   Net 290 ml     CBC:   Recent Labs     03/24/22 0425 03/25/22 0305 03/26/22  0245   WBC 6.5 6.3 6.8   HGB 10.5* 10.6* 10.2*   HCT 32.9* 34.1 32.0*    181 155     BMP:  Recent Labs     03/24/22 0425 03/24/22 0425 03/25/22 0305 03/25/22 0305 03/26/22  0245     --  134  --  136   K 4.8  --  4.6  --  5.4*   CL 99  --  97*  --  100   CO2 27  --  28  --  29   BUN 26*  --  27*  --  33*   CREATININE 1.0  --  1.2*  --  1.2*   LABGLOM 54   < > 43   < > 43    < > = values in this interval not displayed. ABGs:   Lab Results   Component Value Date    PH 7.371 09/19/2019    PO2 81.4 09/19/2019    PCO2 58.4 09/19/2019     INR: No results for input(s): INR in the last 72 hours. PRO-BNP:   Lab Results   Component Value Date    PROBNP 869 (H) 03/22/2022    PROBNP 374 08/28/2020      TSH:   Lab Results   Component Value Date    TSH 5.450 (H) 03/23/2022      Cardiac Injury Profile:   No results for input(s): TROPHS in the last 72 hours. Lipid Profile:   Lab Results   Component Value Date    TRIG 224 10/19/2021    HDL 77 10/19/2021    LDLCALC 65 10/19/2021    CHOL 187 10/19/2021      Hemoglobin A1C: No components found for: HGBA1C      RAD:   XR CHEST (2 VW)    Result Date: 3/22/2022  EXAMINATION: TWO XRAY VIEWS OF THE CHEST 3/22/2022 7:26 pm COMPARISON: August 27, 2020 HISTORY: ORDERING SYSTEM PROVIDED HISTORY: chf TECHNOLOGIST PROVIDED HISTORY: Reason for exam:->chf FINDINGS: No airspace opacity or pleural effusion. The heart is normal size. No pneumothorax. No free air beneath the hemidiaphragms. No pneumonia or pleural effusion.          EKG: See Report  Echo: See Report      IMPRESSIONS:  Principal Problem:    Atrial fibrillation with rapid ventricular response (HCC)  Active Problems:    Essential hypertension    Mixed hyperlipidemia    S/P carotid endarterectomy    CHF (congestive heart failure) (HCC)    Diastolic dysfunction    Asthma    Bilateral carotid artery stenosis    MCCLELLAND (dyspnea on exertion)    CKD (chronic kidney disease) stage 3, GFR 30-59 ml/min ([facility])  Resolved Problems:    * No resolved hospital problems. *      RECOMMENDATIONS:al.  Continue present dosage of dofetilide 250 mcg every 12 hours and discharge to be followed up on an outpatient basis with both BESSIE Angel and I will see her again as well. She is to maintain her other medications. She is also to have her serum electrolytes reassessed this week through [facility] office. We also discussed the potential for pulmonary vein isolation ablation which he will read up upon and consider. I have spent more than 25 minutes face to face with Anne Enrrique and reviewing notes and laboratory data, with greater than 50% of this time instructing and counseling the patient face to face regarding my findings and recommendations and I have answered all questions as posed to me by Ms. Flynn. Nemo Oneill, DO FACP,FACC,FSCAI      NOTE:  This report was transcribed using voice recognition software.   Every effort was made to ensure accuracy; however, inadvertent computerized transcription errors may be present

## 2022-03-26 NOTE — PROGRESS NOTES
Internal Medicine Progress Note    Patient's name: Sherly Vitale  : 1945  Chief complaints (on day of admission): Atrial Fibrillation (HR 140s, hx of afib, takes eliquis ), Dizziness, and Shortness of Breath (94% 2L, wears all the time )  Admission date: 3/22/2022  Date of service: 3/26/2022   Room: [address] INTERNAL MEDICINE   Primary care physician: GIO Sevilla CNP  Reason for visit: Follow-up for a fib rvr     Subjective  Eugjennifer Negron was seen and examined at bedside. She is actually sitting on couch in her room putting her shoes on, she is dressed. Feeling less emotional today in regards to discharge, feeling ready to go home. On 2L O2 via NC. Denies feeling SOB at all. Potassium this AM is noted to be 5.4, slight elevation in creatinine as well. She flipped back into afib yesterday but is back in NSR today. Review of Systems  There are no new complaints of chest pain, shortness of breath, abdominal pain, nausea, vomiting, diarrhea, constipation unless otherwise mentioned above.      Hospital Medications  Current Facility-Administered Medications   Medication Dose Route Frequency Provider Last Rate Last Admin    dofetilide (TIKOSYN) capsule 250 mcg  250 mcg Oral 2 times per day Norman Hicks DO   250 mcg at 22 0908    perflutren lipid microspheres (DEFINITY) injection 1.65 mg  1.5 mL IntraVENous ONCE PRN Norman Hicks DO        trimethobenzamide Dione Black) injection 200 mg  200 mg IntraMUSCular Q6H PRN Ina Stein MD        sodium chloride flush 0.9 % injection 10 mL  10 mL IntraVENous PRN REJI Dorado        sodium chloride flush 0.9 % injection 10 mL  10 mL IntraVENous 2 times per day GIO Sol CNP   10 mL at 22 0908    sodium chloride flush 0.9 % injection 10 mL  10 mL IntraVENous PRN GIO Sol CNP        0.9 % sodium chloride infusion  25 mL IntraVENous PRN GIO Sol CNP        potassium chloride (KLOR-CON M) extended release tablet 40 mEq  40 mEq Oral PRN Edson Paradise, APRN - CNP        Or    potassium bicarb-citric acid (EFFER-K) effervescent tablet 40 mEq  40 mEq Oral PRN Edson Paradise, APRN - CNP        Or    potassium chloride 10 mEq/100 mL IVPB (Peripheral Line)  10 mEq IntraVENous PRN Edson Paradise, APRN - CNP        senna (SENOKOT) tablet 8.6 mg  1 tablet Oral Daily PRN Edson Paradise, APRN - CNP        acetaminophen (TYLENOL) tablet 650 mg  650 mg Oral Q6H PRN Edson Paradise, APRN - CNP   650 mg at 03/24/22 2245    Or    acetaminophen (TYLENOL) suppository 650 mg  650 mg Rectal Q6H PRN Edson Paradise, APRN - CNP        amLODIPine (NORVASC) tablet 2.5 mg  2.5 mg Oral Daily Edson Paradise, APRN - CNP   2.5 mg at 03/26/22 0908    apixaban (ELIQUIS) tablet 5 mg  5 mg Oral BID Edsonon Paradthelma, APRN - CNP   5 mg at 03/26/22 0908    atorvastatin (LIPITOR) tablet 20 mg  20 mg Oral Daily Edsonon Paradise, APRN - CNP   20 mg at 03/25/22 2011    Vitamin D (CHOLECALCIFEROL) tablet 2,000 Units  2,000 Units Oral Daily Edsonon Paradise, APRN - CNP   2,000 Units at 03/26/22 0908    levothyroxine (SYNTHROID) tablet 150 mcg  150 mcg Oral Daily Edsonon Paradise, APRN - CNP   150 mcg at 03/26/22 0547    therapeutic multivitamin-minerals 1 tablet  1 tablet Oral Daily Edson Paradise, APRN - CNP   1 tablet at 03/26/22 0908    pantoprazole (PROTONIX) tablet 40 mg  40 mg Oral Daily Edson Paradise, APRN - CNP   40 mg at 03/26/22 0908    sucralfate (CARAFATE) tablet 1 g  1 g Oral Daily Edson Paradise, APRN - CNP   1 g at 03/26/22 0908    valsartan (DIOVAN) tablet 160 mg  160 mg Oral Daily Edson Paradise, APRN - CNP   160 mg at 03/26/22 0908    venlafaxine (EFFEXOR XR) extended release capsule 150 mg  150 mg Oral Daily Edson Paradise, APRN - CNP   150 mg at 03/26/22 0908    iron polysaccharides (NIFEREX) capsule 150 mg  150 mg Oral Daily Edson Gamboa APRN - CNP   150 mg at 03/26/22 0908    ipratropium (ATROVENT) 0.02 % nebulizer solution 0.5 mg  0.5 mg Nebulization 4x daily GIO Brown - CNP   0.5 mg at 03/26/22 1208    Arformoterol Tartrate (BROVANA) nebulizer solution 15 mcg  15 mcg Nebulization BID Mere Dean APRN - CNP   15 mcg at 03/26/22 0851       PRN Medications  perflutren lipid microspheres, trimethobenzamide, sodium chloride flush, sodium chloride flush, sodium chloride, potassium chloride **OR** potassium alternative oral replacement **OR** potassium chloride, senna, acetaminophen **OR** acetaminophen    Objective  Most Recent Recorded Vitals  /60   Pulse 65   Temp 97.3 °F (36.3 °C) (Oral)   Resp 16   Ht 5' 6\" (1.676 m)   Wt 200 lb (90.7 kg)   SpO2 97%   BMI 32.28 kg/m²   I/O last 3 completed shifts: In: 120 [P.O.:120]  Out: -   No intake/output data recorded. Physical Exam:  General: AAO to person/place/time/purpose, NAD, no labored breathing  Eyes: conjunctivae/corneas clear, sclera non icteric  Skin: color/texture/turgor normal, no rashes or lesions  Lungs: CTAB, on 2L O2 via NC, no retractions/use of accessory muscles, no vocal fremitus, no rhonchi, no crackle, no rales  Heart: regular rate, regular rhythm, no murmur  Abdomen: obese, soft, NT, bowel sounds normal  Extremities: atraumatic, trace BLE edema  Neurologic: cranial nerves 2-12 grossly intact, no slurred speech    Most Recent Labs  Lab Results   Component Value Date    WBC 6.8 03/26/2022    HGB 10.2 (L) 03/26/2022    HCT 32.0 (L) 03/26/2022     03/26/2022     03/26/2022    K 5.4 (H) 03/26/2022     03/26/2022    CREATININE 1.2 (H) 03/26/2022    BUN 33 (H) 03/26/2022    CO2 29 03/26/2022    GLUCOSE 103 (H) 03/26/2022    ALT 12 03/26/2022    AST 17 03/26/2022    INR 1.4 08/27/2020    TSH 5.450 (H) 03/23/2022    LABA1C 4.9 04/23/2021       XR CHEST (2 VW)   Final Result   No pneumonia or pleural effusion. Echocardiogram 9/2019   Summary   Left ventricle grossly normal in size.    Normal LV segmental wall motion. Mild left ventricular concentric hypertrophy noted. Estimated left ventricular ejection fraction is 58±5%. <50% criteria for diastolic dysfunction. The LAESV Index is <34ml/m2. Normal right ventricular size and function. Mild mitral annular calcification. Physiologic and/or trace mitral regurgitation is present. Physiologic and/or trace tricuspid regurgitation. RVSP is 28 mmHg. No evidence to suggest pulmonary hypertension. Physiologic and/or trace pulmonic regurgitation present. Technically fair quality study. Compared to prior echo, no significant changes noted. Suggest clinical correlation. Assessment   Active Hospital Problems    Diagnosis     Atrial fibrillation with rapid ventricular response (HCC) [I48.91]     CKD (chronic kidney disease) stage 3, GFR 30-59 ml/min ([facility]) [N18.30]     MCCLELLAND (dyspnea on exertion) [R06.00]     Bilateral carotid artery stenosis [I65.23]     Asthma [V12.604]     Diastolic dysfunction [F29.87]     CHF (congestive heart failure) ([facility]) [I50.9]     S/P carotid endarterectomy [Z98.890]     Essential hypertension [I10]     Mixed hyperlipidemia [E78.2]          Plan  · Atrial Fibrillation with RVR  ? Telemetry  ? TSH, Free T4 noted  ? ECHO not done since 2019 defer repeat to cardiology   ? Viral PCR negative   ? Cardiology following  ? Continue Eliquis   ? Dofetilide 250mg PO BID at home now   · Fatigue   ? Likely related to above  ? Check B12, folate, thyroid function, vitamin d   ? Consider op MIKHAIL testing   · Asthma/COPD  ? Continue Brovana and Duoneb as needed   ? Supplemental oxygen-- wean as able, patient does wear 2.5L/min at home  · Hypothyroidism   ? Thyroid panel noted-- home dose not adjusted ? ?  Continue home 150 mcg dose for now  · PT/OT  · Consults-cardiology  · Appropriate home meds ordered  · DVT prophylaxis   · Code Status   · Medications, labs and imaging reviewed   · Discharge plan: DC home soon    Electronically signed by GIO Stephens CNP on 3/26/2022 at 1:35 PM     Addendum: I have personally participated in a face-to-face history and physical exam on the date of service with the patient. I have discussed the case with the nurse practitioner. I also participated in medical decision making on the date of service and I agree with all of the pertinent clinical information unless indicated in my editing of the note. I have reviewed and edited the note above based on my findings during my history, exam, and decision making on the same day of service. My additional thoughts:    Continue on Dofetilide per cardiology. Continue to monitor EKG and electrolytes. Fabián Varela DO      Electronically signed by Fabián Varela DO on 3/26/2022 at 2:44 PM    I can be reached through Answering service.

## 2022-03-27 NOTE — DISCHARGE SUMMARY
Discharge Summary     Patient ID:  Robin Bruno  [id number]  64 y.o. 1945 female  Kiesha Cuellar, APRN - CNP        Admit date: 3/22/2022    Discharge date and time: 3/26/2022    Activity level: As tolerated  Diet: Cardiac  Labs: Electrolytes in 1 week   Disposition: Home   Condition on Discharge: Stable  DME: None     Admit Diagnoses:   Patient Active Problem List   Diagnosis    Essential hypertension    Mixed hyperlipidemia    S/P carotid endarterectomy    Hypoxia    CHF (congestive heart failure) (HCC)    Diastolic dysfunction    Asthma    Bilateral carotid artery stenosis    O2 dependent    Primary osteoarthritis of left knee    Lumbar spondylosis    Class 2 obesity due to excess calories with body mass index (BMI) of 38.0 to 38.9 in adult    MCCLELLAND (dyspnea on exertion)    CKD (chronic kidney disease) stage 3, GFR 30-59 ml/min ([facility])    Atrial fibrillation with RVR (HCC)    Atrial fibrillation with rapid ventricular response ([facility] [address]       Discharge Diagnoses: Principal Problem:    Atrial fibrillation with rapid ventricular response ([facility])  Active Problems:    Essential hypertension    Mixed hyperlipidemia    S/P carotid endarterectomy    CHF (congestive heart failure) (HCC)    Diastolic dysfunction    Asthma    Bilateral carotid artery stenosis    MCCLELLAND (dyspnea on exertion)    CKD (chronic kidney disease) stage 3, GFR 30-59 ml/min ([facility])  Resolved Problems:    * No resolved hospital problems. *      Consults:  IP CONSULT TO INTERNAL MEDICINE  IP CONSULT TO SOCIAL WORK  IP CONSULT TO CARDIOLOGY    Procedures: None      Hospital Course: Nicole Murillo is a 64y.o. year old female with a PMH of Afib, HTN, HLD, CKD, hypothyroidism, COPD chronically on 3L NC who presented from Dr. Earnest Sacks office yesterday to the ED due to A. fib RVR with heart rate of 154. Patient states that for the past 2 weeks she has been getting intermittent episodes of palpitations associated with shortness of breath as well as fatigue. Recent Labs     03/25/22  0305 03/26/22  0245   WBC 6.3 6.8   RBC 3.46* 3.27*   HGB 10.6* 10.2*   HCT 34.1 32.0*   MCV 98.6 97.9   MCH 30.6 31.2   MCHC 31.1* 31.9*   RDW 13.2 13.2    155   MPV 10.9 10.4       No results for input(s): GLUMET in the last 72 hours. Imaging:  XR CHEST (2 VW)    Result Date: 3/22/2022  EXAMINATION: TWO XRAY VIEWS OF THE CHEST 3/22/2022 7:26 pm COMPARISON: August 27, 2020 HISTORY: ORDERING SYSTEM PROVIDED HISTORY: chf TECHNOLOGIST PROVIDED HISTORY: Reason for exam:->chf FINDINGS: No airspace opacity or pleural effusion. The heart is normal size. No pneumothorax. No free air beneath the hemidiaphragms. No pneumonia or pleural effusion. Patient Instructions:   Discharge Medication List as of 3/26/2022  2:42 PM      START taking these medications    Details   Arformoterol Tartrate (BROVANA) 15 MCG/2ML NEBU Take 2 mLs by nebulization 2 times daily, Disp-120 mL, R-0Normal      ipratropium (ATROVENT) 0.02 % nebulizer solution Take 2.5 mLs by nebulization 4 times daily, Disp-2.5 mL, R-0Normal      Respiratory Therapy Supplies (FULL KIT NEBULIZER SET) MISC Disp-1 each, R-0, PrintUse as directed with nebulized medication.          CONTINUE these medications which have CHANGED    Details   dofetilide (TIKOSYN) 250 MCG capsule Take 1 capsule by mouth every 12 hours, Disp-60 capsule, R-3Normal         CONTINUE these medications which have NOT CHANGED    Details   FERREX 150 150 MG capsule TAKE 1 CAPSULE BY MOUTH TWICE DAILY, DAWHistorical Med      sucralfate (CARAFATE) 1 GM tablet TAKE 1 TABLET BY MOUTH ONCE DAILYHistorical Med      levothyroxine (SYNTHROID) 150 MCG tablet TAKE 1 TABLET BY MOUTH ONCE DAILYHistorical Med      ANORO ELLIPTA 62.5-25 MCG/INH AEPB inhaler INHALE 1 PUFF BY MOUTH ONCE DAILY, DAWHistorical Med      amLODIPine (NORVASC) 2.5 MG tablet Take 1 tablet by mouth daily, Disp-30 tablet,R-3Normal      OXYGEN Inhale into the lungsHistorical Med      apixaban (ELIQUIS) 5 MG TABS tablet Take 1 tablet by mouth 2 times daily, Disp-60 tablet, R-2Normal      valsartan (DIOVAN) 160 MG tablet Take 1 tablet by mouth daily, Disp-30 tablet, R-3Normal      venlafaxine (EFFEXOR XR) 150 MG extended release capsule Take 150 mg by mouth dailyHistorical Med      pantoprazole (PROTONIX) 40 MG tablet Take 1 tablet by mouth daily, Disp-30 tablet, R-3Normal      atorvastatin (LIPITOR) 20 MG tablet Take 20 mg by mouth dailyHistorical Med      Multiple Vitamins-Minerals (THERAPEUTIC MULTIVITAMIN-MINERALS) tablet Take 1 tablet by mouth daily      Cholecalciferol (VITAMIN D) 2000 UNITS CAPS capsule Take 1 capsule by mouth daily               Note that more than 30 minutes was spent in preparing discharge papers, discussing discharge with patient, medication review, etc.      Signed:    Perla Glover DO    Electronically signed by Perla Glover DO on 3/27/2022 at 8:40 AM

## 2022-03-28 LAB
EKG ATRIAL RATE: 104 BPM
EKG ATRIAL RATE: 59 BPM
EKG ATRIAL RATE: 63 BPM
EKG ATRIAL RATE: 65 BPM
EKG P AXIS: 31 DEGREES
EKG P AXIS: 37 DEGREES
EKG P AXIS: 65 DEGREES
EKG P-R INTERVAL: 136 MS
EKG P-R INTERVAL: 136 MS
EKG P-R INTERVAL: 144 MS
EKG Q-T INTERVAL: 326 MS
EKG Q-T INTERVAL: 424 MS
EKG Q-T INTERVAL: 428 MS
EKG Q-T INTERVAL: 430 MS
EKG QRS DURATION: 70 MS
EKG QRS DURATION: 74 MS
EKG QRS DURATION: 74 MS
EKG QRS DURATION: 80 MS
EKG QTC CALCULATION (BAZETT): 425 MS
EKG QTC CALCULATION (BAZETT): 437 MS
EKG QTC CALCULATION (BAZETT): 440 MS
EKG QTC CALCULATION (BAZETT): 458 MS
EKG R AXIS: 26 DEGREES
EKG R AXIS: 35 DEGREES
EKG R AXIS: 42 DEGREES
EKG R AXIS: 50 DEGREES
EKG T AXIS: 64 DEGREES
EKG T AXIS: 65 DEGREES
EKG T AXIS: 66 DEGREES
EKG T AXIS: 73 DEGREES
EKG VENTRICULAR RATE: 119 BPM
EKG VENTRICULAR RATE: 59 BPM
EKG VENTRICULAR RATE: 63 BPM
EKG VENTRICULAR RATE: 65 BPM

## 2022-04-01 LAB
EKG ATRIAL RATE: 63 BPM
EKG P AXIS: 41 DEGREES
EKG P-R INTERVAL: 128 MS
EKG Q-T INTERVAL: 418 MS
EKG QRS DURATION: 76 MS
EKG QTC CALCULATION (BAZETT): 427 MS
EKG R AXIS: 44 DEGREES
EKG T AXIS: 78 DEGREES
EKG VENTRICULAR RATE: 63 BPM

## 2022-04-14 ENCOUNTER — APPOINTMENT (OUTPATIENT)
Dept: GENERAL RADIOLOGY | Age: 77
DRG: 309 | End: 2022-04-14
Payer: MEDICARE

## 2022-04-14 ENCOUNTER — HOSPITAL ENCOUNTER (INPATIENT)
Age: 77
LOS: 1 days | Discharge: HOME | DRG: 309 | End: 2022-04-16
Attending: STUDENT IN AN ORGANIZED HEALTH CARE EDUCATION/TRAINING PROGRAM | Admitting: STUDENT IN AN ORGANIZED HEALTH CARE EDUCATION/TRAINING PROGRAM
Payer: MEDICARE

## 2022-04-14 DIAGNOSIS — I48.0 PAROXYSMAL ATRIAL FIBRILLATION (HCC): Primary | ICD-10-CM

## 2022-04-14 LAB
ANION GAP SERPL CALCULATED.3IONS-SCNC: 9 MMOL/L (ref 7–16)
BASOPHILS ABSOLUTE: 0.07 E9/L (ref 0–0.2)
BASOPHILS RELATIVE PERCENT: 1 % (ref 0–2)
BUN BLDV-MCNC: 24 MG/DL (ref 6–23)
CALCIUM SERPL-MCNC: 9.4 MG/DL (ref 8.6–10.2)
CHLORIDE BLD-SCNC: 101 MMOL/L (ref 98–107)
CO2: 28 MMOL/L (ref 22–29)
CREAT SERPL-MCNC: 1 MG/DL (ref 0.5–1)
EOSINOPHILS ABSOLUTE: 0.21 E9/L (ref 0.05–0.5)
EOSINOPHILS RELATIVE PERCENT: 2.9 % (ref 0–6)
GFR AFRICAN AMERICAN: >60
GFR NON-AFRICAN AMERICAN: 54 ML/MIN/1.73
GLUCOSE BLD-MCNC: 99 MG/DL (ref 74–99)
HCT VFR BLD CALC: 35.7 % (ref 34–48)
HEMOGLOBIN: 11.1 G/DL (ref 11.5–15.5)
IMMATURE GRANULOCYTES #: 0.03 E9/L
IMMATURE GRANULOCYTES %: 0.4 % (ref 0–5)
LYMPHOCYTES ABSOLUTE: 1.9 E9/L (ref 1.5–4)
LYMPHOCYTES RELATIVE PERCENT: 26.5 % (ref 20–42)
MAGNESIUM: 1.9 MG/DL (ref 1.6–2.6)
MCH RBC QN AUTO: 31 PG (ref 26–35)
MCHC RBC AUTO-ENTMCNC: 31.1 % (ref 32–34.5)
MCV RBC AUTO: 99.7 FL (ref 80–99.9)
MONOCYTES ABSOLUTE: 0.79 E9/L (ref 0.1–0.95)
MONOCYTES RELATIVE PERCENT: 11 % (ref 2–12)
NEUTROPHILS ABSOLUTE: 4.16 E9/L (ref 1.8–7.3)
NEUTROPHILS RELATIVE PERCENT: 58.2 % (ref 43–80)
PDW BLD-RTO: 12.7 FL (ref 11.5–15)
PLATELET # BLD: 205 E9/L (ref 130–450)
PMV BLD AUTO: 10.9 FL (ref 7–12)
POTASSIUM REFLEX MAGNESIUM: 5.9 MMOL/L (ref 3.5–5)
RBC # BLD: 3.58 E12/L (ref 3.5–5.5)
SODIUM BLD-SCNC: 138 MMOL/L (ref 132–146)
TROPONIN, HIGH SENSITIVITY: 16 NG/L (ref 0–9)
TSH SERPL DL<=0.05 MIU/L-ACNC: 2.43 UIU/ML (ref 0.27–4.2)
WBC # BLD: 7.2 E9/L (ref 4.5–11.5)

## 2022-04-14 PROCEDURE — 84443 ASSAY THYROID STIM HORMONE: CPT

## 2022-04-14 PROCEDURE — 2580000003 HC RX 258: Performed by: STUDENT IN AN ORGANIZED HEALTH CARE EDUCATION/TRAINING PROGRAM

## 2022-04-14 PROCEDURE — 71045 X-RAY EXAM CHEST 1 VIEW: CPT

## 2022-04-14 PROCEDURE — 85025 COMPLETE CBC W/AUTO DIFF WBC: CPT

## 2022-04-14 PROCEDURE — 84484 ASSAY OF TROPONIN QUANT: CPT

## 2022-04-14 PROCEDURE — 93005 ELECTROCARDIOGRAM TRACING: CPT | Performed by: STUDENT IN AN ORGANIZED HEALTH CARE EDUCATION/TRAINING PROGRAM

## 2022-04-14 PROCEDURE — 80048 BASIC METABOLIC PNL TOTAL CA: CPT

## 2022-04-14 PROCEDURE — 83735 ASSAY OF MAGNESIUM: CPT

## 2022-04-14 RX ORDER — 0.9 % SODIUM CHLORIDE 0.9 %
500 INTRAVENOUS SOLUTION INTRAVENOUS ONCE
Status: COMPLETED | OUTPATIENT
Start: 2022-04-14 | End: 2022-04-15

## 2022-04-14 RX ORDER — ACETAMINOPHEN 500 MG
1000 TABLET ORAL ONCE
Status: COMPLETED | OUTPATIENT
Start: 2022-04-15 | End: 2022-04-15

## 2022-04-14 RX ADMIN — SODIUM CHLORIDE 500 ML: 9 INJECTION, SOLUTION INTRAVENOUS at 23:09

## 2022-04-15 LAB
ALBUMIN SERPL-MCNC: 4.1 G/DL (ref 3.5–5.2)
ALP BLD-CCNC: 78 U/L (ref 35–104)
ALT SERPL-CCNC: 17 U/L (ref 0–32)
ANION GAP SERPL CALCULATED.3IONS-SCNC: 11 MMOL/L (ref 7–16)
AST SERPL-CCNC: 18 U/L (ref 0–31)
BASOPHILS ABSOLUTE: 0.07 E9/L (ref 0–0.2)
BASOPHILS RELATIVE PERCENT: 0.9 % (ref 0–2)
BILIRUB SERPL-MCNC: 0.4 MG/DL (ref 0–1.2)
BUN BLDV-MCNC: 23 MG/DL (ref 6–23)
CALCIUM SERPL-MCNC: 9.2 MG/DL (ref 8.6–10.2)
CHLORIDE BLD-SCNC: 101 MMOL/L (ref 98–107)
CO2: 27 MMOL/L (ref 22–29)
CREAT SERPL-MCNC: 0.9 MG/DL (ref 0.5–1)
D DIMER: <200 NG/ML DDU
EKG ATRIAL RATE: 138 BPM
EKG Q-T INTERVAL: 294 MS
EKG QRS DURATION: 66 MS
EKG QTC CALCULATION (BAZETT): 465 MS
EKG R AXIS: 58 DEGREES
EKG T AXIS: 81 DEGREES
EKG VENTRICULAR RATE: 151 BPM
EOSINOPHILS ABSOLUTE: 0.2 E9/L (ref 0.05–0.5)
EOSINOPHILS RELATIVE PERCENT: 2.7 % (ref 0–6)
GFR AFRICAN AMERICAN: >60
GFR NON-AFRICAN AMERICAN: >60 ML/MIN/1.73
GLUCOSE BLD-MCNC: 113 MG/DL (ref 74–99)
HCT VFR BLD CALC: 34.5 % (ref 34–48)
HEMOGLOBIN: 10.6 G/DL (ref 11.5–15.5)
IMMATURE GRANULOCYTES #: 0.04 E9/L
IMMATURE GRANULOCYTES %: 0.5 % (ref 0–5)
LYMPHOCYTES ABSOLUTE: 2.08 E9/L (ref 1.5–4)
LYMPHOCYTES RELATIVE PERCENT: 27.8 % (ref 20–42)
MCH RBC QN AUTO: 30.6 PG (ref 26–35)
MCHC RBC AUTO-ENTMCNC: 30.7 % (ref 32–34.5)
MCV RBC AUTO: 99.7 FL (ref 80–99.9)
MONOCYTES ABSOLUTE: 0.7 E9/L (ref 0.1–0.95)
MONOCYTES RELATIVE PERCENT: 9.4 % (ref 2–12)
NEUTROPHILS ABSOLUTE: 4.38 E9/L (ref 1.8–7.3)
NEUTROPHILS RELATIVE PERCENT: 58.7 % (ref 43–80)
PDW BLD-RTO: 12.7 FL (ref 11.5–15)
PLATELET # BLD: 178 E9/L (ref 130–450)
PMV BLD AUTO: 11 FL (ref 7–12)
POTASSIUM REFLEX MAGNESIUM: 5 MMOL/L (ref 3.5–5)
RBC # BLD: 3.46 E12/L (ref 3.5–5.5)
SODIUM BLD-SCNC: 139 MMOL/L (ref 132–146)
TOTAL PROTEIN: 6.8 G/DL (ref 6.4–8.3)
TROPONIN, HIGH SENSITIVITY: 15 NG/L (ref 0–9)
WBC # BLD: 7.5 E9/L (ref 4.5–11.5)

## 2022-04-15 PROCEDURE — 6370000000 HC RX 637 (ALT 250 FOR IP): Performed by: INTERNAL MEDICINE

## 2022-04-15 PROCEDURE — 36415 COLL VENOUS BLD VENIPUNCTURE: CPT

## 2022-04-15 PROCEDURE — 2700000000 HC OXYGEN THERAPY PER DAY

## 2022-04-15 PROCEDURE — G0378 HOSPITAL OBSERVATION PER HR: HCPCS

## 2022-04-15 PROCEDURE — 6360000002 HC RX W HCPCS: Performed by: NURSE PRACTITIONER

## 2022-04-15 PROCEDURE — 84484 ASSAY OF TROPONIN QUANT: CPT

## 2022-04-15 PROCEDURE — 6370000000 HC RX 637 (ALT 250 FOR IP): Performed by: STUDENT IN AN ORGANIZED HEALTH CARE EDUCATION/TRAINING PROGRAM

## 2022-04-15 PROCEDURE — 6370000000 HC RX 637 (ALT 250 FOR IP): Performed by: NURSE PRACTITIONER

## 2022-04-15 PROCEDURE — 85378 FIBRIN DEGRADE SEMIQUANT: CPT

## 2022-04-15 PROCEDURE — 97161 PT EVAL LOW COMPLEX 20 MIN: CPT

## 2022-04-15 PROCEDURE — 99283 EMERGENCY DEPT VISIT LOW MDM: CPT

## 2022-04-15 PROCEDURE — 2580000003 HC RX 258: Performed by: NURSE PRACTITIONER

## 2022-04-15 PROCEDURE — 80053 COMPREHEN METABOLIC PANEL: CPT

## 2022-04-15 PROCEDURE — 2060000000 HC ICU INTERMEDIATE R&B

## 2022-04-15 PROCEDURE — 97165 OT EVAL LOW COMPLEX 30 MIN: CPT

## 2022-04-15 PROCEDURE — 85025 COMPLETE CBC W/AUTO DIFF WBC: CPT

## 2022-04-15 PROCEDURE — 94640 AIRWAY INHALATION TREATMENT: CPT

## 2022-04-15 RX ORDER — POTASSIUM CHLORIDE 20 MEQ/1
40 TABLET, EXTENDED RELEASE ORAL PRN
Status: DISCONTINUED | OUTPATIENT
Start: 2022-04-15 | End: 2022-04-16 | Stop reason: HOSPADM

## 2022-04-15 RX ORDER — DOFETILIDE 0.25 MG/1
250 CAPSULE ORAL ONCE
Status: COMPLETED | OUTPATIENT
Start: 2022-04-15 | End: 2022-04-15

## 2022-04-15 RX ORDER — NADOLOL 20 MG/1
10 TABLET ORAL 2 TIMES DAILY
Status: DISCONTINUED | OUTPATIENT
Start: 2022-04-15 | End: 2022-04-16 | Stop reason: HOSPADM

## 2022-04-15 RX ORDER — SENNA PLUS 8.6 MG/1
1 TABLET ORAL DAILY PRN
Status: DISCONTINUED | OUTPATIENT
Start: 2022-04-15 | End: 2022-04-16 | Stop reason: HOSPADM

## 2022-04-15 RX ORDER — SODIUM CHLORIDE 0.9 % (FLUSH) 0.9 %
10 SYRINGE (ML) INJECTION PRN
Status: DISCONTINUED | OUTPATIENT
Start: 2022-04-15 | End: 2022-04-16 | Stop reason: HOSPADM

## 2022-04-15 RX ORDER — LEVOTHYROXINE SODIUM 0.07 MG/1
150 TABLET ORAL DAILY
Status: DISCONTINUED | OUTPATIENT
Start: 2022-04-15 | End: 2022-04-16 | Stop reason: HOSPADM

## 2022-04-15 RX ORDER — CHOLECALCIFEROL (VITAMIN D3) 50 MCG
2000 TABLET ORAL DAILY
Status: DISCONTINUED | OUTPATIENT
Start: 2022-04-15 | End: 2022-04-16 | Stop reason: HOSPADM

## 2022-04-15 RX ORDER — ACETAMINOPHEN 325 MG/1
650 TABLET ORAL EVERY 6 HOURS PRN
Status: DISCONTINUED | OUTPATIENT
Start: 2022-04-15 | End: 2022-04-16 | Stop reason: HOSPADM

## 2022-04-15 RX ORDER — POTASSIUM CHLORIDE 7.45 MG/ML
10 INJECTION INTRAVENOUS PRN
Status: DISCONTINUED | OUTPATIENT
Start: 2022-04-15 | End: 2022-04-16 | Stop reason: HOSPADM

## 2022-04-15 RX ORDER — NADOLOL 20 MG/1
10 TABLET ORAL DAILY
Status: COMPLETED | OUTPATIENT
Start: 2022-04-15 | End: 2022-04-15

## 2022-04-15 RX ORDER — SODIUM CHLORIDE 0.9 % (FLUSH) 0.9 %
10 SYRINGE (ML) INJECTION EVERY 12 HOURS SCHEDULED
Status: DISCONTINUED | OUTPATIENT
Start: 2022-04-15 | End: 2022-04-16 | Stop reason: HOSPADM

## 2022-04-15 RX ORDER — VENLAFAXINE HYDROCHLORIDE 150 MG/1
150 CAPSULE, EXTENDED RELEASE ORAL DAILY
Status: DISCONTINUED | OUTPATIENT
Start: 2022-04-15 | End: 2022-04-16 | Stop reason: HOSPADM

## 2022-04-15 RX ORDER — ATORVASTATIN CALCIUM 20 MG/1
20 TABLET, FILM COATED ORAL DAILY
Status: DISCONTINUED | OUTPATIENT
Start: 2022-04-15 | End: 2022-04-16 | Stop reason: HOSPADM

## 2022-04-15 RX ORDER — M-VIT,TX,IRON,MINS/CALC/FOLIC 27MG-0.4MG
1 TABLET ORAL DAILY
Status: DISCONTINUED | OUTPATIENT
Start: 2022-04-15 | End: 2022-04-16 | Stop reason: HOSPADM

## 2022-04-15 RX ORDER — SODIUM CHLORIDE 9 MG/ML
INJECTION, SOLUTION INTRAVENOUS PRN
Status: DISCONTINUED | OUTPATIENT
Start: 2022-04-15 | End: 2022-04-16 | Stop reason: HOSPADM

## 2022-04-15 RX ORDER — AMLODIPINE BESYLATE 2.5 MG/1
2.5 TABLET ORAL DAILY
Status: DISCONTINUED | OUTPATIENT
Start: 2022-04-15 | End: 2022-04-16 | Stop reason: HOSPADM

## 2022-04-15 RX ORDER — SUCRALFATE 1 G/1
1 TABLET ORAL DAILY
Status: DISCONTINUED | OUTPATIENT
Start: 2022-04-15 | End: 2022-04-16 | Stop reason: HOSPADM

## 2022-04-15 RX ORDER — DOFETILIDE 0.25 MG/1
250 CAPSULE ORAL EVERY 12 HOURS SCHEDULED
Status: DISCONTINUED | OUTPATIENT
Start: 2022-04-15 | End: 2022-04-16 | Stop reason: HOSPADM

## 2022-04-15 RX ORDER — PANTOPRAZOLE SODIUM 40 MG/1
40 TABLET, DELAYED RELEASE ORAL DAILY
Status: DISCONTINUED | OUTPATIENT
Start: 2022-04-15 | End: 2022-04-16 | Stop reason: HOSPADM

## 2022-04-15 RX ORDER — VALSARTAN 160 MG/1
160 TABLET ORAL DAILY
Status: DISCONTINUED | OUTPATIENT
Start: 2022-04-15 | End: 2022-04-16 | Stop reason: HOSPADM

## 2022-04-15 RX ORDER — ARFORMOTEROL TARTRATE 15 UG/2ML
15 SOLUTION RESPIRATORY (INHALATION) 2 TIMES DAILY
Status: DISCONTINUED | OUTPATIENT
Start: 2022-04-15 | End: 2022-04-16 | Stop reason: HOSPADM

## 2022-04-15 RX ORDER — IRON POLYSACCHARIDE COMPLEX 150 MG
150 CAPSULE ORAL 2 TIMES DAILY
Status: DISCONTINUED | OUTPATIENT
Start: 2022-04-15 | End: 2022-04-16 | Stop reason: HOSPADM

## 2022-04-15 RX ORDER — ACETAMINOPHEN 650 MG/1
650 SUPPOSITORY RECTAL EVERY 6 HOURS PRN
Status: DISCONTINUED | OUTPATIENT
Start: 2022-04-15 | End: 2022-04-16 | Stop reason: HOSPADM

## 2022-04-15 RX ADMIN — ATORVASTATIN CALCIUM 20 MG: 20 TABLET, FILM COATED ORAL at 20:16

## 2022-04-15 RX ADMIN — IPRATROPIUM BROMIDE 0.5 MG: 0.5 SOLUTION RESPIRATORY (INHALATION) at 08:25

## 2022-04-15 RX ADMIN — LEVOTHYROXINE SODIUM 150 MCG: 75 TABLET ORAL at 06:01

## 2022-04-15 RX ADMIN — NADOLOL 10 MG: 20 TABLET ORAL at 06:55

## 2022-04-15 RX ADMIN — ACETAMINOPHEN 650 MG: 325 TABLET ORAL at 16:11

## 2022-04-15 RX ADMIN — APIXABAN 5 MG: 5 TABLET, FILM COATED ORAL at 20:16

## 2022-04-15 RX ADMIN — Medication 10 ML: at 20:17

## 2022-04-15 RX ADMIN — PANTOPRAZOLE SODIUM 40 MG: 40 TABLET, DELAYED RELEASE ORAL at 08:23

## 2022-04-15 RX ADMIN — DOFETILIDE 250 MCG: 0.25 CAPSULE ORAL at 20:16

## 2022-04-15 RX ADMIN — Medication 10 ML: at 11:17

## 2022-04-15 RX ADMIN — APIXABAN 5 MG: 5 TABLET, FILM COATED ORAL at 08:23

## 2022-04-15 RX ADMIN — SUCRALFATE 1 G: 1 TABLET ORAL at 08:23

## 2022-04-15 RX ADMIN — AMLODIPINE BESYLATE 2.5 MG: 2.5 TABLET ORAL at 08:23

## 2022-04-15 RX ADMIN — NADOLOL 10 MG: 20 TABLET ORAL at 20:16

## 2022-04-15 RX ADMIN — MULTIPLE VITAMINS W/ MINERALS TAB 1 TABLET: TAB at 08:23

## 2022-04-15 RX ADMIN — VENLAFAXINE HYDROCHLORIDE 150 MG: 150 CAPSULE, EXTENDED RELEASE ORAL at 08:23

## 2022-04-15 RX ADMIN — VALSARTAN 160 MG: 160 TABLET, FILM COATED ORAL at 08:23

## 2022-04-15 RX ADMIN — ARFORMOTEROL TARTRATE 15 MCG: 15 SOLUTION RESPIRATORY (INHALATION) at 08:25

## 2022-04-15 RX ADMIN — Medication 2000 UNITS: at 08:23

## 2022-04-15 RX ADMIN — ARFORMOTEROL TARTRATE 15 MCG: 15 SOLUTION RESPIRATORY (INHALATION) at 20:48

## 2022-04-15 RX ADMIN — IPRATROPIUM BROMIDE 0.5 MG: 0.5 SOLUTION RESPIRATORY (INHALATION) at 11:54

## 2022-04-15 RX ADMIN — ACETAMINOPHEN 1000 MG: 500 TABLET ORAL at 00:41

## 2022-04-15 RX ADMIN — Medication 150 MG: at 08:23

## 2022-04-15 RX ADMIN — IPRATROPIUM BROMIDE 0.5 MG: 0.5 SOLUTION RESPIRATORY (INHALATION) at 16:16

## 2022-04-15 RX ADMIN — IPRATROPIUM BROMIDE 0.5 MG: 0.5 SOLUTION RESPIRATORY (INHALATION) at 20:48

## 2022-04-15 RX ADMIN — Medication 150 MG: at 16:11

## 2022-04-15 RX ADMIN — DOFETILIDE 250 MCG: 0.25 CAPSULE ORAL at 00:41

## 2022-04-15 RX ADMIN — DOFETILIDE 250 MCG: 0.25 CAPSULE ORAL at 11:17

## 2022-04-15 ASSESSMENT — PAIN SCALES - GENERAL
PAINLEVEL_OUTOF10: 8
PAINLEVEL_OUTOF10: 0
PAINLEVEL_OUTOF10: 7

## 2022-04-15 NOTE — CONSULTS
CARDIOLOGY CONSULTATION    Patient Name:  Veronika Alonso    :  1945    Reason for Consultation:   Recurrent atrial fibrillation rapid ventricular response    History of Present Illness:   Veronika Alonso returns to [facility] following history of sudden onset of feeling weak and short of breath and found to be in atrial fibrillation with a rapid ventricular response. Her neighbor is a nurse practitioner who advised her to go to the hospital for further evaluation. In the emergency room she was found to have a ventricular response of >150/min. She has been on dofetilide and had an adjustment in her medical regimen only a few weeks back. She has felt well since that time until yesterday. Her only new medicine is that of meclizine for mild dizziness. She denied associated chest discomfort but does note profound fatigue. She is now admitted for further observation of her rhythm. Past Medical History:   has a past medical history of Arthritis, Atrial fibrillation ([facility] [address] Bilateral carotid artery stenosis, Bronchitis, Carotid stenosis, bilateral, Hyperlipidemia, Hypertension, Left carotid stenosis, O2 dependent, On continuous oral anticoagulation, S/P carotid endarterectomy, Stomach ulcer, and Thyroid disease. Surgical History:   has a past surgical history that includes Hysterectomy; Cholecystectomy; Appendectomy; back surgery; Colonoscopy; joint replacement (); eye surgery; and Endoscopy, colon, diagnostic (01/10/2018). Social History:   reports that she quit smoking about 3 years ago. Her smoking use included cigarettes. She has a 30.00 pack-year smoking history. She has never used smokeless tobacco. She reports previous alcohol use. She reports that she does not use drugs. Family History:  family history includes Cancer in her sister; Other in her mother.   Mother  secondary to complications of rheumatic heart disease and father  in his [de-identified] secondary to complications of Alzheimer's disease and infirmities of old age. Medications:  Prior to Admission medications    Medication Sig Start Date End Date Taking? Authorizing Provider   Arformoterol Tartrate (BROVANA) 15 MCG/2ML NEBU Take 2 mLs by nebulization 2 times daily 3/25/22   Seth Puckett MD   ipratropium (ATROVENT) 0.02 % nebulizer solution Take 2.5 mLs by nebulization 4 times daily 3/25/22   Seth Puckett MD   Respiratory Therapy Supplies (FULL KIT NEBULIZER SET) MISC Use as directed with nebulized medication.  3/25/22   Seth Puckett MD   dofetilide [facility]) 250 MCG capsule Take 1 capsule by mouth every 12 hours 3/25/22   Pallavi Mancilla DO   FERREX 150 150 MG capsule TAKE 1 CAPSULE BY MOUTH TWICE DAILY 1/6/22   Historical Provider, MD   sucralfate (CARAFATE) 1 GM tablet TAKE 1 TABLET BY MOUTH ONCE DAILY 1/24/22   Historical Provider, MD   levothyroxine (SYNTHROID) 150 MCG tablet TAKE 1 TABLET BY MOUTH ONCE DAILY 7/26/21   Historical Provider, MD Trang Castillo ELLIPTA 62.5-25 MCG/INH AEPB inhaler INHALE 1 PUFF BY MOUTH ONCE DAILY 12/14/20   Historical Provider, MD   amLODIPine (NORVASC) 2.5 MG tablet Take 1 tablet by mouth daily 8/31/20   Maria Esther Still MD   OXYGEN Inhale into the lungs    Historical Provider, MD   apixaban (ELIQUIS) 5 MG TABS tablet Take 1 tablet by mouth 2 times daily 9/25/19   GIO Garcias CNP   valsartan (DIOVAN) 160 MG tablet Take 1 tablet by mouth daily 9/26/19   GIO Garcias CNP   venlafaxine (EFFEXOR XR) 150 MG extended release capsule Take 150 mg by mouth daily 4/22/18   Historical Provider, MD   pantoprazole (PROTONIX) 40 MG tablet Take 1 tablet by mouth daily 1/12/18   Maria Esther Still MD   atorvastatin (LIPITOR) 20 MG tablet Take 20 mg by mouth daily    Historical Provider, MD   Multiple Vitamins-Minerals (THERAPEUTIC MULTIVITAMIN-MINERALS) tablet Take 1 tablet by mouth daily    Historical Provider, MD   Cholecalciferol (VITAMIN D) 2000 UNITS CAPS capsule Take 1 capsule by mouth daily    Historical Provider, MD       Allergies:  Patient has no known allergies. Review of Systems:   · Constitutional: there has been no unanticipated weight loss. There's been no significant change in energy level, sleep pattern or activity level. No fever chills or rigors. · Eyes: No visual changes or diplopia. No scleral icterus. · ENT: No Headaches, hearing loss or vertigo. No mouth sores or sore throat. No change in taste or smell. · Cardiovascular: No chest discomfort,  + dyspnea on minimal exertion, + palpitations, but no loss of consciousness, no phlebitis, no claudication. · Respiratory: No cough or wheezing, no sputum production. No hemoptysis, pleuritic pain. · Gastrointestinal: No abdominal pain, appetite loss, blood in stools. No change in bowel habits. No hematemesis  · Genitourinary: No dysuria, trouble voiding or hematuria. No nocturia or increased frequency. · Musculoskeletal:  No gait disturbance, weakness or joint complaints. · Integumentary: No rash or pruritis. · Neurological: No headache, diplopia, change in muscle strength, numbness or tingling. No change in gait, balance, coordination, mood, affect, memory, mentation, behavior. · Psychiatric: No anxiety or depression. · Endocrine: No temperature intolerance. No excessive thirst, fluid intake, or urination. No tremor. · Hematologic/Lymphatic: No abnormal bruising or bleeding, blood clots or swollen lymph nodes. · Allergic/Immunologic: No nasal congestion or hives. Physical Examination:    Vital Signs: /63   Pulse 62   Temp 99.5 °F (37.5 °C) (Oral)   Resp 18   Ht 5' 6\" (1.676 m)   Wt 250 lb 6 oz (113.6 kg)   SpO2 100%   BMI 40.41 kg/m²   General appearance: Well preserved, mesomorphic body habitus, alert, no distress. Skin: Skin color, texture, turgor normal. No rashes or lesions. No induration or tightening palpated. Head: Normocephalic.  No masses, lesions, tenderness or abnormalities  Eyes: Conjunctivae/corneas clear. PERRL, EOMs intact. Sclera non icteric. Ears: External ears normal. Canals clear. TM's clear bilaterally. Hearing normal to finger rub. Nose/Sinuses: Nares normal. Septum midline. Mucosa normal. No drainage or sinus tenderness. Oropharynx: Lips, mucosa, and tongue normal. Oropharynx clear with no exudate seen. Neck: Neck supple and symmetric. No adenopathy. Thyroid symmetric, normal size, without nodules. Trachea is midline. Carotids brisk in upstroke without bruits, no abnormal JVP noted at 45°. Right carotid cicatrix noted. Chest: Even excursion  Lungs: Lungs grossly clear to auscultation bilaterally. No retractions or use of accessory muscles. No tactile vocal fremitus. No rhonchi, crackles or rales. Heart:  S1 > S2. Irregular, irregular rhythm. No gallop grade 2/6 systolic murmur second right and left intercostal space. No rub, palpable thrill or heave noted. PMI 5th intercostal space midclavicular line. Abdomen: Abdomen soft, moderately protuberant, non-tender. BS normal. No masses, organomegaly. No hernia noted. Extremities: Extremities normal. No deformities, edema, or skin discoloration. No cyanosis or clubbing noted to the nails. Peripheral pulses present 2+ upper extremities and present 1+  lower extremities. Musculoskeletal: Spine ROM normal. Muscular strength intact. Neuro: Cranial nerves intact. Motor: Strength 5/5 in all extremities. Reflexes 2+ in all extremities. No focal weakness. Sensory: grossly normal to touch. Coordination intact.     Pertinent Labs:  CBC:     03/22/22 1859 03/23/22  0820   WBC 6.2 6.0   RBC 3.69 3.38*   HGB 11.5 10.3*   HCT 36.3 33.4*   MCV 98.4 98.8   MCH 31.2 30.5   MCHC 31.7* 30.8*   RDW 13.2 13.3    180   MPV 10.6 10.9     BMP:     03/22/22 1859 03/23/22  0820    135   K 4.7 4.7   CL 98 100   CO2 26 28   BUN 23 23   CREATININE 1.0 0.9   GLUCOSE 107* 103*   CALCIUM 9.1 8.8         ABGs:   Lab Results Component Value Date    PH 7.371 09/19/2019    PO2 81.4 09/19/2019    PCO2 58.4 09/19/2019     INR:   No results for input(s): INR in the last 72 hours. PRO-BNP:   Lab Results   Component Value Date    PROBNP 2,484 (H) 09/19/2019      Cardiac Injury Profile:   No results for input(s): CKTOTAL, CKMB, CKMBINDEX, TROPONINI in the last 72 hours. Lipid Profile:   Lab Results   Component Value Date    TRIG 224 10/19/2021    HDL 77 10/19/2021    LDLCALC 65 10/19/2021    CHOL 187 10/19/2021      Hemoglobin A1C: No components found for: HGBA1C   ECG:  See report  ECHO: 1/9/2018  Summary   Normal left ventricular systolic function.   Ejection fraction is visually estimated at > 65%.   Mild left ventricular hypertrophy.   Normal right ventricular size and function.   There is doppler evidence of stage I diastolic dysfunction.   Physiologic and/or trace tricuspid regurgitation.   PASP is estimated at 22 mmHg (normal estimated PASP).  The inferior vena cava is normal in size with normal respiratory   variation. Radiology:  XR CHEST (2 VW)    Result Date: 3/22/2022  No pneumonia or pleural effusion. Assessment:    Principal Problem:    Atrial fibrillation with rapid ventricular response (HCC)  Active Problems:    Essential hypertension    Mixed hyperlipidemia    S/P carotid endarterectomy    CHF (congestive heart failure) (HCC)    Diastolic dysfunction    Asthma    Bilateral carotid artery stenosis    Class 2 obesity due to excess calories with body mass index (BMI) of 38.0 to 38.9 in adult    CKD (chronic kidney disease) stage 3, GFR 30-59 ml/min ([facility])  Resolved Problems:    * No resolved hospital problems. *      Plan:  After reviewing Mrs. Flynn's  history it appears that despite an increase in her dosage of dofetilide she return to atrial fibrillation. For this reason I have added low-dose nadolol with the hope of synergy and if this does not work will then consider amiodarone.   I have also discussed potential atrial fibrillation ablation with her and she notes that she will consider this. Her son called her today and told her that she needs to go to the [facility]. Maintain strict anticoagulation indefinitely. I have spent more than 45 minutes face to face with Tommie Blunt reviewing notes and laboratory data with greater than 50% of this time instructing and counseling the patient regarding my findings and recommendations and I have answered all questions as posed to me by MsJazmin Rina. Thank you, GIO Langford - CNP for allowing me to consult in the care of this patient. Michelle Goncalves DO DO, FACP, [facility], [facility]    NOTE:  This report was transcribed using voice recognition software. Every effort was made to ensure accuracy; however, inadvertent computerized transcription errors may be present.

## 2022-04-15 NOTE — PROGRESS NOTES
Physician Progress Note      PATIENT:               Chana ROSA #:                  [id number]  :                       1945  ADMIT DATE:       2022 10:29 PM  [address] [facility] DATE:  RESPONDING  PROVIDER #:        Yeyo Kirk          QUERY TEXT:    Patient admitted with A-fib with RVR. Patient noted to have use of home   oxygen. If possible, please document in the progress notes and discharge   summary if you are evaluating and/or treating any of the following: The medical record reflects the following:  Risk Factors: COPD  Clinical Indicators: Per H&P \". .. 30 pack-year smoking hx (quit 3 yrs ago),   who presented with a chief complaint of shortness of breath. Pt normally on   home oxygen 3L NC. Benedetta Ou Benedetta Ou \" on arrival RR of 22 and SpO2 99% on 3LPM  Treatment: Oxygen  Options provided:  -- Chronic respiratory failure with hypoxia  -- Chronic respiratory failure with hypercapnia  -- Chronic respiratory failure with hypoxia and hypercapnia  -- Other - I will add my own diagnosis  -- Disagree - Not applicable / Not valid  -- Disagree - Clinically unable to determine / Unknown  -- Refer to Clinical Documentation Reviewer    PROVIDER RESPONSE TEXT:    This patient has chronic respiratory failure with hypoxia.     Query created by: Nusrat Sneed on 4/15/2022 2:08 PM      Electronically signed by:  Yeyo Kirk 4/15/2022 4:38 PM

## 2022-04-15 NOTE — CARE COORDINATION
4/15/2022 Social Work Discharge Planning: This worker met with Pt to discuss  role and transition of care/discharge planning. Pt and her spouse reside in a two story home;however, Pt stays on the first level. No steps. Pt uses a cane at home. Pt has a nebulizer and tello Allan uses 3l cont. via [facility]. Awaiting therapy evals;however,Pt is planning to return home. Electronically signed by RAYMOND Castillo on 4/15/2022 at 10:14 AM

## 2022-04-15 NOTE — ED NOTES
sbar faxed and confirmed received with joesph. Ed to transport.      Cordell Headley RN  04/15/22 0111

## 2022-04-15 NOTE — PROGRESS NOTES
Physical Therapy    Facility/Department: [address] INTERNAL MEDICINE 2  Initial Assessment    NAME: Lindsay Sarabia  : 1945  MRN: [id number]    Date of Service: 4/15/2022      Patient Diagnosis(es): There were no encounter diagnoses. has a past medical history of Arthritis, Atrial fibrillation ([facility] [address] Bilateral carotid artery stenosis, Bronchitis, Carotid stenosis, bilateral, Hyperlipidemia, Hypertension, Left carotid stenosis, O2 dependent, On continuous oral anticoagulation, S/P carotid endarterectomy, Stomach ulcer, and Thyroid disease. has a past surgical history that includes Hysterectomy; Cholecystectomy; Appendectomy; back surgery; Colonoscopy; joint replacement (); eye surgery; and Endoscopy, colon, diagnostic (01/10/2018). Evaluating Therapist: Ken Gifford PT      Room #:  6267/1196-D  Diagnosis:  Atrial fibrillation with rapid ventricular response (HCC) [I48.91]  Precautions:  Falls, O2      Social:  Pt lives with  in a 2 floor plan but has first floor setup.  3 steps to enter with rail. Prior to admission independent without device. Uses home O2     Initial Evaluation  Date: 4/15/22 Treatment      Short Term/ Long Term   Goals   Was pt agreeable to Eval/treatment? yes     Does pt have pain? no     Bed Mobility  Rolling: NT  Supine to sit: NT  Sit to supine: NT  Scooting: NT  independent   Transfers Sit to stand: supervision  Stand to sit: supervision  Stand pivot: supervision  independent   Ambulation    50 feet with no device with supervision  100 feet with no device independent   Stair Negotiation  Ascended and descended  NT   3 steps with 1 rail independent   LE strength     4-5/    4/5   balance      Fair+     AM-PAC Raw score               18/24         Pt is alert and Oriented   LE ROM: WFL  Sensation: intact  Edema: none  Endurance: fair     ASSESSMENT:    Pt displays functional ability as noted in the objective portion of this evaluation.       Patient education  Pt educated on PT objectives    Patient response to education:   Pt verbalized understanding Pt demonstrated skill Pt requires further education in this area   yes           Comments:  SpO2 on 3L during session %      Pt's/ family goals   1. To return home    Conditions Requiring Skilled Therapeutic Intervention:    [x]Decreased strength     []Decreased ROM  [x]Decreased functional mobility  [x]Decreased balance   [x]Decreased endurance   []Decreased posture  []Decreased sensation  []Decreased coordination   []Decreased vision  []Decreased safety awareness   []Increased pain       Patient and or family understand(s) diagnosis, prognosis, and plan of care. Prognosis is good for reaching above PT goals    PHYSICAL THERAPY PLAN OF CARE:    PT POC is established based on physician order and patient diagnosis     Referring provider/PT Order: GIO Parker CNP/ PT eval and treat      Current Treatment Recommendations:     [x] Strengthening to improve independence with functional mobility   [] ROM to improve independence with functional mobility   [x] Balance Training to improve static/dynamic balance and to reduce fall risk  [x] Endurance Training to improve activity tolerance during functional mobility   [x] Transfer Training to improve safety and independence with all functional transfers   [x] Gait Training to improve gait mechanics, endurance and assess need for appropriate assistive device  [x] Stair Training in preparation for safe discharge home and/or into the community   [] Positioning to prevent skin breakdown and contractures  [] Safety and Education Training   [] Patient/Caregiver Education   [] HEP  [] Other     PT long term treatment goals are located in above grid    Frequency of treatments: 2-5x/week x 3 days.     Time in  0950  Time out  1005        Evaluation Time includes thorough review of current medical information, gathering information on past medical history/social history and prior level of function, completion of standardized testing/informal observation of tasks, assessment of data and education on plan of care and goals.       CPT codes:  [x] Low Complexity PT evaluation 35682  [] Moderate Complexity PT evaluation 30982  [] High Complexity PT evaluation 07175  [] PT Re-evaluation 64620  [] Gait training 99591 minutes  [] Manual therapy 27906 minutes  [] Therapeutic activities 25338 minutes  [] Therapeutic exercises 99027 minutes  [] Neuromuscular reeducation 80802 minutes     [facility] PT [id number]

## 2022-04-15 NOTE — PROGRESS NOTES
I was called by the ED because the patient was found to be afib RVR. Per report the patient is known to Dr. Ludivina Booth and the patient is needing admission for adjustment of tikosyn therapy and further monitoring. I have placed admission orders. Dr Barb Marcano will see the patient in the morning, please call me with any urgent matters.

## 2022-04-15 NOTE — H&P
Internal Medicine History & Physical     Name: Pritesh Burt  : 1945  Chief Complaint: Atrial Fibrillation (140-160s, hx afib, on eliquis and tikosyn) and Shortness of Breath (increased from baseline shortness of breath, 99% 3L home oxygen )  Primary Care Physician: GIO Espinal CNP  Admission date: 2022  Date of service: 4/15/2022     History of Present Illness  Francesco Navarro is a 64y.o. year old female with a PMH of A-fb (on Eloquis and Dofetilide and is followed by Dr. Luis Lee, cardiology), HTN, CHF, COPD, Asthma, CKD, stomach ulcers, carotid stenosis (had carotid endarterectomy), hypothyroidism (on synthroid), 30 pack-year smoking hx (quit 3 yrs ago), who presented with a chief complaint of shortness of breath. Pt normally on home oxygen 3L NC. Pt also relates having some dizziness over the past week. Pt was admitted yesterday for SOB and a-fib w/ RVR and is feeling a bit improved today. Relates SOB as mild at this time. Her SOB is constant, mild, w/ no improving or relieving factors (currently on her normal 02 3L NC). She also complains of some right leg swelling which is new to her. Dr. Luis Lee, cardiology, to see pt today.          Past Medical History:   Diagnosis Date    Arthritis     Atrial fibrillation (HCC)     Tikosyn per Dr. Vickye Mcardle Bilateral carotid artery stenosis 2020    Bronchitis     Carotid stenosis, bilateral 2012    Hyperlipidemia     Hypertension     Left carotid stenosis 2018    O2 dependent 2020    On continuous oral anticoagulation     Eliquis    S/P carotid endarterectomy 10/06/2016    Stomach ulcer     Thyroid disease        Past Surgical History:   Procedure Laterality Date    APPENDECTOMY      BACK SURGERY      CHOLECYSTECTOMY      COLONOSCOPY      ENDOSCOPY, COLON, DIAGNOSTIC  01/10/2018    upper endo    EYE SURGERY      BILATERAL CATARACT    HYSTERECTOMY      JOINT REPLACEMENT  2011    RIGHT KNEE       Family History Problem Relation Age of Onset    Other Mother         rheumatic fever    Cancer Sister         lung         Social History  Patient lives at home. At baseline patient ambulates with w/o support  Illicit drugs: Denies   TOBACCO:   reports that she quit smoking about 3 years ago. Her smoking use included cigarettes. She has a 30.00 pack-year smoking history. She has never used smokeless tobacco.  ETOH:   reports previous alcohol use. Home Medications  Prior to Admission medications    Medication Sig Start Date End Date Taking? Authorizing Provider   Arformoterol Tartrate (BROVANA) 15 MCG/2ML NEBU Take 2 mLs by nebulization 2 times daily 3/25/22   Seth Puckett MD   ipratropium (ATROVENT) 0.02 % nebulizer solution Take 2.5 mLs by nebulization 4 times daily 3/25/22   Seth Puckett MD   Respiratory Therapy Supplies (FULL KIT NEBULIZER SET) MISC Use as directed with nebulized medication.  3/25/22   Seth Puckett MD   dofetilide [facility]) 250 MCG capsule Take 1 capsule by mouth every 12 hours 3/25/22   Pallavi Mancilla DO   FERREX 150 150 MG capsule TAKE 1 CAPSULE BY MOUTH TWICE DAILY 1/6/22   Historical Provider, MD   sucralfate (CARAFATE) 1 GM tablet TAKE 1 TABLET BY MOUTH ONCE DAILY 1/24/22   Historical Provider, MD   levothyroxine (SYNTHROID) 150 MCG tablet TAKE 1 TABLET BY MOUTH ONCE DAILY 7/26/21   Historical Provider, MD   [facility] ELLIPTA 62.5-25 MCG/INH AEPB inhaler INHALE 1 PUFF BY MOUTH ONCE DAILY 12/14/20   Historical Provider, MD   amLODIPine (NORVASC) 2.5 MG tablet Take 1 tablet by mouth daily 8/31/20   Maria Esther Still MD   OXYGEN Inhale into the lungs    Historical Provider, MD   apixaban (ELIQUIS) 5 MG TABS tablet Take 1 tablet by mouth 2 times daily 9/25/19   GIO Garcias CNP   valsartan (DIOVAN) 160 MG tablet Take 1 tablet by mouth daily 9/26/19   GIO Garcias CNP   venlafaxine (EFFEXOR XR) 150 MG extended release capsule Take 150 mg by mouth daily 4/22/18   Historical Provider, MD   pantoprazole (PROTONIX) 40 MG tablet Take 1 tablet by mouth daily 1/12/18   Lizbeth Agee MD   atorvastatin (LIPITOR) 20 MG tablet Take 20 mg by mouth daily    Historical Provider, MD   Multiple Vitamins-Minerals (THERAPEUTIC MULTIVITAMIN-MINERALS) tablet Take 1 tablet by mouth daily    Historical Provider, MD   Cholecalciferol (VITAMIN D) 2000 UNITS CAPS capsule Take 1 capsule by mouth daily    Historical Provider, MD       Allergies  No Known Allergies    Review of Systems  Please see HPI above. All bolded are positive. All un-bolded are negative.   Constitutional Symptoms: fever, chills, fatigue, generalized weakness, diaphoresis, increase in thirst, loss of appetite  Eyes: vision change   Ears, Nose, Mouth, Throat: hearing loss, nasal congestion, sores in the mouth  Cardiovascular: chest pain, chest heaviness, palpitations  Respiratory: shortness of breath (mild), wheezing, coughing  Gastrointestinal: abdominal pain, nausea, vomiting, diarrhea, constipation, melena, hematochezia, hematemesis  Genitourinary: dysuria, hematuria, increased frequency  Musculoskeletal: lower extremity edema (right), myalgias, arthralgias, back pain  Integumentary: rashes, itching   Neurological: headache, lightheadedness, dizziness, confusion, syncope, numbness, tingling, focal weakness  Psychiatric: depression, suicidal ideation, anxiety  Endocrine: unintentional weight change  Hematologic/Lymphatic: lymphadenopathy, easy bruising, easy bleeding   Allergic/Immunologic: recurrent infections      Objective  VITALS:  /63   Pulse 62   Temp 99.5 °F (37.5 °C) (Oral)   Resp 18   Ht 5' 6\" (1.676 m)   Wt 250 lb 6 oz (113.6 kg)   SpO2 100%   BMI 40.41 kg/m²     Physical Exam:  General: awake, alert, oriented to person, place, time, and purpose, appears stated age, cooperative, no acute distress, pleasant, appropriate mood  Eyes: conjunctivae/corneas clear, sclera non icteric, EOMI  Ears: no obvious scars, no lesions, no masses, hearing intact  Mouth: mucous membranes moist, no obvious oral sores  Head: normocephalic, atraumatic  Neck: no JVD, no adenopathy, no thyromegaly, neck is supple, trachea is midline  Back: ROM normal, no CVA tenderness. Chest: no pain on palpation  Lungs: clear to auscultation bilaterally, without rhonchi, crackle, wheezing, or rale, no retractions or use of accessory muscles (Currently on 2.5 L NC O2, which is normal home use)  Heart: regular rate and regular rhythm, no murmur, normal S1, S2  Abdomen: soft, non-tender; bowel sounds normal; no masses, no organomegaly  : Deferred   Extremities: lower extremity edema (R>L), extremities atraumatic, no cyanosis, no clubbing, 2+ pedal pulses palpated  Skin: normal color, normal texture, normal turgor, no rashes, no lesions  Neurologic:5/5 muscle strength throughout, normal muscle tone throughout, face symmetric, hearing intact, tongue midline, speech appropriate without slurring, sensation to fine touch intact in upper and lower extremities    Labs-   Lab Results   Component Value Date    WBC 7.5 04/15/2022    HGB 10.6 (L) 04/15/2022    HCT 34.5 04/15/2022     04/15/2022     04/15/2022    K 5.0 04/15/2022     04/15/2022    CREATININE 0.9 04/15/2022    BUN 23 04/15/2022    CO2 27 04/15/2022    GLUCOSE 113 (H) 04/15/2022    ALT 17 04/15/2022    AST 18 04/15/2022    INR 1.4 08/27/2020     Lab Results   Component Value Date    TROPONINI <0.01 08/27/2020       Last echocardiogram:3/22/22 (Dr. Julianna Hayward, cardiology)  Est EF 64%    Recent Radiological Studies:  XR CHEST PORTABLE   Final Result   Mild cardiomegaly. Otherwise, no acute disease.              Assessment  Active Hospital Problems    Diagnosis     Atrial fibrillation with rapid ventricular response (HCC) [I48.91]     CKD (chronic kidney disease) stage 3, GFR 30-59 ml/min ([facility]) [N18.30]     Class 2 obesity due to excess calories with body mass index (BMI) of 38.0 to 38.9 in adult [E66.09, Z68.38]     Bilateral carotid artery stenosis [I65.23]     Asthma [P99.620]     Diastolic dysfunction [Y82.19]     CHF (congestive heart failure) ([facility]) [I50.9]     S/P carotid endarterectomy [Z98.890]     Essential hypertension [I10]     Mixed hyperlipidemia [E78.2]        Patient Active Problem List    Diagnosis Date Noted    Atrial fibrillation with rapid ventricular response ([facility] 03/22/2022    Atrial fibrillation with RVR ([facility] 08/30/2020    CKD (chronic kidney disease) stage 3, GFR 30-59 ml/min ([facility] 08/29/2020    MCCLELLAND (dyspnea on exertion) 08/27/2020    Primary osteoarthritis of left knee 08/20/2020    Lumbar spondylosis 08/20/2020    Class 2 obesity due to excess calories with body mass index (BMI) of 38.0 to 38.9 in adult 08/20/2020    Bilateral carotid artery stenosis 07/16/2020    O2 dependent 07/16/2020    Asthma 16/55/3630    Diastolic dysfunction 85/85/7885    CHF (congestive heart failure) ([facility] 01/09/2018    Hypoxia 01/08/2018    S/P carotid endarterectomy 10/06/2016    Essential hypertension 09/13/2016    Mixed hyperlipidemia 09/13/2016       Plan  · Atrial fibrillation with RVR   · Cardiology consultation   · Rate/rhythm control per cardio   · Continue Eliquis and Tikosyn  · Son considering transfer to [facility] will discuss with Dr Ludivina Booth   · Hypothyroidism   · Continue home thyroid replacement   · RLE swelling   · US RLE ordered   · PT/OT  · Consults:  · Home medications to be reconciled and confirmed prior to being ordered  · DVT prophylaxis - Continue eliquis  · Code Status: Full   · Discharge plan: TBD pending clinical improvement     Jordon Lang MD  Internal medicine   4/15/2022  12:53 PM    I can be reached through [employer]. NOTE:  This report was transcribed using voice recognition software. Every effort was made to ensure accuracy; however, inadvertent computerized transcription errors may be present.

## 2022-04-15 NOTE — PROGRESS NOTES
Occupational Therapy  OCCUPATIONAL THERAPY INITIAL EVALUATION     Vanessa Gillespie [facility], [place]        Date:4/15/2022                                                  Patient Name: Angelo Byrnes    MRN: [id number]    : 1945    Room: [address] [place] [address]      Evaluating OT: Tyler Merlos OTR/JUSTIN [id number]      Referring Provider: GIO Stoll CNP    Specific Provider Orders/Date: OT eval and treat 4/15/22      Diagnosis:  Atrial fibrillation with rapid ventricular response ([facility] [I48.91]      Pertinent Medical History: arthritis, a-fib, HTN      Precautions:  Fall Risk, O2     Assessment of current deficits    [x] Functional mobility  [x]ADLs  [x] Strength               []Cognition    [x] Functional transfers   [x] IADLs         [x] Safety Awareness   [x]Endurance    [] Fine Coordination              [x] Balance      [] Vision/perception   []Sensation     []Gross Motor Coordination  [] ROM  [] Delirium                   [] Motor Control     OT PLAN OF CARE   OT POC based on physician orders, patient diagnosis and results of clinical assessment    Frequency/Duration 1-3 days/wk for 2 weeks PRN   Specific OT Treatment Interventions to include:   * Instruction/training on adapted ADL techniques and AE recommendations to increase functional independence within precautions       * Training on energy conservation strategies, correct breathing pattern and techniques to improve independence/tolerance for self-care routine  * Functional transfer/mobility training/DME recommendations for increased independence, safety, and fall prevention  * Patient/Family education to increase follow through with safety techniques and functional independence  * Recommendation of environmental modifications for increased safety with functional transfers/mobility and ADLs  * Therapeutic exercise to improve motor endurance, ROM, and functional strength for ADLs/functional transfers  * Therapeutic activities to facilitate/challenge dynamic balance, stand tolerance for increased safety and independence with ADLs        Recommended Adaptive Equipment: TBD     Home Living: Pt lives with  in 2 story house with 3 SANDOVAL. Pt's bedroom and bathroom are on the 1st floor. Bathroom setup: tub/shower with grab bars, elevated commode   Equipment owned: O2 2L, cane    Prior Level of Function: independent with ADLs , independent with IADLs; functional mobility: no device (cane PRN in community)    Pain Level: Pt did not report pain this date; pt agreeable to therapy  Cognition: A&O: pt alert and oriented grossly; [facility] command follow demonstrated. Memory:  WFL   Sequencing:  WFL   Problem solving:  WFL   Judgement/safety:  WFL     Functional Assessment:  AM-PAC Daily Activity Raw Score: 19/24   Initial Eval Status  Date: 4/15/22 Treatment Status  Date: STGs = LTGs  Time frame: 10-14 days   Feeding Independent      Grooming Sup  Mod I/I   UB Dressing Sup  Mod I/I   LB Dressing Sup  To doff/don socks seated in chair   Mod I/I-with use of AD as appropriate/needed   Bathing SBA  Mod I/I -with use of AD as appropriate/needed   Toileting Sup  Independent     Bed Mobility  Pt in chair   Independent    Functional Transfers Sup with no device  Sit<>stand from chair  Independent     Functional Mobility Sup with no device  Short distance in room    Independent  -with device as needed to maximize independence with ADLs and functional task completion   Balance Sitting:     Static:  Independent     Dynamic:Sup  Standing: Sup  Independent dynamic sitting balance to maximize independence with ADLs and functional task completion    Independent for standing balance to maximize independence with ADLs and functional task completion   Activity Tolerance Fair with light activity. Pt on 3L O2 and SpO2 was % during session. Good with ADL activity.  Pt will demonstrate good understanding of education provided on EC/WS techniques    Visual/  Perceptual Glasses: yes                Additional long-term goal: Pt will increase functional independence to PLOF to allow pt to live in least restrictive environment. Hand Dominance R   AROM (PROM) Strength Additional Info:    RUE  WFL WFL good  and wfl FMC/dexterity noted during ADL tasks and functional transfers     LUE [facility] WFL good  and wfl FMC/dexterity noted during ADL tasks and functional transfers     Hearing: [facility]   Sensation:  No c/o numbness or tingling   Tone: WFL   Edema: none noted    Comments: Upon arrival patient sitting in chair. At end of session, patient returned to chair with call light and phone within reach, all lines and tubes intact. Overall patient demonstrated decreased independence and safety during completion of ADL/functional transfer/mobility tasks. Pt would benefit from continued skilled OT to increase safety and independence with completion of ADL/IADL tasks for functional independence and quality of life. Rehab Potential: Good for established goals     Patient / Family Goal: none stated    Patient and/or family were instructed on functional diagnosis, prognosis/goals and OT plan of care. Demonstrated good understanding. Eval Complexity: Low    Time In: 0950  Time Out: 1000    Min Units   OT Eval Low 97165  x  1   OT Eval Medium 83332      OT Eval High 97545      OT Re-Eval [id number]       Therapeutic Ex 94565       Therapeutic Activities 06249       ADL/Self Care 13729       Orthotic Management 78928       Manual [id number]     Neuro Re-Ed 96795       Non-Billable Time          Evaluation Time additionally includes thorough review of current medical information, gathering information on past medical history/social history and prior level of function, interpretation of standardized testing/informal observation of tasks, assessment of data and development of plan of care and goals.             Jorgito Arevalo, OTR/L, [id number]

## 2022-04-16 VITALS
SYSTOLIC BLOOD PRESSURE: 134 MMHG | WEIGHT: 253 LBS | DIASTOLIC BLOOD PRESSURE: 59 MMHG | BODY MASS INDEX: 40.66 KG/M2 | OXYGEN SATURATION: 97 % | TEMPERATURE: 97.9 F | RESPIRATION RATE: 18 BRPM | HEIGHT: 66 IN | HEART RATE: 62 BPM

## 2022-04-16 LAB
ALBUMIN SERPL-MCNC: 4 G/DL (ref 3.5–5.2)
ALP BLD-CCNC: 71 U/L (ref 35–104)
ALT SERPL-CCNC: 13 U/L (ref 0–32)
ANION GAP SERPL CALCULATED.3IONS-SCNC: 6 MMOL/L (ref 7–16)
AST SERPL-CCNC: 14 U/L (ref 0–31)
BASOPHILS ABSOLUTE: 0.06 E9/L (ref 0–0.2)
BASOPHILS RELATIVE PERCENT: 1 % (ref 0–2)
BILIRUB SERPL-MCNC: 0.6 MG/DL (ref 0–1.2)
BUN BLDV-MCNC: 25 MG/DL (ref 6–23)
CALCIUM SERPL-MCNC: 9 MG/DL (ref 8.6–10.2)
CHLORIDE BLD-SCNC: 99 MMOL/L (ref 98–107)
CO2: 30 MMOL/L (ref 22–29)
CREAT SERPL-MCNC: 1.1 MG/DL (ref 0.5–1)
EOSINOPHILS ABSOLUTE: 0.18 E9/L (ref 0.05–0.5)
EOSINOPHILS RELATIVE PERCENT: 3 % (ref 0–6)
GFR AFRICAN AMERICAN: 58
GFR NON-AFRICAN AMERICAN: 48 ML/MIN/1.73
GLUCOSE BLD-MCNC: 102 MG/DL (ref 74–99)
HCT VFR BLD CALC: 32.5 % (ref 34–48)
HEMOGLOBIN: 10 G/DL (ref 11.5–15.5)
IMMATURE GRANULOCYTES #: 0.02 E9/L
IMMATURE GRANULOCYTES %: 0.3 % (ref 0–5)
LYMPHOCYTES ABSOLUTE: 1.63 E9/L (ref 1.5–4)
LYMPHOCYTES RELATIVE PERCENT: 26.9 % (ref 20–42)
MCH RBC QN AUTO: 30.9 PG (ref 26–35)
MCHC RBC AUTO-ENTMCNC: 30.8 % (ref 32–34.5)
MCV RBC AUTO: 100.3 FL (ref 80–99.9)
MONOCYTES ABSOLUTE: 0.58 E9/L (ref 0.1–0.95)
MONOCYTES RELATIVE PERCENT: 9.6 % (ref 2–12)
NEUTROPHILS ABSOLUTE: 3.58 E9/L (ref 1.8–7.3)
NEUTROPHILS RELATIVE PERCENT: 59.2 % (ref 43–80)
PDW BLD-RTO: 12.7 FL (ref 11.5–15)
PLATELET # BLD: 184 E9/L (ref 130–450)
PMV BLD AUTO: 11.5 FL (ref 7–12)
POTASSIUM REFLEX MAGNESIUM: 5.4 MMOL/L (ref 3.5–5)
RBC # BLD: 3.24 E12/L (ref 3.5–5.5)
SODIUM BLD-SCNC: 135 MMOL/L (ref 132–146)
T4 FREE: 1.15 NG/DL (ref 0.93–1.7)
TOTAL PROTEIN: 6.4 G/DL (ref 6.4–8.3)
WBC # BLD: 6.1 E9/L (ref 4.5–11.5)

## 2022-04-16 PROCEDURE — G0378 HOSPITAL OBSERVATION PER HR: HCPCS

## 2022-04-16 PROCEDURE — 2580000003 HC RX 258: Performed by: NURSE PRACTITIONER

## 2022-04-16 PROCEDURE — 6370000000 HC RX 637 (ALT 250 FOR IP): Performed by: INTERNAL MEDICINE

## 2022-04-16 PROCEDURE — 2700000000 HC OXYGEN THERAPY PER DAY

## 2022-04-16 PROCEDURE — 6360000002 HC RX W HCPCS: Performed by: NURSE PRACTITIONER

## 2022-04-16 PROCEDURE — 6370000000 HC RX 637 (ALT 250 FOR IP): Performed by: NURSE PRACTITIONER

## 2022-04-16 PROCEDURE — 80053 COMPREHEN METABOLIC PANEL: CPT

## 2022-04-16 PROCEDURE — 84439 ASSAY OF FREE THYROXINE: CPT

## 2022-04-16 PROCEDURE — 36415 COLL VENOUS BLD VENIPUNCTURE: CPT

## 2022-04-16 PROCEDURE — 94640 AIRWAY INHALATION TREATMENT: CPT

## 2022-04-16 PROCEDURE — 85025 COMPLETE CBC W/AUTO DIFF WBC: CPT

## 2022-04-16 RX ORDER — NADOLOL 20 MG/1
5 TABLET ORAL ONCE
Status: COMPLETED | OUTPATIENT
Start: 2022-04-16 | End: 2022-04-16

## 2022-04-16 RX ORDER — DOFETILIDE 0.25 MG/1
250 CAPSULE ORAL 2 TIMES DAILY
Qty: 60 CAPSULE | Refills: 3 | Status: ON HOLD | OUTPATIENT
Start: 2022-04-16 | End: 2022-07-25 | Stop reason: HOSPADM

## 2022-04-16 RX ORDER — NADOLOL 20 MG/1
5 TABLET ORAL DAILY
Qty: 30 TABLET | Refills: 3 | Status: SHIPPED | OUTPATIENT
Start: 2022-04-16 | End: 2022-07-21

## 2022-04-16 RX ADMIN — ARFORMOTEROL TARTRATE 15 MCG: 15 SOLUTION RESPIRATORY (INHALATION) at 08:59

## 2022-04-16 RX ADMIN — IPRATROPIUM BROMIDE 0.5 MG: 0.5 SOLUTION RESPIRATORY (INHALATION) at 08:59

## 2022-04-16 RX ADMIN — VALSARTAN 160 MG: 160 TABLET, FILM COATED ORAL at 09:04

## 2022-04-16 RX ADMIN — APIXABAN 5 MG: 5 TABLET, FILM COATED ORAL at 08:44

## 2022-04-16 RX ADMIN — Medication 2000 UNITS: at 08:44

## 2022-04-16 RX ADMIN — VENLAFAXINE HYDROCHLORIDE 150 MG: 150 CAPSULE, EXTENDED RELEASE ORAL at 08:44

## 2022-04-16 RX ADMIN — MULTIPLE VITAMINS W/ MINERALS TAB 1 TABLET: TAB at 08:44

## 2022-04-16 RX ADMIN — NADOLOL 5 MG: 20 TABLET ORAL at 11:49

## 2022-04-16 RX ADMIN — LEVOTHYROXINE SODIUM 150 MCG: 75 TABLET ORAL at 05:53

## 2022-04-16 RX ADMIN — IPRATROPIUM BROMIDE 0.5 MG: 0.5 SOLUTION RESPIRATORY (INHALATION) at 12:30

## 2022-04-16 RX ADMIN — PANTOPRAZOLE SODIUM 40 MG: 40 TABLET, DELAYED RELEASE ORAL at 08:44

## 2022-04-16 RX ADMIN — Medication 150 MG: at 08:44

## 2022-04-16 RX ADMIN — Medication 10 ML: at 09:04

## 2022-04-16 RX ADMIN — SUCRALFATE 1 G: 1 TABLET ORAL at 08:44

## 2022-04-16 RX ADMIN — DOFETILIDE 250 MCG: 0.25 CAPSULE ORAL at 09:04

## 2022-04-16 RX ADMIN — AMLODIPINE BESYLATE 2.5 MG: 2.5 TABLET ORAL at 09:04

## 2022-04-16 ASSESSMENT — PAIN SCALES - GENERAL: PAINLEVEL_OUTOF10: 0

## 2022-04-16 NOTE — PROGRESS NOTES
Internal Medicine Progress Note    Patient's name: Agustin Root  : 1945  Chief complaints (on day of admission): Atrial Fibrillation (140-160s, hx afib, on eliquis and tikosyn) and Shortness of Breath (increased from baseline shortness of breath, 99% 3L home oxygen )  Admission date: 2022  Date of service: 2022   Room: [address] INTERNAL MEDICINE 2  Primary care physician: GIO Garcias CNP  Reason for visit: Follow-up for a fib rvr     Subjective  Issa Arce was seen and examined at bedside     Doing well today   Asymptomatic but had some bradycardia in the 50s   She would like to gohome today   Told her this will be up to Dr Julianna Hayward at this point   Discussed case with Dr Julianna Hayward today  Discussed with nursing staff     Current treatment plan discussed and all questions answered     Current medications being prescribed discussed and patient expresses verbal understanding     Review of Systems  There are no new complaints of chest pain, shortness of breath, abdominal pain, nausea, vomiting, diarrhea, constipation unless otherwise mentioned above.      Hospital Medications  Current Facility-Administered Medications   Medication Dose Route Frequency Provider Last Rate Last Admin    amLODIPine (NORVASC) tablet 2.5 mg  2.5 mg Oral Daily GIO Rich - CNP   2.5 mg at 04/15/22 0823    apixaban (ELIQUIS) tablet 5 mg  5 mg Oral BID Rocky Wall APRN - CNP   5 mg at 04/15/22 2016    Arformoterol Tartrate (BROVANA) nebulizer solution 15 mcg  15 mcg Nebulization BID Rocky Wall APRN - CNP   15 mcg at 04/15/22 2048    atorvastatin (LIPITOR) tablet 20 mg  20 mg Oral Daily Rocky Duke APRN - CNP   20 mg at 04/15/22 2016    vitamin D (CHOLECALCIFEROL) tablet 2,000 Units  2,000 Units Oral Daily Rockymukul Wall APRN - CNP   2,000 Units at 04/15/22 0823    dofetilide (TIKOSYN) capsule 250 mcg  250 mcg Oral 2 times per day Rocky Wall APRN - CNP   250 mcg at 04/15/22 2016    Carmela Thakkar, APRN - CNP        nadolol (CORGARD) tablet 10 mg  10 mg Oral BID Pallavi Mancilla DO   10 mg at 04/15/22 2016       PRN Medications  sodium chloride flush, sodium chloride, potassium chloride **OR** potassium alternative oral replacement **OR** potassium chloride, senna, acetaminophen **OR** acetaminophen    Objective  Most Recent Recorded Vitals  BP (!) 114/56   Pulse 58   Temp 97.8 °F (36.6 °C) (Oral)   Resp 18   Ht 5' 6\" (1.676 m)   Wt 253 lb (114.8 kg)   SpO2 99%   BMI 40.84 kg/m²   No intake/output data recorded. No intake/output data recorded. Physical Exam:  General: AAO to person/place/time/purpose, NAD, no labored breathing  Eyes: conjunctivae/corneas clear, sclera non icteric  Skin: color/texture/turgor normal, no rashes or lesions  Lungs: CTAB, no retractions/use of accessory muscles, no vocal fremitus, no rhonchi, no crackle, no rales  Heart: regular rate, regular rhythm, no murmur  Abdomen: soft, NT, bowel sounds normal  Extremities: atraumatic, no edema  Neurologic: cranial nerves 2-12 grossly intact, no slurred speech    Most Recent Labs  Lab Results   Component Value Date    WBC 6.1 04/16/2022    HGB 10.0 (L) 04/16/2022    HCT 32.5 (L) 04/16/2022     04/16/2022     04/16/2022    K 5.4 (H) 04/16/2022    CL 99 04/16/2022    CREATININE 1.1 (H) 04/16/2022    BUN 25 (H) 04/16/2022    CO2 30 (H) 04/16/2022    GLUCOSE 102 (H) 04/16/2022    ALT 13 04/16/2022    AST 14 04/16/2022    INR 1.4 08/27/2020    TSH 2.430 04/14/2022    LABA1C 4.9 04/23/2021       XR CHEST PORTABLE   Final Result   Mild cardiomegaly. Otherwise, no acute disease.              Echocardiogram       Assessment   Active Hospital Problems    Diagnosis     Atrial fibrillation with rapid ventricular response (HCC) [I48.91]     CKD (chronic kidney disease) stage 3, GFR 30-59 ml/min (HCC) [N18.30]     Class 2 obesity due to excess calories with body mass index (BMI) of 38.0 to 38.9 in adult [E66.09, Z68.38]  Bilateral carotid artery stenosis [I65.23]     Asthma [O98.507]     Diastolic dysfunction [U17.63]     CHF (congestive heart failure) (HCC) [I50.9]     S/P carotid endarterectomy [Z98.890]     Essential hypertension [I10]     Mixed hyperlipidemia [E78.2]          Plan  · Atrial fibrillation with RVR   ? Cardiology consultation   ? Rate/rhythm control per cardio   ? Continue Eliquis and Tikosyn  ? Son considering transfer to [facility] will discuss with Dr Linda Mathis   · Asymptomatic bradycardia   · Nadolol was given today   · Defer to cardiology   · Hypothyroidism   ? Continue home thyroid replacement   · RLE swelling   ? US RLE ordered   · PT/OT  · Consults:  · Home medications to be reconciled and confirmed prior to being ordered  · DVT prophylaxis - Continue eliquis  · Code Status: Full   · Medications, labs and imaging reviewed   · Discharge plan: Once cleared by cardiology, maybe today     Electronically signed by Magali Patrick MD on 4/16/2022 at 7:34 AM    I can be reached through [facility].

## 2022-04-16 NOTE — DISCHARGE SUMMARY
Internal Medicine Discharge Summary    NAME: Fatoumata Marie :  1945  MRN:  [id number] GIO Butler CNP    ADMITTED: 2022   DISCHARGED: No discharge date for patient encounter. ADMITTING PHYSICIAN: Kira Aranda MD    PCP: GIO Villalobos CNP    CONSULTANT(S):   IP CONSULT TO CARDIOLOGY  IP CONSULT TO INTERNAL MEDICINE  IP CONSULT TO SOCIAL WORK     ADMITTING DIAGNOSIS:   Atrial fibrillation with rapid ventricular response ([address] [I48.91]     Please see H&P for further details    DISCHARGE DIAGNOSES:   Active Hospital Problems    Diagnosis     Atrial fibrillation with rapid ventricular response ([address] [I48.91]     CKD (chronic kidney disease) stage 3, GFR 30-59 ml/min (HCC) [N18.30]     Class 2 obesity due to excess calories with body mass index (BMI) of 38.0 to 38.9 in adult [E66.09, Z68.38]     Bilateral carotid artery stenosis [I65.23]     Asthma [H30.229]     Diastolic dysfunction [M35.76]     CHF (congestive heart failure) ([address] [I50.9]     S/P carotid endarterectomy [Z98.890]     Essential hypertension [I10]     Mixed hyperlipidemia [E78.2]        BRIEF HISTORY OF PRESENT ILLNESS: Fatoumata Marie is a 64 y.o. female patient of GIO Villalobos CNP who  has a past medical history of Arthritis, Atrial fibrillation ([address] Bilateral carotid artery stenosis, Bronchitis, Carotid stenosis, bilateral, Hyperlipidemia, Hypertension, Left carotid stenosis, O2 dependent, On continuous oral anticoagulation, S/P carotid endarterectomy, Stomach ulcer, and Thyroid disease. who originally had concerns including Atrial Fibrillation (140-160s, hx afib, on eliquis and tikosyn) and Shortness of Breath (increased from baseline shortness of breath, 99% 3L home oxygen ). at presentation on 2022, and was found to have Atrial fibrillation with rapid ventricular response ([address] [I48.91] after workup. Please see H&P for further details.     HOSPITAL COURSE:   The patient presented to the hospital with the chief complaint of Atrial Fibrillation (140-160s, hx afib, on eliquis and tikosyn) and Shortness of Breath (increased from baseline shortness of breath, 99% 3L home oxygen )  . The patient was admitted to the hospital.     · Atrial fibrillation with RVR   ? Cardiology consultation   ? Rate/rhythm control per cardio   ? Continue Eliquis and Tikosyn  ? Son considering transfer to [facility] will discuss with Dr Ida Brownlee  · Asymptomatic bradycardia   ? Nadolol was given today   ? Defer to cardiology   · Hypothyroidism   ? Continue home thyroid replacement   · RLE swelling   ? US RLE ordered   · PT/OT  · Consults:  · Home medications to be reconciled and confirmed prior to being ordered  · DVT prophylaxis - Continue eliquis  · Code Status: Full   · Medications, labs and imaging reviewed   · Discharge plan: Once cleared by cardiology, maybe today         BRIEF PHYSICAL EXAMINATION AND LABORATORIES ON DAY OF DISCHARGE:  VITALS:  BP (!) 134/59   Pulse 61   Temp 97.9 °F (36.6 °C) (Oral)   Resp 18   Ht 5' 6\" (1.676 m)   Wt 253 lb (114.8 kg)   SpO2 97%   BMI 40.84 kg/m²       Please see note from the same day.      LABS[de-identified]  Recent Labs     04/14/22  2307 04/15/22  0315 04/16/22  0245    139 135   K 5.9* 5.0 5.4*    101 99   CO2 28 27 30*   BUN 24* 23 25*   CREATININE 1.0 0.9 1.1*   GLUCOSE 99 113* 102*   CALCIUM 9.4 9.2 9.0     Recent Labs     04/15/22  0315 04/16/22  0245   ALKPHOS 78 71   ALT 17 13   AST 18 14   PROT 6.8 6.4   BILITOT 0.4 0.6   LABALBU 4.1 4.0     Recent Labs     04/14/22  2307 04/15/22  0315 04/16/22  0245   WBC 7.2 7.5 6.1   RBC 3.58 3.46* 3.24*   HGB 11.1* 10.6* 10.0*   HCT 35.7 34.5 32.5*   MCV 99.7 99.7 100.3*   MCH 31.0 30.6 30.9   MCHC 31.1* 30.7* 30.8*   RDW 12.7 12.7 12.7    178 184   MPV 10.9 11.0 11.5     Lab Results   Component Value Date    LABA1C 4.9 04/23/2021    LABA1C 6.0 (H) 07/21/2020    LABA1C 5.4 04/21/2020     Lab Results   Component Value Date INR 1.4 08/27/2020    INR 1.1 09/19/2019    INR 1.0 09/07/2016    PROTIME 16.1 (H) 08/27/2020    PROTIME 12.1 09/19/2019    PROTIME 11.4 09/07/2016      Lab Results   Component Value Date    TSH 2.430 04/14/2022    TSH 5.450 (H) 03/23/2022    TSH 1.830 10/19/2021     Lab Results   Component Value Date    TRIG 224 (H) 10/19/2021    TRIG 147 07/21/2021    TRIG 173 (H) 04/19/2021    HDL 77 10/19/2021    HDL 76 07/21/2021    HDL 71 04/19/2021    LDLCALC 65 10/19/2021    LDLCALC 55 07/21/2021    LDLCALC 71 04/19/2021     Recent Labs     04/14/22  2307   MG 1.9       No results for input(s): CKTOTAL, CKMB, TROPONINI in the last 72 hours. No results for input(s): LACTA in the last 72 hours. IMAGING:  Echo Complete    Result Date: 3/24/2022  Transthoracic Echocardiography Report (TTE)  Demographics   Patient Name         Herminia Betts Gender                Female   Medical Record       [id number]      Room Number           0421  Number   Account #            [de-identified]     Procedure Date        03/24/2022   Corporate ID                       Ordering Physician    J Carlos Perez DO   Accession Number     [id number]    Referring Physician   Date of Birth        1945    [address] [place] [address]   Age                  64 year(s)    Interpreting          J Carlos Perez DO                                     Physician                                      Any Other  Procedure Type of Study   TTE procedure:Echo Complete W/ Dop & Color Flow. Procedure Date Date: 03/24/2022 Start: 10:45 AM Study Location: Echo Lab Technical Quality: Adequate visualization Indications:Atrial fibrillation. Patient Status: Routine Height: 65 inches Weight: 200 pounds BSA: 1.98 m^2 BMI: 33.28 kg/m^2 HR: 57 bpm BP: 139/63 mmHg  Findings   Left Ventricle  Left ventricle grossly normal in size. Borderline asymmetric left ventricular septal hypertrophy. Normal LV segmental wall motion.   Estimated left ventricular ejection fraction is 64±5%. Does not meet 50% diagnostic criteria for diastolic dysfunction. Right Ventricle  Normal right ventricular size and function. TAPSE is normal   Left Atrium  The left atrium is mildly dilated. The LAESV Index is <34ml/m2. Interatrial septum appears intact. Right Atrium  Right atrium is normal in size. Mitral Valve  Mild-moderate mitral annular calcification. No evidence of mitral regurgitation. Tricuspid Valve  The tricuspid valve appears structurally normal.  No evidence of tricuspid regurgitation. Unable to accurately assess RV systolic pressure. Aortic Valve  The aortic valve is trileaflet. Aortic valve opens well. The aortic valve appears mildly sclerotic. No doppler evidence of aortic stenosis or regurgitation. Pulmonic Valve  Pulmonic valve is structurally normal.  Physiologic and/or trace pulmonic regurgitation present. Pericardial Effusion  No evidence of pericardial thickening/calcification present. Physiologic minimal pericardial fluid seen. Aorta  Aortic root dimension within normal limits. Miscellaneous  Normal Inferior Vena Cava diameter and respiratory variation. Conclusions   Summary  No evidence of tricuspid regurgitation. Left ventricle grossly normal in size. Borderline asymmetric left ventricular septal hypertrophy. Normal LV segmental wall motion. Estimated left ventricular ejection fraction is 64±5%. Does not meet 50% diagnostic criteria for diastolic dysfunction. The LAESV Index is <34ml/m2. Normal right ventricular size and function. TAPSE is normal  No evidence of tricuspid regurgitation. Unable to accurately assess RV systolic pressure. Physiologic and/or trace pulmonic regurgitation present. Technically good quality study. Technically difficult study due to patient''s body habitus. No comparison study available. Suggest clinical correlation.    Signature   ----------------------------------------------------------------  Electronically signed by Janie Clayton DO(Interpreting  physician) on 03/27/2022 11:30 AM  ----------------------------------------------------------------  M-Mode/2D Measurements & Calculations   LV Diastolic    LV Systolic Dimension: 3.3   AV Cusp Separation: 1.8 cmLA  Dimension: 4.9  cm                           Dimension: 3.9 cmAO Root  cm              LV Volume Diastolic: 223.9   Dimension: 2.8 cm  LV FS:32.7 %    ml  LV PW           LV Volume Systolic: 83.8 ml  Diastolic: 1.1  LV EDV/LV EDV Index: 113.6  cm              ml/57 ml/m^2LV ESV/LV ESV    RV Diastolic Dimension: 3 cm  LV PW Systolic: Index: 28.4 WA/94BO/ m^2  1.5 cm          EF Calculated: 59.9 %        LA/Aorta: 1.71  Septum          LV Mass Index: 135 l/min*m^2 Ascending Aorta: 2.7 cm  Diastolic: 1.6                               LA volume/Index: 57.4 ml  cm                                           /29ml/m^2  Septum          LVOT: 2.1 cm                 RA Area: 69.5 cm^2  Systolic: 1.7  cm                                           IVC Expiration: 1.3 cm  CO: 6.33 l/min  CI: 3.2 l/m*m^2  LV Mass: 267.94  g  Doppler Measurements & Calculations   MV Peak E-Wave:    AV Peak Velocity: 1.31 m/s      LVOT Peak Velocity:  1.26 m/s           AV Peak Gradient: 6.83 mmHg     1.22 m/s  MV Peak A-Wave:    AV Mean Velocity: 0.98 m/s      LVOT Mean Velocity:  1.2 m/s            AV Mean Gradient: 4.2 mmHg      0.91 m/s  MV E/A Ratio: 1.05 AV VTI: 31.7 cm                 LVOT Peak Gradient: 6  MV Peak Gradient:  AV Area (Continuity):3.51 cm^2  mmHgLVOT Mean Gradient:  6.1 mmHg                                           3.6 mmHg  MV Mean Gradient:  LVOT VTI: 32.1 cm  1.7 mmHg           IVRT: 58.8 msec  MV Mean Velocity:  0.55 m/s           Pulm. Vein A Reversal  MV Deceleration    Duration:134.9 msec  Time: 263.6 msec   Pulm. Vein D Velocity:0.44      PV Peak Velocity: 1.04  MV P1/2t: 74.8     m/sPulm.  Vein A Reversal        m/s  msec               Velocity:0.31 m/s               PV Peak Gradient: 4.31  MVA by PHT:2.94    Pulm. Vein S Velocity: 0.58 m/s mmHg  cm^2                                               PV Mean Velocity: 0.67  MV Area                                            m/s  (continuity): 2.7                                  PV Mean Gradient: 2.1  cm^2                                               mmHg  MV E' Septal  Velocity: 0.11 m/s                                 GA ED Velocity: 0.94  MV E' Lateral                                      m/s  Velocity: 9 m/s  [email or web address] [id number]    XR CHEST (2 VW)    Result Date: 3/22/2022  EXAMINATION: TWO XRAY VIEWS OF THE CHEST 3/22/2022 7:26 pm COMPARISON: August 27, 2020 HISTORY: ORDERING SYSTEM PROVIDED HISTORY: chf TECHNOLOGIST PROVIDED HISTORY: Reason for exam:->chf FINDINGS: No airspace opacity or pleural effusion. The heart is normal size. No pneumothorax. No free air beneath the hemidiaphragms. No pneumonia or pleural effusion. XR CHEST PORTABLE    Result Date: 4/14/2022  EXAMINATION: ONE XRAY VIEW OF THE CHEST 4/14/2022 8:41 pm COMPARISON: 03/22/2022 HISTORY: ORDERING SYSTEM PROVIDED HISTORY: tachycardia TECHNOLOGIST PROVIDED HISTORY: Reason for exam:->tachycardia FINDINGS: The lungs and pleural spaces are clear. The heart is mildly enlarged. Bones are unremarkable. Mild cardiomegaly. Otherwise, no acute disease. MICROBIOLOGY:  BLOOD CX #1  No results for input(s): BC in the last 72 hours. BLOOD CX #2  No results for input(s): Cletis Daft in the last 72 hours. TIP CULTURE  No results for input(s): CXCATHTIP in the last 72 hours. CULTURE, RESPIRATORY   No results for input(s): CULTRESP in the last 72 hours. RESPIRATORY SMEAR  No results for input(s): RESPSMEAR in the last 72 hours. ECHO:      DISPOSITION:  The patient's condition is good.    The patient is being discharged to home    DISCHARGE MEDICATIONS:      Medication List      CONTINUE taking these medications    amLODIPine 2.5 MG tablet  Commonly known as: NORVASC  Take 1 tablet by mouth daily     Anoro Ellipta 62.5-25 MCG/INH Aepb inhaler  Generic drug: umeclidinium-vilanterol     apixaban 5 MG Tabs tablet  Commonly known as: ELIQUIS  Take 1 tablet by mouth 2 times daily     Arformoterol Tartrate 15 MCG/2ML Nebu  Commonly known as: BROVANA  Take 2 mLs by nebulization 2 times daily     atorvastatin 20 MG tablet  Commonly known as: LIPITOR     Ferrex 150 150 MG capsule  Generic drug: iron polysaccharides     Full Kit Nebulizer Set Misc  Use as directed with nebulized medication.      ipratropium 0.02 % nebulizer solution  Commonly known as: ATROVENT  Take 2.5 mLs by nebulization 4 times daily     levothyroxine 150 MCG tablet  Commonly known as: SYNTHROID     OXYGEN     pantoprazole 40 MG tablet  Commonly known as: Protonix  Take 1 tablet by mouth daily     sucralfate 1 GM tablet  Commonly known as: CARAFATE     therapeutic multivitamin-minerals tablet     valsartan 160 MG tablet  Commonly known as: DIOVAN  Take 1 tablet by mouth daily     venlafaxine 150 MG extended release capsule  Commonly known as: EFFEXOR XR     vitamin D 50 MCG (2000 UT) Caps capsule        STOP taking these medications    dofetilide 250 MCG capsule  Commonly known as: TIKOSYN            Current Discharge Medication List        Current Discharge Medication List      STOP taking these medications       dofetilide (TIKOSYN) 250 MCG capsule Comments:   Reason for Stopping:             Current Discharge Medication List          INTERNAL MEDICINE FOLLOW UP/INSTRUCTIONS:  · Follow-up with primary care physician within 1 week of discharge from hospital  · Please review changes to pre-hospital admission medications and prescriptions for new medications upon discharge from the hospital with PCP  · Please review results of labs and imaging studies with PCP  · Follow-up with consultants as directed by them   · If recurrence or worsening of symptoms please call primary care physician or return to the ER immediately  · Diet: ADULT DIET; Regular; Low Fat/Low Chol/High Fiber/LESTER    Preparing for this patient's discharge, including paperwork, orders, instructions, and meeting with patient did required >35 minutes.     Electronically signed by Meka Montejo MD on 4/16/2022 at 11:25 AM

## 2022-04-16 NOTE — PROGRESS NOTES
paged regarding patient's potassium this morning 5.4, heart rate SB with PAC low 50s, and at times as low as 48. Medication clarification on nadolol 10mg/tiksosyn administration. New orders received.

## 2022-04-16 NOTE — PROGRESS NOTES
CARDIOLOGY CONSULTATION    Patient Name:  Josemanuel Galindo    :  1945    Reason for Consultation:   Recurrent atrial fibrillation rapid ventricular response    History of Present Illness:   Josemanuel Galindo returns to [facility] following history of sudden onset of feeling weak and short of breath and found to be in atrial fibrillation with a rapid ventricular response. Her neighbor is a nurse practitioner who advised her to go to the hospital for further evaluation. In the emergency room she was found to have a ventricular response of >150/min. She has been on dofetilide and had an adjustment in her medical regimen only a few weeks back. She has felt well since that time until yesterday. Her only new medicine is that of meclizine for mild dizziness. She denied associated chest discomfort but does note profound fatigue. She is now admitted for further observation of her rhythm. Past Medical History:   has a past medical history of Arthritis, Atrial fibrillation ([address] Bilateral carotid artery stenosis, Bronchitis, Carotid stenosis, bilateral, Hyperlipidemia, Hypertension, Left carotid stenosis, O2 dependent, On continuous oral anticoagulation, S/P carotid endarterectomy, Stomach ulcer, and Thyroid disease. Surgical History:   has a past surgical history that includes Hysterectomy; Cholecystectomy; Appendectomy; back surgery; Colonoscopy; joint replacement (); eye surgery; and Endoscopy, colon, diagnostic (01/10/2018). Social History:   reports that she quit smoking about 3 years ago. Her smoking use included cigarettes. She has a 30.00 pack-year smoking history. She has never used smokeless tobacco. She reports previous alcohol use. She reports that she does not use drugs. Family History:  family history includes Cancer in her sister; Other in her mother.   Mother  secondary to complications of rheumatic heart disease and father  in his [de-identified] secondary to complications of Alzheimer's disease and infirmities of old age. Medications:  Prior to Admission medications    Medication Sig Start Date End Date Taking? Authorizing Provider   dofetilide (TIKOSYN) 250 MCG capsule Take 1 capsule by mouth 2 times daily 4/16/22  Yes Eusebio Heft, DO   nadolol (CORGARD) 20 MG tablet Take 0.25 tablets by mouth daily 4/16/22  Yes Eusebio Heft, DO   Arformoterol Tartrate (BROVANA) 15 MCG/2ML NEBU Take 2 mLs by nebulization 2 times daily 3/25/22   Nandini Oviedo MD   ipratropium (ATROVENT) 0.02 % nebulizer solution Take 2.5 mLs by nebulization 4 times daily 3/25/22   Nandini Oviedo MD   Respiratory Therapy Supplies (FULL KIT NEBULIZER SET) MISC Use as directed with nebulized medication.  3/25/22   Nandini Oviedo MD   FERREX 150 150 MG capsule TAKE 1 CAPSULE BY MOUTH TWICE DAILY 1/6/22   Historical Provider, MD   sucralfate (CARAFATE) 1 GM tablet TAKE 1 TABLET BY MOUTH ONCE DAILY 1/24/22   Historical Provider, MD   levothyroxine (SYNTHROID) 150 MCG tablet TAKE 1 TABLET BY MOUTH ONCE DAILY 7/26/21   Historical Provider, MD Chrystal Ferraro ELLIPTA 62.5-25 MCG/INH AEPB inhaler INHALE 1 PUFF BY MOUTH ONCE DAILY 12/14/20   Historical Provider, MD   amLODIPine (NORVASC) 2.5 MG tablet Take 1 tablet by mouth daily 8/31/20   Tonya Perez MD   OXYGEN Inhale into the lungs    Historical Provider, MD   apixaban (ELIQUIS) 5 MG TABS tablet Take 1 tablet by mouth 2 times daily 9/25/19   GIO Maxwell CNP   valsartan (DIOVAN) 160 MG tablet Take 1 tablet by mouth daily 9/26/19   GIO Maxwell CNP   venlafaxine (EFFEXOR XR) 150 MG extended release capsule Take 150 mg by mouth daily 4/22/18   Historical Provider, MD   pantoprazole (PROTONIX) 40 MG tablet Take 1 tablet by mouth daily 1/12/18   Tonya Perez MD   atorvastatin (LIPITOR) 20 MG tablet Take 20 mg by mouth daily    Historical Provider, MD   Multiple Vitamins-Minerals (THERAPEUTIC MULTIVITAMIN-MINERALS) tablet Take 1 tablet by mouth daily    Historical Provider, MD   Cholecalciferol (VITAMIN D) 2000 UNITS CAPS capsule Take 1 capsule by mouth daily    Historical Provider, MD       Allergies:  Patient has no known allergies. Review of Systems:   · Constitutional: there has been no unanticipated weight loss. There's been no significant change in energy level, sleep pattern or activity level. No fever chills or rigors. · Eyes: No visual changes or diplopia. No scleral icterus. · ENT: No Headaches, hearing loss or vertigo. No mouth sores or sore throat. No change in taste or smell. · Cardiovascular: No chest discomfort,  + dyspnea on minimal exertion, + palpitations, but no loss of consciousness, no phlebitis, no claudication. · Respiratory: No cough or wheezing, no sputum production. No hemoptysis, pleuritic pain. · Gastrointestinal: No abdominal pain, appetite loss, blood in stools. No change in bowel habits. No hematemesis  · Genitourinary: No dysuria, trouble voiding or hematuria. No nocturia or increased frequency. · Musculoskeletal:  No gait disturbance, weakness or joint complaints. · Integumentary: No rash or pruritis. · Neurological: No headache, diplopia, change in muscle strength, numbness or tingling. No change in gait, balance, coordination, mood, affect, memory, mentation, behavior. · Psychiatric: No anxiety or depression. · Endocrine: No temperature intolerance. No excessive thirst, fluid intake, or urination. No tremor. · Hematologic/Lymphatic: No abnormal bruising or bleeding, blood clots or swollen lymph nodes. · Allergic/Immunologic: No nasal congestion or hives. Physical Examination:    Vital Signs: BP (!) 134/59   Pulse 62   Temp 97.9 °F (36.6 °C) (Oral)   Resp 18   Ht 5' 6\" (1.676 m)   Wt 253 lb (114.8 kg)   SpO2 97%   BMI 40.84 kg/m²   General appearance: Well preserved, mesomorphic body habitus, alert, no distress. Skin: Skin color, texture, turgor normal. No rashes or lesions.  No induration or tightening palpated. Head: Normocephalic. No masses, lesions, tenderness or abnormalities  Eyes: Conjunctivae/corneas clear. PERRL, EOMs intact. Sclera non icteric. Ears: External ears normal. Canals clear. TM's clear bilaterally. Hearing normal to finger rub. Nose/Sinuses: Nares normal. Septum midline. Mucosa normal. No drainage or sinus tenderness. Oropharynx: Lips, mucosa, and tongue normal. Oropharynx clear with no exudate seen. Neck: Neck supple and symmetric. No adenopathy. Thyroid symmetric, normal size, without nodules. Trachea is midline. Carotids brisk in upstroke without bruits, no abnormal JVP noted at 45°. Right carotid cicatrix noted. Chest: Even excursion  Lungs: Lungs grossly clear to auscultation bilaterally. No retractions or use of accessory muscles. No tactile vocal fremitus. No rhonchi, crackles or rales. Heart:  S1 > S2. Irregular, irregular rhythm. No gallop grade 2/6 systolic murmur second right and left intercostal space. No rub, palpable thrill or heave noted. PMI 5th intercostal space midclavicular line. Abdomen: Abdomen soft, moderately protuberant, non-tender. BS normal. No masses, organomegaly. No hernia noted. Extremities: Extremities normal. No deformities, edema, or skin discoloration. No cyanosis or clubbing noted to the nails. Peripheral pulses present 2+ upper extremities and present 1+  lower extremities. Musculoskeletal: Spine ROM normal. Muscular strength intact. Neuro: Cranial nerves intact. Motor: Strength 5/5 in all extremities. Reflexes 2+ in all extremities. No focal weakness. Sensory: grossly normal to touch. Coordination intact.     Pertinent Labs:  CBC:     03/22/22 1859 03/23/22  0820   WBC 6.2 6.0   RBC 3.69 3.38*   HGB 11.5 10.3*   HCT 36.3 33.4*   MCV 98.4 98.8   MCH 31.2 30.5   MCHC 31.7* 30.8*   RDW 13.2 13.3    180   MPV 10.6 10.9     BMP:     03/22/22  1859 03/23/22  0820    135   K 4.7 4.7   CL 98 100   CO2 26 28   BUN 23 23   CREATININE 1.0 0.9   GLUCOSE 107* 103*   CALCIUM 9.1 8.8         ABGs:   Lab Results   Component Value Date    PH 7.371 09/19/2019    PO2 81.4 09/19/2019    PCO2 58.4 09/19/2019     INR:   No results for input(s): INR in the last 72 hours. PRO-BNP:   Lab Results   Component Value Date    PROBNP 2,484 (H) 09/19/2019      Cardiac Injury Profile:   No results for input(s): CKTOTAL, CKMB, CKMBINDEX, TROPONINI in the last 72 hours. Lipid Profile:   Lab Results   Component Value Date    TRIG 224 10/19/2021    HDL 77 10/19/2021    LDLCALC 65 10/19/2021    CHOL 187 10/19/2021      Hemoglobin A1C: No components found for: HGBA1C   ECG:  See report  ECHO: 1/9/2018  Summary   Normal left ventricular systolic function.   Ejection fraction is visually estimated at > 65%.   Mild left ventricular hypertrophy.   Normal right ventricular size and function.   There is doppler evidence of stage I diastolic dysfunction.   Physiologic and/or trace tricuspid regurgitation.   PASP is estimated at 22 mmHg (normal estimated PASP).  The inferior vena cava is normal in size with normal respiratory   variation. Radiology:  XR CHEST (2 VW)    Result Date: 3/22/2022  No pneumonia or pleural effusion. Assessment:    Principal Problem:    Atrial fibrillation with rapid ventricular response (HCC)  Active Problems:    Essential hypertension    Mixed hyperlipidemia    S/P carotid endarterectomy    CHF (congestive heart failure) ([facility])    Diastolic dysfunction    Asthma    Bilateral carotid artery stenosis    Class 2 obesity due to excess calories with body mass index (BMI) of 38.0 to 38.9 in adult    CKD (chronic kidney disease) stage 3, GFR 30-59 ml/min ([facility])  Resolved Problems:    * No resolved hospital problems. *      Plan:  After reviewing Mrs. Flynn's  history it appears that despite an increase in her dosage of dofetilide she return to atrial fibrillation.   For this reason I have added low-dose nadolol with the hope of synergy and if this does not work will then consider amiodarone. I have also discussed potential atrial fibrillation ablation with her and she notes that she will consider this. Her son called her today and told her that she needs to go to the [facility]. Maintain strict anticoagulation indefinitely. I have spent more than 45 minutes face to face with Isaias Fraire reviewing notes and laboratory data with greater than 50% of this time instructing and counseling the patient regarding my findings and recommendations and I have answered all questions as posed to me by Ms. Flynn. Thank you, GIO Wilson - CNP for allowing me to consult in the care of this patient. Stephani Hollingsworth DO DO, FACP, [facility], [facility]    NOTE:  This report was transcribed using voice recognition software. Every effort was made to ensure accuracy; however, inadvertent computerized transcription errors may be present.

## 2022-04-17 NOTE — PROGRESS NOTES
PROGRESS NOTE       PATIENT PROBLEM LIST:  Principal Problem:    Atrial fibrillation with rapid ventricular response ([facility])  Active Problems:    Essential hypertension    Mixed hyperlipidemia    S/P carotid endarterectomy    CHF (congestive heart failure) ([facility])    Diastolic dysfunction    Asthma    Bilateral carotid artery stenosis    Class 2 obesity due to excess calories with body mass index (BMI) of 38.0 to 38.9 in adult    CKD (chronic kidney disease) stage 3, GFR 30-59 ml/min ([facility])  Resolved Problems:    * No resolved hospital problems. *      SUBJECTIVE:  Zaida Akhtar states she feels better and is in very good spirits looking forward to being discharged. She has no further symptomatic arrhythmia. Her monitor demonstrates persistent sinus rhythm. REVIEW OF SYSTEMS:  General ROS: negative for - fatigue, malaise,  weight gain or weight loss  Psychological ROS: negative for - anxiety , depression  Ophthalmic ROS: negative for - decreased vision or visual distortion. ENT ROS: negative  Allergy and Immunology ROS: negative  Hematological and Lymphatic ROS: negative  Endocrine: no heat or cold intolerance and no polyphagia, polydipsia, or polyuria  Respiratory ROS: positive for - shortness of breathwith exertion  Cardiovascular ROS: positive for - dyspnea on exertion, irregular heartbeat, rapid heart rate and shortness of breath. Gastrointestinal ROS: no abdominal pain, change in bowel habits, or black or bloody stools  Genito-Urinary ROS: no nocturia, dysuria, trouble voiding, frequency or hematuria  Musculoskeletal ROS: negative for- myalgias, arthralgias, or claudication  Neurological ROS: no TIA or stroke symptoms otherwise no significant change in symptoms or problems since yesterday as documented in previous progress notes.     SCHEDULED MEDICATIONS:      VITAL SIGNS:                                                                                                                          BP (!) 134/59 Pulse 62   Temp 97.9 °F (36.6 °C) (Oral)   Resp 18   Ht 5' 6\" (1.676 m)   Wt 253 lb (114.8 kg)   SpO2 97%   BMI 40.84 kg/m²   Patient Vitals for the past 96 hrs (Last 3 readings):   Weight   04/16/22 0624 253 lb (114.8 kg)   04/15/22 0423 250 lb 6 oz (113.6 kg)   04/14/22 2216 200 lb (90.7 kg)     OBJECTIVE:    HEENT: PERRL, EOM  Intact; sclera non-icteric, conjunctiva pink. Carotids are brisk in upstroke with normal contour. No carotid bruits. Normal jugular venous pulsation at 45°. No palpable cervical nor supraclavicular nodes. Thyroid not palpable. Trachea midline. Chest: Even excursion  Lungs: CTA B, no expiratory wheezes or rhonchi, no decreased tactile fremitus without inspiratory rales. Heart: Regular  rhythm; S1 > S2, no gallop or murmur. No clicks, rub, palpable thrills   or heaves. PMI nondisplaced, 5th intercostal space MCL. Abdomen: Soft, nontender, nondistended,  moderately protuberant, no masses or organomegaly. Bowel sounds active. Extremities: Without clubbing, cyanosis or edema. Pulses present 3+ upper extermities bilaterally; present 1+ DP and present 1+ PT bilaterally. Data:   Scheduled Meds: Reviewed  Continuous Infusions:     Intake/Output Summary (Last 24 hours) at 4/17/2022 0013  Last data filed at 4/16/2022 0830  Gross per 24 hour   Intake 180 ml   Output --   Net 180 ml     CBC:   Recent Labs     04/14/22  2307 04/15/22  0315 04/16/22  0245   WBC 7.2 7.5 6.1   HGB 11.1* 10.6* 10.0*   HCT 35.7 34.5 32.5*    178 184     BMP:  Recent Labs     04/14/22  2307 04/14/22  2307 04/15/22  0315 04/15/22  0315 04/16/22  0245     --  139  --  135   K 5.9*  --  5.0  --  5.4*     --  101  --  99   CO2 28  --  27  --  30*   BUN 24*  --  23  --  25*   CREATININE 1.0  --  0.9  --  1.1*   LABGLOM 54   < > >60   < > 48    < > = values in this interval not displayed.      ABGs:   Lab Results   Component Value Date    PH 7.371 09/19/2019    PO2 81.4 09/19/2019    PCO2 58.4 09/19/2019     INR: No results for input(s): INR in the last 72 hours. PRO-BNP:   Lab Results   Component Value Date    PROBNP 869 (H) 03/22/2022    PROBNP 374 08/28/2020      TSH:   Lab Results   Component Value Date    TSH 2.430 04/14/2022      Cardiac Injury Profile:   Recent Labs     04/14/22  2307 04/15/22  0030   TROPHS 16* 15*      Lipid Profile:   Lab Results   Component Value Date    TRIG 224 10/19/2021    HDL 77 10/19/2021    LDLCALC 65 10/19/2021    CHOL 187 10/19/2021      Hemoglobin A1C: No components found for: HGBA1C      RAD:   Echo Complete    Result Date: 3/24/2022  Transthoracic Echocardiography Report (TTE)  Demographics   Patient Name         Irma Caballero Gender                Female   Medical Record       [id number]      Room Number           0421  Number   Account #            [de-identified]     Procedure Date        03/24/2022   Corporate ID                       Ordering Physician    Dora Beard DO   Accession Number     [id number]    Referring Physician   Date of Birth        1945    [address] [place] [address]   Age                  64 year(s)    Interpreting          Dora Beard DO                                     Physician                                      Any Other  Procedure Type of Study   TTE procedure:Echo Complete W/ Dop & Color Flow. Procedure Date Date: 03/24/2022 Start: 10:45 AM Study Location: Echo Lab Technical Quality: Adequate visualization Indications:Atrial fibrillation. Patient Status: Routine Height: 65 inches Weight: 200 pounds BSA: 1.98 m^2 BMI: 33.28 kg/m^2 HR: 57 bpm BP: 139/63 mmHg  Findings   Left Ventricle  Left ventricle grossly normal in size. Borderline asymmetric left ventricular septal hypertrophy. Normal LV segmental wall motion. Estimated left ventricular ejection fraction is 64±5%. Does not meet 50% diagnostic criteria for diastolic dysfunction. Right Ventricle  Normal right ventricular size and function.   TAPSE is normal Left Atrium  The left atrium is mildly dilated. The LAESV Index is <34ml/m2. Interatrial septum appears intact. Right Atrium  Right atrium is normal in size. Mitral Valve  Mild-moderate mitral annular calcification. No evidence of mitral regurgitation. Tricuspid Valve  The tricuspid valve appears structurally normal.  No evidence of tricuspid regurgitation. Unable to accurately assess RV systolic pressure. Aortic Valve  The aortic valve is trileaflet. Aortic valve opens well. The aortic valve appears mildly sclerotic. No doppler evidence of aortic stenosis or regurgitation. Pulmonic Valve  Pulmonic valve is structurally normal.  Physiologic and/or trace pulmonic regurgitation present. Pericardial Effusion  No evidence of pericardial thickening/calcification present. Physiologic minimal pericardial fluid seen. Aorta  Aortic root dimension within normal limits. Miscellaneous  Normal Inferior Vena Cava diameter and respiratory variation. Conclusions   Summary  No evidence of tricuspid regurgitation. Left ventricle grossly normal in size. Borderline asymmetric left ventricular septal hypertrophy. Normal LV segmental wall motion. Estimated left ventricular ejection fraction is 64±5%. Does not meet 50% diagnostic criteria for diastolic dysfunction. The LAESV Index is <34ml/m2. Normal right ventricular size and function. TAPSE is normal  No evidence of tricuspid regurgitation. Unable to accurately assess RV systolic pressure. Physiologic and/or trace pulmonic regurgitation present. Technically good quality study. Technically difficult study due to patient''s body habitus. No comparison study available. Suggest clinical correlation.    Signature   ----------------------------------------------------------------  Electronically signed by Alexa Moran DO(Interpreting  physician) on 03/27/2022 11:30 AM  ----------------------------------------------------------------  M-Mode/2D Measurements & Calculations   LV Diastolic    LV Systolic Dimension: 3.3   AV Cusp Separation: 1.8 cmLA  Dimension: 4.9  cm                           Dimension: 3.9 cmAO Root  cm              LV Volume Diastolic: 901.5   Dimension: 2.8 cm  LV FS:32.7 %    ml  LV PW           LV Volume Systolic: 88.8 ml  Diastolic: 1.1  LV EDV/LV EDV Index: 113.6  cm              ml/57 ml/m^2LV ESV/LV ESV    RV Diastolic Dimension: 3 cm  LV PW Systolic: Index: 95.1 VO/34NR/ m^2  1.5 cm          EF Calculated: 59.9 %        LA/Aorta: 1.71  Septum          LV Mass Index: 135 l/min*m^2 Ascending Aorta: 2.7 cm  Diastolic: 1.6                               LA volume/Index: 57.4 ml  cm                                           /29ml/m^2  Septum          LVOT: 2.1 cm                 RA Area: 82.2 cm^2  Systolic: 1.7  cm                                           IVC Expiration: 1.3 cm  CO: 6.33 l/min  CI: 3.2 l/m*m^2  LV Mass: 267.94  g  Doppler Measurements & Calculations   MV Peak E-Wave:    AV Peak Velocity: 1.31 m/s      LVOT Peak Velocity:  1.26 m/s           AV Peak Gradient: 6.83 mmHg     1.22 m/s  MV Peak A-Wave:    AV Mean Velocity: 0.98 m/s      LVOT Mean Velocity:  1.2 m/s            AV Mean Gradient: 4.2 mmHg      0.91 m/s  MV E/A Ratio: 1.05 AV VTI: 31.7 cm                 LVOT Peak Gradient: 6  MV Peak Gradient:  AV Area (Continuity):3.51 cm^2  mmHgLVOT Mean Gradient:  6.1 mmHg                                           3.6 mmHg  MV Mean Gradient:  LVOT VTI: 32.1 cm  1.7 mmHg           IVRT: 58.8 msec  MV Mean Velocity:  0.55 m/s           Pulm. Vein A Reversal  MV Deceleration    Duration:134.9 msec  Time: 263.6 msec   Pulm. Vein D Velocity:0.44      PV Peak Velocity: 1.04  MV P1/2t: 74.8     m/sPulm. Vein A Reversal        m/s  msec               Velocity:0.31 m/s               PV Peak Gradient: 4.31  MVA by PHT:2.94    Pulm.  Vein S Velocity: 0.58 m/s mmHg  cm^2                                               PV Mean Velocity: 0. 67  MV Area                                            m/s  (continuity): 2.7                                  PV Mean Gradient: 2.1  cm^2                                               mmHg  MV E' Septal  Velocity: 0.11 m/s                                 VT ED Velocity: 0.94  MV E' Lateral                                      m/s  Velocity: 9 m/s  [email or web address] [id number]    XR CHEST (2 VW)    Result Date: 3/22/2022  EXAMINATION: TWO XRAY VIEWS OF THE CHEST 3/22/2022 7:26 pm COMPARISON: August 27, 2020 HISTORY: ORDERING SYSTEM PROVIDED HISTORY: chf TECHNOLOGIST PROVIDED HISTORY: Reason for exam:->chf FINDINGS: No airspace opacity or pleural effusion. The heart is normal size. No pneumothorax. No free air beneath the hemidiaphragms. No pneumonia or pleural effusion. XR CHEST PORTABLE    Result Date: 4/14/2022  EXAMINATION: ONE XRAY VIEW OF THE CHEST 4/14/2022 8:41 pm COMPARISON: 03/22/2022 HISTORY: ORDERING SYSTEM PROVIDED HISTORY: tachycardia TECHNOLOGIST PROVIDED HISTORY: Reason for exam:->tachycardia FINDINGS: The lungs and pleural spaces are clear. The heart is mildly enlarged. Bones are unremarkable. Mild cardiomegaly. Otherwise, no acute disease. EKG: See Report  Echo: See Report      IMPRESSIONS:  Principal Problem:    Atrial fibrillation with rapid ventricular response ([facility])  Active Problems:    Essential hypertension    Mixed hyperlipidemia    S/P carotid endarterectomy    CHF (congestive heart failure) ([facility])    Diastolic dysfunction    Asthma    Bilateral carotid artery stenosis    Class 2 obesity due to excess calories with body mass index (BMI) of 38.0 to 38.9 in adult    CKD (chronic kidney disease) stage 3, GFR 30-59 ml/min ([facility])  Resolved Problems:    * No resolved hospital problems.  *      RECOMMENDATIONS:  Mrs. Mark Roach will be discharged home at this time in stable condition. She notes no further arrhythmia and was unaware when her heart rate was 50/min. she has received her 5 mg dose of nadolol a few hours ago with no untoward side effects and baseline heart rate 60. No further atrial fibrillation noted. Potassium is 5.4 mmol/L I would ask that Sarahmarlon Ayers NP obtain a serum potassium and electrolytes in approximately 1 week following discharge. I have encouraged her to discuss with her family members the potential for atrial fibrillation ablation. she will continue her dofetilide 250 mcg twice a day and nadolol 5 mg daily. I have spent more than 27minutes face to face with Agustin Root and reviewing notes and laboratory data, with greater than 50% of this time instructing and counseling the patient  face to face regarding my findings and recommendations and I have answered all questions as posed to me by Ms. Flynn. Pallavi Mancilla, DO FACP,FACC,[facility]      NOTE:  This report was transcribed using voice recognition software.   Every effort was made to ensure accuracy; however, inadvertent computerized transcription errors may be present

## 2022-06-30 ENCOUNTER — HOSPITAL ENCOUNTER (OUTPATIENT)
Dept: NUCLEAR MEDICINE | Age: 77
Discharge: HOME | End: 2022-06-30
Payer: MEDICARE

## 2022-06-30 ENCOUNTER — HOSPITAL ENCOUNTER (OUTPATIENT)
Dept: NON INVASIVE DIAGNOSTICS | Age: 77
Discharge: HOME | End: 2022-06-30
Payer: MEDICARE

## 2022-06-30 VITALS — HEIGHT: 67 IN | BODY MASS INDEX: 38.45 KG/M2 | WEIGHT: 245 LBS

## 2022-06-30 LAB
LV EF: 80 %
LVEF MODALITY: NORMAL

## 2022-06-30 PROCEDURE — 93017 CV STRESS TEST TRACING ONLY: CPT

## 2022-06-30 PROCEDURE — 78452 HT MUSCLE IMAGE SPECT MULT: CPT

## 2022-06-30 PROCEDURE — 6360000002 HC RX W HCPCS: Performed by: INTERNAL MEDICINE

## 2022-06-30 PROCEDURE — 3430000000 HC RX DIAGNOSTIC RADIOPHARMACEUTICAL: Performed by: RADIOLOGY

## 2022-06-30 PROCEDURE — A9500 TC99M SESTAMIBI: HCPCS | Performed by: RADIOLOGY

## 2022-06-30 RX ADMIN — Medication 10 MILLICURIE: at 09:09

## 2022-06-30 RX ADMIN — Medication 30 MILLICURIE: at 09:09

## 2022-06-30 RX ADMIN — REGADENOSON 0.4 MG: 0.08 INJECTION, SOLUTION INTRAVENOUS at 10:35

## 2022-07-12 ENCOUNTER — TELEPHONE (OUTPATIENT)
Dept: VASCULAR SURGERY | Age: 77
End: 2022-07-12

## 2022-07-12 DIAGNOSIS — I65.23 BILATERAL CAROTID ARTERY STENOSIS: Primary | ICD-10-CM

## 2022-07-12 DIAGNOSIS — Z98.890 S/P CAROTID ENDARTERECTOMY: ICD-10-CM

## 2022-07-12 NOTE — TELEPHONE ENCOUNTER
Notified pt of her ultrasound appt on 7- at [address] [place] [address] arrive at 1:30 pm to register.

## 2022-07-21 ENCOUNTER — HOSPITAL ENCOUNTER (INPATIENT)
Age: 77
LOS: 4 days | Discharge: HOME HEALTH | DRG: 309 | End: 2022-07-25
Attending: EMERGENCY MEDICINE | Admitting: INTERNAL MEDICINE
Payer: MEDICARE

## 2022-07-21 ENCOUNTER — APPOINTMENT (OUTPATIENT)
Dept: GENERAL RADIOLOGY | Age: 77
DRG: 309 | End: 2022-07-21
Payer: MEDICARE

## 2022-07-21 DIAGNOSIS — Z86.79 HISTORY OF ATRIAL FIBRILLATION: ICD-10-CM

## 2022-07-21 DIAGNOSIS — Z99.81 O2 DEPENDENT: ICD-10-CM

## 2022-07-21 DIAGNOSIS — I48.91 ATRIAL FIBRILLATION WITH RAPID VENTRICULAR RESPONSE (HCC): ICD-10-CM

## 2022-07-21 DIAGNOSIS — R07.9 CHEST PAIN, UNSPECIFIED TYPE: Primary | ICD-10-CM

## 2022-07-21 PROBLEM — R06.02 SHORTNESS OF BREATH: Status: ACTIVE | Noted: 2022-07-21

## 2022-07-21 PROBLEM — R00.1 SYMPTOMATIC BRADYCARDIA: Status: ACTIVE | Noted: 2022-07-21

## 2022-07-21 LAB
ALBUMIN SERPL-MCNC: 4.1 G/DL (ref 3.5–5.2)
ALP BLD-CCNC: 68 U/L (ref 35–104)
ALT SERPL-CCNC: 11 U/L (ref 0–32)
ANION GAP SERPL CALCULATED.3IONS-SCNC: 11 MMOL/L (ref 7–16)
AST SERPL-CCNC: 15 U/L (ref 0–31)
BASOPHILS ABSOLUTE: 0.07 E9/L (ref 0–0.2)
BASOPHILS RELATIVE PERCENT: 1 % (ref 0–2)
BILIRUB SERPL-MCNC: 0.8 MG/DL (ref 0–1.2)
BUN BLDV-MCNC: 17 MG/DL (ref 6–23)
CALCIUM SERPL-MCNC: 9 MG/DL (ref 8.6–10.2)
CHLORIDE BLD-SCNC: 104 MMOL/L (ref 98–107)
CO2: 25 MMOL/L (ref 22–29)
CREAT SERPL-MCNC: 0.8 MG/DL (ref 0.5–1)
EOSINOPHILS ABSOLUTE: 0.16 E9/L (ref 0.05–0.5)
EOSINOPHILS RELATIVE PERCENT: 2.2 % (ref 0–6)
GFR AFRICAN AMERICAN: >60
GFR NON-AFRICAN AMERICAN: >60 ML/MIN/1.73
GLUCOSE BLD-MCNC: 129 MG/DL (ref 74–99)
HCT VFR BLD CALC: 36.5 % (ref 34–48)
HEMOGLOBIN: 11.6 G/DL (ref 11.5–15.5)
IMMATURE GRANULOCYTES #: 0.02 E9/L
IMMATURE GRANULOCYTES %: 0.3 % (ref 0–5)
LIPASE: 16 U/L (ref 13–60)
LYMPHOCYTES ABSOLUTE: 1.26 E9/L (ref 1.5–4)
LYMPHOCYTES RELATIVE PERCENT: 17.3 % (ref 20–42)
MCH RBC QN AUTO: 30.4 PG (ref 26–35)
MCHC RBC AUTO-ENTMCNC: 31.8 % (ref 32–34.5)
MCV RBC AUTO: 95.5 FL (ref 80–99.9)
MONOCYTES ABSOLUTE: 0.54 E9/L (ref 0.1–0.95)
MONOCYTES RELATIVE PERCENT: 7.4 % (ref 2–12)
NEUTROPHILS ABSOLUTE: 5.22 E9/L (ref 1.8–7.3)
NEUTROPHILS RELATIVE PERCENT: 71.8 % (ref 43–80)
PDW BLD-RTO: 12.6 FL (ref 11.5–15)
PLATELET # BLD: 192 E9/L (ref 130–450)
PMV BLD AUTO: 11.2 FL (ref 7–12)
POTASSIUM REFLEX MAGNESIUM: 4.5 MMOL/L (ref 3.5–5)
PRO-BNP: 575 PG/ML (ref 0–450)
RBC # BLD: 3.82 E12/L (ref 3.5–5.5)
SARS-COV-2, NAAT: NOT DETECTED
SODIUM BLD-SCNC: 140 MMOL/L (ref 132–146)
TOTAL PROTEIN: 7 G/DL (ref 6.4–8.3)
TROPONIN, HIGH SENSITIVITY: 15 NG/L (ref 0–9)
TROPONIN, HIGH SENSITIVITY: 16 NG/L (ref 0–9)
TSH SERPL DL<=0.05 MIU/L-ACNC: 1.21 UIU/ML (ref 0.27–4.2)
WBC # BLD: 7.3 E9/L (ref 4.5–11.5)

## 2022-07-21 PROCEDURE — 6370000000 HC RX 637 (ALT 250 FOR IP): Performed by: INTERNAL MEDICINE

## 2022-07-21 PROCEDURE — 71046 X-RAY EXAM CHEST 2 VIEWS: CPT

## 2022-07-21 PROCEDURE — 1200000000 HC SEMI PRIVATE

## 2022-07-21 PROCEDURE — 83690 ASSAY OF LIPASE: CPT

## 2022-07-21 PROCEDURE — 36415 COLL VENOUS BLD VENIPUNCTURE: CPT

## 2022-07-21 PROCEDURE — 99285 EMERGENCY DEPT VISIT HI MDM: CPT

## 2022-07-21 PROCEDURE — 87635 SARS-COV-2 COVID-19 AMP PRB: CPT

## 2022-07-21 PROCEDURE — G0378 HOSPITAL OBSERVATION PER HR: HCPCS

## 2022-07-21 PROCEDURE — 93005 ELECTROCARDIOGRAM TRACING: CPT | Performed by: EMERGENCY MEDICINE

## 2022-07-21 PROCEDURE — 83880 ASSAY OF NATRIURETIC PEPTIDE: CPT

## 2022-07-21 PROCEDURE — 80053 COMPREHEN METABOLIC PANEL: CPT

## 2022-07-21 PROCEDURE — 85025 COMPLETE CBC W/AUTO DIFF WBC: CPT

## 2022-07-21 PROCEDURE — 2700000000 HC OXYGEN THERAPY PER DAY

## 2022-07-21 PROCEDURE — 84443 ASSAY THYROID STIM HORMONE: CPT

## 2022-07-21 PROCEDURE — 84484 ASSAY OF TROPONIN QUANT: CPT

## 2022-07-21 PROCEDURE — 93005 ELECTROCARDIOGRAM TRACING: CPT | Performed by: PHYSICIAN ASSISTANT

## 2022-07-21 RX ORDER — POLYETHYLENE GLYCOL 3350 17 G/17G
17 POWDER, FOR SOLUTION ORAL DAILY PRN
Status: DISCONTINUED | OUTPATIENT
Start: 2022-07-21 | End: 2022-07-25 | Stop reason: HOSPADM

## 2022-07-21 RX ORDER — ARFORMOTEROL TARTRATE 15 UG/2ML
15 SOLUTION RESPIRATORY (INHALATION) 2 TIMES DAILY
Status: DISCONTINUED | OUTPATIENT
Start: 2022-07-22 | End: 2022-07-25 | Stop reason: HOSPADM

## 2022-07-21 RX ORDER — LEVOTHYROXINE SODIUM 0.15 MG/1
150 TABLET ORAL DAILY
Status: DISCONTINUED | OUTPATIENT
Start: 2022-07-22 | End: 2022-07-25 | Stop reason: HOSPADM

## 2022-07-21 RX ORDER — VALSARTAN 160 MG/1
160 TABLET ORAL DAILY
Status: DISCONTINUED | OUTPATIENT
Start: 2022-07-22 | End: 2022-07-25 | Stop reason: HOSPADM

## 2022-07-21 RX ORDER — DOFETILIDE 0.25 MG/1
250 CAPSULE ORAL 2 TIMES DAILY
Status: DISCONTINUED | OUTPATIENT
Start: 2022-07-21 | End: 2022-07-22

## 2022-07-21 RX ORDER — ACETAMINOPHEN 650 MG/1
650 SUPPOSITORY RECTAL EVERY 6 HOURS PRN
Status: DISCONTINUED | OUTPATIENT
Start: 2022-07-21 | End: 2022-07-25 | Stop reason: HOSPADM

## 2022-07-21 RX ORDER — ATORVASTATIN CALCIUM 20 MG/1
20 TABLET, FILM COATED ORAL DAILY
Status: DISCONTINUED | OUTPATIENT
Start: 2022-07-22 | End: 2022-07-25 | Stop reason: HOSPADM

## 2022-07-21 RX ORDER — SODIUM CHLORIDE 0.9 % (FLUSH) 0.9 %
5-40 SYRINGE (ML) INJECTION PRN
Status: DISCONTINUED | OUTPATIENT
Start: 2022-07-21 | End: 2022-07-25 | Stop reason: HOSPADM

## 2022-07-21 RX ORDER — SODIUM CHLORIDE 9 MG/ML
INJECTION, SOLUTION INTRAVENOUS PRN
Status: DISCONTINUED | OUTPATIENT
Start: 2022-07-21 | End: 2022-07-25 | Stop reason: HOSPADM

## 2022-07-21 RX ORDER — SODIUM CHLORIDE 0.9 % (FLUSH) 0.9 %
5-40 SYRINGE (ML) INJECTION EVERY 12 HOURS SCHEDULED
Status: DISCONTINUED | OUTPATIENT
Start: 2022-07-21 | End: 2022-07-25 | Stop reason: HOSPADM

## 2022-07-21 RX ORDER — ACETAMINOPHEN 325 MG/1
650 TABLET ORAL EVERY 6 HOURS PRN
Status: DISCONTINUED | OUTPATIENT
Start: 2022-07-21 | End: 2022-07-25 | Stop reason: HOSPADM

## 2022-07-21 RX ORDER — VENLAFAXINE HYDROCHLORIDE 150 MG/1
150 CAPSULE, EXTENDED RELEASE ORAL DAILY
Status: DISCONTINUED | OUTPATIENT
Start: 2022-07-22 | End: 2022-07-25 | Stop reason: HOSPADM

## 2022-07-21 RX ORDER — HEPARIN SODIUM 10000 [USP'U]/ML
5000 INJECTION, SOLUTION INTRAVENOUS; SUBCUTANEOUS EVERY 8 HOURS
Status: DISCONTINUED | OUTPATIENT
Start: 2022-07-21 | End: 2022-07-22

## 2022-07-21 RX ADMIN — DOFETILIDE 250 MCG: 0.25 CAPSULE ORAL at 22:50

## 2022-07-21 ASSESSMENT — PAIN - FUNCTIONAL ASSESSMENT: PAIN_FUNCTIONAL_ASSESSMENT: NONE - DENIES PAIN

## 2022-07-21 ASSESSMENT — ENCOUNTER SYMPTOMS: SHORTNESS OF BREATH: 1

## 2022-07-21 NOTE — H&P
Hospital Medicine History & Physical      PCP: Oneida Choudhury, APRN - CNP    Date of Admission: 7/21/2022    Date of Service: Pt seen/examined on 7/21/22 and Admitted to Inpatient with expected LOS greater than two midnights due to medical therapy. Chief Complaint:   neck tightness      History Of Present Illness:      64 y.o. female who presented to [address] [place] [address] with tightness in her neck. She has a known hx of atrial fibrillation on tikosyn, COPD with 2L oxygen requirement, HTN, HLD, obesity, hypothyroidism and b/l carotid artery stenosis. FOr the last several months she has been having episodes of elevated heart rate, at times up tto the 170's; she was started on nadalol, but then started developing episodes of bradycardia associated with neck tightness. Symptoms associated with shortness of breath for the last 1-2 days. Upon evaluation in the ED pt was noted to have bradycardia. Labs largely reassuring. Patient admitted for further evaluation and treatment. Upon my evaluation patient was laying comfortably on exam table and in no acute distress. She reports fatigue but did not have neck tightness at the time.      Past Medical History:          Diagnosis Date    Arthritis     Atrial fibrillation ([facility] [address]     Tikosyn per Dr. Raphael Standing    Bilateral carotid artery stenosis 07/16/2020    Bronchitis     Carotid stenosis, bilateral 05/21/2012    Hyperlipidemia     Hypertension     Left carotid stenosis 04/26/2018    O2 dependent 07/16/2020    On continuous oral anticoagulation     Eliquis    S/P carotid endarterectomy 10/06/2016    Stomach ulcer     Thyroid disease        Past Surgical History:          Procedure Laterality Date    APPENDECTOMY      BACK SURGERY      CHOLECYSTECTOMY      COLONOSCOPY      ENDOSCOPY, COLON, DIAGNOSTIC  01/10/2018    upper endo    EYE SURGERY      BILATERAL CATARACT    HYSTERECTOMY      JOINT REPLACEMENT  2011    RIGHT KNEE       Medications Prior to Admission: Prior to Admission medications    Medication Sig Start Date End Date Taking? Authorizing Provider   dofetilide (TIKOSYN) 250 MCG capsule Take 1 capsule by mouth 2 times daily 4/16/22   Murvin [place], DO   levothyroxine (SYNTHROID) 150 MCG tablet TAKE 1 TABLET BY MOUTH ONCE DAILY 7/26/21   Historical Provider, MD Marisol Puente ELLIPTA 62.5-25 MCG/INH AEPB inhaler INHALE 1 PUFF BY MOUTH ONCE DAILY 12/14/20   Historical Provider, MD   amLODIPine (NORVASC) 2.5 MG tablet Take 1 tablet by mouth daily  Patient taking differently: Take 1.25 mg by mouth in the morning. 8/31/20   Aure Perkins MD   OXYGEN Inhale into the lungs    Historical Provider, MD   apixaban Conchis Amna) 5 MG TABS tablet Take 1 tablet by mouth 2 times daily 9/25/19   GIO Santana CNP   valsartan (DIOVAN) 160 MG tablet Take 1 tablet by mouth daily 9/26/19   GIO Santana CNP   venlafaxine (EFFEXOR XR) 150 MG extended release capsule Take 150 mg by mouth daily 4/22/18   Historical Provider, MD   atorvastatin (LIPITOR) 20 MG tablet Take 20 mg by mouth daily    Historical Provider, MD       Allergies:  Patient has no known allergies. Social History:      The patient currently lives at home with her     TOBACCO:   reports that she quit smoking about 4 years ago. Her smoking use included cigarettes. She has a 30.00 pack-year smoking history. She has never used smokeless tobacco.  ETOH:   reports that she does not currently use alcohol. Family History:    Reviewed in detail and negative for DM, CAD, Cancer, CVA. Positive as follows:        Problem Relation Age of Onset    Other Mother         rheumatic fever    Cancer Sister         lung       REVIEW OF SYSTEMS:   Pertinent positives as noted in the HPI. All other systems reviewed and negative.     PHYSICAL EXAM:    BP (!) 163/60   Pulse 67   Temp 98.1 °F (36.7 °C) (Oral)   Resp 18   Ht 5' 7\" (1.702 m)   Wt 243 lb (110.2 kg)   SpO2 97%   BMI 38.06 kg/m²     General appearance:  No apparent distress, appears stated age and cooperative. HEENT:  Normal cephalic, atraumatic without obvious deformity. Pupils equal, round, and reactive to light. Extra ocular muscles intact. Conjunctivae/corneas clear. Neck: Supple, with full range of motion. No jugular venous distention. Trachea midline. Respiratory:  Normal respiratory effort. Clear to auscultation, bilaterally without Rales/Wheezes/Rhonchi. Cardiovascular: bradycardic, regular rhythm with normal S1/S2 without murmurs, rubs or gallops. Abdomen: Soft, non-tender, non-distended with normal bowel sounds. Musculoskeletal:  No clubbing, cyanosis or edema bilaterally. Full range of motion without deformity. Skin: Skin color, texture, turgor normal.  No rashes or lesions. Neurologic:  Neurovascularly intact without any focal sensory/motor deficits. Cranial nerves: II-XII intact, grossly non-focal.  Psychiatric:  Alert and oriented, thought content appropriate, normal insight    XR CHEST (2 VW)   Final Result   No acute cardiopulmonary process. EKG: sinus bradycardia      Labs:     Recent Labs     07/21/22  1247   WBC 7.3   HGB 11.6   HCT 36.5        Recent Labs     07/21/22  1247      K 4.5      CO2 25   BUN 17   CREATININE 0.8   CALCIUM 9.0     Recent Labs     07/21/22  1247   AST 15   ALT 11   BILITOT 0.8   ALKPHOS 68     No results for input(s): INR in the last 72 hours. No results for input(s): Kathyrn Belch in the last 72 hours.     Urinalysis:      Lab Results   Component Value Date/Time    NITRU Negative 08/29/2020 11:40 AM    WBCUA >20 08/29/2020 11:40 AM    WBCUA NONE 04/28/2012 01:15 AM    BACTERIA MODERATE 08/29/2020 11:40 AM    RBCUA 1-3 08/29/2020 11:40 AM    RBCUA NONE 04/28/2012 01:15 AM    BLOODU Negative 08/29/2020 11:40 AM    SPECGRAV 1.020 08/29/2020 11:40 AM    GLUCOSEU Negative 08/29/2020 11:40 AM    GLUCOSEU NEGATIVE 04/28/2012 01:15 AM         ASSESSMENT:  Symptomatic bradycardia  Hx of atrial fibrillation with concern for tachy/kyle syndrome  COPD  HTN  HLD  B/l carotid artery stenosis  Hypothyroidsims  Chronic hypoxic respiratory failure    PLAN:  Continue home meds as ordered; hold nadolol  EP consulted, follow up recs  Continue home oxygen  Pain control  TSH added on  Strict I/O, monitor renal function      DVT Prophylaxis: heparin  Diet: ADULT DIET; Regular  Code Status: Prior    PT/OT Eval Status: as needed    Dispo - home w hhc at [facility] MD    Thank you GIO Padilla CNP for the opportunity to be involved in this patient's care. If you have any questions or concerns please feel free to contact me through [address] [place] [address].

## 2022-07-21 NOTE — ED PROVIDER NOTES
The history is provided by the patient, medical records, a relative and a friend. 75-year-old female presents emergency department complaint of some shortness of breath. Does have a history of A. fib. She is on Eliquis. She states that recently the past 1 to 2 days she has been get some slight shortness of breath and some slight chest tightness up towards her lower neck. States that she was taking her pulse for her oxygen because she does wear 2 L oxygen at home due to her shortness of breath. Noticed that sometimes her heart rate is in the 40s and sometimes is up in the 170s. Denies anything making her symptoms better or worse. Denies any chest pain at this time. Denies any shortness of breath currently. Denies abdominal pain change bowel bladder habits fevers chills cough congestion runny nose. The patient presents with sob that has been going on for 1 day. These symptoms are moderate in severity. Symptoms are made better by nothng. Symptoms are made worse by nothgn. Associated symptoms include levated heart rate. Review of Systems   Respiratory:  Positive for shortness of breath. Physical Exam  Vitals and nursing note reviewed. Constitutional:       General: She is not in acute distress. Appearance: Normal appearance. She is normal weight. She is not toxic-appearing. HENT:      Head: Normocephalic and atraumatic. Eyes:      Conjunctiva/sclera: Conjunctivae normal.   Cardiovascular:      Rate and Rhythm: Normal rate and regular rhythm. Pulses: Normal pulses. Heart sounds: Normal heart sounds. No murmur heard. No gallop. Pulmonary:      Effort: Pulmonary effort is normal. No respiratory distress. Breath sounds: Normal breath sounds. No wheezing or rales. Abdominal:      General: Abdomen is flat. Bowel sounds are normal. There is no distension. Palpations: Abdomen is soft. Tenderness: There is no abdominal tenderness. There is no guarding. Skin:     General: Skin is warm and dry. Capillary Refill: Capillary refill takes less than 2 seconds. Neurological:      General: No focal deficit present. Mental Status: She is alert and oriented to person, place, and time. Procedures     MDM     Amount and/or Complexity of Data Reviewed  Clinical lab tests: reviewed  Decide to obtain previous medical records or to obtain history from someone other than the patient: yes         40-year-old female presents emerged from with complaint of some slight chest pain and shortness of breath. Does have a history of A. fib. States her pulse at home was 40 to mid 40s and bradycardic and then last night it was around 150. Her neighbor is a nurse practitioner with electrophysiology who did monitor both those readings. Some slight concern about tachycardia-bradycardia syndrome at this time. Patient is otherwise on EKG to have a sinus rhythm no acute ST elevation or depression. Main laboratory work-up is reassuring. Patient will be admitted in the hospital at this time for further observation. She is agreeable this plan. ED Course as of 07/21/22 1632 Th Jul 21, 2022   [address] [place] [address] agree with admission [CB]      ED Course User Index  [CB] Sonny Osman MD       EKG: This EKG is signed by emergency department physician.     Rate: 71  Rhythm: Sinus  Interpretation: No acute ST elevation or depression sinus rhythm normal axis  Comparison: stable as compared to patient's most recent EKG       ED Course as of 07/21/22 1632 Thu Jul 21, 2022   [address] [place] [address] physician agree with admission [CB]      ED Course User Index  [CB] Sonny Osman MD       --------------------------------------------- PAST HISTORY ---------------------------------------------  Past Medical History:  has a past medical history of Arthritis, Atrial fibrillation ([facility] [address] Bilateral carotid artery stenosis, Bronchitis, Carotid stenosis, bilateral, Hyperlipidemia, Hypertension, Left carotid stenosis, O2 dependent, On continuous oral anticoagulation, S/P carotid endarterectomy, Stomach ulcer, and Thyroid disease. Past Surgical History:  has a past surgical history that includes Hysterectomy; Cholecystectomy; Appendectomy; back surgery; Colonoscopy; joint replacement (2011); eye surgery; and Endoscopy, colon, diagnostic (01/10/2018). Social History:  reports that she quit smoking about 4 years ago. Her smoking use included cigarettes. She has a 30.00 pack-year smoking history. She has never used smokeless tobacco. She reports that she does not currently use alcohol. She reports that she does not use drugs. Family History: family history includes Cancer in her sister; Other in her mother. The patients home medications have been reviewed. Allergies: Patient has no known allergies.     -------------------------------------------------- RESULTS -------------------------------------------------    LABS:  Results for orders placed or performed during the hospital encounter of 07/21/22   COVID-19, Rapid    Specimen: Nasopharyngeal Swab   Result Value Ref Range    SARS-CoV-2, NAAT Not Detected Not Detected   CBC with Auto Differential   Result Value Ref Range    WBC 7.3 4.5 - 11.5 E9/L    RBC 3.82 3.50 - 5.50 E12/L    Hemoglobin 11.6 11.5 - 15.5 g/dL    Hematocrit 36.5 34.0 - 48.0 %    MCV 95.5 80.0 - 99.9 fL    MCH 30.4 26.0 - 35.0 pg    MCHC 31.8 (L) 32.0 - 34.5 %    RDW 12.6 11.5 - 15.0 fL    Platelets 923 731 - 217 E9/L    MPV 11.2 7.0 - 12.0 fL    Neutrophils % 71.8 43.0 - 80.0 %    Immature Granulocytes % 0.3 0.0 - 5.0 %    Lymphocytes % 17.3 (L) 20.0 - 42.0 %    Monocytes % 7.4 2.0 - 12.0 %    Eosinophils % 2.2 0.0 - 6.0 %    Basophils % 1.0 0.0 - 2.0 %    Neutrophils Absolute 5.22 1.80 - 7.30 E9/L    Immature Granulocytes # 0.02 E9/L    Lymphocytes Absolute 1.26 (L) 1.50 - 4.00 E9/L    Monocytes Absolute 0.54 0.10 - 0.95 E9/L    Eosinophils Absolute 0.16 0.05 - Interpretation: Normal    ------------------------------------------ PROGRESS NOTES ------------------------------------------  Re-evaluation(s):  Time: 9261  Patients symptoms show no change  Repeat physical examination is not changed    Counseling:  I have spoken with the patient and discussed todays results, in addition to providing specific details for the plan of care and counseling regarding the diagnosis and prognosis. Their questions are answered at this time and they are agreeable with the plan of admission.    --------------------------------- ADDITIONAL PROVIDER NOTES ---------------------------------  Consultations:  . Spoke with sound physician. Discussed case. They will admit the patient. This patient's ED course included: a personal history and physicial examination, re-evaluation prior to disposition, multiple bedside re-evaluations, cardiac monitoring, and continuous pulse oximetry    This patient has remained hemodynamically stable during their ED course. Diagnosis:  1. Chest pain, unspecified type    2. History of atrial fibrillation        Disposition:  Patient's disposition: Admit to telemetry  Patient's condition is stable.          Bertin Pearce MD  Resident  07/21/22 3620

## 2022-07-21 NOTE — ED NOTES
Department of Emergency Medicine    FIRST PROVIDER TRIAGE NOTE             Independent MLP           7/21/22  12:38 PM EDT    Date of Encounter: 7/21/22   MRN: [id number]    Vitals:    07/21/22 1045 07/21/22 1105   BP:  (!) 177/66   Pulse: 78 66   Resp:  18   Temp: 98.1 °F (36.7 °C)    TempSrc: Oral    SpO2: 95% 95%   Weight:  243 lb (110.2 kg)   Height:  5' 7\" (1.702 m)      HPI: Lauro Rucker is a 64 y.o. female who presents to the ED for Shortness of Breath (Pt states the SOB started yesterday and that some upper chest discomfort started this morning. )    Dr. Livia Mahoney     ROS: Negative for fever, vomiting, or diarrhea. Physical Exam:   Gen Appearance/Constitutional: alert  CV: regular rate     Initial Plan of Care: All treatment areas with department are currently occupied. Plan to order/Initiate the following while awaiting opening in ED: labs, EKG, and imaging studies.     Initial Plan of Care: Initiate Treatment-Testing, Proceed toTreatment Area When Bed Available for ED Attending/MLP to Continue Care    Electronically signed by Marybel Mayen PA-C   DD: 7/21/22       Marybel Mayen PA-C  07/21/22 0236

## 2022-07-22 LAB
EKG ATRIAL RATE: 55 BPM
EKG ATRIAL RATE: 71 BPM
EKG P AXIS: 67 DEGREES
EKG P AXIS: 70 DEGREES
EKG P-R INTERVAL: 136 MS
EKG P-R INTERVAL: 148 MS
EKG Q-T INTERVAL: 394 MS
EKG Q-T INTERVAL: 460 MS
EKG QRS DURATION: 74 MS
EKG QRS DURATION: 78 MS
EKG QTC CALCULATION (BAZETT): 428 MS
EKG QTC CALCULATION (BAZETT): 440 MS
EKG R AXIS: 43 DEGREES
EKG R AXIS: 80 DEGREES
EKG T AXIS: 79 DEGREES
EKG T AXIS: 82 DEGREES
EKG VENTRICULAR RATE: 55 BPM
EKG VENTRICULAR RATE: 71 BPM
T4 FREE: 1.11 NG/DL (ref 0.93–1.7)

## 2022-07-22 PROCEDURE — 6370000000 HC RX 637 (ALT 250 FOR IP): Performed by: NURSE PRACTITIONER

## 2022-07-22 PROCEDURE — 6370000000 HC RX 637 (ALT 250 FOR IP): Performed by: INTERNAL MEDICINE

## 2022-07-22 PROCEDURE — 2700000000 HC OXYGEN THERAPY PER DAY

## 2022-07-22 PROCEDURE — 36415 COLL VENOUS BLD VENIPUNCTURE: CPT

## 2022-07-22 PROCEDURE — 99223 1ST HOSP IP/OBS HIGH 75: CPT | Performed by: STUDENT IN AN ORGANIZED HEALTH CARE EDUCATION/TRAINING PROGRAM

## 2022-07-22 PROCEDURE — 84439 ASSAY OF FREE THYROXINE: CPT

## 2022-07-22 PROCEDURE — 93005 ELECTROCARDIOGRAM TRACING: CPT | Performed by: STUDENT IN AN ORGANIZED HEALTH CARE EDUCATION/TRAINING PROGRAM

## 2022-07-22 PROCEDURE — 6370000000 HC RX 637 (ALT 250 FOR IP): Performed by: STUDENT IN AN ORGANIZED HEALTH CARE EDUCATION/TRAINING PROGRAM

## 2022-07-22 PROCEDURE — 1200000000 HC SEMI PRIVATE

## 2022-07-22 PROCEDURE — 2580000003 HC RX 258: Performed by: INTERNAL MEDICINE

## 2022-07-22 RX ORDER — DOFETILIDE 0.5 MG/1
500 CAPSULE ORAL EVERY 12 HOURS SCHEDULED
Status: DISCONTINUED | OUTPATIENT
Start: 2022-07-22 | End: 2022-07-22

## 2022-07-22 RX ORDER — DOFETILIDE 0.5 MG/1
500 CAPSULE ORAL EVERY 12 HOURS SCHEDULED
Status: DISCONTINUED | OUTPATIENT
Start: 2022-07-22 | End: 2022-07-25 | Stop reason: HOSPADM

## 2022-07-22 RX ADMIN — Medication 10 ML: at 20:12

## 2022-07-22 RX ADMIN — ACETAMINOPHEN 325MG 650 MG: 325 TABLET ORAL at 13:55

## 2022-07-22 RX ADMIN — ACETAMINOPHEN 325MG 650 MG: 325 TABLET ORAL at 22:16

## 2022-07-22 RX ADMIN — DOFETILIDE 500 MCG: 0.5 CAPSULE ORAL at 20:02

## 2022-07-22 RX ADMIN — LEVOTHYROXINE SODIUM 150 MCG: 0.15 TABLET ORAL at 08:40

## 2022-07-22 RX ADMIN — VENLAFAXINE HYDROCHLORIDE 150 MG: 150 CAPSULE, EXTENDED RELEASE ORAL at 08:40

## 2022-07-22 RX ADMIN — APIXABAN 5 MG: 5 TABLET, FILM COATED ORAL at 17:06

## 2022-07-22 RX ADMIN — VALSARTAN 160 MG: 160 TABLET, FILM COATED ORAL at 08:40

## 2022-07-22 RX ADMIN — ATORVASTATIN CALCIUM 20 MG: 20 TABLET, FILM COATED ORAL at 08:40

## 2022-07-22 RX ADMIN — DOFETILIDE 250 MCG: 0.25 CAPSULE ORAL at 08:40

## 2022-07-22 RX ADMIN — Medication 10 ML: at 08:52

## 2022-07-22 ASSESSMENT — PAIN SCALES - GENERAL: PAINLEVEL_OUTOF10: 8

## 2022-07-22 ASSESSMENT — PAIN DESCRIPTION - LOCATION: LOCATION: HEAD

## 2022-07-22 ASSESSMENT — PAIN DESCRIPTION - DESCRIPTORS: DESCRIPTORS: ACHING

## 2022-07-22 NOTE — PROGRESS NOTES
Hospitalist Progress Note      PCP: GIO Zambrano CNP    Date of Admission: 7/21/2022    Chief Complaint: Bradycardia, shortness of breath    Hospital Course:   64 y.o. female who presented to [facility] with tightness in her neck. She has a known hx of atrial fibrillation on tikosyn, COPD with 2L oxygen requirement, HTN, HLD, obesity, hypothyroidism and b/l carotid artery stenosis. FOr the last several months she has been having episodes of elevated heart rate, at times up tto the 170's; she was started on nadalol, but then started developing episodes of bradycardia associated with neck tightness. Symptoms associated with shortness of breath for the last 1-2 days. Upon evaluation in the ED pt was noted to have bradycardia. Labs largely reassuring. Patient admitted for further evaluation and treatment. Electrophysiology was consulted during admission. Troponins were trended and stable, proBNP was mild elevated at 575  Restratification labs-within normal limits. X-ray of the chest did not show any acute findings. Recently had stress test in June 2022 which was low risk with an ejection fraction of 80%    Subjective: Patient was seen at bedside and mentions that she has been noticing tightness in her neck and that she has been having varying heart rates from 170s to being bradycardic in the 40s. Patient does not mention of any chest pain but does mention some difficulty breathing. Does have a history of COPD with oxygen dependency-2 L which she is currently on.       Medications:  Reviewed    Infusion Medications    sodium chloride       Scheduled Medications    atorvastatin  20 mg Oral Daily    dofetilide  250 mcg Oral BID    levothyroxine  150 mcg Oral Daily    valsartan  160 mg Oral Daily    venlafaxine  150 mg Oral Daily    sodium chloride flush  5-40 mL IntraVENous 2 times per day    heparin (porcine)  5,000 Units SubCUTAneous Q8H    ipratropium  0.5 mg Nebulization 4x daily Arformoterol Tartrate  15 mcg Nebulization BID     PRN Meds: sodium chloride flush, sodium chloride, polyethylene glycol, acetaminophen **OR** acetaminophen    No intake or output data in the 24 hours ending 07/22/22 1120    Exam:    BP (!) 136/53   Pulse 61   Temp 98.2 °F (36.8 °C) (Temporal)   Resp 18   Ht 5' 7\" (1.702 m)   Wt 243 lb (110.2 kg)   SpO2 96%   BMI 38.06 kg/m²     General appearance: Obese appears stated age and cooperative. HEENT: Pupils equal, round, and reactive to light. Conjunctivae/corneas clear. Neck: Supple, with full range of motion. No jugular venous distention. Trachea midline. Respiratory:  Normal respiratory effort. Clear to auscultation, bilaterally without Rales/Wheezes/Rhonchi. Cardiovascular: Regular rate and rhythm with normal S1/S2 without murmurs, rubs or gallops. Abdomen: Soft, non-tender, non-distended with normal bowel sounds. Musculoskeletal: No clubbing, cyanosis or edema bilaterally. Full range of motion without deformity. Skin: Skin color, texture, turgor normal.  No rashes or lesions. Neurologic:  Neurovascularly intact without any focal sensory/motor deficits. Cranial nerves: II-XII intact, grossly non-focal.  Psychiatric: Alert and oriented, thought content appropriate, normal insight    Labs:   Recent Labs     07/21/22  1247   WBC 7.3   HGB 11.6   HCT 36.5        Recent Labs     07/21/22  1247      K 4.5      CO2 25   BUN 17   CREATININE 0.8   CALCIUM 9.0     Recent Labs     07/21/22  1247   AST 15   ALT 11   BILITOT 0.8   ALKPHOS 68     No results for input(s): INR in the last 72 hours. No results for input(s): Jolie Kaska in the last 72 hours.     Assessment/Plan:    Active Hospital Problems    Diagnosis Date Noted    Shortness of breath [R06.02] 07/21/2022     Priority: Medium    Symptomatic bradycardia [R00.1] 07/21/2022     Priority: Medium   Symptomatic bradycardia  Hx of atrial fibrillation with concern for tachy/kyle syndrome  COPD  HTN  HLD  B/l carotid artery stenosis  Hypothyroidsims  Chronic hypoxic respiratory failure-on 2 L nasal cannula    Electrophysiology consulted  Heart rate reviewed since admission and in the range of 60-80  Continue on telemetry  Troponins reviewed and stable  History of bilateral carotid stenosis-40%-follows with vascular surgery outpatient. Has an appointment set up for next week for obtaining carotid ultrasound. Patient does not complain of any active dizziness or lightheadedness at this time. Will monitor and consider carotid ultrasound as deemed necessary      DVT Prophylaxis: Lovenox  Diet: ADULT DIET;  Regular  Code Status: Full Code    Dispo -ongoing specialist eval.  Home at discharge    Rafal Sapp MD

## 2022-07-22 NOTE — CARE COORDINATION
7/22 Care Coordination: Pt presents to the ED for Shortness of Breath. Has Hx Afib, On O2 2lnc at home. O2 from [facility]. Pt having periods of Reji in ED 40's. EP consulted. PTA pt lives w/ her  in a 2 story house- resides on 1st floor. Uses cane occasionally. Pt also has a hx of using . Hx [facility]. If need HHC again will use [facility] again. Her  will transport home. [facility] will follow along. Next available day to start is Tuesday. CM/SW will continue to follow for discharge planning.    Flo MEJIAN,RN-CV-BC  [phone]

## 2022-07-22 NOTE — CONSULTS
[facility],[address] and [address] [facility] Electrophysiology  Consultation Report  PATIENT: [facility] RECORD NUMBER: [id number]  DATE OF SERVICE:  7/22/2022  ATTENDING ELECTROPHYSIOLOGIST: Dr Timmy Ty   PRIMARY ELECTROPHYSIOLOGIST: none previous   REFERRING PHYSICIAN: No ref. provider found and Betty Arechiga, APRN - CNP  CHIEF COMPLAINT: atrial fib, bradycardia     HPI: This is a 64 y.o. female with PMH as below significant for persistent atrial fibrillation, HTN, bradycardia, COPD on chronic oxygen, obesity, who presents to the hospital complaining of chest pressure, SOB and labile heart rates. Cardiac electrophysiology service is consulted for complaints of low heart rates and tikosyn; atrial fib management. Her atrial fibrillation has historically been managed by Dr Neema Nichols. She also has made an appointment with [place] at the [facility] [place] in August [place], but has not been seen there yet. She was started on tikosyn 125 mcg BID in 8/2020, had breakthrough episodes and was readmitted in Spring earlier this year and tikosyn was increased to 250 mcg BID. This worked for brief time, but she continued to have breakthrough episodes with RVR up to 170 bpm at home and was started on nadolol 5 mg daily. In the past several months, she has noted low heart rates 40's at times and elevated heart rates at other times. She felt poorly overall with increased fatigue and SOB. The day of admission, she started to have chest pressure, fatigue and noted her heart rates to be 41 on her pulse ox. She describes feeling the worse she had felt and presented to the hospital.   She was SR/SB with rates 50-60 on admit. Troponin negative, labs unremarkable. Nadolol was held on admit. She has maintained sinus rhythm with no significant bradycardia overnight. She is ambulated in the halls with no significant arrhythmia and no evidence of chronotropic incompetence.   She is chronically short of breath Hypertension     Left carotid stenosis 2018    O2 dependent 2020    On continuous oral anticoagulation     Eliquis    S/P carotid endarterectomy 10/06/2016    Stomach ulcer     Thyroid disease        Family History   Problem Relation Age of Onset    Other Mother         rheumatic fever    Cancer Sister         lung       Social History     Tobacco Use    Smoking status: Former     Packs/day: 1.00     Years: 30.00     Pack years: 30.00     Types: Cigarettes     Quit date: 2018     Years since quittin.0    Smokeless tobacco: Never   Substance Use Topics    Alcohol use: Not Currently       Current Facility-Administered Medications   Medication Dose Route Frequency Provider Last Rate Last Admin    apixaban (ELIQUIS) tablet 5 mg  5 mg Oral BID Ethelylolly Casjuan APRN - CNP        atorvastatin (LIPITOR) tablet 20 mg  20 mg Oral Daily Tamara Curran MD   20 mg at 22 0840    dofetilide (TIKOSYN) capsule 250 mcg  250 mcg Oral BID Tamara Curran MD   250 mcg at 22 0840    levothyroxine (SYNTHROID) tablet 150 mcg  150 mcg Oral Daily Tamara Curran MD   150 mcg at 22 0840    valsartan (DIOVAN) tablet 160 mg  160 mg Oral Daily Tamara Curran MD   160 mg at 22 0840    venlafaxine (EFFEXOR XR) extended release capsule 150 mg  150 mg Oral Daily Tamara Curran MD   150 mg at 22 0840    sodium chloride flush 0.9 % injection 5-40 mL  5-40 mL IntraVENous 2 times per day Tamara Curran MD   10 mL at 22 0852    sodium chloride flush 0.9 % injection 5-40 mL  5-40 mL IntraVENous PRN Tamara Curran MD        0.9 % sodium chloride infusion   IntraVENous PRN Tamara Curran MD        polyethylene glycol (GLYCOLAX) packet 17 g  17 g Oral Daily PRN Tamara Curran MD        acetaminophen (TYLENOL) tablet 650 mg  650 mg Oral Q6H PRN Tamara Curran MD   650 mg at 22 1355    Or    acetaminophen (TYLENOL) suppository 650 mg  650 mg Rectal Q6H PRN Tamara Curran MD        ipratropium (ATROVENT) 0.02 % nebulizer solution 0.5 mg  0.5 mg Nebulization 4x daily Tamara Curran MD        Arformoterol Tartrate (BROVANA) nebulizer solution 15 mcg  15 mcg Nebulization BID Tamara Curran MD            No Known Allergies    ROS:   Constitutional: Negative for fever, activity change and appetite change. HENT: Negative for epistaxis or JVD  Eyes: Negative for diploplia, blurred vision. Respiratory: Negative for cough, chest tightness, positive for chronic shortness of breath, worse with exertion and negative for wheezing. Cardiovascular: pertinent positives in HPI  Gastrointestinal: Negative for abdominal pain and blood in stool. All other review of systems are negative     PHYSICAL EXAM:   Vitals:    07/21/22 1456 07/21/22 1546 07/21/22 2048 07/22/22 0729   BP: (!) 163/60  (!) 188/105 (!) 136/53   Pulse:  67 69 61   Resp:  18 17 18   Temp:    98.2 °F (36.8 °C)   TempSrc:    Temporal   SpO2: 98% 97% 96% 96%   Weight:       Height:          Constitutional: Well-developed, no acute distress  Eyes: conjunctivae normal, no xanthelasma   Ears, Nose, Throat: oral mucosa moist, no cyanosis   CV: no JVD. Regular rate and rhythm. Normal S1S2 and no S3. No murmurs, rubs, or gallops. PMI is nondisplaced  Lungs: clear to auscultation bilaterally, normal respiratory effort without used of accessory muscles  Abdomen: soft, non-tender, bowel sounds present, no masses or hepatomegaly   Musculoskeletal: no digital clubbing, no edema   Skin: warm, no rashes     I have personally reviewed the laboratory, cardiac diagnostic and radiographic testing as outlined below:    Data:    Recent Labs     07/21/22  1247   WBC 7.3   HGB 11.6   HCT 36.5        Recent Labs     07/21/22  1247      K 4.5      CO2 25   BUN 17   CREATININE 0.8   CALCIUM 9.0      Lab Results   Component Value Date/Time    MG 1.9 04/14/2022 11:07 PM     Recent Labs     07/21/22  1444   TSH 1.210     No results for input(s): INR in the last 72 hours.     CXR: 7/21/22 no acute cardiopulmonary process    Telemetry: Sinus rhythm/sinus bradycardia 50 to 80 bpm    EKG:  Please see scan in Cardiology. Echocardiogram:   3/22/22   Summary   No evidence of tricuspid regurgitation. Left ventricle grossly normal in size. Borderline asymmetric left ventricular septal hypertrophy. Normal LV segmental wall motion. Estimated left ventricular ejection fraction is 64±5%. Does not meet 50% diagnostic criteria for diastolic dysfunction. The LAESV Index is <34ml/m2. Normal right ventricular size and function. TAPSE is normal   No evidence of tricuspid regurgitation. Unable to accurately assess RV systolic pressure. Physiologic and/or trace pulmonic regurgitation present. Technically good quality study. Technically difficult study due to patient''s body habitus. No comparison study available. Suggest clinical correlation. Stress Test: 6/30/22 : NM stress   1. No evidence of significant stress-induced ischemia. 2. Normal-hyperdynamic left ventricular segmental wall motion with   Gated SPECT left ventricular ejection fraction 80%. Ildefonso Toure Taylor [address] [facility] [place] [facility]     Outpatient Monitor: none in the past         I have independently reviewed all of the ECGs and rhythm strips per above     Assessment/Plan: This is a 64 y.o. female with a history of persistent atrial fibrillation on Tikosyn and Eliquis who presents with chest pressure, shortness of breath in the setting of low heart rates at home. She has history of RVR in atrial fibrillation and was started on low-dose beta-blocker/nadolol in the spring this year. She has multiple episodes of breakthrough atrial fibrillation's with RVR and slow heart rates at home. She is on tikosyn, but with her CrCl of over 60, she may be able to tolerate tikosyn 500 mcg every 12 hours to maintain sinus rhythm and stop beta blockers to avoid low heart rates.      1.  Atrial fibrillation, persistent   - currently in SR -Currently on Tikosyn 250 mcg every 12 hours, has reported tachy bradycardia episodes and was recently started on beta-blocker in spring 2022.   - CrCl as above, using 40% adjustment for obesity based on Cr 0.8.   - on Eliquis 5 mg BID for [email or web address] score of at least 4 (HTN, age 64, female)   -Recent stress as above was negative, recent PSG as outpatient on Monday and reported negative although no final report is available    2. COPD with hypoxia on chronic oxygen  -Patient is at baseline with her COPD per report, uses daily inhalers and on chronic oxygen 2 to 3 L  -Is not established with pulmonologist at this time    3. HTN   -On Diovan 160 daily and amlodipine; elevated at times    4. Hypothyroid  -stable TSH on Synthroid    5. History of carotid stenosis status post carotid enterectomy    6. Obesity  Body mass index is 38.06 kg/m². Recommendations:    Hold AV ann blockers,plan to discontinue nadolol on discharge  Increase Tikosyn 500 mcg tonight and will continue per protocol if tolerated every 12 hours  Daily lab with BMP magnesium  EKG 2 hours after each dose of Tikosyn  5. Recommend pulmonology work-up as outpatient    Thank you for allowing me to participate in your patient's care. Please call me if there are any questions or concerns. GIO Alan - CNP  Cardiac Electrophysiology  [facility]) Physicians  [facility] [place]: [place] Electrophysiology  2:38 PM  7/22/2022    Attending Supervising Physicians [address] Statement  I performed at least 51% of the work. I was present with the nurse practitioner during the history and exam. I discussed the findings and plans with the nurse practitioner and agree as documented in his note     Electronically signed by Trent Griffith DO on 7/22/22 at 3:21 PM EDT    70-year-old female with a past medical history of nonvalvular persistent AF, HTN, COPD on nasal cannula O2, and obesity.   She is managed by Dr. Obdulia Goodman with

## 2022-07-23 LAB
ANION GAP SERPL CALCULATED.3IONS-SCNC: 11 MMOL/L (ref 7–16)
BUN BLDV-MCNC: 17 MG/DL (ref 6–23)
CALCIUM SERPL-MCNC: 9.1 MG/DL (ref 8.6–10.2)
CHLORIDE BLD-SCNC: 102 MMOL/L (ref 98–107)
CO2: 27 MMOL/L (ref 22–29)
CREAT SERPL-MCNC: 0.9 MG/DL (ref 0.5–1)
GFR AFRICAN AMERICAN: >60
GFR NON-AFRICAN AMERICAN: >60 ML/MIN/1.73
GLUCOSE BLD-MCNC: 102 MG/DL (ref 74–99)
MAGNESIUM: 1.7 MG/DL (ref 1.6–2.6)
POTASSIUM SERPL-SCNC: 4 MMOL/L (ref 3.5–5)
SODIUM BLD-SCNC: 140 MMOL/L (ref 132–146)

## 2022-07-23 PROCEDURE — 6360000002 HC RX W HCPCS: Performed by: STUDENT IN AN ORGANIZED HEALTH CARE EDUCATION/TRAINING PROGRAM

## 2022-07-23 PROCEDURE — 1200000000 HC SEMI PRIVATE

## 2022-07-23 PROCEDURE — 93005 ELECTROCARDIOGRAM TRACING: CPT | Performed by: STUDENT IN AN ORGANIZED HEALTH CARE EDUCATION/TRAINING PROGRAM

## 2022-07-23 PROCEDURE — 99233 SBSQ HOSP IP/OBS HIGH 50: CPT | Performed by: STUDENT IN AN ORGANIZED HEALTH CARE EDUCATION/TRAINING PROGRAM

## 2022-07-23 PROCEDURE — 6370000000 HC RX 637 (ALT 250 FOR IP): Performed by: INTERNAL MEDICINE

## 2022-07-23 PROCEDURE — 36415 COLL VENOUS BLD VENIPUNCTURE: CPT

## 2022-07-23 PROCEDURE — 6370000000 HC RX 637 (ALT 250 FOR IP): Performed by: NURSE PRACTITIONER

## 2022-07-23 PROCEDURE — 2580000003 HC RX 258: Performed by: INTERNAL MEDICINE

## 2022-07-23 PROCEDURE — 80048 BASIC METABOLIC PNL TOTAL CA: CPT

## 2022-07-23 PROCEDURE — 83735 ASSAY OF MAGNESIUM: CPT

## 2022-07-23 PROCEDURE — 6370000000 HC RX 637 (ALT 250 FOR IP): Performed by: STUDENT IN AN ORGANIZED HEALTH CARE EDUCATION/TRAINING PROGRAM

## 2022-07-23 PROCEDURE — 2700000000 HC OXYGEN THERAPY PER DAY

## 2022-07-23 RX ORDER — MAGNESIUM SULFATE IN WATER 40 MG/ML
2000 INJECTION, SOLUTION INTRAVENOUS ONCE
Status: COMPLETED | OUTPATIENT
Start: 2022-07-23 | End: 2022-07-23

## 2022-07-23 RX ADMIN — ACETAMINOPHEN 325MG 650 MG: 325 TABLET ORAL at 15:15

## 2022-07-23 RX ADMIN — DOFETILIDE 500 MCG: 0.5 CAPSULE ORAL at 19:50

## 2022-07-23 RX ADMIN — VALSARTAN 160 MG: 160 TABLET, FILM COATED ORAL at 08:09

## 2022-07-23 RX ADMIN — Medication 10 ML: at 08:02

## 2022-07-23 RX ADMIN — MAGNESIUM SULFATE HEPTAHYDRATE 2000 MG: 40 INJECTION, SOLUTION INTRAVENOUS at 16:56

## 2022-07-23 RX ADMIN — ATORVASTATIN CALCIUM 20 MG: 20 TABLET, FILM COATED ORAL at 08:09

## 2022-07-23 RX ADMIN — APIXABAN 5 MG: 5 TABLET, FILM COATED ORAL at 08:09

## 2022-07-23 RX ADMIN — LEVOTHYROXINE SODIUM 150 MCG: 0.15 TABLET ORAL at 08:09

## 2022-07-23 RX ADMIN — APIXABAN 5 MG: 5 TABLET, FILM COATED ORAL at 19:49

## 2022-07-23 RX ADMIN — VENLAFAXINE HYDROCHLORIDE 150 MG: 150 CAPSULE, EXTENDED RELEASE ORAL at 08:09

## 2022-07-23 RX ADMIN — DOFETILIDE 500 MCG: 0.5 CAPSULE ORAL at 08:09

## 2022-07-23 RX ADMIN — MAGNESIUM SULFATE HEPTAHYDRATE 2000 MG: 40 INJECTION, SOLUTION INTRAVENOUS at 12:01

## 2022-07-23 ASSESSMENT — PAIN SCALES - GENERAL: PAINLEVEL_OUTOF10: 2

## 2022-07-23 ASSESSMENT — PAIN DESCRIPTION - DESCRIPTORS: DESCRIPTORS: ACHING

## 2022-07-23 ASSESSMENT — PAIN DESCRIPTION - LOCATION: LOCATION: HEAD

## 2022-07-23 NOTE — PROGRESS NOTES
[facility],2[address] and [address] [facility] Electrophysiology  Inpatient progress report  PATIENT: [facility] RECORD NUMBER: [id number]  DATE OF SERVICE:  7/23/2022  ATTENDING ELECTROPHYSIOLOGIST: Dr Ramirez Lynn   PRIMARY ELECTROPHYSIOLOGIST: none previous   REFERRING PHYSICIAN: No ref. provider found and GIO Johnson - CNP  CHIEF COMPLAINT: atrial fib, bradycardia     Subjective:  64-year-old female with a past medical history of nonvalvular persistent AF, HTN, COPD on nasal cannula O2, and obesity. She is managed by Dr. Divine Mcpherson with Norvasc 1.25 mg daily, Eliquis 5 mg twice daily, Lipitor 20 mg daily, dofetilide 250 mcg twice daily, and valsartan 160 mg daily. In 2020, patient was diagnosed with nonvalvular paroxysmal AF. At this time she was initiated on dofetilide 125 mcg twice daily. In 3/2022, she had AF recurrences, which were managed with increase in dofetilide to 250 mcg twice daily. On 7/21/2022, she presented with chief complaint of low heart rates at home and dyspnea. She reports recently starting nadolol for episodes of fast heartbeat. EP service was consulted by admitting physician for evaluation and management of possible tachybradycardia syndrome. Nadolol was discontinued. Tikosyn was increased to 500 mcg twice daily. She is tentatively scheduled for discharge following 6 doses of increased Tikosyn dose. Patient denies any other complaints at this time. Prior cardiac testing:  - TTE (3/22/2022): LVEF = 64%, borderline asymmetric septal LVH, technically difficult study due to patient's body habitus.  -Pharmacologic nuclear stress test (6/30/2022): LVEF = 80%, no ischemia.       Past Medical History:   Diagnosis Date    Arthritis     Atrial fibrillation (HCC)     Tikosyn per Dr. Divine Mcpherson    Bilateral carotid artery stenosis 07/16/2020    Bronchitis     Carotid stenosis, bilateral 05/21/2012    Hyperlipidemia     Hypertension     Left carotid stenosis 2018    O2 dependent 2020    On continuous oral anticoagulation     Eliquis    S/P carotid endarterectomy 10/06/2016    Stomach ulcer     Thyroid disease      Past Surgical History:   Procedure Laterality Date    APPENDECTOMY      BACK SURGERY      CHOLECYSTECTOMY      COLONOSCOPY      ENDOSCOPY, COLON, DIAGNOSTIC  01/10/2018    upper endo    EYE SURGERY      BILATERAL CATARACT    HYSTERECTOMY (CERVIX STATUS UNKNOWN)      JOINT REPLACEMENT      RIGHT KNEE      Family History   Problem Relation Age of Onset    Other Mother         rheumatic fever    Cancer Sister         lung       Social History     Tobacco Use    Smoking status: Former     Packs/day: 1.00     Years: 30.00     Pack years: 30.00     Types: Cigarettes     Quit date: 2018     Years since quittin.0    Smokeless tobacco: Never   Substance Use Topics    Alcohol use: Not Currently       Current Facility-Administered Medications   Medication Dose Route Frequency Provider Last Rate Last Admin    apixaban (ELIQUIS) tablet 5 mg  5 mg Oral BID Clifford Reno APRN - CNP   5 mg at 22 08    dofetilide (TIKOSYN) capsule 500 mcg  500 mcg Oral 2 times per day Mary Manifold, DO   500 mcg at 22 08    atorvastatin (LIPITOR) tablet 20 mg  20 mg Oral Daily Tamara Curran MD   20 mg at 22 08    levothyroxine (SYNTHROID) tablet 150 mcg  150 mcg Oral Daily Tamara Curran MD   150 mcg at 22 08    valsartan (DIOVAN) tablet 160 mg  160 mg Oral Daily Tamara Curran MD   160 mg at 22 08    venlafaxine (EFFEXOR XR) extended release capsule 150 mg  150 mg Oral Daily Tamara Curran MD   150 mg at 22 0809    sodium chloride flush 0.9 % injection 5-40 mL  5-40 mL IntraVENous 2 times per day Tamara Curran MD   10 mL at 22 0802    sodium chloride flush 0.9 % injection 5-40 mL  5-40 mL IntraVENous PRN Tamara Curran MD        0.9 % sodium chloride infusion   IntraVENous PRN Tamara Curran MD        polyethylene glycol (GLYCOLAX) packet 17 g  17 g Oral Daily PRN Tamara Curran MD        acetaminophen (TYLENOL) tablet 650 mg  650 mg Oral Q6H PRN Tamara Curran MD   650 mg at 07/22/22 2216    Or    acetaminophen (TYLENOL) suppository 650 mg  650 mg Rectal Q6H PRN Tamara Curran MD        ipratropium (ATROVENT) 0.02 % nebulizer solution 0.5 mg  0.5 mg Nebulization 4x daily Tamara Curran MD        Arformoterol Tartrate (BROVANA) nebulizer solution 15 mcg  15 mcg Nebulization BID Tamara Curran MD            No Known Allergies    ROS:   Constitutional: Negative for fever, activity change and appetite change. HENT: Negative for epistaxis or JVD  Eyes: Negative for diploplia, blurred vision. Respiratory: Negative for cough, chest tightness, positive for chronic shortness of breath, worse with exertion and negative for wheezing. Cardiovascular: pertinent positives in HPI  Gastrointestinal: Negative for abdominal pain and blood in stool. All other review of systems are negative     PHYSICAL EXAM:   Vitals:    07/21/22 2048 07/22/22 0729 07/22/22 2000 07/23/22 0800   BP: (!) 188/105 (!) 136/53 (!) 141/64 (!) 132/51   Pulse: 69 61 63 67   Resp: 17 18 18 18   Temp:  98.2 °F (36.8 °C) 96.9 °F (36.1 °C) 97.5 °F (36.4 °C)   TempSrc:  Temporal Temporal Temporal   SpO2: 96% 96% 98% 98%   Weight:       Height:        Constitutional: Well-developed, no acute distress  Eyes: conjunctivae normal, no xanthelasma   Ears, Nose, Throat: oral mucosa moist, no cyanosis  Neck: No JVD, supple  CV: Regular rate and rhythm.   Bilateral radial and dorsalis pedis pulses intact  Lungs: normal respiratory effort without used of accessory muscles, no audible wheezes  Abdomen: soft, non-tender, no masses or hepatomegaly   Musculoskeletal: no digital clubbing, no edema   Skin: warm, no rashes     I have personally reviewed the laboratory, cardiac diagnostic and radiographic testing as outlined below:    Data:    Recent Labs     07/21/22  1247   WBC 7.3   HGB 11.6   HCT 36.5        Recent Labs     07/21/22  1247 07/23/22  0458    140   K 4.5 4.0    102   CO2 25 27   BUN 17 17   CREATININE 0.8 0.9   CALCIUM 9.0 9.1      Lab Results   Component Value Date/Time    MG 1.7 07/23/2022 04:58 AM     Recent Labs     07/21/22  1444   TSH 1.210     No results for input(s): INR in the last 72 hours. CXR: 7/21/22 no acute cardiopulmonary process    Telemetry: SR at 60 bpm, episode of AF with ventricular rate of 110 bpm     Assessment/Plan:     Assessment/plan:  1. Nonvalvular persistent AF  - Initially diagnosed in 2020.  - [id number] score = 4 (age, sex, HTN). Recommend [address] in females with score of 2 or more. DOAC preferred. - Continue apixaban 5 mg twice daily. Recommend annual monitoring of CBC and CMP. - Rhythm control previously recommended and treated with dofetilide. Appears dofetilide was initiated in 2020 and a starting dose of 125 mcg twice daily. In 3/2022, appears dofetilide was increased to 250 mcg twice daily due to AF recurrences. Patient now presents with complaint of episodes of rapid as well as slow heartbeats following initiation of nadolol. Recommend stop nadolol. Recommend increase dofetilide to 500 mcg twice daily. This requires inpatient monitoring for minimum of 5 doses. ECG will be performed 2 to 3 hours after each dose. Continuous telemetry monitoring. Maintain potassium greater than 4 magnesium greater than 2. Anticipate discharge Monday, 7/25/2022. -QTc today = 450 msec on my measurement. -Modifiable risk factors:  --Obesity: BMI goal less than 27. Recommend diet and exercise. --MIKHAIL: Patient reports recent sleep study. Results are not available to me.  --HTN: BP goal less than 130/80. --Alcohol: Continue to avoid.  -Plan for 2-week ZIO XT monitor at discharge. - Follow-up with my office in 1-2 months. 2.  Bradycardia  - Reported based on patient's pulse oximeter after starting nadolol. No evidence at this time.   - Continue to hold nadolol.  - Continue to monitor on telemetry. 3.  COPD  - She reports never being evaluated by pulmonologist in the past.  She is on nasal cannula O2. In the future, AF ablation may need to be entertained. Prior to pursuing that, would be helpful to have her evaluated by pulmonary to ensure she is maximized from that standpoint. This evaluation can be obtained as an outpatient. I spent 35 minutes. Thank you for allowing me to participate in your patient's care. Please call me if there are any questions or concerns.       Julissa Rodrigez, DO  Cardiac Electrophysiology  [facility]) Physicians  [facility] [place]: [place] Electrophysiology  10:50 AM  7/23/2022

## 2022-07-23 NOTE — PROGRESS NOTES
Hospitalist Progress Note      PCP: Adam Ramirez, APRN - PENG    Date of Admission: 7/21/2022    Chief Complaint: Bradycardia, shortness of breath    Hospital Course:   64 y.o. female who presented to [address] [place] [address] with tightness in her neck. She has a known hx of atrial fibrillation on tikosyn, COPD with 2L oxygen requirement, HTN, HLD, obesity, hypothyroidism and b/l carotid artery stenosis. FOr the last several months she has been having episodes of elevated heart rate, at times up tto the 170's; she was started on nadalol, but then started developing episodes of bradycardia associated with neck tightness. Symptoms associated with shortness of breath for the last 1-2 days. Upon evaluation in the ED pt was noted to have bradycardia. Labs largely reassuring. Patient admitted for further evaluation and treatment. Electrophysiology was consulted during admission. Troponins were trended and stable, proBNP was mild elevated at 575  Restratification labs-within normal limits. X-ray of the chest did not show any acute findings. Recently had stress test in June 2022 which was low risk with an ejection fraction of 80%    Subjective: Patient was seen at bedside and DOES Not have any chest pain at this time. Discussed about the plan per EP to adjust medications and monitor EKGs. Monitor and replete potassium and magnesium. does have a history of COPD with oxygen dependency-2 L which she is currently on.       Medications:  Reviewed    Infusion Medications    sodium chloride       Scheduled Medications    apixaban  5 mg Oral BID    dofetilide  500 mcg Oral 2 times per day    atorvastatin  20 mg Oral Daily    levothyroxine  150 mcg Oral Daily    valsartan  160 mg Oral Daily    venlafaxine  150 mg Oral Daily    sodium chloride flush  5-40 mL IntraVENous 2 times per day    ipratropium  0.5 mg Nebulization 4x daily    Arformoterol Tartrate  15 mcg Nebulization BID     PRN Meds: sodium chloride flush, sodium chloride, polyethylene glycol, acetaminophen **OR** acetaminophen      Intake/Output Summary (Last 24 hours) at 7/23/2022 0938  Last data filed at 7/22/2022 1800  Gross per 24 hour   Intake 600 ml   Output --   Net 600 ml       Exam:    BP (!) 132/51   Pulse 67   Temp 97.5 °F (36.4 °C) (Temporal)   Resp 18   Ht 5' 7\" (1.702 m)   Wt 243 lb (110.2 kg)   SpO2 98%   BMI 38.06 kg/m²     General appearance: Obese appears stated age and cooperative. HEENT: Pupils equal, round, and reactive to light. Conjunctivae/corneas clear. Neck: Supple, with full range of motion. No jugular venous distention. Trachea midline. Respiratory:  Normal respiratory effort. Clear to auscultation, bilaterally without Rales/Wheezes/Rhonchi. Cardiovascular: Regular rate and rhythm with normal S1/S2 without murmurs, rubs or gallops. Abdomen: Soft, non-tender, non-distended with normal bowel sounds. Musculoskeletal: No clubbing, cyanosis or edema bilaterally. Full range of motion without deformity. Skin: Skin color, texture, turgor normal.  No rashes or lesions. Neurologic:  Neurovascularly intact without any focal sensory/motor deficits. Cranial nerves: II-XII intact, grossly non-focal.  Psychiatric: Alert and oriented, thought content appropriate, normal insight    Labs:   Recent Labs     07/21/22  1247   WBC 7.3   HGB 11.6   HCT 36.5        Recent Labs     07/21/22  1247 07/23/22  0458    140   K 4.5 4.0    102   CO2 25 27   BUN 17 17   CREATININE 0.8 0.9   CALCIUM 9.0 9.1     Recent Labs     07/21/22  1247   AST 15   ALT 11   BILITOT 0.8   ALKPHOS 68     No results for input(s): INR in the last 72 hours. No results for input(s): Nicci Risk in the last 72 hours.     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 Recommend increase dofetilide to 500 mcg twice daily. This requires inpatient monitoring for minimum of 5 doses. ECG will be performed 2 to 3 hours after each dose. Continuous telemetry monitoring. Maintain potassium greater than 4 magnesium greater than 2. Pulmonary evaluation as OP  Heart rate reviewed since admission and in the range of 60-80  Continue on telemetry  Troponins reviewed and stable  History of bilateral carotid stenosis-40%-follows with vascular surgery outpatient. Has an appointment set up for next week for obtaining carotid ultrasound. Patient does not complain of any active dizziness or lightheadedness at this time. Will monitor and consider carotid ultrasound as deemed necessary      DVT Prophylaxis: Lovenox  Diet: ADULT DIET; Regular; Low Sodium (2 gm)  Code Status: Full Code    Dispo -ongoing specialist eval.- dofetilide requires inpatient monitoring for minimum of 5 doses. Possible DC on Monday.  Home at discharge    Heather Rothman MD

## 2022-07-24 DIAGNOSIS — I48.19 PERSISTENT ATRIAL FIBRILLATION (HCC): Primary | ICD-10-CM

## 2022-07-24 LAB
ANION GAP SERPL CALCULATED.3IONS-SCNC: 11 MMOL/L (ref 7–16)
ANION GAP SERPL CALCULATED.3IONS-SCNC: 11 MMOL/L (ref 7–16)
BUN BLDV-MCNC: 21 MG/DL (ref 6–23)
BUN BLDV-MCNC: 24 MG/DL (ref 6–23)
CALCIUM SERPL-MCNC: 9.1 MG/DL (ref 8.6–10.2)
CALCIUM SERPL-MCNC: 9.3 MG/DL (ref 8.6–10.2)
CHLORIDE BLD-SCNC: 103 MMOL/L (ref 98–107)
CHLORIDE BLD-SCNC: 104 MMOL/L (ref 98–107)
CO2: 25 MMOL/L (ref 22–29)
CO2: 25 MMOL/L (ref 22–29)
CREAT SERPL-MCNC: 0.9 MG/DL (ref 0.5–1)
CREAT SERPL-MCNC: 1 MG/DL (ref 0.5–1)
GFR AFRICAN AMERICAN: >60
GFR AFRICAN AMERICAN: >60
GFR NON-AFRICAN AMERICAN: 54 ML/MIN/1.73
GFR NON-AFRICAN AMERICAN: >60 ML/MIN/1.73
GLUCOSE BLD-MCNC: 101 MG/DL (ref 74–99)
GLUCOSE BLD-MCNC: 112 MG/DL (ref 74–99)
MAGNESIUM: 2.2 MG/DL (ref 1.6–2.6)
MAGNESIUM: 2.4 MG/DL (ref 1.6–2.6)
POTASSIUM SERPL-SCNC: 4.3 MMOL/L (ref 3.5–5)
POTASSIUM SERPL-SCNC: 4.3 MMOL/L (ref 3.5–5)
PRO-BNP: 448 PG/ML (ref 0–450)
SODIUM BLD-SCNC: 139 MMOL/L (ref 132–146)
SODIUM BLD-SCNC: 140 MMOL/L (ref 132–146)
TROPONIN, HIGH SENSITIVITY: 16 NG/L (ref 0–9)

## 2022-07-24 PROCEDURE — 6370000000 HC RX 637 (ALT 250 FOR IP): Performed by: INTERNAL MEDICINE

## 2022-07-24 PROCEDURE — 2500000003 HC RX 250 WO HCPCS

## 2022-07-24 PROCEDURE — 84484 ASSAY OF TROPONIN QUANT: CPT

## 2022-07-24 PROCEDURE — 6370000000 HC RX 637 (ALT 250 FOR IP): Performed by: STUDENT IN AN ORGANIZED HEALTH CARE EDUCATION/TRAINING PROGRAM

## 2022-07-24 PROCEDURE — 2580000003 HC RX 258: Performed by: INTERNAL MEDICINE

## 2022-07-24 PROCEDURE — 83880 ASSAY OF NATRIURETIC PEPTIDE: CPT

## 2022-07-24 PROCEDURE — 2700000000 HC OXYGEN THERAPY PER DAY

## 2022-07-24 PROCEDURE — 99233 SBSQ HOSP IP/OBS HIGH 50: CPT | Performed by: STUDENT IN AN ORGANIZED HEALTH CARE EDUCATION/TRAINING PROGRAM

## 2022-07-24 PROCEDURE — 80048 BASIC METABOLIC PNL TOTAL CA: CPT

## 2022-07-24 PROCEDURE — 83735 ASSAY OF MAGNESIUM: CPT

## 2022-07-24 PROCEDURE — 36415 COLL VENOUS BLD VENIPUNCTURE: CPT

## 2022-07-24 PROCEDURE — 93005 ELECTROCARDIOGRAM TRACING: CPT | Performed by: STUDENT IN AN ORGANIZED HEALTH CARE EDUCATION/TRAINING PROGRAM

## 2022-07-24 PROCEDURE — 1200000000 HC SEMI PRIVATE

## 2022-07-24 PROCEDURE — 6370000000 HC RX 637 (ALT 250 FOR IP): Performed by: NURSE PRACTITIONER

## 2022-07-24 RX ORDER — METOPROLOL TARTRATE 5 MG/5ML
INJECTION INTRAVENOUS
Status: COMPLETED
Start: 2022-07-24 | End: 2022-07-24

## 2022-07-24 RX ORDER — METOPROLOL TARTRATE 5 MG/5ML
5 INJECTION INTRAVENOUS ONCE
Status: COMPLETED | OUTPATIENT
Start: 2022-07-24 | End: 2022-07-24

## 2022-07-24 RX ADMIN — LEVOTHYROXINE SODIUM 150 MCG: 0.15 TABLET ORAL at 08:15

## 2022-07-24 RX ADMIN — Medication 10 ML: at 23:30

## 2022-07-24 RX ADMIN — DOFETILIDE 500 MCG: 0.5 CAPSULE ORAL at 20:00

## 2022-07-24 RX ADMIN — DOFETILIDE 500 MCG: 0.5 CAPSULE ORAL at 08:15

## 2022-07-24 RX ADMIN — ATORVASTATIN CALCIUM 20 MG: 20 TABLET, FILM COATED ORAL at 08:15

## 2022-07-24 RX ADMIN — VENLAFAXINE HYDROCHLORIDE 150 MG: 150 CAPSULE, EXTENDED RELEASE ORAL at 08:15

## 2022-07-24 RX ADMIN — VALSARTAN 160 MG: 160 TABLET, FILM COATED ORAL at 08:15

## 2022-07-24 RX ADMIN — METOPROLOL TARTRATE 5 MG: 5 INJECTION INTRAVENOUS at 01:33

## 2022-07-24 RX ADMIN — APIXABAN 5 MG: 5 TABLET, FILM COATED ORAL at 20:00

## 2022-07-24 RX ADMIN — APIXABAN 5 MG: 5 TABLET, FILM COATED ORAL at 08:15

## 2022-07-24 RX ADMIN — Medication 10 ML: at 08:16

## 2022-07-24 NOTE — PROGRESS NOTES
Hospitalist Progress Note      PCP: GIO Villalobos CNP    Date of Admission: 7/21/2022    Chief Complaint: Bradycardia, shortness of breath    Hospital Course:   64 y.o. female who presented to [address] [place] [address] with tightness in her neck. She has a known hx of atrial fibrillation on tikosyn, COPD with 2L oxygen requirement, HTN, HLD, obesity, hypothyroidism and b/l carotid artery stenosis. FOr the last several months she has been having episodes of elevated heart rate, at times up tto the 170's; she was started on nadalol, but then started developing episodes of bradycardia associated with neck tightness. Symptoms associated with shortness of breath for the last 1-2 days. Upon evaluation in the ED pt was noted to have bradycardia. Labs largely reassuring. Patient admitted for further evaluation and treatment. Electrophysiology was consulted during admission. Troponins were trended and stable, proBNP was mild elevated at 575  Restratification labs-within normal limits. X-ray of the chest did not show any acute findings. Recently had stress test in June 2022 which was low risk with an ejection fraction of 80%    Subjective: Patient was seen at bedside and DOES Not have any chest pain at this time.     Had an episode of atrial fibrillation yesterday night-1 dose of metoprolol 5 mg IV was given after she reverted to sinus bradycardia    Medications:  Reviewed    Infusion Medications    sodium chloride       Scheduled Medications    ipratropium  0.5 mg Nebulization Q6H WA    apixaban  5 mg Oral BID    dofetilide  500 mcg Oral 2 times per day    atorvastatin  20 mg Oral Daily    levothyroxine  150 mcg Oral Daily    valsartan  160 mg Oral Daily    venlafaxine  150 mg Oral Daily    sodium chloride flush  5-40 mL IntraVENous 2 times per day    Arformoterol Tartrate  15 mcg Nebulization BID     PRN Meds: sodium chloride flush, sodium chloride, polyethylene glycol, acetaminophen **OR** stenosis  Hypothyroidsims  Chronic hypoxic respiratory failure-on 2 L nasal cannula    Electrophysiology consulted- Recommend stop nadolol. Recommend increase dofetilide to 500 mcg twice daily. This requires inpatient monitoring for minimum of 5 doses. ECG will be performed 2 to 3 hours after each dose. Continuous telemetry monitoring. Maintain potassium greater than 4 magnesium greater than 2. Pulmonary evaluation as OP  Heart rate reviewed since admission and in the range of 60-80  Continue on telemetry  Troponins reviewed and stable  History of bilateral carotid stenosis-40%-follows with vascular surgery outpatient. Has an appointment set up for next week for obtaining carotid ultrasound. Patient does not complain of any active dizziness or lightheadedness at this time. Will monitor and consider carotid ultrasound as deemed necessary      DVT Prophylaxis: Lovenox  Diet: ADULT DIET; Regular; Low Sodium (2 gm)  Code Status: Full Code    Dispo -ongoing specialist eval.- dofetilide requires inpatient monitoring for minimum of 5 doses. Possible DC on Monday.  Home at discharge    Sindy Kemp MD

## 2022-07-24 NOTE — PROGRESS NOTES
At 2345 pt in AFIB. Heart rate 100-140. Confirmed with EKG. Sound physicians notified . Labs obtained. Perfect serve sent to EP. BP stable. Patient is not having any distress.

## 2022-07-24 NOTE — PROGRESS NOTES
[facility],[address] Floor and [facility] Electrophysiology  Inpatient progress report  PATIENT: [facility] RECORD NUMBER: [id number]  DATE OF SERVICE:  7/24/2022  ATTENDING ELECTROPHYSIOLOGIST: Dr Isabel Griffiths   PRIMARY ELECTROPHYSIOLOGIST: none previous   REFERRING PHYSICIAN: No ref. provider found and GIO Amaro - CNP  CHIEF COMPLAINT: atrial fib, bradycardia     Subjective:  68-year-old female with a past medical history of nonvalvular persistent AF, HTN, COPD on nasal cannula O2, and obesity. She is managed by Dr. Angela Vázquez with Norvasc 1.25 mg daily, Eliquis 5 mg twice daily, Lipitor 20 mg daily, dofetilide 250 mcg twice daily, and valsartan 160 mg daily. In 2020, patient was diagnosed with nonvalvular paroxysmal AF. At this time she was initiated on dofetilide 125 mcg twice daily. In 3/2022, she had AF recurrences, which were managed with increase in dofetilide to 250 mcg twice daily. On 7/21/2022, she presented with chief complaint of low heart rates at home and dyspnea. She reports recently starting nadolol for episodes of fast heartbeat. EP service was consulted by admitting physician for evaluation and management of possible tachybradycardia syndrome. Nadolol was discontinued. Tikosyn was increased to 500 mcg twice daily. She is tentatively scheduled for discharge following 6 doses of increased Tikosyn dose on 7/25/2022 (6 dose is scheduled for 7/25/22 AM). Patient denies any other complaints at this time. Prior cardiac testing:  TTE (3/22/2022): LVEF = 64%, borderline asymmetric septal LVH, technically difficult study due to patient's body habitus. Pharmacologic nuclear stress test (6/30/2022): LVEF = 80%, no ischemia.       Past Medical History:   Diagnosis Date    Arthritis     Atrial fibrillation ([address]     Tikosyn per Dr. Angela Vázquez    Bilateral carotid artery stenosis 07/16/2020    Bronchitis     Carotid stenosis, bilateral 05/21/2012 Hyperlipidemia     Hypertension     Left carotid stenosis 2018    O2 dependent 2020    On continuous oral anticoagulation     Eliquis    S/P carotid endarterectomy 10/06/2016    Stomach ulcer     Thyroid disease      Past Surgical History:   Procedure Laterality Date    APPENDECTOMY      BACK SURGERY      CHOLECYSTECTOMY      COLONOSCOPY      ENDOSCOPY, COLON, DIAGNOSTIC  01/10/2018    upper endo    EYE SURGERY      BILATERAL CATARACT    HYSTERECTOMY (CERVIX STATUS UNKNOWN)      JOINT REPLACEMENT      RIGHT KNEE      Family History   Problem Relation Age of Onset    Other Mother         rheumatic fever    Cancer Sister         lung       Social History     Tobacco Use    Smoking status: Former     Packs/day: 1.00     Years: 30.00     Pack years: 30.00     Types: Cigarettes     Quit date: 2018     Years since quittin.0    Smokeless tobacco: Never   Substance Use Topics    Alcohol use: Not Currently       Current Facility-Administered Medications   Medication Dose Route Frequency Provider Last Rate Last Admin    ipratropium (ATROVENT) 0.02 % nebulizer solution 0.5 mg  0.5 mg Nebulization Q6H WA Benton Brambila MD        apixaban (ELIQUIS) tablet 5 mg  5 mg Oral BID GIO Hatch CNP   5 mg at 22 0815    dofetilide (TIKOSYN) capsule 500 mcg  500 mcg Oral 2 times per day Nadja Joseph DO   500 mcg at 22 0815    atorvastatin (LIPITOR) tablet 20 mg  20 mg Oral Daily Tamara Curran MD   20 mg at 22 0815    levothyroxine (SYNTHROID) tablet 150 mcg  150 mcg Oral Daily Tamara Curran MD   150 mcg at 22 0815    valsartan (DIOVAN) tablet 160 mg  160 mg Oral Daily Tamara Curran MD   160 mg at 22 0815    venlafaxine (EFFEXOR XR) extended release capsule 150 mg  150 mg Oral Daily Tamara Curran MD   150 mg at 22 0815    sodium chloride flush 0.9 % injection 5-40 mL  5-40 mL IntraVENous 2 times per day Tamara Curran MD   10 mL at 22 0816    sodium chloride flush 0.9 % injection 5-40 mL  5-40 mL IntraVENous PRN Tamara Curran MD        0.9 % sodium chloride infusion   IntraVENous PRN Taamra Curran MD        polyethylene glycol (GLYCOLAX) packet 17 g  17 g Oral Daily PRN Tamara Curran MD        acetaminophen (TYLENOL) tablet 650 mg  650 mg Oral Q6H PRN Tamara Curran MD   650 mg at 07/23/22 1515    Or    acetaminophen (TYLENOL) suppository 650 mg  650 mg Rectal Q6H PRN Tamara Curran MD        Arformoterol Tartrate (BROVANA) nebulizer solution 15 mcg  15 mcg Nebulization BID Tamara Curran MD            No Known Allergies    ROS:   Constitutional: Negative for fever, activity change and appetite change. HENT: Negative for epistaxis or JVD  Eyes: Negative for diploplia, blurred vision. Respiratory: Negative for cough, chest tightness, positive for chronic shortness of breath, worse with exertion and negative for wheezing. Cardiovascular: pertinent positives in HPI  Gastrointestinal: Negative for abdominal pain and blood in stool. All other review of systems are negative     PHYSICAL EXAM:   Vitals:    07/23/22 2345 07/24/22 0130 07/24/22 0145 07/24/22 0800   BP: 137/69 139/74 114/81 (!) 148/63   Pulse: (S) (!) 107 (S) (!) 125 (!) 116 65   Resp:    18   Temp:    98.1 °F (36.7 °C)   TempSrc:    Temporal   SpO2: 95% 94% 96% 96%   Weight:       Height:        Constitutional: Well-developed, no acute distress, obese  Eyes: conjunctivae normal, no xanthelasma   Ears, Nose, Throat: oral mucosa moist, no cyanosis  Neck: No JVD, supple  CV: Regular rate and rhythm.   Bilateral radial and dorsalis pedis pulses intact  Lungs: normal respiratory effort without used of accessory muscles, no audible wheezes  Abdomen: soft, non-tender, no masses or hepatomegaly   Musculoskeletal: no digital clubbing, no edema   Skin: warm, no rashes     I have personally reviewed the laboratory, cardiac diagnostic and radiographic testing as outlined below:    Data:    Recent Labs     07/21/22  1247   WBC 7. 3   HGB 11.6   HCT 36.5        Recent Labs     07/23/22  0458 07/24/22  0019 07/24/22  0514    139 140   K 4.0 4.3 4.3    103 104   CO2 27 25 25   BUN 17 24* 21   CREATININE 0.9 1.0 0.9   CALCIUM 9.1 9.3 9.1      Lab Results   Component Value Date/Time    MG 2.2 07/24/2022 05:14 AM     Recent Labs     07/21/22  1444   TSH 1.210     No results for input(s): INR in the last 72 hours. CXR: 7/21/22 no acute cardiopulmonary process    Telemetry: SR at 50-80 bpm, 3 hour episode of AF with ventricular rate of 130 bpm     Assessment/Plan:     Assessment/plan:  1. Nonvalvular persistent AF  - Initially diagnosed in 2020.  - [id number] score = 4 (age, sex, HTN). Recommend [address] in females with score of 2 or more. DOAC preferred. - Continue apixaban 5 mg twice daily. Recommend annual monitoring of CBC and CMP. - Rhythm control previously recommended and treated with dofetilide. Appears dofetilide was initiated in 2020 and a starting dose of 125 mcg twice daily. In 3/2022, appears dofetilide was increased to 250 mcg twice daily due to AF recurrences. Patient now presents with complaint of episodes of rapid as well as slow heartbeats following initiation of nadolol. Nadolol discontinued and Dofetilide increased to 500 mcg twice daily. Qtc today is 420 msec on my measurement. Anticipate discharge afternoon of Monday, 7/25/2022; after 6th dose. -Modifiable risk factors:  --Obesity: BMI goal less than 27. Recommend diet and exercise. --MIKHAIL: Patient reports recent sleep study. Results are not available to me.  --HTN: BP goal less than 130/80. --Alcohol: Continue to avoid. - Plan for 2-week ZIO XT monitor at discharge. Please contact ECG tech to apply. - Follow-up with my office in 1-2 months. I messaged my  to arrange. 2.  Bradycardia  - Reported based on patient's pulse oximeter after starting nadolol. No evidence at this time.   - Continue to hold nadolol.  - Continue to monitor on telemetry. 3.  COPD  - She reports never being evaluated by pulmonologist in the past.  She is on nasal cannula O2. In the future, AF ablation may need to be entertained. Prior to pursuing that, would be helpful to have her evaluated by pulmonary to ensure she is maximized from that standpoint. This evaluation can be obtained as an outpatient. I will message my  to arrange. I spent 35 minutes. Thank you for allowing me to participate in your patient's care. Please call me if there are any questions or concerns.       Marisol Grant, DO  Cardiac Electrophysiology  [facility]) Physicians  [facility] [place]  11:32 AM  7/24/2022

## 2022-07-25 ENCOUNTER — TELEPHONE (OUTPATIENT)
Dept: NON INVASIVE DIAGNOSTICS | Age: 77
End: 2022-07-25

## 2022-07-25 VITALS
DIASTOLIC BLOOD PRESSURE: 76 MMHG | OXYGEN SATURATION: 96 % | WEIGHT: 243 LBS | HEART RATE: 65 BPM | SYSTOLIC BLOOD PRESSURE: 168 MMHG | HEIGHT: 67 IN | RESPIRATION RATE: 18 BRPM | TEMPERATURE: 98.1 F | BODY MASS INDEX: 38.14 KG/M2

## 2022-07-25 PROBLEM — Z79.899 HISTORY OF ONGOING TREATMENT WITH HIGH-RISK MEDICATION: Status: ACTIVE | Noted: 2022-07-25

## 2022-07-25 LAB
ANION GAP SERPL CALCULATED.3IONS-SCNC: 13 MMOL/L (ref 7–16)
BUN BLDV-MCNC: 21 MG/DL (ref 6–23)
CALCIUM SERPL-MCNC: 9.1 MG/DL (ref 8.6–10.2)
CHLORIDE BLD-SCNC: 103 MMOL/L (ref 98–107)
CO2: 24 MMOL/L (ref 22–29)
CREAT SERPL-MCNC: 0.8 MG/DL (ref 0.5–1)
EKG ATRIAL RATE: 147 BPM
EKG ATRIAL RATE: 53 BPM
EKG ATRIAL RATE: 55 BPM
EKG ATRIAL RATE: 58 BPM
EKG ATRIAL RATE: 63 BPM
EKG ATRIAL RATE: 66 BPM
EKG ATRIAL RATE: 72 BPM
EKG ATRIAL RATE: 76 BPM
EKG P AXIS: 47 DEGREES
EKG P AXIS: 47 DEGREES
EKG P AXIS: 52 DEGREES
EKG P AXIS: 53 DEGREES
EKG P AXIS: 54 DEGREES
EKG P AXIS: 61 DEGREES
EKG P AXIS: 66 DEGREES
EKG P-R INTERVAL: 132 MS
EKG P-R INTERVAL: 136 MS
EKG P-R INTERVAL: 140 MS
EKG P-R INTERVAL: 142 MS
EKG P-R INTERVAL: 144 MS
EKG P-R INTERVAL: 148 MS
EKG P-R INTERVAL: 156 MS
EKG Q-T INTERVAL: 344 MS
EKG Q-T INTERVAL: 360 MS
EKG Q-T INTERVAL: 406 MS
EKG Q-T INTERVAL: 426 MS
EKG Q-T INTERVAL: 430 MS
EKG Q-T INTERVAL: 442 MS
EKG Q-T INTERVAL: 450 MS
EKG Q-T INTERVAL: 456 MS
EKG QRS DURATION: 74 MS
EKG QRS DURATION: 74 MS
EKG QRS DURATION: 76 MS
EKG QRS DURATION: 78 MS
EKG QRS DURATION: 78 MS
EKG QRS DURATION: 82 MS
EKG QTC CALCULATION (BAZETT): 377 MS
EKG QTC CALCULATION (BAZETT): 414 MS
EKG QTC CALCULATION (BAZETT): 422 MS
EKG QTC CALCULATION (BAZETT): 430 MS
EKG QTC CALCULATION (BAZETT): 444 MS
EKG QTC CALCULATION (BAZETT): 466 MS
EKG QTC CALCULATION (BAZETT): 479 MS
EKG QTC CALCULATION (BAZETT): 499 MS
EKG R AXIS: 29 DEGREES
EKG R AXIS: 30 DEGREES
EKG R AXIS: 33 DEGREES
EKG R AXIS: 38 DEGREES
EKG R AXIS: 38 DEGREES
EKG R AXIS: 42 DEGREES
EKG R AXIS: 42 DEGREES
EKG R AXIS: 45 DEGREES
EKG T AXIS: 102 DEGREES
EKG T AXIS: 174 DEGREES
EKG T AXIS: 56 DEGREES
EKG T AXIS: 64 DEGREES
EKG T AXIS: 73 DEGREES
EKG T AXIS: 77 DEGREES
EKG T AXIS: 96 DEGREES
EKG T AXIS: 98 DEGREES
EKG VENTRICULAR RATE: 127 BPM
EKG VENTRICULAR RATE: 53 BPM
EKG VENTRICULAR RATE: 55 BPM
EKG VENTRICULAR RATE: 58 BPM
EKG VENTRICULAR RATE: 63 BPM
EKG VENTRICULAR RATE: 66 BPM
EKG VENTRICULAR RATE: 72 BPM
EKG VENTRICULAR RATE: 76 BPM
GFR AFRICAN AMERICAN: >60
GFR NON-AFRICAN AMERICAN: >60 ML/MIN/1.73
GLUCOSE BLD-MCNC: 97 MG/DL (ref 74–99)
MAGNESIUM: 2 MG/DL (ref 1.6–2.6)
POTASSIUM SERPL-SCNC: 4.1 MMOL/L (ref 3.5–5)
SODIUM BLD-SCNC: 140 MMOL/L (ref 132–146)

## 2022-07-25 PROCEDURE — 6370000000 HC RX 637 (ALT 250 FOR IP): Performed by: STUDENT IN AN ORGANIZED HEALTH CARE EDUCATION/TRAINING PROGRAM

## 2022-07-25 PROCEDURE — 80048 BASIC METABOLIC PNL TOTAL CA: CPT

## 2022-07-25 PROCEDURE — 2700000000 HC OXYGEN THERAPY PER DAY

## 2022-07-25 PROCEDURE — 36415 COLL VENOUS BLD VENIPUNCTURE: CPT

## 2022-07-25 PROCEDURE — 6370000000 HC RX 637 (ALT 250 FOR IP): Performed by: INTERNAL MEDICINE

## 2022-07-25 PROCEDURE — 93005 ELECTROCARDIOGRAM TRACING: CPT | Performed by: STUDENT IN AN ORGANIZED HEALTH CARE EDUCATION/TRAINING PROGRAM

## 2022-07-25 PROCEDURE — 93242 EXT ECG>48HR<7D RECORDING: CPT

## 2022-07-25 PROCEDURE — 99232 SBSQ HOSP IP/OBS MODERATE 35: CPT | Performed by: INTERNAL MEDICINE

## 2022-07-25 PROCEDURE — 6370000000 HC RX 637 (ALT 250 FOR IP): Performed by: NURSE PRACTITIONER

## 2022-07-25 PROCEDURE — 83735 ASSAY OF MAGNESIUM: CPT

## 2022-07-25 RX ORDER — DOFETILIDE 0.5 MG/1
500 CAPSULE ORAL EVERY 12 HOURS SCHEDULED
Qty: 60 CAPSULE | Refills: 3 | Status: SHIPPED | OUTPATIENT
Start: 2022-07-25

## 2022-07-25 RX ADMIN — ATORVASTATIN CALCIUM 20 MG: 20 TABLET, FILM COATED ORAL at 08:05

## 2022-07-25 RX ADMIN — VALSARTAN 160 MG: 160 TABLET, FILM COATED ORAL at 08:05

## 2022-07-25 RX ADMIN — LEVOTHYROXINE SODIUM 150 MCG: 0.15 TABLET ORAL at 08:05

## 2022-07-25 RX ADMIN — APIXABAN 5 MG: 5 TABLET, FILM COATED ORAL at 08:05

## 2022-07-25 RX ADMIN — DOFETILIDE 500 MCG: 0.5 CAPSULE ORAL at 08:05

## 2022-07-25 RX ADMIN — VENLAFAXINE HYDROCHLORIDE 150 MG: 150 CAPSULE, EXTENDED RELEASE ORAL at 08:05

## 2022-07-25 NOTE — PROGRESS NOTES
CLINICAL PHARMACY NOTE: MEDS TO BEDS    Total # of Prescriptions Filled: 1   The following medications were delivered to the patient:  Dofetilide 500 mcg    Additional Documentation:    Delivered meds to patient @12:10

## 2022-07-25 NOTE — HOME CARE
HOME CARE ORDERS NEED PLACED FOR PATIENT SOC UPON DISCHARGE.      Jessica Geller LPN   [facility] [address]

## 2022-07-25 NOTE — PLAN OF CARE
Applied Parastructureo XT monitor for 14 days. Serial number [id number]. Instructed patient to avoid showering in the first 24 hours. Press the button on the monitor when you feel a symptom and write it in your diary. Do not submerge in water. No lotion or powder near the patch. Please return the monitor in the box provided.  Patient verbalized understanding

## 2022-07-25 NOTE — PROGRESS NOTES
EKG department called regarding ziopatch needing placed before discharge today, Dr. Tina Simpson confirmed via perfect serve that patient will be discharged today

## 2022-07-25 NOTE — DISCHARGE SUMMARY
Hospitalist Discharge Summary    Patient ID: Angela All   Patient : 1945  Patient's PCP: Mercedes Mtz, APRN - CNP    Admit Date: 2022   Admitting Physician: Serene Walker MD    Discharge Date:  2022   Discharge Physician: Unique Holt MD   Discharge Condition: Stable  Discharge Disposition: Home with [facility] course in brief:  (Please refer to daily progress notes for a comprehensive review of the hospitalization by requesting medical records)  64 y.o. female who presented to [facility] with tightness in her neck. She has a known hx of atrial fibrillation on tikosyn, COPD with 2L oxygen requirement, HTN, HLD, obesity, hypothyroidism and b/l carotid artery stenosis. FOr the last several months she has been having episodes of elevated heart rate, at times up tto the 170's; she was started on nadalol, but then started developing episodes of bradycardia associated with neck tightness. Symptoms associated with shortness of breath for the last 1-2 days. Upon evaluation in the ED pt was noted to have bradycardia. Labs largely reassuring. Patient admitted for further evaluation and treatment. Electrophysiology was consulted during admission. Troponins were trended and stable, proBNP was mild elevated at 575  Restratification labs-within normal limits. X-ray of the chest did not show any acute findings. Recently had stress test in 2022 which was low risk with an ejection fraction of 80%  Electrophysiology consulted- Recommend stop nadolol. Recommend increase dofetilide to 500 mcg twice daily. requires inpatient monitoring for minimum of 5 doses for which she was here until today. ECG was performed 2 to 3 hours after each dose. Continuous telemetry monitoring. Maintained potassium greater than 4 magnesium greater than 2. Pulmonary evaluation as OP  ZIO Patch was placed at DC.     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 Follow-up with consultants as indicated by them. Compliance with medications as prescribed on discharge. Future Appointments   Date Time Provider Jose Luis Grace   7/27/2022  2:00 PM [facility] US 1 SEYZ US/AUS [facility]   8/4/2022 11:00 AM Martir Ge MD [facility]       The patient's condition is stable . At this time the patient is without objective evidence of an acute process requiring continuing hospitalization or inpatient management. They are stable for discharge with outpatient follow-up. I have spoken with the patient and discussed the results of the current hospitalization, in addition to providing specific details for the plan of care and counseling regarding the diagnosis and prognosis. The plan has been discussed in detail and they are aware of the specific conditions for emergent return, as well as the importance of follow-up. Their questions are answered at this time and they are agreeable with the plan for discharge to home with [place]. Continued appropriate risk factor modification of blood pressure, diabetes and serum lipids will remain essential to reducing risk of future atherosclerotic development    Activity: activity as tolerated    Physical exam:  General appearance: OBESE, appears stated age and cooperative. HEENT: Conjunctivae/corneas clear. Mucous membranes moist.  Neck: Supple. No JVD. Respiratory:  Clear to auscultation bilaterally. Normal respiratory effort. Cardiovascular:  RRR. S1, S2 without MRG. PV: Pulses palpable. No edema. Abdomen: Soft, non-tender, non-distended. +BS  Musculoskeletal: No obvious deformities. Skin: Normal skin color. No rashes or lesions. Good turgor. Neurologic:  Grossly non-focal. Awake, alert, following commands. Psychiatric: Alert and oriented, thought content appropriate, normal insight and judgement    Significant labs:  CBC:   No results for input(s): WBC, RBC, HGB, HCT, MCV, RDW, PLT in the last 72 hours. BMP:   Recent Labs     07/24/22  0019 07/24/22  0514 07/25/22  0517    140 140   K 4.3 4.3 4.1    104 103   CO2 25 25 24   BUN 24* 21 21   CREATININE 1.0 0.9 0.8   MG 2.4 2.2 2.0     LFT:  No results for input(s): PROT, ALB, ALKPHOS, ALT, AST, BILITOT, AMYLASE, LIPASE in the last 72 hours. PT/INR: No results for input(s): INR, APTT in the last 72 hours. BNP: No results for input(s): BNP in the last 72 hours.   Hgb A1C:   Lab Results   Component Value Date    LABA1C 4.9 04/23/2021     Folate and B12:   Lab Results   Component Value Date    YNXDMBHG34 569 03/23/2022   ,   Lab Results   Component Value Date    FOLATE >20.0 03/23/2022     Thyroid Studies:   Lab Results   Component Value Date    TSH 1.210 07/21/2022    V9LFCIP 6.1 03/23/2022       Urinalysis:    Lab Results   Component Value Date/Time    NITRU Negative 08/29/2020 11:40 AM    WBCUA >20 08/29/2020 11:40 AM    WBCUA NONE 04/28/2012 01:15 AM    BACTERIA MODERATE 08/29/2020 11:40 AM    RBCUA 1-3 08/29/2020 11:40 AM    RBCUA NONE 04/28/2012 01:15 AM    BLOODU Negative 08/29/2020 11:40 AM    SPECGRAV 1.020 08/29/2020 11:40 AM    GLUCOSEU Negative 08/29/2020 11:40 AM    GLUCOSEU NEGATIVE 04/28/2012 01:15 AM       Imaging:  XR CHEST (2 VW)    Result Date: 7/21/2022  EXAMINATION: TWO XRAY VIEWS OF THE CHEST 7/21/2022 1:25 pm COMPARISON: 14 April 2022 HISTORY: ORDERING SYSTEM PROVIDED HISTORY: Shortness of breath TECHNOLOGIST PROVIDED HISTORY: Reason for exam:->Shortness of breath What reading provider will be dictating this exam?->CRC FINDINGS: Two views of the chest taking these medications      Anoro Ellipta 62.5-25 MCG/INH Aepb inhaler  Generic drug: umeclidinium-vilanterol     apixaban 5 MG Tabs tablet  Commonly known as: ELIQUIS  Take 1 tablet by mouth 2 times daily     atorvastatin 20 MG tablet  Commonly known as: LIPITOR     levothyroxine 150 MCG tablet  Commonly known as: SYNTHROID     valsartan 160 MG tablet  Commonly known as: DIOVAN  Take 1 tablet by mouth daily     venlafaxine 150 MG extended release capsule  Commonly known as: EFFEXOR XR            STOP taking these medications      OXYGEN               Where to Get Your Medications        These medications were sent to [facility] [address] [address]      Phone: [phone]   dofetilide 500 MCG capsule         Time Spent on discharge is more than 45 minutes in the examination, evaluation, counseling and review of medications and discharge plan.    +++++++++++++++++++++++++++++++++++++++++++++++++  MD PABLO [place]/ [place] [address]  +++++++++++++++++++++++++++++++++++++++++++++++++  NOTE: This report was transcribed using voice recognition software. Every effort was made to ensure accuracy; however, inadvertent computerized transcription errors may be present.

## 2022-07-25 NOTE — PROGRESS NOTES
[facility],[address] Floor and [address] [employer] Electrophysiology  Inpatient progress report  PATIENT: [facility] RECORD NUMBER: [id number]  DATE OF SERVICE:  7/25/2022  ATTENDING ELECTROPHYSIOLOGIST: Dr Radha Sanchez   PRIMARY ELECTROPHYSIOLOGIST: none previous   REFERRING PHYSICIAN: No ref. provider found and GIO Juarez CNP  CHIEF COMPLAINT: atrial fib, bradycardia     Subjective:  75-year-old female with a past medical history of nonvalvular persistent AF, HTN, COPD on nasal cannula O2, and obesity. She is managed by Dr. Arely Desir with Norvasc 1.25 mg daily, Eliquis 5 mg twice daily, Lipitor 20 mg daily, dofetilide 250 mcg twice daily, and valsartan 160 mg daily. In 2020, patient was diagnosed with nonvalvular paroxysmal AF. At this time she was initiated on dofetilide 125 mcg twice daily. In 3/2022, she had AF recurrences, which were managed with increase in dofetilide to 250 mcg twice daily. On 7/21/2022, she presented with chief complaint of low heart rates at home and dyspnea. She reports recently starting nadolol for episodes of fast heartbeat. EP service was consulted by admitting physician for evaluation and management of possible tachybradycardia syndrome. Nadolol was discontinued. Tikosyn was increased to 500 mcg twice daily. She did well on this with only one breakthough episode while in the hospital which was treated with IV lopressor x 1 dose. She feels overall better but continues to be tired with exertion. Prior cardiac testing:  TTE (3/22/2022): LVEF = 64%, borderline asymmetric septal LVH, technically difficult study due to patient's body habitus. Pharmacologic nuclear stress test (6/30/2022): LVEF = 80%, no ischemia.       Past Medical History:   Diagnosis Date    Arthritis     Atrial fibrillation ([facility] [address]     Tikosyn per Dr. Arely Desir    Bilateral carotid artery stenosis 07/16/2020    Bronchitis     Carotid stenosis, bilateral 05/21/2012 Hyperlipidemia     Hypertension     Left carotid stenosis 2018    O2 dependent 2020    On continuous oral anticoagulation     Eliquis    S/P carotid endarterectomy 10/06/2016    Stomach ulcer     Thyroid disease      Past Surgical History:   Procedure Laterality Date    APPENDECTOMY      BACK SURGERY      CHOLECYSTECTOMY      COLONOSCOPY      ENDOSCOPY, COLON, DIAGNOSTIC  01/10/2018    upper endo    EYE SURGERY      BILATERAL CATARACT    HYSTERECTOMY (CERVIX STATUS UNKNOWN)      JOINT REPLACEMENT      RIGHT KNEE      Family History   Problem Relation Age of Onset    Other Mother         rheumatic fever    Cancer Sister         lung       Social History     Tobacco Use    Smoking status: Former     Packs/day: 1.00     Years: 30.00     Pack years: 30.00     Types: Cigarettes     Quit date: 2018     Years since quittin.0    Smokeless tobacco: Never   Substance Use Topics    Alcohol use: Not Currently       Current Facility-Administered Medications   Medication Dose Route Frequency Provider Last Rate Last Admin    ipratropium (ATROVENT) 0.02 % nebulizer solution 0.5 mg  0.5 mg Nebulization Q6H WA Benton Brambila MD        apixaban (ELIQUIS) tablet 5 mg  5 mg Oral BID Shauna Corn, APRN - CNP   5 mg at 22 08    dofetilide (TIKOSYN) capsule 500 mcg  500 mcg Oral 2 times per day Marisol Matas, DO   500 mcg at 22 08    atorvastatin (LIPITOR) tablet 20 mg  20 mg Oral Daily Tamara Curran MD   20 mg at 22 08    levothyroxine (SYNTHROID) tablet 150 mcg  150 mcg Oral Daily Tamara Curran MD   150 mcg at 22 08    valsartan (DIOVAN) tablet 160 mg  160 mg Oral Daily Tamara Curran MD   160 mg at 22 08    venlafaxine (EFFEXOR XR) extended release capsule 150 mg  150 mg Oral Daily Tamara Curran MD   150 mg at 22 08    sodium chloride flush 0.9 % injection 5-40 mL  5-40 mL IntraVENous 2 times per day Tamara Curran MD   10 mL at 22 2330    sodium chloride flush 0.9 % injection 5-40 mL  5-40 mL IntraVENous PRN Tamara Curran MD        0.9 % sodium chloride infusion   IntraVENous PRN Tamara Curran MD        polyethylene glycol (GLYCOLAX) packet 17 g  17 g Oral Daily PRN Tamara Curran MD        acetaminophen (TYLENOL) tablet 650 mg  650 mg Oral Q6H PRN Tamara Curran MD   650 mg at 07/23/22 1515    Or    acetaminophen (TYLENOL) suppository 650 mg  650 mg Rectal Q6H PRN Tamara Curran MD        Arformoterol Tartrate (BROVANA) nebulizer solution 15 mcg  15 mcg Nebulization BID Tamara Curran MD         Current Outpatient Medications   Medication Sig Dispense Refill    dofetilide (TIKOSYN) 500 MCG capsule Take 1 capsule by mouth every 12 hours 60 capsule 3    levothyroxine (SYNTHROID) 150 MCG tablet TAKE 1 TABLET BY MOUTH ONCE DAILY      ANORO ELLIPTA 62.5-25 MCG/INH AEPB inhaler INHALE 1 PUFF BY MOUTH ONCE DAILY      amLODIPine (NORVASC) 2.5 MG tablet Take 1 tablet by mouth daily 30 tablet 3    apixaban (ELIQUIS) 5 MG TABS tablet Take 1 tablet by mouth 2 times daily 60 tablet 2    valsartan (DIOVAN) 160 MG tablet Take 1 tablet by mouth daily 30 tablet 3    venlafaxine (EFFEXOR XR) 150 MG extended release capsule Take 150 mg by mouth daily      atorvastatin (LIPITOR) 20 MG tablet Take 20 mg by mouth daily          No Known Allergies    ROS:   Constitutional: Negative for fever, activity change and appetite change. HENT: Negative for epistaxis or JVD  Eyes: Negative for diploplia, blurred vision. Respiratory: Negative for cough, chest tightness, positive for chronic shortness of breath, worse with exertion and negative for wheezing. Cardiovascular: pertinent positives in HPI  Gastrointestinal: Negative for abdominal pain and blood in stool.    All other review of systems are negative     PHYSICAL EXAM:   Vitals:    07/24/22 0145 07/24/22 0800 07/24/22 2230 07/25/22 0736   BP: 114/81 (!) 148/63 (!) 151/77 (!) 168/76   Pulse: (!) 116 65 63 65   Resp:  18 18 18   Temp:  98.1 °F (36.7 °C) 98 °F (36.7 °C) 98.1 °F (36.7 °C)   TempSrc:  Temporal Temporal Temporal   SpO2: 96% 96% 97% 96%   Weight:       Height:        Constitutional: Well-developed, no acute distress, obese  Eyes: conjunctivae normal, no xanthelasma   Ears, Nose, Throat: oral mucosa moist, no cyanosis  Neck: No JVD, supple  CV: No JVD or leg edema, laterally displaced PMI with a normal S1 and S2 at the left sternal border and apex  Lungs: normal respiratory effort without used of accessory muscles, no audible wheezes  Abdomen: soft, non-tender, no masses or hepatomegaly   Musculoskeletal: no digital clubbing, no edema   Skin: warm, no rashes     I have personally reviewed the laboratory, cardiac diagnostic and radiographic testing as outlined below:    Data:    No results for input(s): WBC, HGB, HCT, PLT in the last 72 hours. Recent Labs     07/24/22  0019 07/24/22  0514 07/25/22  0517    140 140   K 4.3 4.3 4.1    104 103   CO2 25 25 24   BUN 24* 21 21   CREATININE 1.0 0.9 0.8   CALCIUM 9.3 9.1 9.1      Lab Results   Component Value Date/Time    MG 2.0 07/25/2022 05:17 AM     No results for input(s): TSH in the last 72 hours. No results for input(s): INR in the last 72 hours. CXR: 7/21/22 no acute cardiopulmonary process    Telemetry: Sinus rhythm     Assessment/Plan:     Assessment/plan:  1. Nonvalvular persistent AF  - Initially diagnosed in 2020  - [id number] score = 4 (age, sex, HTN). Recommend [address] in females with score of 2 or more. DOAC preferred- on eliquis   - Continue apixaban 5 mg twice daily. Recommend annual monitoring of CBC and CMP. - Rhythm control previously recommended and treated with dofetilide. Dofetilide was initiated in 2020 at a starting dose of 125 mcg twice daily. In 3/2022, dofetilide was increased to 250 mcg twice daily due to AF recurrences. Patient now presents with complaint of episodes of rapid as well as slow heartbeats following initiation of nadolol.   Nadolol discontinued and Dofetilide increased to 500 mcg twice daily. -  discharge today 7/25/2022; ekg reviewed and acceptable with QTc ~ 440 ms after 6th dose   -Modifiable risk factors:  --Obesity: BMI goal less than 27. Recommend diet and exercise. --MIKHAIL: Patient reports recent sleep study. Results are not available to me.  --HTN: BP goal less than 130/80. --Alcohol: Continue to avoid. - Plan for 2-week ZIO XT monitor at discharge- applied   - Follow-up with Dr Saturnino Stuart office in 1-2 months. Discussed with patient     2. Bradycardia  - Report based on patient's pulse oximeter after starting nadolol.    - Continue to hold nadolol.  - Continue to monitor on telemetry. 3.  COPD on chronic oxygen   - She reports never being evaluated by pulmonologist in the past.  She is on nasal cannula O2. In the future, AF ablation may need to be entertained. Prior to pursuing that, would be helpful to have her evaluated by pulmonary to ensure she is maximized from that standpoint. This evaluation can be obtained as an outpatient. I have spent a total of 25 minutes with the patient reviewing the above stated recommendations. And a total of >50% of that time involved in review of his records, formulating care and/or face-to-face time providing counseling and or coordination of care with the other providers. Thank you for allowing me to participate in your patient's care. Please call me if there are any questions or concerns.     [id number]  Cardiac Electrophysiology  [facility]) Physicians  7/25/2022

## 2022-07-25 NOTE — TELEPHONE ENCOUNTER
----- Message from Criselda Pearce DO sent at 7/24/2022  3:47 PM EDT -----  Regarding: appt  Please refer to pulmonary for COPD. Please schedule appointment with me in ~6 weeks.     Criselda Pearce DO

## 2022-07-26 NOTE — TELEPHONE ENCOUNTER
Spoke with patient let her know to talk to PCP about pulmonary referral since no one in our area are taking new patients. Also patient on list to be seen with NP for follow up. She verbalized understanding.

## 2022-07-27 ENCOUNTER — HOSPITAL ENCOUNTER (OUTPATIENT)
Dept: ULTRASOUND IMAGING | Age: 77
Discharge: HOME | End: 2022-07-29
Payer: MEDICARE

## 2022-07-27 DIAGNOSIS — I65.23 BILATERAL CAROTID ARTERY STENOSIS: ICD-10-CM

## 2022-07-27 DIAGNOSIS — Z98.890 S/P CAROTID ENDARTERECTOMY: ICD-10-CM

## 2022-07-27 PROCEDURE — 93880 EXTRACRANIAL BILAT STUDY: CPT

## 2022-07-29 ENCOUNTER — CLINICAL DOCUMENTATION (OUTPATIENT)
Dept: VASCULAR SURGERY | Age: 77
End: 2022-07-29

## 2022-08-03 ENCOUNTER — TELEPHONE (OUTPATIENT)
Dept: VASCULAR SURGERY | Age: 77
End: 2022-08-03

## 2022-08-04 ENCOUNTER — OFFICE VISIT (OUTPATIENT)
Dept: VASCULAR SURGERY | Age: 77
End: 2022-08-04
Payer: MEDICARE

## 2022-08-04 DIAGNOSIS — I65.23 BILATERAL CAROTID ARTERY STENOSIS: Primary | ICD-10-CM

## 2022-08-04 DIAGNOSIS — Z99.81 O2 DEPENDENT: ICD-10-CM

## 2022-08-04 PROCEDURE — 99213 OFFICE O/P EST LOW 20 MIN: CPT | Performed by: SURGERY

## 2022-08-04 PROCEDURE — 1123F ACP DISCUSS/DSCN MKR DOCD: CPT | Performed by: SURGERY

## 2022-08-04 NOTE — PROGRESS NOTES
Chief Complaint:   Chief Complaint   Patient presents with    Circulatory Problem     Follow up carotid stenosis. HPI: Patient came to the office, for the evaluation left carotid stenosis overall doing well, currently on 2 L of oxygen for COPD    Also recently diagnosed to have atrial fibrillation      Patient denies any focal lateralizing neurological symptoms like loss of speech, vision or loss of function of extremity    Patient can walk short distances slowly, and denies any symptoms of rest pain    No Known Allergies    Current Outpatient Medications   Medication Sig Dispense Refill    OXYGEN Inhale 2 L/min into the lungs continuous      dofetilide (TIKOSYN) 500 MCG capsule Take 1 capsule by mouth every 12 hours 60 capsule 3    levothyroxine (SYNTHROID) 150 MCG tablet TAKE 1 TABLET BY MOUTH ONCE DAILY      ANORO ELLIPTA 62.5-25 MCG/INH AEPB inhaler INHALE 1 PUFF BY MOUTH ONCE DAILY      amLODIPine (NORVASC) 2.5 MG tablet Take 1 tablet by mouth daily 30 tablet 3    apixaban (ELIQUIS) 5 MG TABS tablet Take 1 tablet by mouth 2 times daily 60 tablet 2    valsartan (DIOVAN) 160 MG tablet Take 1 tablet by mouth daily 30 tablet 3    venlafaxine (EFFEXOR XR) 150 MG extended release capsule Take 150 mg by mouth daily      atorvastatin (LIPITOR) 20 MG tablet Take 20 mg by mouth daily       No current facility-administered medications for this visit.        Past Medical History:   Diagnosis Date    Arthritis     Atrial fibrillation (HCC)     Tikosyn per Dr. Antwon George    Bilateral carotid artery stenosis 07/16/2020    Bronchitis     Carotid stenosis, bilateral 05/21/2012    Hyperlipidemia     Hypertension     Left carotid stenosis 04/26/2018    O2 dependent 07/16/2020    On continuous oral anticoagulation     Eliquis    S/P carotid endarterectomy 10/06/2016    Stomach ulcer     Thyroid disease        Past Surgical History:   Procedure Laterality Date    APPENDECTOMY      BACK SURGERY      CHOLECYSTECTOMY COLONOSCOPY      ENDOSCOPY, COLON, DIAGNOSTIC  01/10/2018    upper endo    EYE SURGERY      BILATERAL CATARACT    HYSTERECTOMY (CERVIX STATUS UNKNOWN)      JOINT REPLACEMENT  2011    RIGHT KNEE       Family History   Problem Relation Age of Onset    Other Mother         rheumatic fever    Cancer Sister         lung       Social History     Socioeconomic History    Marital status:      Spouse name: Not on file    Number of children: Not on file    Years of education: Not on file    Highest education level: Not on file   Occupational History    Not on file   Tobacco Use    Smoking status: Former     Packs/day: 1.00     Years: 30.00     Pack years: 30.00     Types: Cigarettes     Quit date: 2018     Years since quittin.0    Smokeless tobacco: Never   Vaping Use    Vaping Use: Never used   Substance and Sexual Activity    Alcohol use: Not Currently    Drug use: No    Sexual activity: Not on file   Other Topics Concern    Not on file   Social History Narrative    Not on file     Social Determinants of Health     Financial Resource Strain: Not on file   Food Insecurity: Not on file   Transportation Needs: Not on file   Physical Activity: Not on file   Stress: Not on file   Social Connections: Not on file   Intimate Partner Violence: Not on file   Housing Stability: Not on file       Review of Systems:    Eyes:  No blurring, diplopia or vision loss. Respiratory:  No cough, pleuritic chest pain, dyspnea, or wheezing. COPD and oxygen  Cardiovascular: No angina, palpitations . Coronary artery disease history of atrial fibrillation, hypertension  Musculoskeletal:  No arthritis or weakness. Neurologic:  No paralysis, paresis, paresthesia, seizures or headache. Endocrinology:           Physical Exam:  General appearance:  Alert, awake, oriented x 3. No distress. Eyes:  Conjunctivae appear normal; PERRL  Neck:  No jugular venous distention, lymphadenopathy or thyromegaly.   Carotid bruit noted  Lungs:  Clear to ausculation bilaterally. No rhonchi, crackles, wheezes  Heart:  Regular rate and rhythm. No rub or murmur  Abdomen:  Soft, non-tender. No masses, organomegaly. Musculoskeletal : No joint effusions, tenderness swelling    Neuro: Speech is intact. Moving all extremities. No focal motor or sensory deficits      Extremities:  Both feet are warm to touch. The color of both feet is normal.        Pulses Right  Left    Brachial 3 3    Radial    3=normal   Femoral 2 2  2=diminished   Popliteal    1=barely palpable   Dorsalis pedis    0=absent   Posterior tibial 1 1  4=aneurysmal             Other pertinent information:1. The past medical records were reviewed. Assessment:    1. Bilateral carotid artery stenosis    2. O2 dependent              Plan:       Discussed the patient, left carotid ultrasound, personally interpreted by me, 30% stenosis on the right and 50% stenosis left, unchanged, asymptomatic, follow-up in 1 year but call immediately and come to the hospital for any strokelike symptoms              Patient was instructed to continue walking program and to call if any worsening of symptoms and to call if any focal lateralizing neurological symptoms like loss of speech, vision or loss of function of extremity. All the questions were answered. Indicated follow-up: Return in about 1 year (around 8/4/2023), or if symptoms worsen or fail to improve.

## 2022-08-15 DIAGNOSIS — I48.19 PERSISTENT ATRIAL FIBRILLATION (HCC): ICD-10-CM

## 2022-08-23 ENCOUNTER — OFFICE VISIT (OUTPATIENT)
Dept: ORTHOPEDIC SURGERY | Age: 77
End: 2022-08-23
Payer: MEDICARE

## 2022-08-23 VITALS — BODY MASS INDEX: 38.14 KG/M2 | WEIGHT: 243 LBS | HEIGHT: 67 IN

## 2022-08-23 DIAGNOSIS — M25.562 CHRONIC PAIN OF LEFT KNEE: ICD-10-CM

## 2022-08-23 DIAGNOSIS — M17.12 PRIMARY OSTEOARTHRITIS OF LEFT KNEE: Primary | ICD-10-CM

## 2022-08-23 DIAGNOSIS — G89.29 CHRONIC PAIN OF LEFT KNEE: ICD-10-CM

## 2022-08-23 PROCEDURE — 20610 DRAIN/INJ JOINT/BURSA W/O US: CPT | Performed by: ORTHOPAEDIC SURGERY

## 2022-08-23 RX ORDER — BUPIVACAINE HYDROCHLORIDE 2.5 MG/ML
3 INJECTION, SOLUTION INFILTRATION; PERINEURAL ONCE
Status: COMPLETED | OUTPATIENT
Start: 2022-08-23 | End: 2022-08-23

## 2022-08-23 RX ORDER — TRIAMCINOLONE ACETONIDE 40 MG/ML
80 INJECTION, SUSPENSION INTRA-ARTICULAR; INTRAMUSCULAR ONCE
Status: COMPLETED | OUTPATIENT
Start: 2022-08-23 | End: 2022-08-23

## 2022-08-23 RX ORDER — AMLODIPINE BESYLATE 5 MG/1
TABLET ORAL
COMMUNITY
Start: 2022-06-01 | End: 2022-09-28 | Stop reason: DRUGHIGH

## 2022-08-23 RX ADMIN — TRIAMCINOLONE ACETONIDE 80 MG: 40 INJECTION, SUSPENSION INTRA-ARTICULAR; INTRAMUSCULAR at 09:15

## 2022-08-23 RX ADMIN — BUPIVACAINE HYDROCHLORIDE 7.5 MG: 2.5 INJECTION, SOLUTION INFILTRATION; PERINEURAL at 09:15

## 2022-08-23 NOTE — PROGRESS NOTES
Chief Complaint:   Chief Complaint   Patient presents with    Knee Pain     FU Left knee pain. Requesting injection. States first two injections she had worked well, the last injection did not work at all. [facility] presents with persisting left knee pain, previously diagnosed with end-stage DJD, last injection provided minimal relief however for some reason. Continues to have significant medical issues and is following through on follow-up of these pulmonary and cardiac problems. Knee pain is constant interfering with daily activities including weightbearing requests repeat injection attempt left knee today. Allergies; medications; past medical, surgical, family, and social history; and problem list have been reviewed today and updated as indicated in this encounter seen below. Exam: Left knee shows no visible or palpable deformity, there is diffuse tenderness palpation without appreciable erythema or effusion. Knee is stable to stress range of motion though is limited at the extremes by pain. Radiographs: Prior x-rays on review showing advanced DJD left knee. [place] was seen today for knee pain. Diagnoses and all orders for this visit:    Primary osteoarthritis of left knee  -     [place] ARTHROCENTESIS ASPIR&/INJ MAJOR JT/BURSA W/O US    Chronic pain of left knee    Other orders  -     triamcinolone acetonide (KENALOG-40) injection 80 mg  -     bupivacaine (MARCAINE) 0.25 % injection 7.5 mg       Diagnosis and treatment options were reviewed, the patient requests repeat injection of Kenalog and Marcaine in the left knee dosages as noted above through an anteromedial approach no difficulty or complication tolerated well. Questions asked and answered follow-up as needed. Return if symptoms worsen or fail to improve.      Current Outpatient Medications   Medication Sig Dispense Refill    amLODIPine (NORVASC) 5 MG tablet TAKE 1/2 (ONE-HALF) TABLET BY MOUTH ONCE DAILY      OXYGEN Inhale 2 L/min into the lungs continuous      dofetilide (TIKOSYN) 500 MCG capsule Take 1 capsule by mouth every 12 hours 60 capsule 3    levothyroxine (SYNTHROID) 150 MCG tablet TAKE 1 TABLET BY MOUTH ONCE DAILY      ANORO ELLIPTA 62.5-25 MCG/INH AEPB inhaler INHALE 1 PUFF BY MOUTH ONCE DAILY      apixaban (ELIQUIS) 5 MG TABS tablet Take 1 tablet by mouth 2 times daily 60 tablet 2    valsartan (DIOVAN) 160 MG tablet Take 1 tablet by mouth daily 30 tablet 3    venlafaxine (EFFEXOR XR) 150 MG extended release capsule Take 150 mg by mouth daily      atorvastatin (LIPITOR) 20 MG tablet Take 20 mg by mouth daily       Current Facility-Administered Medications   Medication Dose Route Frequency Provider Last Rate Last Admin    triamcinolone acetonide (KENALOG-40) injection 80 mg  80 mg Intra-artICUlar Once Maciel Ness MD        bupivacaine (MARCAINE) 0.25 % injection 7.5 mg  3 mL Intra-artICUlar Once Maciel Ness MD           Patient Active Problem List   Diagnosis    Essential hypertension    Mixed hyperlipidemia    S/P carotid endarterectomy    Hypoxia    CHF (congestive heart failure) (HCC)    Diastolic dysfunction    Asthma    Bilateral carotid artery stenosis    O2 dependent    Primary osteoarthritis of left knee    Lumbar spondylosis    Class 2 obesity due to excess calories with body mass index (BMI) of 38.0 to 38.9 in adult    M[place]AND (dyspnea on exertion)    CKD (chronic kidney disease) stage 3, GFR 30-59 ml/min (HCC)    Atrial fibrillation with RVR (HCC)    Atrial fibrillation with rapid ventricular response (HCC)    Shortness of breath    Symptomatic bradycardia    History of ongoing treatment with high-risk medication       Past Medical History:   Diagnosis Date    Arthritis     Atrial fibrillation (HCC)     Tikosyn per Dr. Ovidio Gonsalves    Bilateral carotid artery stenosis 07/16/2020    Bronchitis     Carotid stenosis, bilateral 05/21/2012    Hyperlipidemia     Hypertension     Left carotid stenosis 04/26/2018 O2 dependent 2020    On continuous oral anticoagulation     Eliquis    S/P carotid endarterectomy 10/06/2016    Stomach ulcer     Thyroid disease        Past Surgical History:   Procedure Laterality Date    APPENDECTOMY      BACK SURGERY      CHOLECYSTECTOMY      COLONOSCOPY      ENDOSCOPY, COLON, DIAGNOSTIC  01/10/2018    upper endo    EYE SURGERY      BILATERAL CATARACT    HYSTERECTOMY (CERVIX STATUS UNKNOWN)      JOINT REPLACEMENT  2011    RIGHT KNEE       No Known Allergies    Social History     Socioeconomic History    Marital status:      Spouse name: None    Number of children: None    Years of education: None    Highest education level: None   Tobacco Use    Smoking status: Former     Packs/day: 1.00     Years: 30.00     Pack years: 30.00     Types: Cigarettes     Quit date: 2018     Years since quittin.1    Smokeless tobacco: Never   Vaping Use    Vaping Use: Never used   Substance and Sexual Activity    Alcohol use: Not Currently    Drug use: No       Family History   Problem Relation Age of Onset    Other Mother         rheumatic fever    Cancer Sister         lung         Review of Systems  As follows except as previously noted in HPI:  Constitutional: Negative for chills, diaphoresis, fatigue, fever and unexpected weight change. Respiratory: Negative for cough, shortness of breath and wheezing. Cardiovascular: Negative for chest pain and palpitations. Neurological: Negative for dizziness, syncope, cephalgia. GI / : negative  Musculoskeletal: see HPI       Objective:   Physical Exam   Constitutional: Oriented to person, place, and time. and appears well-developed and well-nourished. :   Head: Normocephalic and atraumatic. Eyes: EOM are normal.   Neck: Neck supple. Cardiovascular: Normal rate and regular rhythm. Pulmonary/Chest: Effort normal. No stridor. No respiratory distress, no wheezes. Abdominal:  No abnormal distension.     Neurological: Alert and oriented to person, place, and time. Skin: Skin is warm and dry. Psychiatric: Normal mood and affect.  Behavior is normal. Thought content normal.    8/23/2022  12:31 PM

## 2022-08-26 ENCOUNTER — TELEPHONE (OUTPATIENT)
Dept: NON INVASIVE DIAGNOSTICS | Age: 77
End: 2022-08-26

## 2022-08-26 NOTE — TELEPHONE ENCOUNTER
Patient continues to have episodes at home with elevated heart rates, has Britta Begum that she can use and showed Afib with rates up to 148 bpm. She gets SOBOE easily in the past 2 weeks on and off. She also checks her heart rates on pulse ox with rates up to 140-150 bpm.   She denies any increased swelling or chest pain. She has not been tolerant to BB in the past with bradycardia. Was on nadolol recently at low dose with symptomatic bradycardia. Zio reviewed also. Discussed above with Dr Burton Montejo and plan for pacemaker next week to allow AVN blocker in addition to Tikosyn to allow for better heart rate control. Talked to patient over the phone and discussed at length. She is agreeable. She is on Eliquis. Will hold for 24 hours prior to procedure. Please schedule on Wednesday.     GIO Funes - CNP

## 2022-08-29 ENCOUNTER — OFFICE VISIT (OUTPATIENT)
Dept: NON INVASIVE DIAGNOSTICS | Age: 77
End: 2022-08-29
Payer: MEDICARE

## 2022-08-29 VITALS
RESPIRATION RATE: 16 BRPM | HEART RATE: 67 BPM | WEIGHT: 258.4 LBS | HEIGHT: 67 IN | SYSTOLIC BLOOD PRESSURE: 138 MMHG | DIASTOLIC BLOOD PRESSURE: 80 MMHG | BODY MASS INDEX: 40.56 KG/M2

## 2022-08-29 DIAGNOSIS — I48.19 PERSISTENT ATRIAL FIBRILLATION (HCC): ICD-10-CM

## 2022-08-29 DIAGNOSIS — Z79.899 VISIT FOR MONITORING TIKOSYN THERAPY: ICD-10-CM

## 2022-08-29 DIAGNOSIS — R00.1 SYMPTOMATIC BRADYCARDIA: ICD-10-CM

## 2022-08-29 DIAGNOSIS — I10 ESSENTIAL HYPERTENSION: ICD-10-CM

## 2022-08-29 DIAGNOSIS — I48.91 ATRIAL FIBRILLATION WITH RVR (HCC): ICD-10-CM

## 2022-08-29 DIAGNOSIS — Z51.81 VISIT FOR MONITORING TIKOSYN THERAPY: ICD-10-CM

## 2022-08-29 DIAGNOSIS — E66.01 CLASS 3 SEVERE OBESITY WITH BODY MASS INDEX (BMI) OF 40.0 TO 44.9 IN ADULT, UNSPECIFIED OBESITY TYPE, UNSPECIFIED WHETHER SERIOUS COMORBIDITY PRESENT (HCC): ICD-10-CM

## 2022-08-29 DIAGNOSIS — E03.9 HYPOTHYROIDISM, UNSPECIFIED TYPE: ICD-10-CM

## 2022-08-29 DIAGNOSIS — I48.19 PERSISTENT ATRIAL FIBRILLATION (HCC): Primary | ICD-10-CM

## 2022-08-29 DIAGNOSIS — Z79.01 CHRONIC ANTICOAGULATION: ICD-10-CM
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
## 2022-08-29 PROBLEM — S09.90XA CLOSED HEAD INJURY: Status: ACTIVE | Noted: 2022-08-29

## 2022-08-29 PROBLEM — M19.90 UNSPECIFIED OSTEOARTHRITIS, UNSPECIFIED SITE: Status: ACTIVE | Noted: 2022-05-26

## 2022-08-29 PROBLEM — S09.90XA UNSPECIFIED INJURY OF HEAD, INITIAL ENCOUNTER: Status: ACTIVE | Noted: 2022-05-26

## 2022-08-29 PROBLEM — J44.9 CHRONIC OBSTRUCTIVE PULMONARY DISEASE, UNSPECIFIED (HCC): Status: ACTIVE | Noted: 2022-05-26

## 2022-08-29 LAB
ANION GAP SERPL CALCULATED.3IONS-SCNC: 14 MMOL/L (ref 7–16)
BUN BLDV-MCNC: 27 MG/DL (ref 6–23)
CALCIUM SERPL-MCNC: 9.1 MG/DL (ref 8.6–10.2)
CHLORIDE BLD-SCNC: 102 MMOL/L (ref 98–107)
CO2: 23 MMOL/L (ref 22–29)
CREAT SERPL-MCNC: 1 MG/DL (ref 0.5–1)
GFR AFRICAN AMERICAN: >60
GFR NON-AFRICAN AMERICAN: 54 ML/MIN/1.73
GLUCOSE BLD-MCNC: 102 MG/DL (ref 74–99)
HCT VFR BLD CALC: 37.6 % (ref 34–48)
HEMOGLOBIN: 12.1 G/DL (ref 11.5–15.5)
MAGNESIUM: 1.9 MG/DL (ref 1.6–2.6)
MCH RBC QN AUTO: 31.3 PG (ref 26–35)
MCHC RBC AUTO-ENTMCNC: 32.2 % (ref 32–34.5)
MCV RBC AUTO: 97.4 FL (ref 80–99.9)
PDW BLD-RTO: 12.7 FL (ref 11.5–15)
PLATELET # BLD: 227 E9/L (ref 130–450)
PMV BLD AUTO: 11.3 FL (ref 7–12)
POTASSIUM SERPL-SCNC: 5.2 MMOL/L (ref 3.5–5)
RBC # BLD: 3.86 E12/L (ref 3.5–5.5)
SODIUM BLD-SCNC: 139 MMOL/L (ref 132–146)
WBC # BLD: 10.2 E9/L (ref 4.5–11.5)

## 2022-08-29 PROCEDURE — 99214 OFFICE O/P EST MOD 30 MIN: CPT | Performed by: NURSE PRACTITIONER

## 2022-08-29 PROCEDURE — 93000 ELECTROCARDIOGRAM COMPLETE: CPT | Performed by: STUDENT IN AN ORGANIZED HEALTH CARE EDUCATION/TRAINING PROGRAM

## 2022-08-29 PROCEDURE — 1123F ACP DISCUSS/DSCN MKR DOCD: CPT | Performed by: NURSE PRACTITIONER

## 2022-08-29 RX ORDER — MULTIVIT-MIN/IRON/FOLIC ACID/K 18-600-40
CAPSULE ORAL
COMMUNITY

## 2022-08-29 NOTE — PROGRESS NOTES
[facility]) Physicians- [facility] and [facility] Electrophysiology  Outpatient Progress Note  Subhash Dempsey  1945  Date of Service: 8/29/2022  PCP: GIO Vazquez - CNP  Cardiologist: Dr. Sole Espinoza  Electrophysiologist:  Dr Aris Palomo         Subjective: Subhash Dempsey is seen for follow-up and management of: Atrial fibrillation on Tikosyn. Here for H&P preop pacemaker    Last seen in hospital as new consult with Dr. Aris Palomo on July 22, 2022    [address] as noted below significant for persistent atrial fibrillation, on anticoagulation with Eliquis, hypertension, COPD/asthma, obesity, tachybradycardia syndrome. She has been managed by Dr. Sole Espinoza in the past and started on Tikosyn about 1 year ago at low-dose. She had breakthrough episodes and has increased the dose and is now at 500 mcg twice daily starting in July 2022. She wore a 2-week ZIO monitor after admission with Tikosyn 500 twice daily and it showed atrial fibrillation burden about 5% but did have tachycardic rates and symptomatic in atrial fibrillation. She was previously on low-dose beta-blocker and had symptomatic bradycardia with chest pain fatigue and shortness of breath. Last week, she has had several episodes of tachycardic atrial fibrillation events recorded by neighbor with alive core with A. fib up to 148 to 160 bpm.  Discussion with office and she is planning for pacemaker insertion and addition of AV ann blocker post pacemaker. She is in sinus rhythm today in the office with controlled rate 67 bpm.  She has been having increased fatigue over the past few days but yesterday had improved heart rates on her pulse ox with rates 60s to 70s. She does get shortness of breath with exertion at times. 2week Zio: 7-8/2022: Afib with RVR correlated with sx of SOB, fatigue.    NM stress: 6/30/22: negative stress ischemia, EF 80%   TTE 3/24/2022: LVEF 64%   TTE 9/19/2019: LVEF 58%     Patient Active Problem List   Diagnosis negative appetite change. HENT: Negative for epistaxis. Eyes: Negative for diploplia, blurred vision. Respiratory: Negative for cough, chest tightness, shortness of breath and wheezing. Positive shortness of breath on exertion  Cardiovascular: pertinent positives in HPI  Gastrointestinal: Negative for abdominal pain and blood in stool. All other review of systems are negative     PHYSICAL EXAM:      Vitals:    08/29/22 1301   BP: 138/80   Pulse: 67   Resp: 16   Weight: 258 lb 6.4 oz (117.2 kg)   Height: 5' 7\" (1.702 m)     Constitutional: well-developed, no acute distress  Eyes: conjunctivae normal, no xanthelasma   Ears, Nose, Throat: oral mucosa moist, no cyanosis   CV: no JVD. Regular rate and rhythm. Normal S1S2 and no S3. No murmurs, rubs, or gallops. PMI is nondisplaced  Lungs: clear to auscultation bilaterally, normal respiratory effort without used of accessory muscles  Abdomen: soft, non-tender, bowel sounds present, no masses or hepatomegaly   Musculoskeletal: no digital clubbing, no edema   Skin: warm, no rashes     Data:    No results for input(s): WBC, HGB, HCT, PLT in the last 72 hours. No results for input(s): NA, K, CL, CO2, BUN, CREATININE, GLU, CALCIUM in the last 72 hours. Invalid input(s):  MAGNESIUM  Lab Results   Component Value Date/Time    MG 2.0 07/25/2022 05:17 AM     No results for input(s): TSH in the last 72 hours. No results for input(s): INR in the last 72 hours. EKG: SR rate of 67 bpm with QTc stable at ~ 420 ms   Please see scan in Cardiology. Echocardiogram: 3/23/22   Summary   No evidence of tricuspid regurgitation. Left ventricle grossly normal in size. Borderline asymmetric left ventricular septal hypertrophy. Normal LV segmental wall motion. Estimated left ventricular ejection fraction is 64±5%. Does not meet 50% diagnostic criteria for diastolic dysfunction. The LAESV Index is <34ml/m2. Normal right ventricular size and function.    TAPSE is normal   No evidence of tricuspid regurgitation. Unable to accurately assess RV systolic pressure. Physiologic and/or trace pulmonic regurgitation present. Technically good quality study. Technically difficult study due to patient''s body habitus. No comparison study available. Suggest clinical correlation. Stress Test: 6/30/22  IMAGING: Myocardial perfusion imaging was performed at rest 30-35   minutes following the intravenous injection of 10.7 mCi of   (Tc-Sestamibi) followed by 10 ml of Normal Saline. At peak exercise,   the patient was injected intravenously with 31. 2mCi of (Tc-Sestamibi)   followed by 10 ml of Normal Saline. Gated post-stress tomographic   imaging was performed 20-25 minutes after stress. FINDINGS: The overall quality of the study was fair. Left ventricular cavity size was noted to be normal.  EDV 85 mL       Rotational analog analysis demonstrated significant increase in   gastrointestinal uptake attenuation as well as soft tissue   attenuation. The gated SPECT stress imaging in the short, vertical long, and   horizontal long axes demonstrate grossly normal homogeneous tracer   distribution throughout the myocardium. Gated SPECT left ventricular ejection fraction was calculated to be   80%, with normal global myocardial segmental wall motion. Impression   1. No evidence of significant stress-induced ischemia. 2. Normal-hyperdynamic left ventricular segmental wall motion with   Gated SPECT left ventricular ejection fraction 80%. Cardiac Monitor:   Zio   7/25/22->8/8/22      I have independently reviewed all of the ECGs and rhythm strips per above     Assessment and plan:    1. Atrial fibrillation, persistent   - currently in SR today in the office   -Currently on Tikosyn 500 mcg every 12 hours, was on nadalol recently with symptomatic bradycardia. (Was started on beta-blocker in spring 2022, stopped as inpatient 7/2022). Estimated Creatinine Clearance: 77 mL/min (based on SCr of 0.8 mg/dL). - on Eliquis 5 mg BID for [id number] score of at least 4 (HTN, age 64, female)   -Recent stress as above was negative, negative MIKHAIL on outpatient PSG 7/2022    2. Tachy-bradycardia syndrome  -Was intolerant to beta-blockers in the past with symptomatic bradycardia on review of chart, patient has been on metoprolol in 2018, diltiazem 120mg in 2019, and more recently nadolol 5 mg stopped July 2022 due to bradycardia. -ZIO monitor shows tachycardia rates in atrial fibrillation    3. Suspected COPD with hypoxia on chronic oxygen  - has not had PFTs, noted from pulmonary note \"presumed COPD\" with hx of smoker. -  uses daily inhalers and on chronic oxygen 2 to 3 L  - has referral for pulmonology, but not able to get in for appointment yet. PCP manages and is stable. 4 . HTN   -On Diovan 160 daily and amlodipine; elevated at times     4. Hypothyroid  -stable TSH on Synthroid     5. History of carotid stenosis status post carotid enterectomy     6. Obesity  Body mass index is 40.47 kg/m². Recommendations:     Plan for dual chamber pacemaker Wednesday   Hold Eliquis 24 hours prior to implant- discussed with Dr Juan Velez 500 mcg every 12 hours   Labs in office today   Plan to start BB- possibly metoprolol post pacemaker implant. She does not recall adverse side effects in past  pulmonology work-up as outpatient  Pre/Post pacemaker instructions reviewed with patient today in office     Re-education on importance of well controlled HTN (goal BP < 130/80), adequate weight control (goal BMI of < 27), physical activity consisting of moderate cardiopulmonary exercise up to a goal of 250 min/wk, smoking/tobacco abstinence and limited ETOH intake. I have spent a total of 30 minutes with the patient and the family reviewing the above stated recommendations.  And a total of >50% of that time involved face-to-face time providing counseling and or coordination of care with the other providers. Thank you for allowing me to participate in your patient's care. Please call me if there are any questions or concerns.       Candance Caulk, APRN - CNP  Cardiac Electrophysiology  [facility]) Physicians  The [facility] and [facility] [place]: [facility]  2:13 PM  8/29/2022

## 2022-08-30 ENCOUNTER — TELEPHONE (OUTPATIENT)
Dept: CARDIAC CATH/INVASIVE PROCEDURES | Age: 77
End: 2022-08-30

## 2022-08-31 ENCOUNTER — ANESTHESIA EVENT (OUTPATIENT)
Dept: CARDIAC CATH/INVASIVE PROCEDURES | Age: 77
End: 2022-08-31

## 2022-08-31 ENCOUNTER — HOSPITAL ENCOUNTER (OUTPATIENT)
Dept: CARDIAC CATH/INVASIVE PROCEDURES | Age: 77
Discharge: HOME | End: 2022-09-01
Attending: STUDENT IN AN ORGANIZED HEALTH CARE EDUCATION/TRAINING PROGRAM | Admitting: STUDENT IN AN ORGANIZED HEALTH CARE EDUCATION/TRAINING PROGRAM
Payer: MEDICARE

## 2022-08-31 ENCOUNTER — ANESTHESIA (OUTPATIENT)
Dept: CARDIAC CATH/INVASIVE PROCEDURES | Age: 77
End: 2022-08-31

## 2022-08-31 ENCOUNTER — HOSPITAL ENCOUNTER (OUTPATIENT)
Dept: GENERAL RADIOLOGY | Age: 77
Discharge: HOME | End: 2022-09-02
Attending: STUDENT IN AN ORGANIZED HEALTH CARE EDUCATION/TRAINING PROGRAM
Payer: MEDICARE

## 2022-08-31 PROBLEM — I49.5 TACHY-BRADY SYNDROME (HCC): Status: ACTIVE | Noted: 2022-08-31

## 2022-08-31 PROBLEM — I48.91 AF (ATRIAL FIBRILLATION) (HCC): Status: ACTIVE | Noted: 2022-08-31

## 2022-08-31 LAB
ANION GAP SERPL CALCULATED.3IONS-SCNC: 10 MMOL/L (ref 7–16)
BUN BLDV-MCNC: 20 MG/DL (ref 6–23)
CALCIUM SERPL-MCNC: 9 MG/DL (ref 8.6–10.2)
CHLORIDE BLD-SCNC: 105 MMOL/L (ref 98–107)
CO2: 25 MMOL/L (ref 22–29)
CREAT SERPL-MCNC: 0.8 MG/DL (ref 0.5–1)
EKG ATRIAL RATE: 62 BPM
EKG P AXIS: 56 DEGREES
EKG P-R INTERVAL: 144 MS
EKG Q-T INTERVAL: 448 MS
EKG QRS DURATION: 74 MS
EKG QTC CALCULATION (BAZETT): 454 MS
EKG R AXIS: 40 DEGREES
EKG T AXIS: 94 DEGREES
EKG VENTRICULAR RATE: 62 BPM
GFR AFRICAN AMERICAN: >60
GFR NON-AFRICAN AMERICAN: >60 ML/MIN/1.73
GLUCOSE BLD-MCNC: 105 MG/DL (ref 74–99)
MAGNESIUM: 1.8 MG/DL (ref 1.6–2.6)
POTASSIUM SERPL-SCNC: 5.2 MMOL/L (ref 3.5–5)
SODIUM BLD-SCNC: 140 MMOL/L (ref 132–146)

## 2022-08-31 PROCEDURE — C1894 INTRO/SHEATH, NON-LASER: HCPCS

## 2022-08-31 PROCEDURE — 6370000000 HC RX 637 (ALT 250 FOR IP): Performed by: STUDENT IN AN ORGANIZED HEALTH CARE EDUCATION/TRAINING PROGRAM

## 2022-08-31 PROCEDURE — 2709999900 HC NON-CHARGEABLE SUPPLY

## 2022-08-31 PROCEDURE — C1785 PMKR, DUAL, RATE-RESP: HCPCS

## 2022-08-31 PROCEDURE — 33208 INSRT HEART PM ATRIAL & VENT: CPT | Performed by: STUDENT IN AN ORGANIZED HEALTH CARE EDUCATION/TRAINING PROGRAM

## 2022-08-31 PROCEDURE — 80048 BASIC METABOLIC PNL TOTAL CA: CPT

## 2022-08-31 PROCEDURE — C1889 IMPLANT/INSERT DEVICE, NOC: HCPCS

## 2022-08-31 PROCEDURE — 2580000003 HC RX 258: Performed by: STUDENT IN AN ORGANIZED HEALTH CARE EDUCATION/TRAINING PROGRAM

## 2022-08-31 PROCEDURE — C1769 GUIDE WIRE: HCPCS

## 2022-08-31 PROCEDURE — 83735 ASSAY OF MAGNESIUM: CPT

## 2022-08-31 PROCEDURE — C1898 LEAD, PMKR, OTHER THAN TRANS: HCPCS

## 2022-08-31 PROCEDURE — 2500000003 HC RX 250 WO HCPCS

## 2022-08-31 PROCEDURE — 3700000000 HC ANESTHESIA ATTENDED CARE

## 2022-08-31 PROCEDURE — 2580000003 HC RX 258

## 2022-08-31 PROCEDURE — 3700000001 HC ADD 15 MINUTES (ANESTHESIA)

## 2022-08-31 PROCEDURE — 36415 COLL VENOUS BLD VENIPUNCTURE: CPT

## 2022-08-31 PROCEDURE — G0379 DIRECT REFER HOSPITAL OBSERV: HCPCS

## 2022-08-31 PROCEDURE — 2500000003 HC RX 250 WO HCPCS: Performed by: NURSE ANESTHETIST, CERTIFIED REGISTERED

## 2022-08-31 PROCEDURE — 6360000002 HC RX W HCPCS

## 2022-08-31 PROCEDURE — G0378 HOSPITAL OBSERVATION PER HR: HCPCS

## 2022-08-31 PROCEDURE — 33208 INSRT HEART PM ATRIAL & VENT: CPT

## 2022-08-31 PROCEDURE — 71045 X-RAY EXAM CHEST 1 VIEW: CPT

## 2022-08-31 PROCEDURE — 6360000002 HC RX W HCPCS: Performed by: NURSE ANESTHETIST, CERTIFIED REGISTERED

## 2022-08-31 PROCEDURE — 2700000000 HC OXYGEN THERAPY PER DAY

## 2022-08-31 PROCEDURE — 93005 ELECTROCARDIOGRAM TRACING: CPT | Performed by: STUDENT IN AN ORGANIZED HEALTH CARE EDUCATION/TRAINING PROGRAM

## 2022-08-31 RX ORDER — SODIUM CHLORIDE 9 MG/ML
INJECTION, SOLUTION INTRAVENOUS PRN
Status: DISCONTINUED | OUTPATIENT
Start: 2022-08-31 | End: 2022-09-01 | Stop reason: HOSPADM

## 2022-08-31 RX ORDER — ATORVASTATIN CALCIUM 20 MG/1
20 TABLET, FILM COATED ORAL DAILY
Status: DISCONTINUED | OUTPATIENT
Start: 2022-08-31 | End: 2022-08-31 | Stop reason: ALTCHOICE

## 2022-08-31 RX ORDER — ATORVASTATIN CALCIUM 20 MG/1
20 TABLET, FILM COATED ORAL NIGHTLY
Status: DISCONTINUED | OUTPATIENT
Start: 2022-08-31 | End: 2022-09-01 | Stop reason: HOSPADM

## 2022-08-31 RX ORDER — AMLODIPINE BESYLATE 5 MG/1
5 TABLET ORAL DAILY
Status: DISCONTINUED | OUTPATIENT
Start: 2022-08-31 | End: 2022-09-01 | Stop reason: HOSPADM

## 2022-08-31 RX ORDER — ARFORMOTEROL TARTRATE 15 UG/2ML
15 SOLUTION RESPIRATORY (INHALATION) 2 TIMES DAILY
Status: DISCONTINUED | OUTPATIENT
Start: 2022-08-31 | End: 2022-09-01 | Stop reason: HOSPADM

## 2022-08-31 RX ORDER — PROPOFOL 10 MG/ML
INJECTION, EMULSION INTRAVENOUS CONTINUOUS PRN
Status: DISCONTINUED | OUTPATIENT
Start: 2022-08-31 | End: 2022-08-31 | Stop reason: SDUPTHER

## 2022-08-31 RX ORDER — SODIUM CHLORIDE 9 MG/ML
INJECTION, SOLUTION INTRAVENOUS CONTINUOUS
Status: DISCONTINUED | OUTPATIENT
Start: 2022-08-31 | End: 2022-09-01 | Stop reason: HOSPADM

## 2022-08-31 RX ORDER — FENTANYL CITRATE 50 UG/ML
INJECTION, SOLUTION INTRAMUSCULAR; INTRAVENOUS PRN
Status: DISCONTINUED | OUTPATIENT
Start: 2022-08-31 | End: 2022-08-31 | Stop reason: SDUPTHER

## 2022-08-31 RX ORDER — CEFAZOLIN SODIUM 1 G/3ML
INJECTION, POWDER, FOR SOLUTION INTRAMUSCULAR; INTRAVENOUS PRN
Status: DISCONTINUED | OUTPATIENT
Start: 2022-08-31 | End: 2022-08-31 | Stop reason: SDUPTHER

## 2022-08-31 RX ORDER — METOPROLOL TARTRATE 5 MG/5ML
INJECTION INTRAVENOUS PRN
Status: DISCONTINUED | OUTPATIENT
Start: 2022-08-31 | End: 2022-08-31 | Stop reason: SDUPTHER

## 2022-08-31 RX ORDER — MIDAZOLAM HYDROCHLORIDE 1 MG/ML
INJECTION INTRAMUSCULAR; INTRAVENOUS PRN
Status: DISCONTINUED | OUTPATIENT
Start: 2022-08-31 | End: 2022-08-31 | Stop reason: SDUPTHER

## 2022-08-31 RX ORDER — SODIUM CHLORIDE 0.9 % (FLUSH) 0.9 %
5-40 SYRINGE (ML) INJECTION EVERY 12 HOURS SCHEDULED
Status: DISCONTINUED | OUTPATIENT
Start: 2022-08-31 | End: 2022-09-01 | Stop reason: HOSPADM

## 2022-08-31 RX ORDER — ACETAMINOPHEN 325 MG/1
650 TABLET ORAL EVERY 4 HOURS PRN
Status: DISCONTINUED | OUTPATIENT
Start: 2022-08-31 | End: 2022-09-01 | Stop reason: HOSPADM

## 2022-08-31 RX ORDER — VENLAFAXINE HYDROCHLORIDE 75 MG/1
75 CAPSULE, EXTENDED RELEASE ORAL
Status: DISCONTINUED | OUTPATIENT
Start: 2022-09-01 | End: 2022-09-01 | Stop reason: HOSPADM

## 2022-08-31 RX ORDER — VALSARTAN 160 MG/1
160 TABLET ORAL DAILY
Status: DISCONTINUED | OUTPATIENT
Start: 2022-08-31 | End: 2022-09-01 | Stop reason: HOSPADM

## 2022-08-31 RX ORDER — SODIUM CHLORIDE 0.9 % (FLUSH) 0.9 %
5-40 SYRINGE (ML) INJECTION PRN
Status: DISCONTINUED | OUTPATIENT
Start: 2022-08-31 | End: 2022-09-01 | Stop reason: HOSPADM

## 2022-08-31 RX ORDER — DOFETILIDE 0.5 MG/1
500 CAPSULE ORAL EVERY 12 HOURS SCHEDULED
Status: DISCONTINUED | OUTPATIENT
Start: 2022-08-31 | End: 2022-09-01 | Stop reason: HOSPADM

## 2022-08-31 RX ORDER — LEVOTHYROXINE SODIUM 0.15 MG/1
150 TABLET ORAL DAILY
Status: DISCONTINUED | OUTPATIENT
Start: 2022-09-01 | End: 2022-09-01 | Stop reason: HOSPADM

## 2022-08-31 RX ORDER — OMEGA-3S/DHA/EPA/FISH OIL/D3 300MG-1000
400 CAPSULE ORAL DAILY
Status: DISCONTINUED | OUTPATIENT
Start: 2022-08-31 | End: 2022-09-01 | Stop reason: HOSPADM

## 2022-08-31 RX ADMIN — FENTANYL CITRATE 50 MCG: 50 INJECTION, SOLUTION INTRAMUSCULAR; INTRAVENOUS at 10:31

## 2022-08-31 RX ADMIN — ATORVASTATIN CALCIUM 20 MG: 20 TABLET, FILM COATED ORAL at 21:38

## 2022-08-31 RX ADMIN — METOROPROLOL TARTRATE 2.5 MG: 5 INJECTION, SOLUTION INTRAVENOUS at 11:56

## 2022-08-31 RX ADMIN — SODIUM CHLORIDE, PRESERVATIVE FREE 10 ML: 5 INJECTION INTRAVENOUS at 21:00

## 2022-08-31 RX ADMIN — PROPOFOL 20 MCG/KG/MIN: 10 INJECTION, EMULSION INTRAVENOUS at 10:31

## 2022-08-31 RX ADMIN — CEFAZOLIN 2000 MG: 1 INJECTION, POWDER, FOR SOLUTION INTRAMUSCULAR; INTRAVENOUS at 10:35

## 2022-08-31 RX ADMIN — PROPOFOL 20 MG: 10 INJECTION, EMULSION INTRAVENOUS at 11:40

## 2022-08-31 RX ADMIN — SODIUM CHLORIDE: 9 INJECTION, SOLUTION INTRAVENOUS at 08:09

## 2022-08-31 RX ADMIN — METOROPROLOL TARTRATE 2.5 MG: 5 INJECTION, SOLUTION INTRAVENOUS at 11:51

## 2022-08-31 RX ADMIN — SODIUM CHLORIDE: 9 INJECTION, SOLUTION INTRAVENOUS at 08:10

## 2022-08-31 RX ADMIN — FENTANYL CITRATE 25 MCG: 50 INJECTION, SOLUTION INTRAMUSCULAR; INTRAVENOUS at 10:53

## 2022-08-31 RX ADMIN — MIDAZOLAM 1 MG: 1 INJECTION INTRAMUSCULAR; INTRAVENOUS at 10:31

## 2022-08-31 RX ADMIN — FENTANYL CITRATE 25 MCG: 50 INJECTION, SOLUTION INTRAMUSCULAR; INTRAVENOUS at 10:48

## 2022-08-31 RX ADMIN — MIDAZOLAM 0.5 MG: 1 INJECTION INTRAMUSCULAR; INTRAVENOUS at 10:48

## 2022-08-31 RX ADMIN — MIDAZOLAM 1 MG: 1 INJECTION INTRAMUSCULAR; INTRAVENOUS at 10:53

## 2022-08-31 ASSESSMENT — ENCOUNTER SYMPTOMS
SHORTNESS OF BREATH: 1
DYSPNEA ACTIVITY LEVEL: AFTER AMBULATING 1 FLIGHT OF STAIRS

## 2022-08-31 ASSESSMENT — LIFESTYLE VARIABLES: SMOKING_STATUS: 0

## 2022-08-31 NOTE — DISCHARGE INSTRUCTIONS
[facility] Pacemaker Instructions    Medications: No medication changes at this time. Resume all previously prescribed medications. Incision Care:  Tea Smith Aquacel dressing: located over your incision. Remove in 1 week (Wednesday 9/7/22). Surgical glue: located under the brown dressings and over your incision. This glue is there to prevent infection. Do NOT scrub, itch, pick, or remove the glue as this could lead to infection. The glue will fall off on its own over the next 6 weeks. Daily monitoring: check incision sites on chest daily. Notify the office at [phone] if you develop any redness, swelling, drainage, warmth, or fever greater than 100 degrees. Bathing:  No soaking in a bathtub, hot tub, or pool for 6 weeks. You may shower, but do not scrub at the incision site as it has surgical glue covering it, which is there to prevent infection. Activity:  You may resume normal activity with left arm restrictions. Arm restrictions:  Arm immobilizer: Wear the arm immobilizer at all times for 48 hours except to shower, toilet, and eat. After 48 hours, wear the arm immobilizer at night for the next 4 weeks. After 4 weeks you do not need to wear the arm immobilizer anymore. The immobilizer should be loose, not tight in order to avoid restriction of blood flow. Avoid pulling yourself up with your arm, pushing or stretching motions. Do not raise left elbow higher than shoulder height and do not lift anything weighing more than 3 pounds for 6 weeks. Do not do any activities such as golfing, vacuuming, or mowing the lawn for 6 weeks. Prevent any hard blows to the pacemaker. Driving: You may drive, if you feel up to it, in 14 days, or after your follow up appointment. Start with local/short trips to familiar places. Avoid highway/ high-speed driving for the first few days after you resume driving. DO NOT drive until you have stopped taking prescription pain medications.       What else do I need to know about my pacemaker? Do not carry a cellular phone in a pocket within six inches of the pacemaker as this can affect the operation of the unit. Hold the cell phone on the opposite ear as the pacemaker insertion site. Do not walk through airport security systems as these devices can detect the metal in your pacemaker and possibly alarm. The new full body scanners are safe for both pacemakers. Keep your pacemaker ID card with you at all times and ask security to clear you with a hand search only if a full body scanner is not available. Do not let security use a hand-held wand. Avoid passing through metal detectors (for example in shopping malls and schools). If you must pass through, walk quickly through. These detectors can interfere with the pacemaker function. Do not touch the spark plug or distributor on running car or  - turn engine off first.  Avoid holding magnets near your pacemaker. Special Instructions:  Let your dentist, doctor, or medical specialist know that you have a pacemaker so precautions can be taken to protect the device. Your device is considered to be MRI conditional; meaning that under certain circumstances, MRI exams may be performed after 6 weeks post implantation  Your pacemaker may set off a metal detector, such as those used in airports and courtrooms. Let security know that you have a pacemaker & show them your card. Remote Monitor: Many of these monitors have a delay of 3-6 months currently, in some cases an ankit can be used with newer smartphones to provide monitoring services. Please discuss this with the device clinic at your follow up appointment in 2 weeks. ID Card: You will have a temporary ID card until a permanent card is sent to you by the device company. The permanent card will look like a 's license or credit card and should arrive within 8 weeks.   Carry your ID card with you at all times    Follow Up:  Device clinic: You will be seen on 9/14/22 at 1:30 pm at the [address] [place] [address] for incision check and brief pacemaker evaluation. If you need to reschedule this appointment, call the office at [phone]. Dr Saturnino Stuart: Follow up with Dr. Saturnino Stuart in 3 months, call to schedule appointment. [facility]  [address] [place] [address]  [phone]

## 2022-08-31 NOTE — OP NOTE
Operative Note      Patient: Subhash Dempsey  YOB: 1945  MRN: [id number]    Date of Procedure: 8/31/22    Patient History: Subhash Dempsey is a 24-year-old female with a history of nonvalvular persistent AF, SND, HTN, COPD on nasal cannula O2, and obesity. She is managed by Dr. Sole Espinoza with Norvasc 5 mg daily, Eliquis 5 mg twice daily, Lipitor 20 mg daily, dofetilide 500 mcg twice daily, and valsartan 160 mg daily. In 2020, patient was diagnosed with nonvalvular paroxysmal AF. At this time she was initiated on dofetilide 125 mcg twice daily. In 3/2022, she had AF recurrences, which were managed with increase in dofetilide to 250 mcg twice daily. In 7/2022, she was started on nadolol for \"rapid heart rate\", but developed bradycardia on manual palpitation, so discontinued. She established care with my office shortly after. I noted paroxysms of AF on telemetry, so tikosyn increased to 500 mcg BID. A 2 week event monitor reported SR at 49 - 124 bpm (mean: 68 bpm), 16 patient events which predominantly occurred during AF with ventricular rate of 89 - 183 bpm, and AF burden = 5% (longest: 2 h 52 min) with ventricular rate of 72 - 191 bpm (mean: 126 bpm). 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 Her last dose of apixaban was 8/30/22 AM. She presents today, 8/31/22; for outpatient dual chamber pacemaker implant followed by initiation of AVN inhibitor. Pre-Op Diagnosis: tachy-kyle syndrome    Post-Op Diagnosis: Same       Procedure: [address] 233 dual chamber pacemaker implant (CPT code: 73601)     Surgeon: Karlie Connelly DO     Assistant: None     Anesthesia: see separate Anesthesia report     Estimated Blood Loss (mL): 10 mL     Complications: none     Detailed Description of Procedure:   Verbal and written informed consent obtained from the patient and family. The patient was brought to the EP lab in a fasting state.  Monitored anesthesia care was administered by anesthesia provider during the procedure. A venogram with IV contrast confirmed patency and location of left axillary, subclavian veins and SVC. The left upper chest was inspected for hair at the anticipated incision site within the clavipectoral triangle and if present was removed with electric clippers. The site was then prepared with chlorhexidine gluconate and draped in a sterile fashion. Ancef was administered paraenterally within 1 hour prior to incision. The left upper chest area was inspected for main folding lines and striae to guide orientation of incision and if not apparent a pinch test in various directions was performed to visualize the folding lines. The incision was planned to follow perpendicular to the main folding lines in order to minimize scar formation and optimize wound healing. Local anesthesia with 2% lidocaine HCl was applied to the planned incision and pocket area. Once adequate sedation was achieved and lidocaine was administered, an incision was made two finger-breadths inferior the left clavicle between the mid-clavicular line and deltopectoral groove, which was of adequate width to accommodate the device. Using blunt dissection and electrocautery, a subcutaneous device pocket was created superficial to the pectoralis major muscle fascia in order to avoid the pain corpuscles of the subcuticular plane and vascular pectoralis major muscle. The pocket was extended in a medial and inferior direction from the pocket incision site. A superior lip to the pocket was also created. Hemostasis was achieved and confirmed. The previously performed venogram was used to guide access in the axillary vein overlying the first rib with a modified Seldinger technique and micro puncture needle under fluoroscopic visualization in PA caudal 35*  view in order to reduce the risk of pneumothorax and hemothorax.  Two J tip wires were advanced to the right atrium (RA) and IVC in order to confirm venous puncture and avoid inadvertent placement of pacing leads in the arterial system. An 8 Turkmen sheath was advanced over the the first one. Wire and dilator were removed. The delivery sheath SSPC2 was reshaped manually by exaggerating the nominal primary and secondary curves of the sheath in order to facilitate positioning at the target area. The SSPC2 was introduced over long J wire via 8 F short sheath into the RV using a HOSKINS 30° view on fluoroscopy. Once in the RV, the wire and dilator were exhcanged for a 59 cm (model: 7842) [facility] Ingevity+ active fixation lead with a soft straight stylet, which was advanced through the delivery sheath. Both sheath and lead were retracted to target the upper part of the septum. On fluoroscopy, the tricuspid annulus was located. The sheath was rotated CW, then advanced 1 - 2 cm into the RV along an imaginary line between superior aspect of the triscuspid valve annulus (traditional region of the His) & RV apex. This area was targeted as this is traditionally the area of the left bundle branch. Slight counter clockwise rotation of the sheath and lead combination was applied to position the tip perpendicular on the septum. The distal aspect of the sheath and lead were noted to be directed at 1 oclock position in HOSKINS 30* view. Septal position was confirmed with Tongan 30° view as the distal aspect of the sheath and lead were noted to be directed to 2-3 oclock position. The connector clips were connected to the lead to facilitate unipolar pacing (black clip to stylet or electrode tip and red clip to pocket). Unipolar pacing was performed to find the target site for implant on the RV septum and to avoid an RVOT location.  The target site was noted to have:  -V1: QS with notch in the fazal (W pattern with notch in the fazal)  -II/III: discordant (R II > R III OR R II and RS in III)  -aVL/aVR: discordant (aVL + / aVR -)  Once ideal site was located via passive pace mapping. The electrically active helix was extended using the standard clip-on-tool. The baseline unipolar pacing impedance was noted. In order to screw the Ingevity lead into a deep septal position, clockwise rotations on the outer lead body were applied while keeping the delivery sheath in close contact with the RV septum in a stable fashion. While screwing the lead into the RV septum, monitoring for helix retraction via fluoroscopy as well as sudden increase in pacing impedance was performed. If helix retraction was observed, the helix was re-extended using the clip-on-tool followed by additional clockwise rotations on the outer lead body to further advance the lead if necessary. These maneuvers are repeated until lead electrode tip arrived at left bundle branch area. Lead advancement into the septum guided by unipolar pacing impedance, paced QRS morphology, and monitoring for presence of fixation beats and/or presence of a left bundle branch potential on the unipolar lead tip electrogram. As the pacing lead reached the course of the left bundle branch, the paced W shaped QRS morphology in lead V1 was noted to gradually change to a narrow QRS morphology with a terminal r-wave (qR or rsR') in lead V1 (so-called incomplete right bundle branch block morphology). Additionally, the unipolar pacing impedance was noted to drop by 100 ohms at this location. COI was assessed and noted to have myocardial COI on lead EGM. No evidence of septal perforation was observed (impedance drop of > 200 ohms, loss of capture). To assess lead depth in the IVS, pacing was changed to ring unipolar. Evidence of RV septal capture with LBBB was noted. The lead implant depth was assessed with fluoroscopic landmarks of the pacing electrodes and interelectrode distance (Ingevity+ interelectrode distance in 10.7 mm). Patient noted to have IVS thickness of 16 mm on echocardiogram prior to procedure.  LBB capture was confirmed with evidence of pacing caleb LVAT in V6 of ~65 msec and paced QRS morphology with RBBB morphology (Qr). The delivery sheath was gently retracted into the RA while maintaining adequate lead slack. Lead parameters were reassessed in unipolar and bipolar and determined to be adequate (sensing > 5 mV, impedance = 400 - 1200 ohms, threshold < 1.5 V @ 0.4 msec). The EGM was reviewed and current of injury observed, which is associated with adequate threshold and long-term stability. No evidence of perforation (negative ST-segment) was observed. The delivery sheath was split with standard technique with [facility]. Shortly after, patient developed atrial fibrillation with ventricular rates of ~130 bpm, then spontaneously converted to sinus rhythm for several minutes followed by return to atrial fibrillation with ventricular rates of ~130 bpm. Metoprolol 2.5 mg x 2 was administered to slow the ventricular rate in order to permit threshold testing of the RV lead. Lead function remained stable and no evidence of perforation into LV (pace impedance decreases by > 200 ohms, unipolar pace impedance < 400 ohms, R-sensing decreasing COI on unipolar). High output pacing performed and no evidence of phrenic nerve capture was observed. Adequate lead slack was confirmed as RV lead noted to lay along the floor of the RA without prolapsing into IVC. The 8 F sheath was split, and the lead was sutured to the pectoralis major muscle with a non-absorbable 0 silk suture over suture sleeve. A 6 Cymraes sheath was introduced over the second J-tip wire, following which the wire and dilator were removed. A  52 cm (model: 4379) 6F [employer] Ingevity+ active fixation atrial lead with soft straight stylet was introduced into the sheath, then the stylet was partially retracted and the lead was advanced into the IVC. The soft straight stylet was exchanged for the soft curved atrial stylet.  A soft stylet was chosen to minimize the risk of cardiac perforation. The lead tip was positioned in the right atrial appendage (RAA). The lead tip was deployed. The EGM was reviewed and current of injury observed, which is associated with adequate threshold and long-term stability. No evidence of perforation (negative ST-segment) was observed. Lead parameters were assessed and found to be adequate (sensing > 1 mV, impedance = 400 - 1200 ohms, threshold N/A (AF)). High output pacing performed and no evidence of phrenic nerve capture was observed. Adequate lead slack was confirmed as RA lead had a J-shape without engaging the RA floor or TV annulus. The sheath was split, and the lead was sutured to the pectoralis major muscle with a non-absorbable 0 silk suture over suture sleeve. The pocket was then irrigated with antibiotic infused saline in order to remove tissue debris and uncover any persistent bleeding. Hemostasis was again confirmed. The leads were connected to the [employer] MRI DR IS-1 (EL) (model L331) device. The leads were curled in the pocket in fashion to avoid any acute angles. The device was then placed in an absorbable TYRX antibiotic (rifampin and minocycline) pouch then into the pocket with the leads beneath the device. SURGIFLO hemostatic matrix with thrombin was injected into the pocket above and below the device. Fluoroscopy was used to confirm ideal device and lead position in the pocket, lead connector pins in the device header, adequate lead slack, and lead position. The device was secured in place over the leads with a non-absorbable 0 silk suture. The pocket was closed with multiple layers of suture. An absorbable 2-0 Vicryl violet braided suture with CT-1 needle was used to approximate the clavipectoral deep fascial layer with running suture technique with deep bites of equal spacing in order to isolate the pocket, restore anterior wall structure, and prevent device erosion.  An absorbable 2-0 Vicryl violet braided suture with CT-1 needle was used to approximate the subcutaneous tissue with running suture technique with deep bites of equal spacing. An absorbable 4-0 Monocryl suture with reverse cutting needle was used to approximate the subcuticular tissue with minimal tension through running suture technique with closely spaced bites and buried sutured knot at the ends. Dermabond was placed over the incision. An Aquacel Ag surgical dressing was placed over the incision. A pressure dressing was placed over the incision. Final lead assessment:  RA: sensing = 2.3 mV, impedance = 606 ohms, capture threshold = N/A (AF)  RV: sensing = 25 mV, impedance = 1250 ohms, capture threshold = 0.4 V @ 0.4 msec    The device was programmed:  DDDR 60/120 ppm  AV delays: 140-200 msec (sense) and 160-230 msec (pace)  PVARP: 280-320 msec  Lead programming:  RA lead: sensitivity =  0.25 mV, output = 3.5 V @ 0.4 msec  RV lead: sensitivity =  0.6 mV, output =  3.5 V @ 0.4 msec. SUMMARY: Successful [employer] dual chamber pacemaker implant in the setting of tachy-kyle syndrome. RV lead in left bundle branch area.      Electronically signed by Bre Hammer DO on 8/30/2022 at 9:26 PM

## 2022-08-31 NOTE — ANESTHESIA POSTPROCEDURE EVALUATION
Department of Anesthesiology  Postprocedure Note    Patient: Alanis Parents  MRN: [id number]  YOB: 1945  Date of evaluation: 8/31/2022      Procedure Summary     Date: 08/31/22 Room / Location: [facility] CATH LAB    Anesthesia Start: 5946 Anesthesia Stop: 1236    Procedure: PACEMAKER IMPLANT W/ANES Diagnosis:       Other persistent atrial fibrillation      AF (atrial fibrillation) ([address]      Tachy-kyle syndrome ([address]    Scheduled Providers: GIO Ragsdale CRNA; Josephine Calderon DO Responsible Provider: Josephine Calderon DO    Anesthesia Type: MAC ASA Status: 4          Anesthesia Type: No value filed.     Jai Phase I:      Jai Phase II:        Anesthesia Post Evaluation    Patient location during evaluation: bedside  Patient participation: complete - patient cannot participate  Level of consciousness: lethargic  Airway patency: patent  Nausea & Vomiting: no nausea and no vomiting  Complications: no  Cardiovascular status: blood pressure returned to baseline  Respiratory status: acceptable  Hydration status: euvolemic  Multimodal analgesia pain management approach

## 2022-08-31 NOTE — ANESTHESIA PRE PROCEDURE
Department of Anesthesiology  Preprocedure Note       Name:  Aure Alba   Age:  64 y.o.  :  1945                                          MRN:  [id number]         Date:  2022      Surgeon: Car Doshi    Procedure: Permanent pacemaker implant, venogram    Medications prior to admission:   Prior to Admission medications    Medication Sig Start Date End Date Taking?  Authorizing Provider   Cholecalciferol (VITAMIN D) 50 MCG (2000) CAPS capsule Take by mouth    Historical Provider, MD   amLODIPine (NORVASC) 5 MG tablet TAKE 1/2 (ONE-HALF) TABLET BY MOUTH ONCE DAILY 22   Historical Provider, MD   OXYGEN Inhale 2 L/min into the lungs continuous    Historical Provider, MD   dofetilide (TIKOSYN) 500 MCG capsule Take 1 capsule by mouth every 12 hours 22   Yinka Grubbs MD   levothyroxine (SYNTHROID) 150 MCG tablet TAKE 1 TABLET BY MOUTH ONCE DAILY 21   Historical Provider, MD Pradeep Alonso ELLIPTA 62.5-25 MCG/INH AEPB inhaler INHALE 1 PUFF BY MOUTH ONCE DAILY 20   Historical Provider, MD   apixaban (ELIQUIS) 5 MG TABS tablet Take 1 tablet by mouth 2 times daily 19   Betty Smoke, APRN - CNP   valsartan (DIOVAN) 160 MG tablet Take 1 tablet by mouth daily 19   Betty Smoke, APRN - CNP   venlafaxine (EFFEXOR XR) 150 MG extended release capsule Take 150 mg by mouth daily 18   Historical Provider, MD   atorvastatin (LIPITOR) 20 MG tablet Take 20 mg by mouth daily    Historical Provider, MD       Current medications:    Current Outpatient Medications   Medication Sig Dispense Refill    Cholecalciferol (VITAMIN D) 50 MCG (2000) CAPS capsule Take by mouth      amLODIPine (NORVASC) 5 MG tablet TAKE 1/2 (ONE-HALF) TABLET BY MOUTH ONCE DAILY      OXYGEN Inhale 2 L/min into the lungs continuous      dofetilide (TIKOSYN) 500 MCG capsule Take 1 capsule by mouth every 12 hours 60 capsule 3    levothyroxine (SYNTHROID) 150 MCG tablet TAKE 1 TABLET BY MOUTH ONCE DAILY      ANORO ELLIPTA 62.5-25 MCG/INH AEPB inhaler INHALE 1 PUFF BY MOUTH ONCE DAILY      apixaban (ELIQUIS) 5 MG TABS tablet Take 1 tablet by mouth 2 times daily 60 tablet 2    valsartan (DIOVAN) 160 MG tablet Take 1 tablet by mouth daily 30 tablet 3    venlafaxine (EFFEXOR XR) 150 MG extended release capsule Take 150 mg by mouth daily      atorvastatin (LIPITOR) 20 MG tablet Take 20 mg by mouth daily       Current Facility-Administered Medications   Medication Dose Route Frequency Provider Last Rate Last Admin    0.9 % sodium chloride infusion   IntraVENous Continuous Tor Mohs, DO 20 mL/hr at 08/31/22 0810 New Bag at 08/31/22 0810    0.9 % sodium chloride infusion   IntraVENous Continuous Tor Mohs, DO 20 mL/hr at 08/31/22 0809 New Bag at 08/31/22 0809    ceFAZolin (ANCEF) 2,000 mg in sterile water 20 mL IV syringe  2,000 mg IntraVENous Once Tor Mohs, DO           Allergies:  No Known Allergies    Problem List:    Patient Active Problem List   Diagnosis Code    Essential hypertension I10    Mixed hyperlipidemia E78.2    S/P carotid endarterectomy Z98.890    Hypoxia R09.02    CHF (congestive heart failure) ([facility]) Y64.8    Diastolic dysfunction R48.88    Asthma J45.909    Bilateral carotid artery stenosis I65.23    O2 dependent Z99.81    Primary osteoarthritis of left knee M17.12    Lumbar spondylosis M47.816    Class 2 obesity due to excess calories with body mass index (BMI) of 38.0 to 38.9 in adult E66.09, Z68.38    MCCLELLAND (dyspnea on exertion) R06.00    CKD (chronic kidney disease) stage 3, GFR 30-59 ml/min ([facility]) N18.30    Atrial fibrillation with RVR ([facility]) I48.91    Atrial fibrillation with rapid ventricular response ([facility]) I48.91    Shortness of breath R06.02    Symptomatic bradycardia R00.1    History of ongoing treatment with high-risk medication Z79.899    Unspecified osteoarthritis, unspecified site M19.90    Unspecified injury of head, initial encounter S09.90XA    Hypothyroidism, unspecified E03.9    Fall from non-moving wheelchair W05. 0XXA    Closed head injury S09.90XA    Chronic obstructive pulmonary disease, unspecified ([facility] J44.9       Past Medical History:        Diagnosis Date    Arthritis     Atrial fibrillation (HCC)     Tikosyn per Dr. Gil Wagner Bilateral carotid artery stenosis 2020    Bronchitis     Carotid stenosis, bilateral 2012    Hyperlipidemia     Hypertension     Left carotid stenosis 2018    O2 dependent 2020    On continuous oral anticoagulation     Eliquis    S/P carotid endarterectomy 10/06/2016    Stomach ulcer     Thyroid disease        Past Surgical History:        Procedure Laterality Date    APPENDECTOMY      BACK SURGERY      CHOLECYSTECTOMY      COLONOSCOPY      ENDOSCOPY, COLON, DIAGNOSTIC  01/10/2018    upper endo    EYE SURGERY      BILATERAL CATARACT    HYSTERECTOMY (CERVIX STATUS UNKNOWN)      JOINT REPLACEMENT      RIGHT KNEE       Social History:    Social History     Tobacco Use    Smoking status: Former     Packs/day: 1.00     Years: 30.00     Pack years: 30.00     Types: Cigarettes     Quit date: 2018     Years since quittin.1    Smokeless tobacco: Never   Substance Use Topics    Alcohol use: Not Currently                                Counseling given: Not Answered      Vital Signs (Current):   Vitals:    22 0759   BP: (!) 160/66   Pulse: 63   Resp: 18   Temp: 36.6 °C (97.8 °F)   TempSrc: Tympanic   SpO2: 98%                                              BP Readings from Last 3 Encounters:   22 (!) 160/66   22 138/80   22 (!) 168/76       NPO Status:  2100                                                                               BMI:   Wt Readings from Last 3 Encounters:   22 258 lb 6.4 oz (117.2 kg)   22 243 lb (110.2 kg)   22 243 lb (110.2 kg)     There is no height or weight on file to calculate BMI.    CBC:   Lab Results Component Value Date/Time    WBC 10.2 08/29/2022 01:55 PM    RBC 3.86 08/29/2022 01:55 PM    HGB 12.1 08/29/2022 01:55 PM    HCT 37.6 08/29/2022 01:55 PM    MCV 97.4 08/29/2022 01:55 PM    RDW 12.7 08/29/2022 01:55 PM     08/29/2022 01:55 PM       CMP:   Lab Results   Component Value Date/Time     08/29/2022 01:55 PM    K 5.2 08/29/2022 01:55 PM    K 4.5 07/21/2022 12:47 PM     08/29/2022 01:55 PM    CO2 23 08/29/2022 01:55 PM    BUN 27 08/29/2022 01:55 PM    CREATININE 1.0 08/29/2022 01:55 PM    GFRAA >60 08/29/2022 01:55 PM    LABGLOM 54 08/29/2022 01:55 PM    GLUCOSE 102 08/29/2022 01:55 PM    GLUCOSE 156 04/28/2012 02:00 AM    PROT 7.0 07/21/2022 12:47 PM    CALCIUM 9.1 08/29/2022 01:55 PM    BILITOT 0.8 07/21/2022 12:47 PM    ALKPHOS 68 07/21/2022 12:47 PM    AST 15 07/21/2022 12:47 PM    ALT 11 07/21/2022 12:47 PM       POC Tests: No results for input(s): POCGLU, POCNA, POCK, POCCL, POCBUN, POCHEMO, POCHCT in the last 72 hours.     Coags:   Lab Results   Component Value Date/Time    PROTIME 16.1 08/27/2020 01:20 PM    INR 1.4 08/27/2020 01:20 PM    APTT 33.5 08/27/2020 01:20 PM       HCG (If Applicable): No results found for: PREGTESTUR, PREGSERUM, HCG, HCGQUANT     ABGs: No results found for: PHART, PO2ART, WWB6FHM, UYY6BAP, BEART, X1CXJPWC     Type & Screen (If Applicable):  No results found for: LABABO, LABRH    Drug/Infectious Status (If Applicable):  No results found for: HIV, HEPCAB    COVID-19 Screening (If Applicable):   Lab Results   Component Value Date/Time    COVID19 Not Detected 07/21/2022 02:44 PM    COVID19 Not Detected 03/23/2022 10:15 AM           Anesthesia Evaluation  Patient summary reviewed and Nursing notes reviewed no history of anesthetic complications:   Airway: Mallampati: III  TM distance: >3 FB   Neck ROM: full  Mouth opening: > = 3 FB   Dental:    (+) upper dentures, lower dentures and edentulous  Comment: Dentures remain in    Pulmonary: breath sounds clear to auscultation  (+) COPD:  shortness of breath:  decreased breath sounds: bilateral asthma:     (-) not a current smoker                          ROS comment: Uses O2 2l/nc 24/7  PE comment: 2L nc cont Cardiovascular:  Exercise tolerance: good (>4 METS),   (+) hypertension:, dysrhythmias (symptomatic bradycardia): atrial fibrillation, CHF:, MCCLELLAND: after ambulating 1 flight of stairs,       ECG reviewed  Rhythm: regular  Rate: normal  Echocardiogram reviewed    Cleared by cardiology     Beta Blocker:  Not on Beta Blocker      ROS comment: Symptomatic bradycardia    PE comment: SR   Neuro/Psych:   Negative Neuro/Psych ROS               ROS comment: [place] wears hearing aids  [facility] 2* fall  Lumbar spondylosis GI/Hepatic/Renal:   (+) PUD, renal disease: CRI, morbid obesity          Endo/Other:    (+) hypothyroidism, blood dyscrasia: anticoagulation therapy, arthritis: OA., . Pt had no PAT visit       Abdominal:   (+) obese,     Abdomen: soft. Vascular: negative vascular ROS. ROS comment: bilat Carotid stenosis- s/p RCEA 2016. Other Findings:           Anesthesia Plan      MAC     ASA 4       Induction: intravenous. Anesthetic plan and risks discussed with patient. Use of blood products discussed with patient whom. Plan discussed with attending.                     Bebo Roman, APRN - CRNA   8/31/2022

## 2022-09-01 ENCOUNTER — APPOINTMENT (OUTPATIENT)
Dept: GENERAL RADIOLOGY | Age: 77
End: 2022-09-01
Attending: STUDENT IN AN ORGANIZED HEALTH CARE EDUCATION/TRAINING PROGRAM
Payer: MEDICARE

## 2022-09-01 ENCOUNTER — TELEPHONE (OUTPATIENT)
Dept: NON INVASIVE DIAGNOSTICS | Age: 77
End: 2022-09-01

## 2022-09-01 VITALS
DIASTOLIC BLOOD PRESSURE: 70 MMHG | HEART RATE: 70 BPM | OXYGEN SATURATION: 94 % | TEMPERATURE: 97.4 F | RESPIRATION RATE: 17 BRPM | SYSTOLIC BLOOD PRESSURE: 169 MMHG

## 2022-09-01 PROBLEM — Z95.0 PRESENCE OF CARDIAC PACEMAKER: Status: ACTIVE | Noted: 2022-09-01

## 2022-09-01 LAB
ANION GAP SERPL CALCULATED.3IONS-SCNC: 9 MMOL/L (ref 7–16)
BUN BLDV-MCNC: 18 MG/DL (ref 6–23)
CALCIUM SERPL-MCNC: 9 MG/DL (ref 8.6–10.2)
CHLORIDE BLD-SCNC: 106 MMOL/L (ref 98–107)
CO2: 25 MMOL/L (ref 22–29)
CREAT SERPL-MCNC: 0.8 MG/DL (ref 0.5–1)
EKG ATRIAL RATE: 234 BPM
EKG ATRIAL RATE: 240 BPM
EKG ATRIAL RATE: 65 BPM
EKG P AXIS: 43 DEGREES
EKG P-R INTERVAL: 144 MS
EKG Q-T INTERVAL: 390 MS
EKG Q-T INTERVAL: 412 MS
EKG Q-T INTERVAL: 434 MS
EKG QRS DURATION: 124 MS
EKG QRS DURATION: 70 MS
EKG QRS DURATION: 74 MS
EKG QTC CALCULATION (BAZETT): 428 MS
EKG QTC CALCULATION (BAZETT): 530 MS
EKG QTC CALCULATION (BAZETT): 551 MS
EKG R AXIS: -10 DEGREES
EKG R AXIS: 46 DEGREES
EKG R AXIS: 51 DEGREES
EKG T AXIS: 100 DEGREES
EKG T AXIS: 118 DEGREES
EKG T AXIS: 92 DEGREES
EKG VENTRICULAR RATE: 120 BPM
EKG VENTRICULAR RATE: 65 BPM
EKG VENTRICULAR RATE: 90 BPM
GFR AFRICAN AMERICAN: >60
GFR NON-AFRICAN AMERICAN: >60 ML/MIN/1.73
GLUCOSE BLD-MCNC: 98 MG/DL (ref 74–99)
MAGNESIUM: 1.8 MG/DL (ref 1.6–2.6)
POTASSIUM SERPL-SCNC: 5 MMOL/L (ref 3.5–5)
SODIUM BLD-SCNC: 140 MMOL/L (ref 132–146)

## 2022-09-01 PROCEDURE — 93005 ELECTROCARDIOGRAM TRACING: CPT | Performed by: NURSE PRACTITIONER

## 2022-09-01 PROCEDURE — 93286 PERI-PX EVAL PM/LDLS PM IP: CPT | Performed by: STUDENT IN AN ORGANIZED HEALTH CARE EDUCATION/TRAINING PROGRAM

## 2022-09-01 PROCEDURE — 6370000000 HC RX 637 (ALT 250 FOR IP): Performed by: STUDENT IN AN ORGANIZED HEALTH CARE EDUCATION/TRAINING PROGRAM

## 2022-09-01 PROCEDURE — 80048 BASIC METABOLIC PNL TOTAL CA: CPT

## 2022-09-01 PROCEDURE — 36415 COLL VENOUS BLD VENIPUNCTURE: CPT

## 2022-09-01 PROCEDURE — 2700000000 HC OXYGEN THERAPY PER DAY

## 2022-09-01 PROCEDURE — 83735 ASSAY OF MAGNESIUM: CPT

## 2022-09-01 PROCEDURE — 99233 SBSQ HOSP IP/OBS HIGH 50: CPT | Performed by: STUDENT IN AN ORGANIZED HEALTH CARE EDUCATION/TRAINING PROGRAM

## 2022-09-01 PROCEDURE — 71046 X-RAY EXAM CHEST 2 VIEWS: CPT

## 2022-09-01 PROCEDURE — 99239 HOSP IP/OBS DSCHRG MGMT >30: CPT | Performed by: STUDENT IN AN ORGANIZED HEALTH CARE EDUCATION/TRAINING PROGRAM

## 2022-09-01 PROCEDURE — 2580000003 HC RX 258: Performed by: STUDENT IN AN ORGANIZED HEALTH CARE EDUCATION/TRAINING PROGRAM

## 2022-09-01 RX ADMIN — SODIUM CHLORIDE, PRESERVATIVE FREE 10 ML: 5 INJECTION INTRAVENOUS at 09:30

## 2022-09-01 RX ADMIN — AMLODIPINE BESYLATE 5 MG: 5 TABLET ORAL at 09:32

## 2022-09-01 RX ADMIN — METOPROLOL TARTRATE 25 MG: 25 TABLET, FILM COATED ORAL at 09:31

## 2022-09-01 RX ADMIN — LEVOTHYROXINE SODIUM 150 MCG: 0.15 TABLET ORAL at 06:27

## 2022-09-01 RX ADMIN — CHOLECALCIFEROL TAB 10 MCG (400 UNIT) 400 UNITS: 10 TAB at 09:32

## 2022-09-01 RX ADMIN — ACETAMINOPHEN 650 MG: 325 TABLET, FILM COATED ORAL at 06:36

## 2022-09-01 RX ADMIN — DOFETILIDE 500 MCG: 0.5 CAPSULE ORAL at 09:31

## 2022-09-01 RX ADMIN — VALSARTAN 160 MG: 160 TABLET, FILM COATED ORAL at 09:32

## 2022-09-01 ASSESSMENT — PAIN DESCRIPTION - DESCRIPTORS: DESCRIPTORS: ACHING

## 2022-09-01 ASSESSMENT — PAIN SCALES - GENERAL: PAINLEVEL_OUTOF10: 3

## 2022-09-01 ASSESSMENT — PAIN DESCRIPTION - LOCATION: LOCATION: BACK

## 2022-09-01 ASSESSMENT — PAIN DESCRIPTION - ORIENTATION: ORIENTATION: MID

## 2022-09-01 NOTE — DISCHARGE SUMMARY
UT) Caps capsule              Hospital Course: [employer] dual-chamber pacemaker implant on 8/31/2022. Device check today 9/21/2022 with stable device function. Chest x-ray with stable device position and no pneumothorax. Site of implant is stable. Procedures Performed: Ammy Ryan dual-chamber pacemaker implant on 8/31/2022    Consultants: None    Disposition: The patient was discharged to home in stable condition on the above medications. Clinical follow-up in the device clinic office in 2 weeks and my office in 3 months.       Carrington Dopp, DO [facility]  [address] [facility]    > 30 minutes was used in preparation of the discharge summary

## 2022-09-01 NOTE — PROGRESS NOTES
[facility]) Physicians- [facility]  Inpatient Progress Note  Aure Alba  1945  Date of Service: 2022  PCP: GIO Madden - CNP  Cardiologist: Dr. Neema Nichols  Electrophysiologist:  Dr Car Doshi         Subjective: Aure Alba is a 47-year-old female with a history of nonvalvular persistent AF, SND, HTN, COPD on nasal cannula O2, and obesity. She is managed by Dr. Neema Nichols with Norvasc 5 mg daily, Eliquis 5 mg twice daily, Lipitor 20 mg daily, dofetilide 500 mcg twice daily, and valsartan 160 mg daily. In , patient was diagnosed with nonvalvular paroxysmal AF. At this time she was initiated on dofetilide 125 mcg twice daily. In 3/2022, she had AF recurrences, which were managed with increase in dofetilide to 250 mcg twice daily. In 2022, she was started on nadolol for \"rapid heart rate\", but developed bradycardia on manual palpitation, so discontinued. She established care with my office shortly after. I noted paroxysms of AF on telemetry, so tikosyn increased to 500 mcg BID. A 2 week event monitor reported SR at 49 - 124 bpm (mean: 68 bpm), 16 patient events which predominantly occurred during AF with ventricular rate of 89 - 183 bpm, and AF burden = 5% (longest: 2 h 52 min) with ventricular rate of 72 - 191 bpm (mean: 126 bpm). She was diagnosed with tachy-kyle syndrome, so referred for dual chamber pacemaker implant, which was performed on 2022 with [address] and RV lead and left bundle branch area. He denies any complaints at this time.     Prior cardiac testin week event monitor (22): SR at 49 - 124 bpm (mean: 68 bpm), 16 patient events which predominantly occurred during AF with ventricular rate of 89 - 183 bpm, SVE burden = 1.5%, 12 runs of asymptomatic & non-sustained SVE (longest: 14.3 sec; fastest: 163 bpm), VE burden < 1%, 1 episode of asymptomatic NSVT (13.3 sec at 110 bpm), AF burden = 5% (longest: 2 h 52 min) with ventricular rate of 72 - 191 bpm (mean: 126 bpm), and no 2* type II AVB, 3* AV block or significant pauses. TTE (3/22/2022): LVEF = 64%, borderline asymmetric septal LVH, technically difficult study due to patient's body habitus. Pharmacologic nuclear stress test (2022): LVEF = 80%, no ischemia.        Past Medical History:   Diagnosis Date    Arthritis     Atrial fibrillation (HCC)     Tikosyn per Dr. Raphael Standing    Bilateral carotid artery stenosis 2020    Bronchitis     Carotid stenosis, bilateral 2012    Hyperlipidemia     Hypertension     Left carotid stenosis 2018    O2 dependent 2020    On continuous oral anticoagulation     Eliquis    S/P carotid endarterectomy 10/06/2016    Stomach ulcer     Thyroid disease       Past Surgical History:   Procedure Laterality Date    APPENDECTOMY      BACK SURGERY      CHOLECYSTECTOMY      COLONOSCOPY      ENDOSCOPY, COLON, DIAGNOSTIC  01/10/2018    upper endo    EYE SURGERY      BILATERAL CATARACT    HYSTERECTOMY (CERVIX STATUS UNKNOWN)      JOINT REPLACEMENT      RIGHT KNEE    PACEMAKER INSERTION Left 2022    Successful [employer] dual chamber pacemaker (Sesar)      Social History     Tobacco Use    Smoking status: Former     Packs/day: 1.00     Years: 30.00     Pack years: 30.00     Types: Cigarettes     Quit date: 2018     Years since quittin.1    Smokeless tobacco: Never   Vaping Use    Vaping Use: Never used   Substance Use Topics    Alcohol use: Not Currently    Drug use: No      Family History   Problem Relation Age of Onset    Other Mother         rheumatic fever    Cancer Sister         lung      Current Facility-Administered Medications   Medication Dose Route Frequency Provider Last Rate Last Admin    0.9 % sodium chloride infusion   IntraVENous Continuous  Dikes, DO 20 mL/hr at 22 0810 Restarted at 22 1013    0.9 % sodium chloride infusion   IntraVENous Continuous Marcie GASPAR Sesar, DO   Stopped at 08/31/22 1225    ceFAZolin (ANCEF) 2,000 mg in sterile water 20 mL IV syringe  2,000 mg IntraVENous Once Filiberto GASPAR Sesar, DO        sodium chloride flush 0.9 % injection 5-40 mL  5-40 mL IntraVENous 2 times per day Pamela Legacy, DO   10 mL at 09/01/22 0930    sodium chloride flush 0.9 % injection 5-40 mL  5-40 mL IntraVENous PRN Pamlea Legacy, DO        0.9 % sodium chloride infusion   IntraVENous PRN Pamela Legacy, DO        acetaminophen (TYLENOL) tablet 650 mg  650 mg Oral Q4H PRN Pamela Legacy, DO   650 mg at 09/01/22 0636    amLODIPine (NORVASC) tablet 5 mg  5 mg Oral Daily Pamela Legacy, DO   5 mg at 09/01/22 0932    vitamin D3 (CHOLECALCIFEROL) tablet 400 Units  400 Units Oral Daily Pamela Legacy, DO   400 Units at 09/01/22 0932    levothyroxine (SYNTHROID) tablet 150 mcg  150 mcg Oral Daily Pamela Legacy, DO   150 mcg at 09/01/22 9042    valsartan (DIOVAN) tablet 160 mg  160 mg Oral Daily Pamela Legacy, DO   160 mg at 09/01/22 0932    venlafaxine (EFFEXOR XR) extended release capsule 75 mg  75 mg Oral Daily with breakfast Pamela Legacy, DO        dofetilide (TIKOSYN) capsule 500 mcg  500 mcg Oral 2 times per day Pamela Legacy, DO   500 mcg at 09/01/22 0931    ipratropium (ATROVENT) 0.02 % nebulizer solution 0.5 mg  0.5 mg Nebulization 4x daily Pamela Legacy, DO        Arformoterol Tartrate (BROVANA) nebulizer solution 15 mcg  15 mcg Nebulization BID Pamela Legacy, DO        metoprolol tartrate (LOPRESSOR) tablet 25 mg  25 mg Oral BID Pamela Legacy, DO   25 mg at 09/01/22 0931    atorvastatin (LIPITOR) tablet 20 mg  20 mg Oral Nightly Pamela Legacy, DO   20 mg at 08/31/22 2138      No Known Allergies    ROS:   Constitutional: Negative for fever, positive fatigue activity change and negative appetite change. HENT: Negative for epistaxis. Eyes: Negative for diploplia, blurred vision.    Respiratory: Negative for cough, chest tightness, shortness of breath and wheezing. Positive shortness of breath on exertion  Cardiovascular: pertinent positives in HPI  Gastrointestinal: Negative for abdominal pain and blood in stool. All other review of systems are negative     PHYSICAL EXAM:      Vitals:    08/31/22 2130 09/01/22 0030 09/01/22 0400 09/01/22 0915   BP: (!) 154/65 (!) 122/58 (!) 140/69 (!) 169/70   Pulse: 64 60 61 70   Resp:  18 18 17   Temp: (!) 96.2 °F (35.7 °C) (!) 96.5 °F (35.8 °C) 96.8 °F (36 °C) 97.4 °F (36.3 °C)   TempSrc: Temporal Temporal Temporal Oral   SpO2: 98% 96% 93% 94%     Constitutional: well-developed, no acute distress  Eyes: conjunctivae normal, no xanthelasma   Ears, Nose, Throat: oral mucosa moist, no cyanosis   CV: no JVD. Regular rate and rhythm. Normal S1S2 and no S3. No murmurs, rubs, or gallops. PMI is nondisplaced  Lungs: clear to auscultation bilaterally, normal respiratory effort without used of accessory muscles  Abdomen: soft, non-tender, bowel sounds present, no masses or hepatomegaly   Musculoskeletal: no digital clubbing, no edema   Skin: warm, no rashes   CI ED site: Clean dry intact, no erythema edema or drainage, Aquacel dressing in place, pressure dressing removed today    Data:    Recent Labs     08/29/22  1355   WBC 10.2   HGB 12.1   HCT 37.6        Recent Labs     08/29/22  1355 08/31/22  0950 09/01/22  1040    140 140   K 5.2* 5.2* 5.0    105 106   CO2 23 25 25   BUN 27* 20 18   CREATININE 1.0 0.8 0.8   CALCIUM 9.1 9.0 9.0     Lab Results   Component Value Date/Time    MG 1.8 09/01/2022 10:40 AM     No results for input(s): TSH in the last 72 hours. No results for input(s): INR in the last 72 hours.     Telemetry: SR-a paced with ventricular sensed rhythm  Device Interrogation/Reprogramming  Make/Model: Scientific Accolade EL L3 3 1  DOI: 8/31/2022  Battery: Greater than 11 years  Leonela Salguero therapy: DDDR  bpm, AV delays of 140-200 ms sensed and 160-230 ms paced ppm  Pacing %: RA = 23% , RV = less than 1 %  Lead function:  RA lead: sensing = 6.6 mV, impedance = 540 ohms, threshold = 0.4 V @0.4 msec  RV lead: sensing = greater than 25 mV, impedance = 1187 ohms, threshold = 0.4 V @0.4 msec  Lead programming:  RA lead: sensitivity = 0.25 mV AGC, output = 3.5 V @0.4 msec trend  RV lead: sensitivity = 0.6 mV AGC, output = 3.5 V @0.4 msec trend  Arrhythmias: None  Reprogramming included: None  Overall device function is normal  All device programmable settings were evaluated per above and in the scanned document, along with iterative adjustments (capture thresholds) to assess and select the most appropriate final programming to provide for consistent delivery of the appropriate therapy and to verify function of the device. I have independently reviewed all of the ECGs and rhythm strips per above     Assessment and plan:  1. Tachy-bradycardia syndrome s/p [employer] dual-chamber pacemaker (DOI 8/31/2022)  -Dual-chamber implanted 8/31/2022 with RV lead and left bundle branch area. - Beta-blocker initiated following device implant.  - Device check today with stable device function.  - Chest x-ray with stable device function and no pneumothorax. - Site stable. Pressure dressing removed. - Restart [address] [place], [place] [address].  - Follow-up with device clinic in 2 weeks. - Follow-up with my office in 3 months. 2.  Nonvalvular persistent atrial fibrillation  - Initially diagnosed in 2020.  - [id number] score = 4 (age, sex, HTN). Recommend [address] in females with score of 2 or more. DOAC preferred. - Continue apixaban 5 mg twice daily. Recommend annual monitoring of CBC and CMP. - Rhythm control previously recommended and treated with dofetilide. Appears dofetilide was initiated in 2020 and a starting dose of 125 mcg twice daily. In 3/2022, appears dofetilide was increased to 250 mcg twice daily due to AF recurrences.   Patient had recurrent episodes of rapid as well as slow heartbeats following initiation

## 2022-09-01 NOTE — CARE COORDINATION
Pt discharged home before she could be seen with no needs. Family provided transportation.    Donis Calderón, ISELAS.W.  [phone]

## 2022-09-01 NOTE — TELEPHONE ENCOUNTER
----- Message from Emily Molina sent at 9/1/2022 12:58 PM EDT -----  Regarding: FW: Appointment    ----- Message -----  From: Amirah Le DO  Sent: 9/1/2022  11:55 AM EDT  To: Amber Park  Subject: Appointment                                      Me or FLAVIO in 3 months.  -Amirah Le DO

## 2022-09-01 NOTE — NURSE NAVIGATOR
ARRHYTHMIA EDUCATION     Met with patient to review information relating to tachy-kyle syndrome, afib  & PPM Insertion    Discussed the following topics: The Heart's Electrical System, tachy-kyle, PPM, Post Operative Care of PPM,  Arm Restrictions,  & PPM DC Instructions     Handouts given on above topics given & questions answered. Patient verbalized understanding as evidenced by \"teach back\". Time spent with patient: 20 minutes.

## 2022-09-01 NOTE — PLAN OF CARE
Problem: Chronic Conditions and Co-morbidities  Goal: Patient's chronic conditions and co-morbidity symptoms are monitored and maintained or improved  Outcome: Progressing  Flowsheets (Taken 8/31/2022 2130)  Care Plan - Patient's Chronic Conditions and Co-Morbidity Symptoms are Monitored and Maintained or Improved: Monitor and assess patient's chronic conditions and comorbid symptoms for stability, deterioration, or improvement     Problem: Discharge Planning  Goal: Discharge to home or other facility with appropriate resources  Outcome: Progressing  Flowsheets (Taken 8/31/2022 2130)  Discharge to home or other facility with appropriate resources: Identify barriers to discharge with patient and caregiver

## 2022-09-01 NOTE — PLAN OF CARE
Patient's chart updated to reflect:      . - HF care plan, HF education points and HF discharge instructions.  -Orders: 2 gram sodium diet, daily weights, I/O.  -PCP and/or Cardiologist appointment to be scheduled within 7 days of hospital discharge.  -History of HF, not primary admission Dx.   Patient admitted for treatment of elective procedure [employer] dual chamber pacemaker implant   James Can RN RN, BSN  Heart Failure Navigator

## 2022-09-07 ENCOUNTER — TELEPHONE (OUTPATIENT)
Dept: CARDIAC CATH/INVASIVE PROCEDURES | Age: 77
End: 2022-09-07

## 2022-09-07 NOTE — TELEPHONE ENCOUNTER
I called Geovany Hyatt to see how she's doing since her PPM insertion on 8/30/22. She states she removed the aquacel dressing this morning without difficulty and the skin glue is intact. She denies any fevers, redness, bleeding, drainage, or swelling from PPM incision site. I reminded her to continue to wear her sling at night for 4 weeks; along with not abducting the L arm above the shoulder. She's also aware of her follow up appt at the [address] [place] [address] Drive on 9/14/22 at 1:30 pm.  She offers no complaints & is thankful for the phone call.

## 2022-09-14 ENCOUNTER — NURSE ONLY (OUTPATIENT)
Dept: NON INVASIVE DIAGNOSTICS | Age: 77
End: 2022-09-14

## 2022-09-14 NOTE — PROGRESS NOTES
Wound Check  1  Device pocket examined  2 week post implant wound check and Device education completed  Wound check was within normal limits (see chart note)  Parameters were reviewed  No changes made  Additional Notes:  Surgical glue intact. Incision free of redness, drainage, and swelling. Post implant wound care and arm restrictions discussed. Due to [address] shipping delays patient does not have a home Latitude monitor. Instructed patient to contact the office if she develops any symptoms of palpitations or lightheadedness. We can bring her into the office if needed. She voiced understanding of all the above. Plan: 3 month office appointment with Dr. Adiel Gray.     Woodrow Cosby RN, BSN  [facility]

## 2022-09-14 NOTE — PATIENT INSTRUCTIONS
Continue arm restrictions until 9/29/22  You may shower starting today      Call if any signs or symptoms of infection [phone] ext: [phone]  Fevers, chills, redness, swelling or drainage.        Hook up home  [facility] [employer] Latitude support (home monitoring) [phone]    If you have any symptoms of dizziness or palpitations until you get your home pacemaker monitor, please call the office at [phone], ext [phone]

## 2022-09-19 NOTE — ED PROVIDER NOTES
ED  Provider Note  Admit Date/RoomTime: 4/14/2022 10:29 PM  ED Room: [facility]5/0405-A      History of Present Illness:  4/14/22, Time: 10:30 PM EDT  Chief Complaint   Patient presents with    Atrial Fibrillation     140-160s, hx afib, on eliquis and tikosyn    Shortness of Breath     increased from baseline shortness of breath, 99% 3L home oxygen          Gabino Nair is a 64 y.o. female presenting to the ED for one day of SOB increased today gradual onset, moderate, on baseline O2 3L, HR increased today  Onset: gradual   Timing: persistent    Duration: one day   Associated symptoms: dizziness for one week, given meclizine, moderate relief, no CP. No arm or jaw pain. No increased back pain which is chronic and unchanged, no c/c/r    Severity: moderate    Exacerbated by: none   Relieved by: none       Review of Systems:   A complete review of systems was performed and pertinent positives and negatives are stated within HPI, all other systems reviewed and are negative.        --------------------------------------------- PATIENT HISTORY--------------------------------------------  Past Medical History:  has a past medical history of Arthritis, Atrial fibrillation ([address] Bilateral carotid artery stenosis, Bronchitis, Carotid stenosis, bilateral, Hyperlipidemia, Hypertension, Left carotid stenosis, O2 dependent, On continuous oral anticoagulation, S/P carotid endarterectomy, Stomach ulcer, and Thyroid disease. Past Surgical History:  has a past surgical history that includes Hysterectomy; Cholecystectomy; Appendectomy; back surgery; Colonoscopy; joint replacement (2011); eye surgery; and Endoscopy, colon, diagnostic (01/10/2018). Family History: family history includes Cancer in her sister; Other in her mother. . Unless otherwise noted, family history is non contributory    Social History:  reports that she quit smoking about 3 years ago. Her smoking use included cigarettes. She has a 30.00 pack-year smoking history. She has never used smokeless tobacco. She reports previous alcohol use. She reports that she does not use drugs. The patients home medications have been reviewed. Allergies: Patient has no known allergies. I have reviewed the past medical history, past surgical history, social history, and family history    ---------------------------------------------------PHYSICAL EXAM--------------------------------------    Constitutional/General: Alert and oriented x3  Head: Normocephalic and atraumatic  Eyes: PERRL, EOMI, sclera non icteric  ENT: Oropharynx clear, handling secretions, no trismus  Neck: Supple, full ROM, no stridor, no meningismus  Respiratory: lctab, incr WOB   Cardiovascular: irregularly irregular, no R/G/M, 2+ peripheral pulses  Chest: No chest wall tenderness, equal chest rise  Gastrointestinal:  sntnd  Musculoskeletal: Extremities warm and well perfused, moving all extremities  Skin: skin warm and dry. No rashes. Neurologic: No focal deficits, strength and sensation grossly intact   Psychiatric: Normal Affect, behavior normal      ED Course as of 04/15/22 1919   Thu Apr 14, 2022   5587 Patient reevaluated. She states that she is feeling much more comfortable. She looks like she is breathing a lot easier. Complaining of headache. We will try Tylenol for the patient and reassess. [PP]   Fri Apr 15, 2022   0019 Spoke with Dr. Charlene Wray. He believes that the patient should be admitted to the hospital.  Discussed that she is on Tikosyn and so he suggested the patient take her nighttime dose of Tikosyn if she has not had a yet. He will see her when she is an inpatient and adjust doses as needed.  [PP]   3624 Spoke with Jose Luis Lua who accepted the patient for admission [PP]      ED Course User Index  [PP] Steffi Chavez DO       -------------------------------------------------- RESULTS -------------------------------------------------  I have personally reviewed all laboratory and imaging results for this patient. Results are listed below.      LABS: (Lab results interpreted by me)  Results for orders placed or performed during the hospital encounter of 04/14/22   CBC with Auto Differential   Result Value Ref Range    WBC 7.2 4.5 - 11.5 E9/L    RBC 3.58 3.50 - 5.50 E12/L    Hemoglobin 11.1 (L) 11.5 - 15.5 g/dL    Hematocrit 35.7 34.0 - 48.0 %    MCV 99.7 80.0 - 99.9 fL    MCH 31.0 26.0 - 35.0 pg    MCHC 31.1 (L) 32.0 - 34.5 %    RDW 12.7 11.5 - 15.0 fL    Platelets 366 929 - 990 E9/L    MPV 10.9 7.0 - 12.0 fL    Neutrophils % 58.2 43.0 - 80.0 %    Immature Granulocytes % 0.4 0.0 - 5.0 %    Lymphocytes % 26.5 20.0 - 42.0 %    Monocytes % 11.0 2.0 - 12.0 %    Eosinophils % 2.9 0.0 - 6.0 %    Basophils % 1.0 0.0 - 2.0 %    Neutrophils Absolute 4.16 1.80 - 7.30 E9/L    Immature Granulocytes # 0.03 E9/L    Lymphocytes Absolute 1.90 1.50 - 4.00 E9/L    Monocytes Absolute 0.79 0.10 - 0.95 E9/L    Eosinophils Absolute 0.21 0.05 - 0.50 E9/L    Basophils Absolute 0.07 0.00 - 0.20 V6/C   Basic Metabolic Panel w/ Reflex to MG   Result Value Ref Range    Sodium 138 132 - 146 mmol/L    Potassium reflex Magnesium 5.9 (H) 3.5 - 5.0 mmol/L    Chloride 101 98 - 107 mmol/L    CO2 28 22 - 29 mmol/L    Anion Gap 9 7 - 16 mmol/L    Glucose 99 74 - 99 mg/dL    BUN 24 (H) 6 - 23 mg/dL    CREATININE 1.0 0.5 - 1.0 mg/dL    GFR Non-African American 54 >=60 mL/min/1.73    GFR African American >60     Calcium 9.4 8.6 - 10.2 mg/dL   Troponin   Result Value Ref Range    Troponin, High Sensitivity 16 (H) 0 - 9 ng/L   TSH   Result Value Ref Range    TSH 2.430 0.270 - 4.200 uIU/mL   Magnesium   Result Value Ref Range    Magnesium 1.9 1.6 - 2.6 mg/dL   Troponin   Result Value Ref Range    Troponin, High Sensitivity 15 (H) 0 - 9 ng/L   D-Dimer, Quantitative   Result Value Ref Range    D-Dimer, Quant <200 ng/mL DDU   Comprehensive Metabolic Panel w/ Reflex to MG   Result Value Ref Range    Sodium 139 132 - 146 mmol/L    Potassium reflex Magnesium tachycardia/bradycardia and/or changes in rhythm.     ------------------------- NURSING NOTES AND VITALS REVIEWED ---------------------------  The nursing notes within the ED encounter and vital signs as below have been reviewed by myself  BP (!) 107/53   Pulse 59   Temp 97.8 °F (36.6 °C) (Oral)   Resp 18   Ht 5' 6\" (1.676 m)   Wt 250 lb 6 oz (113.6 kg)   SpO2 100%   BMI 40.41 kg/m²      The patients available past medical records and past encounters were reviewed.         ------------------------------ ED COURSE/MEDICAL DECISION MAKING----------------------  Medications   amLODIPine (NORVASC) tablet 2.5 mg (2.5 mg Oral Given 4/15/22 0823)   apixaban (ELIQUIS) tablet 5 mg (5 mg Oral Given 4/15/22 0823)   Arformoterol Tartrate (BROVANA) nebulizer solution 15 mcg (15 mcg Nebulization Given 4/15/22 0825)   atorvastatin (LIPITOR) tablet 20 mg (has no administration in time range)   vitamin D (CHOLECALCIFEROL) tablet 2,000 Units (2,000 Units Oral Given 4/15/22 0823)   dofetilide (TIKOSYN) capsule 250 mcg (250 mcg Oral Given 4/15/22 1117)   iron polysaccharides (NIFEREX) capsule 150 mg (150 mg Oral Given 4/15/22 1611)   ipratropium (ATROVENT) 0.02 % nebulizer solution 0.5 mg (0.5 mg Nebulization Given 4/15/22 1616)   levothyroxine (SYNTHROID) tablet 150 mcg (150 mcg Oral Given 4/15/22 0601)   therapeutic multivitamin-minerals 1 tablet (1 tablet Oral Given 4/15/22 0823)   pantoprazole (PROTONIX) tablet 40 mg (40 mg Oral Given 4/15/22 0823)   sucralfate (CARAFATE) tablet 1 g (1 g Oral Given 4/15/22 0823)   valsartan (DIOVAN) tablet 160 mg (160 mg Oral Given 4/15/22 0823)   venlafaxine (EFFEXOR XR) extended release capsule 150 mg (150 mg Oral Given 4/15/22 0823)   sodium chloride flush 0.9 % injection 10 mL (10 mLs IntraVENous Given 4/15/22 1117)   sodium chloride flush 0.9 % injection 10 mL (has no administration in time range)   0.9 % sodium chloride infusion (has no administration in time range)   potassium chloride (KLOR-CON M) extended release tablet 40 mEq (has no administration in time range)     Or   potassium bicarb-citric acid (EFFER-K) effervescent tablet 40 mEq (has no administration in time range)     Or   potassium chloride 10 mEq/100 mL IVPB (Peripheral Line) (has no administration in time range)   senna (SENOKOT) tablet 8.6 mg (has no administration in time range)   acetaminophen (TYLENOL) tablet 650 mg (650 mg Oral Given 4/15/22 1611)     Or   acetaminophen (TYLENOL) suppository 650 mg ( Rectal See Alternative 4/15/22 1611)   nadolol (CORGARD) tablet 10 mg (10 mg Oral Not Given 4/15/22 1506)   0.9 % sodium chloride bolus (0 mLs IntraVENous Stopped 4/15/22 0038)   acetaminophen (TYLENOL) tablet 1,000 mg (1,000 mg Oral Given 4/15/22 0041)   dofetilide (TIKOSYN) capsule 250 mcg (250 mcg Oral Given 4/15/22 0041)   nadolol (CORGARD) tablet 10 mg (10 mg Oral Given 4/15/22 0655)       IDr. Wilmar, am the primary provider of record    Medical Decision Making:   Afib RVR on initial EKG. HR improved on arrival to room, now 90s. Will repeat EKG. Fluids. Workup cardio-pulm. TSH. No ETOH use. Anticoagulated. WOB initially increased, now back in Afib RVR at 120s, WOB resolved   ED Course as of 04/15/22 1919   Thu Apr 14, 2022   6428 Patient reevaluated. She states that she is feeling much more comfortable. She looks like she is breathing a lot easier. Complaining of headache. We will try Tylenol for the patient and reassess. [PP]   Fri Apr 15, 2022   0019 Spoke with Dr. Thang Wolff. He believes that the patient should be admitted to the hospital.  Discussed that she is on Tikosyn and so he suggested the patient take her nighttime dose of Tikosyn if she has not had a yet. He will see her when she is an inpatient and adjust doses as needed. [PP]   3400 Spoke with Nichelle Humphrey who accepted the patient for admission [PP]      ED Course User Index  [PP] Estonian Crigler, DO           ED Counseling:     This emergency provider has spoken with the patient and any family present to discuss clinical status, results, plan of care, diagnosis and prognosis as able to be determined at this time. Any questions were answered and patient and/or family/POA are agreeable with the plan.       --------------------------------- IMPRESSION AND DISPOSITION ---------------------------------    IMPRESSION  1. Paroxysmal atrial fibrillation (HCC)        DISPOSITION  Disposition: Admit to telemetry  Patient condition is good      This report was transcribed using voice recognition software. Every effort was made to ensure accuracy; however, transcription errors may be present.          Zaida Mccallum MD  04/15/22 1919 Other

## 2022-09-28 ENCOUNTER — OFFICE VISIT (OUTPATIENT)
Dept: ORTHOPEDIC SURGERY | Age: 77
End: 2022-09-28
Payer: MEDICARE

## 2022-09-28 VITALS — BODY MASS INDEX: 40.49 KG/M2 | HEIGHT: 67 IN | WEIGHT: 258 LBS

## 2022-09-28 DIAGNOSIS — M17.12 PRIMARY OSTEOARTHRITIS OF LEFT KNEE: ICD-10-CM

## 2022-09-28 DIAGNOSIS — M25.561 PAIN IN BOTH KNEES, UNSPECIFIED CHRONICITY: Primary | ICD-10-CM

## 2022-09-28 DIAGNOSIS — M25.562 PAIN IN BOTH KNEES, UNSPECIFIED CHRONICITY: Primary | ICD-10-CM

## 2022-09-28 PROCEDURE — 1123F ACP DISCUSS/DSCN MKR DOCD: CPT | Performed by: STUDENT IN AN ORGANIZED HEALTH CARE EDUCATION/TRAINING PROGRAM

## 2022-09-28 PROCEDURE — 99205 OFFICE O/P NEW HI 60 MIN: CPT | Performed by: STUDENT IN AN ORGANIZED HEALTH CARE EDUCATION/TRAINING PROGRAM

## 2022-09-28 RX ORDER — AMLODIPINE BESYLATE 2.5 MG/1
2.5 TABLET ORAL DAILY
COMMUNITY
Start: 2022-09-08 | End: 2022-10-07

## 2022-09-28 NOTE — LETTER
Pedro Yusuf M.D.       [address]    Surgery Date:                          10/17/2022                                        Regarding Prescription & Non-Prescription Medications:    Medication Name to Discontinue:   Days before surgery  Last dose  Eliquis  D/C per Cardiologist                                                                                                                                                                                                                                                                                                                                                                                                                                                                                                                                                                                                                                                                                                                                                                                                                                                                                                                                                                                                                                                                                                                                                                                                                                                                                                                                             ** All medications discontinued prior to surgery may be resumed after your surgery is completed, unless otherwise specified by the physician.     If you have any questions or concerns, please contact our nurse at [phone], Option 2:  Monday through Thursday 9:00 am - 4:00 pm  Friday 10:00 am - 12 noon

## 2022-09-28 NOTE — PROGRESS NOTES
New Knee Patient     Referring Provider:   No referring provider defined for this encounter. CHIEF COMPLAINT:   Chief Complaint   Patient presents with    Knee Pain     Continue to have left knee pain. Was given injection 8/23/2022 with no relief of pain. Here to discuss further treatment. Hx Rt TKA 2011. Had difficulty with PTx after Rt TKA. HPI:    Mickie Shaw is a 64y.o. year old female with left knee pain. She has had multiple injections in his knee most recently in August.  She is getting no relief. She is ambulate with a cane. Her knee hurts her every day. She did well after right total knee. She does have many significant medical comorbidities. She is interested in talking with surgical treatment. She has failed all nonoperative options at this point.     PAST MEDICAL HISTORY  Past Medical History:   Diagnosis Date    Arthritis     Atrial fibrillation (HCC)     Tikosyn per Dr. Livia Mahoney    Bilateral carotid artery stenosis 07/16/2020    Bronchitis     Carotid stenosis, bilateral 05/21/2012    Hyperlipidemia     Hypertension     Left carotid stenosis 04/26/2018    O2 dependent 07/16/2020    On continuous oral anticoagulation     Eliquis    S/P carotid endarterectomy 10/06/2016    Stomach ulcer     Thyroid disease        PAST SURGICAL HISTORY  Past Surgical History:   Procedure Laterality Date    APPENDECTOMY      BACK SURGERY      CHOLECYSTECTOMY      COLONOSCOPY      ENDOSCOPY, COLON, DIAGNOSTIC  01/10/2018    upper endo    EYE SURGERY      BILATERAL CATARACT    HYSTERECTOMY (CERVIX STATUS UNKNOWN)      JOINT REPLACEMENT  2011    RIGHT KNEE    PACEMAKER INSERTION Left 08/31/2022    Successful [employer] dual chamber pacemaker (Sesar)         FAMILY HISTORY   Family History   Problem Relation Age of Onset    Other Mother         rheumatic fever    Cancer Sister         lung       SOCIAL HISTORY  Social History     Socioeconomic History    Marital status:      Spouse name: Not on file    Number of children: Not on file    Years of education: Not on file    Highest education level: Not on file   Occupational History    Not on file   Tobacco Use    Smoking status: Former     Packs/day: 1.00     Years: 30.00     Pack years: 30.00     Types: Cigarettes     Quit date: 2018     Years since quittin.2    Smokeless tobacco: Never   Vaping Use    Vaping Use: Never used   Substance and Sexual Activity    Alcohol use: Not Currently    Drug use: No    Sexual activity: Not on file   Other Topics Concern    Not on file   Social History Narrative    Not on file     Social Determinants of Health     Financial Resource Strain: Not on file   Food Insecurity: Not on file   Transportation Needs: Not on file   Physical Activity: Not on file   Stress: Not on file   Social Connections: Not on file   Intimate Partner Violence: Not on file   Housing Stability: Not on file     Social History     Occupational History    Not on file   Tobacco Use    Smoking status: Former     Packs/day: 1.00     Years: 30.00     Pack years: 30.00     Types: Cigarettes     Quit date: 2018     Years since quittin.2    Smokeless tobacco: Never   Vaping Use    Vaping Use: Never used   Substance and Sexual Activity    Alcohol use: Not Currently    Drug use: No    Sexual activity: Not on file       CURRENT MEDICATIONS     Current Outpatient Medications:     amLODIPine (NORVASC) 2.5 MG tablet, Take 2.5 mg by mouth daily, Disp: , Rfl:     metoprolol tartrate (LOPRESSOR) 25 MG tablet, Take 1 tablet by mouth 2 times daily, Disp: 180 tablet, Rfl: 1    Cholecalciferol (VITAMIN D) 50 MCG (2000 UT) CAPS capsule, Take by mouth, Disp: , Rfl:     OXYGEN, Inhale 2 L/min into the lungs continuous, Disp: , Rfl:     dofetilide (TIKOSYN) 500 MCG capsule, Take 1 capsule by mouth every 12 hours, Disp: 60 capsule, Rfl: 3    levothyroxine (SYNTHROID) 150 MCG tablet, TAKE 1 TABLET BY MOUTH ONCE DAILY, Disp: , Rfl:     ANORO ELLIPTA 62.5-25 MCG/INH AEPB inhaler, INHALE 1 PUFF BY MOUTH ONCE DAILY, Disp: , Rfl:     apixaban (ELIQUIS) 5 MG TABS tablet, Take 1 tablet by mouth 2 times daily, Disp: 60 tablet, Rfl: 2    valsartan (DIOVAN) 160 MG tablet, Take 1 tablet by mouth daily, Disp: 30 tablet, Rfl: 3    venlafaxine (EFFEXOR XR) 150 MG extended release capsule, Take 300 mg by mouth daily, Disp: , Rfl:     atorvastatin (LIPITOR) 20 MG tablet, Take 20 mg by mouth daily, Disp: , Rfl:     ALLERGIES  No Known Allergies    Controlled Substances Monitoring:          REVIEW OF SYSTEMS:     Constitutional:  Negative for weight loss, fevers, chills, fatigue  Cardiovascular: Negative for chest pain, palpitations  Pulmonary: Negative for shortness of breath, labored breathing, cough  GI: negative for abdominal pain, nausea, vomitting   MSK: per HPI  Skin: negative for rash, open wounds    All other systems reviewed and are negative         PHYSICAL EXAM     Vitals:    09/28/22 1353   Weight: 258 lb (117 kg)   Height: 5' 7\" (1.702 m)       Height: 5' 7\" (1.702 m)  Weight: [unfilled]  BMI:  Body mass index is 40.41 kg/m². General: The patient is alert and oriented x 3, appears to be stated age and in no distress. HEENT: head is normocephalic, atraumatic. EOMI. Neck: supple, trachea midline, no thyromegaly   Cardiovascular: peripheral pulses palpable. Normal Capillary refill   Respiratory: breathing unlabored, chest expansion symmetric   Skin: no rash, no open wounds, no erythema  Psych: normal affect; mood stable  Neurologic: gait normal, sensation grossly intact in extremities  MSK:        Lower Extremity:   Ipsilateral hip exam shows normal range of motion without pain with impingement testing. Left knee: Atraumatic without any skin lesions. Extremely tender to palpation around the medial joint line and patellofemoral joint. There is significant crepitus with range of motion. Knee flexion and extension is 0 to 130 degrees.   Knee is stable to varus and valgus stress testing at 0 and 30 degrees. Negative Lachman. Negative posterior drawer. Calf is soft and compressible without any tenderness           IMAGING:    XR: 4 views of the left bilateral knee show significant tricompartmental osteoarthritis of the left knee with bone-on-bone changes, joint space narrowing, subchondral sclerosis, and osteophyte formation        ASSESSMENT  Left knee knee osteoarthritis end-stage    PLAN  -Treatment of this left knee was discussed in detail with the patient today. She has failed multiple rounds of injections, therapy, and anti-inflammatories. At this point she is fed up with her knee and is wishing to pursue surgical treatment. She meets criteria for total joint arthroplasty. However she has multiple medical comorbidities. We discussed these in detail. She states that her specialty physicians have told her that she would be safe for surgery. We are going to request medical clearance from her cardiologist and family physician. We will schedule her for preanesthesia testing. She understands she is at high risk for surgery due to her medical problems however she wishes to proceed with knee replacement.   Risk benefits and alternatives were discussed in detail with the patient and she wishes to proceed  -Schedule left total knee at her [address], DO  Orthopaedic Surgery   9/28/22   2:27 PM EDT

## 2022-09-28 NOTE — LETTER
Severo Ip, D.O. Orthopaedic Surgeons  [facility]  [address], [address] [facility], [address] [facility]  Phone:  [phone]    Fax:   [phone]    Jorge Mtz  Type of Procedure: Left Knee Total Joint Arthroplasty  Place of Surgery: [address] [place] [address]  Date of Surgery: Tentative date 10/17/2022  Time of Surgery: 7:30 a.m. PREOPERATIVE INSTRUCTIONS FOR TOTAL JOINT REPLACEMENT  1. Read all the information provided for you in this packet and joint replacement folder; retain it for 2 years following surgery. 2.  Follow Dietary Handout: Patient Education Guide: Iron Replacement   Begin eating foods rich in iron one month before your surgery and continue for one month after surgery. 3.  Optional:  Not required by Dr. Annie Marie. You may choose an over the counter iron supplement and start taking it at least one month prior to surgery and continue taking the iron supplement for one month following surgery. Feel free to contact your primary care physician for his / her approval or for a prescription. 4.  A nurse from [facility] will contact you and inform you of a date and time for your Pre-Admission Testing Day. You may contact them at [phone] [facility]) or [phone] [facility] for any special requests. 5. Inform your family doctor as soon as your surgery date has been scheduled. See your doctor before surgery and obtain a letter of medical clearance for our office. Notify any specialists you are seeing and obtain a letter of clearance. You must see your pulmonary specialist prior to surgery if you have a condition called Sleep Apnea or use a CPAP machine at night. Please inform us if you have had problems with anesthesia in the past; especially with intubation. 6.  If you do not see a dentist regularly, see your dentist prior to surgery for a dental checkup and cleaning. Please have all dental procedures completed prior to surgery.   Your teeth and ready for pickup at the doctor's office.     Any questions, problems or concerns regarding your surgery should be addressed to our nurse by calling the office Monday through Thursday 10:00 am - 3:00 pm and Friday 10:00 am - 2:00 pm.

## 2022-09-30 ENCOUNTER — TELEPHONE (OUTPATIENT)
Dept: ORTHOPEDIC SURGERY | Age: 77
End: 2022-09-30

## 2022-09-30 ENCOUNTER — TELEPHONE (OUTPATIENT)
Dept: NON INVASIVE DIAGNOSTICS | Age: 77
End: 2022-09-30

## 2022-09-30 NOTE — TELEPHONE ENCOUNTER
9/30/2022 9:40 am Fax message to Betty Arechiga [phone]: Notification of plans for surgery - Left Knee TJA tentatively scheduled for October 17 th (pending clearances). Request medical clearance for surgery. Holding the following medications for surgery: Eliquis 3 days pre op. Last dose October 13.

## 2022-09-30 NOTE — TELEPHONE ENCOUNTER
9/30/2022 9:42 am Fax message to Dr. Lopes [facility] [phone]: Notification of plans for surgery - Left Knee TJA October 17 th. Request cardiac clearance for surgery. Holding the following medications for surgery: Eliquis 3 days pre op. October 13 th last dose.

## 2022-10-04 ENCOUNTER — TELEPHONE (OUTPATIENT)
Dept: ORTHOPEDIC SURGERY | Age: 77
End: 2022-10-04

## 2022-10-04 NOTE — TELEPHONE ENCOUNTER
10/04/2022 Late Entry    9/30/2022 3:24 pm Received a fax message from [facility] dated 9/14/2022 and signed by Lin Merlos MD on 9/16, 2022:   - Interrogation of Device  - Battery has 14.5 years left    Above scanned to media

## 2022-10-07 ENCOUNTER — HOSPITAL ENCOUNTER (OUTPATIENT)
Dept: GENERAL RADIOLOGY | Age: 77
Discharge: HOME | End: 2022-10-09
Payer: MEDICARE

## 2022-10-07 ENCOUNTER — HOSPITAL ENCOUNTER (OUTPATIENT)
Age: 77
Discharge: HOME | End: 2022-10-09
Payer: MEDICARE

## 2022-10-07 ENCOUNTER — TELEPHONE (OUTPATIENT)
Dept: NON INVASIVE DIAGNOSTICS | Age: 77
End: 2022-10-07

## 2022-10-07 ENCOUNTER — NURSE ONLY (OUTPATIENT)
Dept: NON INVASIVE DIAGNOSTICS | Age: 77
End: 2022-10-07

## 2022-10-07 DIAGNOSIS — Z01.811 PRE-OP CHEST EXAM: ICD-10-CM

## 2022-10-07 PROCEDURE — 71046 X-RAY EXAM CHEST 2 VIEWS: CPT

## 2022-10-07 RX ORDER — METOPROLOL TARTRATE 50 MG/1
50 TABLET, FILM COATED ORAL 2 TIMES DAILY
Qty: 180 TABLET | Refills: 1 | Status: SHIPPED | OUTPATIENT
Start: 2022-10-07 | End: 2022-11-01 | Stop reason: SDUPTHER

## 2022-10-07 NOTE — TELEPHONE ENCOUNTER
Patient returned my call. She will come into the office today for a pacemaker check per request of GIO Disla.     Glenn Davalos RN, BSN  [facility]

## 2022-10-07 NOTE — TELEPHONE ENCOUNTER
In response to a message from GIO Palomino regarding evaluating pacemaker and rapid heart rates. I called the patient to offer an appointment today to assess her pacemaker. I left a message for her to call the office.     Marcelina Shahid RN, BSN  [facility]

## 2022-10-07 NOTE — TELEPHONE ENCOUNTER
Increase lopressor to 50 mg in am and 25 mg in pm  Stop norvasc. Monitor BPs at home. 10/17/2022   Update- patient has had less episodes of elevated heart rates. Feels overall good with her heart rates, having knee surgery next month.        Adelaida Mendoza, APRN - CNP

## 2022-10-14 ENCOUNTER — TELEPHONE (OUTPATIENT)
Dept: ORTHOPEDIC SURGERY | Age: 77
End: 2022-10-14

## 2022-10-14 NOTE — LETTER
Melita Osler, M.D.   [address] Adam Vazquez M.D. [facility], [address] [place] [address] - [address]     November 21, 2022  Surgery Date:                                                                 Regarding Prescription & Non-Prescription Medications:    Medication Name to Discontinue:   Days before surgery  Last dose  Rolaqius        3   11/17/2022                                                                                                                                                                                                                                                                                                                                                                                                                                                                                                                                                                                                                                                                                                                                                                                                                                                                                                                                                                                                                                                                                                                                                                                                                                                                                                                                             ** All medications discontinued prior to surgery may be resumed after your surgery is completed, unless otherwise specified by the physician.     If you have any questions or concerns, please contact our nurse at [phone], Option 2:  Monday through Thursday 9:00 am - 4:00 pm  Friday 10:00 am - 12 noon    Td Trivedi RN, BSN, ONC

## 2022-10-14 NOTE — TELEPHONE ENCOUNTER
10/13/2022 Received a fax w/ a message on cover sheet originally sent to Dr. Green Sheridan office on9/30/22 @ 9:42 am which reads: Needs ASAP faxed surgery is Monday.   Transmission includes:  - A letter dated October 13, 2022 and signed by Arnia Jara D.O.  - An EKG strip dated 10/13/2022 and signed by Dr. Arina Jara     Above scanned to media

## 2022-10-14 NOTE — TELEPHONE ENCOUNTER
10/14/2022 9:21 am Phoned [phone] / Raina Wang w/ and informed the patient: Received a letter from Dr. Jeramie Sagastume, cardiologist. Dates for surgery offered to the patient. Patient chose November 21 st for TLK. Patient expressed disappointment for she was under the impression surgery would be done this Monday, October 17 th. Informed the patient: Surgery will be scheduled once clearances ontained. Patient reports she also saw Kassidy Perez NP last week and was cleared for surgery. Note: Numerous calls made to [facility] office [phone], unsuccessful, the phone rang busy w/ each call. Surgery: LEFT KNEE TOTAL JOINT ARTHROPLASTY - TRADITIONAL  Vendor: [employer]  Anesthesia: General vs Spinal  Regional Block: Adductor Nerve Block  Date: Monday, November 21, 2022  Time: 9:00 am  Place: [facility]   Admission Status: 23 - Hour  Surgeon: Mitchell Alfredo    Medications reviewed with the patient  Holding the following medications: Eliquis 3 days pre op, take last dose on 11/11/17/2022 - Approved by Dr. Jeramie Sagastume  DDS: Patient does not have regular visits d/t she has dentures, upper and lower    Joint Folder for Total Knee Replacement given to and reviewed with the patient at her last office visit on 9/28/22  [address] which includes the following handouts: Dietary / Nutrition, Information on The Aquacel Dressing, Scar Massage, ADA Recommendations for Pre Medication after Joint Replacement Surgery given to and reviewed with the patient  Patient educated on the importance of attending Mandatory Joint Class  Patient informed: A nurse from the hospital will contact her with a date and time for Pre-Admission Testing and Joint Class    : Spouse  DME Needs: None    Post Op Appointment: Date:  Wednesday, November 30 th Time:  11:00 am    Letter mailed to the patient which includes:  - Pre Op Instructions from [address]  - Instructions to Hold Medications  - Appointment card

## 2022-11-08 ENCOUNTER — TELEPHONE (OUTPATIENT)
Dept: ORTHOPEDIC SURGERY | Age: 77
End: 2022-11-08

## 2022-11-08 NOTE — TELEPHONE ENCOUNTER
11/08/2022 Fax message to [facility] [phone]: Notification of plans for surgery - TLK 11/21/2022. Request medical clearance for surgery. Copy of Dr. Marion Buerger Hoffman's cardiac clearance sent for review including EKG.  Holding the following medications for surgery: Eliquis 3 days pre op    Confirmation of Transmission  Result - Success

## 2022-11-08 NOTE — TELEPHONE ENCOUNTER
11/08/2022 9:09 am Phoned [phone] / Terri Stafford w/ patient who states she saw Arjun Arriaga mid October for medical clearance for surgery. Patient is also aware she is to stop her Eliquis 3 days pre op. Take last dose on November 17 th then stop.

## 2022-11-09 ENCOUNTER — TELEPHONE (OUTPATIENT)
Dept: ORTHOPEDIC SURGERY | Age: 77
End: 2022-11-09

## 2022-11-09 NOTE — TELEPHONE ENCOUNTER
11/09/2022 Late Entry    11/08/2022 1:20 pm Received a fax from LITA CULLEN [facility] [phone]: A note dated 11/08/2022 and singed by Rea Calabrese which states, Pt is medically cleared for sx.     Above scanned to media

## 2022-11-14 ENCOUNTER — TELEPHONE (OUTPATIENT)
Dept: ORTHOPEDIC SURGERY | Age: 77
End: 2022-11-14

## 2022-11-14 NOTE — TELEPHONE ENCOUNTER
Spoke with Sherry Montoya @ [facility] for Moris Ybarra Re: Teresita Angel for LT TKA 11/21/2022 OPT 23 hr. Call Ref # [id number]    Case Sent to Review as [facility] Pre-[facility] Dept still does not have Dr Arnaud Aldridge as in-network provider. He was on boarded with [facility] ([facility]) on 8/29/2022. Case will be expedited due to surgery date of 11/21/2022. [facility] will contact their team at [address] [place] [address] to confirm that Dr Arnaud Aldridge is a Network Provider.

## 2022-11-16 ENCOUNTER — TELEPHONE (OUTPATIENT)
Dept: ORTHOPEDIC SURGERY | Age: 77
End: 2022-11-16

## 2022-11-16 ENCOUNTER — HOSPITAL ENCOUNTER (OUTPATIENT)
Dept: PREADMISSION TESTING | Age: 77
Discharge: HOME | End: 2022-11-16

## 2022-11-16 DIAGNOSIS — M17.12 OSTEOARTHRITIS OF LEFT KNEE, UNSPECIFIED OSTEOARTHRITIS TYPE: ICD-10-CM

## 2022-11-16 NOTE — TELEPHONE ENCOUNTER
11/16/2022 11:08 am SARAH BETH LOPEZ [facility] [phone] phoned the office to report: Patient was seen in PAT today. Patient uses O2, has a portable oxygen tank. Patient is SOB especially upon exertion. Dr. Cindy Gonzalez, Anesthesiologist evaluated the patient. Per Dr. Cindy Gonzalez: Patient needs Pulmonary Clearance for Surgery. Patient reports she does not have a pulmonary doctor but GIO Lindo wanted her to see one before surgery. Patient did not see a pulmonologist.    Dr. Yaneli Tolentino please advise    Discussed above w/ Dr. Yaneli Tolentino  Per Dr. Toshia Zamora: Janette Blackgum surgery for November 21 st 11/16/2022 11:40 am I was summoned to the front office by ALESHA Villagomez who reports: The patient is here in the office and wants to speak w/ you. Informed the patient: Per anesthesia dept. , they are requesting you be cleared for surgery by a lung doctor. Your surgery scheduled for Monday will be cancelled Surgery will be reschedule at a later date once Dr. Toshia Zamora receives a letter of pulmonary clearance.  Instructions to the patient: Contact Tania Kumar for a referral to a Pulmonologist.    11/16/2022 12:05 pm Phoned Mayuri Cabral office [phone] / Message left on voice mail: Informed of above

## 2022-11-16 NOTE — PROGRESS NOTES
Maddy, at Dr. Cm Swann office, notified that pt needs pulmonary clearance per Dr. Stephani Lala, anesthesiologist.

## 2022-11-18 NOTE — TELEPHONE ENCOUNTER
Spoke with Conner Espinosa @ [facility]. Surgery Lt TKA 11/21/2022 ([id number]) [facility] OPT 23 hr has been approved. Auth# [id number]  Valid:  11/21/2022 - 2/18/2023    Surgery date 11/21/2022 was cancelled due to need for Pulmonary Clearance.  Patient was informed of above by Maddy LU

## 2022-12-06 NOTE — PROGRESS NOTES
[facility] CARDIOLOGY  CARDIAC ELECTROPHYSIOLOGY DEPARTMENT/DIVISION OF CARDIOLOGY  Outpatient Progress Report  PATIENT: Claribel Gonsalves  MEDICAL RECORD NUMBER: [id number]  DATE OF SERVICE:  2022  ATTENDING ELECTROPHYSIOLOGIST:  Nuno Bowen D.O.  REFERRING PHYSICIAN: No ref. provider found and GIO Pepper - CNP  CHIEF COMPLAINT: PPM insitu    HPI:  Claribel Gonsalves is a 76 y.o. female with a history of nonvalvular persistent AF, SND sp BSCI dc PPM (DOI: 22- Dr Jaquan Kitchen), HTN, COPD on nasal cannula O2, and obesity. She is managed by Dr. Seamus Dunbar with apixaban 5 mg twice daily, atorvastatin 20 mg daily, dofetilide 500 mcg twice daily, metoprolol 50 mg q AM and 25 mg q PM, and valsartan 160 mg daily. In , patient was diagnosed with nonvalvular paroxysmal AF, which was treated with dofetilide 125 mcg twice daily. In 3/2022, she had AF recurrences, which were managed with increase in dofetilide to 250 mcg twice daily. In 2022, she was started on nadolol for \"rapid heart rate\", but developed bradycardia on manual palpitation, so discontinued. In 2022, she established care with my office. I diagnosed tachy-kyle syndrome and treated with [facility] [employer] dual chamber pacemaker (RV lead is deep septal). She presents today, 22; for follow up with my office. Her device has not yet been enrolled in remote monitoring due to delay in receiving remote transmitter. She denies any complaints at this time.      Prior cardiac testin week event monitor (22): SR at 49 - 124 bpm (mean: 68 bpm), 16 patient events which predominantly occurred during AF with ventricular rate of 89 - 183 bpm, SVE burden = 1.5%, 12 runs of asymptomatic & non-sustained SVE (longest: 14.3 sec; fastest: 163 bpm), VE burden < 1%, 1 episode of asymptomatic NSVT (13.3 sec at 110 bpm), AF burden = 5% (longest: 2 h 52 min) with ventricular rate of 72 - 191 bpm (mean: 126 bpm), and no 2* type II AVB, 3* AV block or significant pauses. TTE (3/22/2022): LVEF = 64%, borderline asymmetric septal LVH, technically difficult study due to patient's body habitus. Pharmacologic nuclear stress test (2022): LVEF = 80%, no ischemia.       Past Medical History:   Diagnosis Date    Arthritis     Atrial fibrillation (HCC)     Tikosyn per Dr. Karis Vincent    Bilateral carotid artery stenosis 2020    Bronchitis     Carotid stenosis, bilateral 2012    Hyperlipidemia     Hypertension     Left carotid stenosis 2018    O2 dependent 2020    On continuous oral anticoagulation     Eliquis    S/P carotid endarterectomy 10/06/2016    Stomach ulcer     Thyroid disease      Past Surgical History:   Procedure Laterality Date    APPENDECTOMY      BACK SURGERY      CHOLECYSTECTOMY      COLONOSCOPY      ENDOSCOPY, COLON, DIAGNOSTIC  01/10/2018    upper endo    EYE SURGERY      BILATERAL CATARACT    HYSTERECTOMY (CERVIX STATUS UNKNOWN)      JOINT REPLACEMENT      RIGHT KNEE    PACEMAKER INSERTION Left 2022    Successful [employer] dual chamber pacemaker (Sesar)      Family History   Problem Relation Age of Onset    Other Mother         rheumatic fever    Cancer Sister         lung     There is no family history of sudden cardiac arrest    Social History     Tobacco Use    Smoking status: Former     Packs/day: 1.00     Years: 30.00     Pack years: 30.00     Types: Cigarettes     Quit date: 2018     Years since quittin.4    Smokeless tobacco: Never   Substance Use Topics    Alcohol use: Not Currently       Current Outpatient Medications   Medication Sig Dispense Refill    sucralfate (CARAFATE) 1 GM/10ML suspension TAKE 10 ML BY MOUTH ONCE DAILY AS NEEDED      metoprolol tartrate (LOPRESSOR) 25 MG tablet Take 50 mg in the am and 25 mg in the pm 270 tablet 2    Cholecalciferol (VITAMIN D) 50 MCG (2000) CAPS capsule Take by mouth      OXYGEN Inhale 2 L/min into the lungs continuous      dofetilide (TIKOSYN) 500 MCG capsule Take 1 capsule by mouth every 12 hours 60 capsule 3    levothyroxine (SYNTHROID) 150 MCG tablet TAKE 1 TABLET BY MOUTH ONCE DAILY      ANORO ELLIPTA 62.5-25 MCG/INH AEPB inhaler INHALE 1 PUFF BY MOUTH ONCE DAILY      apixaban (ELIQUIS) 5 MG TABS tablet Take 1 tablet by mouth 2 times daily 60 tablet 2    valsartan (DIOVAN) 160 MG tablet Take 1 tablet by mouth daily 30 tablet 3    venlafaxine (EFFEXOR XR) 150 MG extended release capsule Take 300 mg by mouth daily      atorvastatin (LIPITOR) 20 MG tablet Take 20 mg by mouth daily       No current facility-administered medications for this visit. No Known Allergies    ROS:   Constitutional: Negative for fever, activity change and appetite change. HENT: Negative for epistaxis. Eyes: Negative for diploplia, blurred vision. Respiratory: Negative for cough, chest tightness, shortness of breath and wheezing. Cardiovascular: pertinent positives in HPI  Gastrointestinal: Negative for abdominal pain and blood in stool. Genitourinary: Negative for hematuria and difficulty urinating. Musculoskeletal: Negative for myalgias and gait problem. Skin: Negative for color change and rash. Neurological: Negative for syncope and light-headedness. Psychiatric/Behavioral: Negative for confusion and agitation. The patient is not nervous/anxious. Heme: no bleeding disorders, no melena or hematochezia  All other review of systems are negative     PHYSICAL EXAM:  Constitutional   Vitals:    12/07/22 1356   BP: 122/72   Pulse: 69   Resp: 18   Weight: 260 lb (117.9 kg)   Height: 5' 6\" (1.676 m)    Well-developed, no acute distress, well groomed  Eyes: conjunctivae normal, no xanthelasma   Ears, Nose, Throat: oral mucosa moist, no cyanosis   Neck: supple, no JVD, no bruits, no thyromegaly   CV: normal rate, regular rhythm,  no murmurs, rubs, or gallops.  PMI is nondisplaced, Peripheral pulses normal including carotid auscultation, no noted aortic bruit, bilateral femoral and pedal pulses are normal in quality  Lungs: clear to auscultation bilaterally, normal respiratory effort without used of accessory muscles, no wheezes  Abdomen: soft, non-tender, bowel sounds present, no masses or hepatomegaly   Extremities: no digital clubbing, no edema   Skin: warm, no rashes   Neuro/Psych: A&O x 3, normal mood and affect  Pacemaker site: c/d/I, no erythema, edema, or drainage    Cardiac testing done today:   EC22: sinus at 69 bpm, intermittent A-pace, Qtc = 433 msec  Device Interrogation/Reprogramming 22  Make/Model: Elie White  DOI: 22  Battery: 13.5 years  TuTanda therapy: DDDR  ppm, AV delays 140-200 msec sense and 160-230 msec paced, Avsearch+ ON  Pacing %: RA = 46%, RV = <1%  Lead function:  RA lead: sensing = 6.7 mV, impedance = 590 ohms, threshold = 0.8 V @ 0.4 msec  RV lead: sensing = 21.6 mV, impedance = 794 ohms, threshold = 0.5 V @ 0.4 msec  Lead programming:  RA lead: sensitivity = 0.25 mV, output = 3.5 V @ 0.4 msec  RV lead: sensitivity = 0.6 mV, output = 3.5 V @ 0.4 msec  Arrhythmias: AF burden = 3%; episodes of NSVT are atrial driven  Reprogramming included: none  Overall device function is normal  All device programmable settings were evaluated per above and in the scanned document, along with iterative adjustments (capture thresholds) to assess and select the most appropriate final programming to provide for consistent delivery of the appropriate therapy and to verify function of the device. Assessment/plan:  1. Tachy-bradycardia syndrome s/p [employer] dual-chamber pacemaker (DOI 2022)  -- RV lead is deep septal (ECG 22)  -- Stable device function. -- Patient waiting for remote transmitter, then will start remote monitoring every 91 days. -- Follow-up with my office in 3 months. Will plan to reduce RA and RV outputs.      2.  Nonvalvular persistent atrial fibrillation  - Initially diagnosed in .  - [id number] score = 4 (age, sex, HTN). Recommend [address] in females with score of 2 or more. DOAC preferred. - Continue apixaban 5 mg twice daily. While on DOAC, recommend annual monitoring of CBC and CMP. - Rhythm control previously recommended and treated with dofetilide. Appears dofetilide was initiated in 2020 and a starting dose of 125 mcg twice daily. In 3/2022, dofetilide was increased to 250 mcg twice daily due to AF recurrences. In 8/2022, I increase dofetilide to 500 mcg twice daily, implanted [employer] dual-chamber pacemaker, and started beta-blocker. AF burden today is 3%. Monitor AF burden going forward via pacemaker. While on tikosyn, recommend monitoring of BMP, Mg++, and ECG every 3 months. Qtc today is 433 msec. I will order labs. -Modifiable risk factors:  --Obesity: BMI goal less than 27. Recommend diet and exercise. --MIKHAIL: Patient reports recent sleep study. Results are not available to me.  --HTN: BP goal less than 130/80. --Alcohol: Continue to avoid. 3. Suspected COPD with hypoxia on chronic oxygen  - has not had PFTs, noted from pulmonary note \"presumed COPD\" with hx of smoker. -  uses daily inhalers and on chronic oxygen 2 to 3 L  - has referral for pulmonology, but not able to get in for appointment yet. PCP manages and is stable. I spent a total of 30 minutes reviewing previous notes, test results, and face to face with the patient discussing the diagnosis and importance of compliance with the treatment plan as well as documenting on the day of the visit. Time of the day of service includes:  Preparing to see the patient (eg. Review of the medical record, such as tests). Obtaining and/or reviewing separately obtained history. Ordering prescription medications, tests, and/or procedures. Communicating results to the patient/family/caregiver. Counseling/educating the patient/family/caregiver.   Documenting clinical information in the patients electronic record. Coordination of care for the patient. Performing a medical appropriate exam and/or evaluation. Thank you for allowing me to participate in your patient's care. Please call me if there are any questions. Mesha Arias D.O.   Cardiac Electrophysiology  [facility]  [facility]) Physicians    CC: GIO Fitzpatrick CNP

## 2022-12-07 ENCOUNTER — OFFICE VISIT (OUTPATIENT)
Dept: NON INVASIVE DIAGNOSTICS | Age: 77
End: 2022-12-07

## 2022-12-07 VITALS
WEIGHT: 260 LBS | SYSTOLIC BLOOD PRESSURE: 122 MMHG | BODY MASS INDEX: 41.78 KG/M2 | DIASTOLIC BLOOD PRESSURE: 72 MMHG | RESPIRATION RATE: 18 BRPM | HEART RATE: 69 BPM | HEIGHT: 66 IN

## 2022-12-07 DIAGNOSIS — I48.19 PERSISTENT ATRIAL FIBRILLATION (HCC): Primary | ICD-10-CM

## 2022-12-07 RX ORDER — SUCRALFATE ORAL 1 G/10ML
SUSPENSION ORAL
COMMUNITY
Start: 2022-10-24

## 2022-12-07 NOTE — PATIENT INSTRUCTIONS
No medication changes at this time. You will be enrolled in remote monitoring of pacemaker, which will be performed every 91 days. Please obtain blood work and have copy of results sent to Dr Deedee Quintanilla. Labs ordered today. Focus on diet and exercise to reduce weight. Contact Dr Deedee Quintanilla office if you desire referral to Bariatric Weight Loss Program.  Follow-up with this office in 3 months.

## 2022-12-08 ENCOUNTER — HOSPITAL ENCOUNTER (OUTPATIENT)
Age: 77
Discharge: HOME | End: 2022-12-08
Payer: MEDICARE

## 2022-12-08 DIAGNOSIS — I48.19 PERSISTENT ATRIAL FIBRILLATION (HCC): ICD-10-CM

## 2022-12-08 LAB
ANION GAP SERPL CALCULATED.3IONS-SCNC: 11 MMOL/L (ref 7–16)
BUN BLDV-MCNC: 23 MG/DL (ref 6–23)
CALCIUM SERPL-MCNC: 9.4 MG/DL (ref 8.6–10.2)
CHLORIDE BLD-SCNC: 101 MMOL/L (ref 98–107)
CO2: 26 MMOL/L (ref 22–29)
CREAT SERPL-MCNC: 1 MG/DL (ref 0.5–1)
GFR SERPL CREATININE-BSD FRML MDRD: 57 ML/MIN/1.73
GLUCOSE BLD-MCNC: 169 MG/DL (ref 74–99)
MAGNESIUM: 1.5 MG/DL (ref 1.6–2.6)
POTASSIUM SERPL-SCNC: 4.8 MMOL/L (ref 3.5–5)
SODIUM BLD-SCNC: 138 MMOL/L (ref 132–146)

## 2022-12-08 PROCEDURE — 80048 BASIC METABOLIC PNL TOTAL CA: CPT

## 2022-12-08 PROCEDURE — 36415 COLL VENOUS BLD VENIPUNCTURE: CPT

## 2022-12-08 PROCEDURE — 83735 ASSAY OF MAGNESIUM: CPT

## 2022-12-10 DIAGNOSIS — I48.0 PAROXYSMAL ATRIAL FIBRILLATION (HCC): Primary | ICD-10-CM

## 2022-12-10 RX ORDER — MAGNESIUM OXIDE 400 MG/1
400 TABLET ORAL DAILY
Qty: 90 TABLET | Refills: 1 | Status: SHIPPED | OUTPATIENT
Start: 2022-12-10 | End: 2023-03-10

## 2022-12-12 ENCOUNTER — TELEPHONE (OUTPATIENT)
Dept: NON INVASIVE DIAGNOSTICS | Age: 77
End: 2022-12-12

## 2022-12-12 NOTE — TELEPHONE ENCOUNTER
----- Message from Eva Hines DO sent at 12/7/2022  2:18 PM EST -----  Regarding: remote  Please enroll in remote  -Eva Hines DO

## 2022-12-12 NOTE — TELEPHONE ENCOUNTER
Latitude monitor was ordered on 8/31/2022 and was delivered today 12/12/2022. Will call patient at the end of the week if she does not send. Latitude monitor hooked up and working.      Ras Oscar

## 2022-12-12 NOTE — TELEPHONE ENCOUNTER
----- Message from Lexy Huitron DO sent at 12/10/2022 10:35 PM EST -----  Regarding: mag  Please let patient know I ordered mag supplement.  -Lexy Huitron DO

## 2022-12-13 DIAGNOSIS — G25.81 RESTLESS LEG: ICD-10-CM

## 2022-12-13 PROBLEM — Z01.818 PREOP EXAMINATION: Status: ACTIVE | Noted: 2022-12-13

## 2022-12-14 LAB — FERRITIN: 44 NG/ML

## 2022-12-15 ENCOUNTER — TELEPHONE (OUTPATIENT)
Dept: ORTHOPEDIC SURGERY | Age: 77
End: 2022-12-15

## 2022-12-15 NOTE — TELEPHONE ENCOUNTER
12/15/2022 9:16 am Received a fax from [place] [address] [phone]:   - Cover sheet / Message - Surgery Clearance  - A letter dated 12/15/2022 and signed by Alaina Healy MD (found in epic under letters)

## 2022-12-20 ENCOUNTER — TELEPHONE (OUTPATIENT)
Dept: ORTHOPEDIC SURGERY | Age: 77
End: 2022-12-20

## 2022-12-20 DIAGNOSIS — M17.12 PRIMARY OSTEOARTHRITIS OF LEFT KNEE: Primary | ICD-10-CM

## 2022-12-20 NOTE — TELEPHONE ENCOUNTER
Patient called. She has received Pulmonary Clearance and would like new surgery date for Lt TKA. We had her scheduled as Traditional Lt TKA. Do you want to change to ZULY? Also last seen by you 9/28/2022. Do you want pre-op visit?

## 2022-12-23 NOTE — TELEPHONE ENCOUNTER
Schedule first available (1/23/2023), Lt TKA (ZULY) CT Scan ordered and fax'd or IR [facility]. Patient notified she will be getting call from [facility] to confirm surgery date / time. Will need pre-op appt  and post-op appt scheduled.

## 2022-12-29 ENCOUNTER — TELEPHONE (OUTPATIENT)
Dept: ORTHOPEDIC SURGERY | Age: 77
End: 2022-12-29

## 2022-12-29 NOTE — TELEPHONE ENCOUNTER
Surgery: LEFT KNEE TOTAL JOINT ARTHROPLASTY - ZULY ROBOT ASSISTED  Vendor: [employer]  Anesthesia: General  Regional Block: Adductor Nerve Block  Date: Monday, January 23, 2023  Time: 9:00 am  Place: [facility]  Admission Status: 23 hours  Surgeon: Cailin Marroquin    Medications reviewed with the patient  Holding the following medications: Eliquis 3 days pre op. Patient instructed to take her last dose on January 19 th then stop. Patient informed the medication will be resumed by Dr. Ziggy Barajas after the surgery has been completed. Will notify the following of plans for surgery:  Aga Méndez CNP  Cardiologist: Deepak Porras (clearance letter in media)  Pulmonologist: Lorna Lancaster    Patient has retained Joint Folder for Total Knee Replacement  Defer Joint Class - patient attended in November  Patient informed: A nurse from the hospital will contact her with a date and time for Pre-Admission Testing    Patient informed: For Robot Assisted Knee Replacement a CT scan is required  The x-ray department will contact her with a date and time    Pre Op Appointment: Wednesday, January 18 th at 8:20 am    Post Op Appointment: Date:  Wednesday, February 8 th Time: 1:20 pm    Letter mailed to the patient which includes:  - Appointment cards  - Brochure on [facility] Robot Assisted Surgery  - Medication Instruction Sheet: Hold Eliquis 3 days pre op  - Pre Op Instructions

## 2022-12-29 NOTE — LETTER
[facility]  [address], [address], [address] [facility]  Phone:  [phone]    Fax:   [phone]    Antonella Martínez  Type of Procedure: LEFT KNEE REPLACEMENT - ZULY ROBOT ASSISTED  Place of Surgery: [facility]  Date of Surgery: Monday, January 23, 2023  Time of Surgery: 9:00 am      PREOPERATIVE INSTRUCTIONS FOR TOTAL JOINT REPLACEMENT  1. Read all the information provided for you in this packet and joint replacement folder; retain it for 2 years following surgery. 2.  Follow Dietary Handout: Patient Education Guide: Iron Replacement   Begin eating foods rich in iron one month before your surgery and continue for one month after surgery. 3.  Optional:  Not required by Dr. Alyssia Zuniga. You may choose an over the counter iron supplement and start taking it at least one month prior to surgery and continue taking the iron supplement for one month following surgery. Feel free to contact your primary care physician for his / her approval or for a prescription. 4.  A nurse from [facility] will contact you and inform you of a date and time for your Pre-Admission Testing Day. You may contact them at [phone] [facility] or [phone] [facility] for any special requests. 5. Inform your family doctor as soon as your surgery date has been scheduled. See your doctor before surgery and obtain a letter of medical clearance for our office. Notify any specialists you are seeing and obtain a letter of clearance. You must see your pulmonary specialist prior to surgery if you have a condition called Sleep Apnea or use a CPAP machine at night. Please inform us if you have had problems with anesthesia in the past; especially with intubation. 6.  If you do not see a dentist regularly, see your dentist prior to surgery for a dental checkup and cleaning. Please have all dental procedures completed prior to surgery.   Your teeth and gums must be in good condition for surgery. Have cavities filled, broken teeth repaired and root canals and / or teeth extractions completed prior to surgery. If teeth extractions are needed prior to surgery, a note from your dentist is required stating your mouth is healed and free of infection. 7. Inform our office if you are ill, have an infection, or if any doctor has prescribed an antibiotic for you. You must be free of infection for surgery. If you become ill within 2 weeks of your surgery date (cold, congestion, flu), surgery will be postponed. A letter of medical clearance from your family doctor will then be required to re-schedule your surgery on the next available date. 8.  If you have private insurance, call customer service and check your benefits and any deductibles / co-pays. 9.  The goal is to discharge you home to your family. If you feel inpatient rehab will be needed, contact your insurance carrier prior to surgery for a list of inpatient rehab facilities. Visiting one or two facilities before surgery may help with your decision. 10.  After surgery, continue regular checkups with your dentist. Please inform your dentist of your joint replacement surgery upon scheduling appointments. Your dentist is to follow The American Dental Association Guidelines for Premedication / Antibiotics prior to dental procedures. 11.  All requests for pain medication refills from our office must be called in to [phone], Ext [phone]. Please speak clearly when leaving your message with the following information:  Your name, spelling of last name, date of birth, name of medication, your pharmacy name and phone number. Please do not wait until the last minute to call in your prescription requests. There is a 24 - 72 hour turn-around time for all refills. If you call on Friday, your request will not begin processing until the next business day.   You will be informed when your prescription has been printed and ready for pickup at the doctor's office.     Any questions, problems or concerns regarding your surgery should be addressed to our nurse by calling the office Monday through Thursday 10:00 am - 3:00 pm and Friday 10:00 am - 2:00 pm.    Sandeep Yu RN, BSN, ONC, scribe for Dr. Nicole Cade Surgeon  Phone: [phone]  Ext. [phone]

## 2022-12-29 NOTE — LETTER
[address]     January 23, 2023  Surgery Date:                                                                  Regarding Prescription & Non-Prescription Medications:    Medication Name to Discontinue:   Days before surgery  Last dose  Eliquis        3   1/19/2023                                                                                                                                                                                                                                                                                                                                                                                                                                                                                                                                                                                                                                                                                                                                                                                                                                                                                                                                                                                                                                                                                                                                                                                                                                                                                                                                             ** All medications discontinued prior to surgery may be resumed after your surgery is completed, unless otherwise specified by the physician.     If you have any questions or concerns, please contact our nurse at [phone], Ext. [phone]:  Monday through Thursday 9:00 am - 4:00 pm  Friday 10:00 am - 12 noon    Tiffanie Boone RN, BSN, ONC, scribe for Dr. Evita Palacio Surgeon

## 2023-01-12 ENCOUNTER — HOSPITAL ENCOUNTER (OUTPATIENT)
Dept: CT IMAGING | Age: 78
Discharge: HOME | End: 2023-01-14
Payer: MEDICARE

## 2023-01-12 DIAGNOSIS — M17.12 PRIMARY OSTEOARTHRITIS OF LEFT KNEE: ICD-10-CM

## 2023-01-12 PROBLEM — Z01.818 PREOP EXAMINATION: Status: RESOLVED | Noted: 2022-12-13 | Resolved: 2023-01-12

## 2023-01-12 PROCEDURE — 73700 CT LOWER EXTREMITY W/O DYE: CPT

## 2023-01-18 ENCOUNTER — ANESTHESIA EVENT (OUTPATIENT)
Dept: OPERATING ROOM | Age: 78
DRG: 470 | End: 2023-01-18
Payer: MEDICARE

## 2023-01-18 ENCOUNTER — HOSPITAL ENCOUNTER (OUTPATIENT)
Dept: PREADMISSION TESTING | Age: 78
Discharge: HOME | End: 2023-01-18
Payer: MEDICARE

## 2023-01-18 ENCOUNTER — OFFICE VISIT (OUTPATIENT)
Dept: ORTHOPEDIC SURGERY | Age: 78
End: 2023-01-18

## 2023-01-18 VITALS
HEIGHT: 63 IN | TEMPERATURE: 98 F | SYSTOLIC BLOOD PRESSURE: 165 MMHG | DIASTOLIC BLOOD PRESSURE: 72 MMHG | BODY MASS INDEX: 44.47 KG/M2 | HEART RATE: 61 BPM | RESPIRATION RATE: 16 BRPM | OXYGEN SATURATION: 94 % | WEIGHT: 251 LBS

## 2023-01-18 VITALS — WEIGHT: 251 LBS | HEIGHT: 63 IN | BODY MASS INDEX: 44.47 KG/M2

## 2023-01-18 DIAGNOSIS — M17.12 PRIMARY OSTEOARTHRITIS OF LEFT KNEE: ICD-10-CM

## 2023-01-18 DIAGNOSIS — M17.12 PRIMARY OSTEOARTHRITIS OF LEFT KNEE: Primary | ICD-10-CM

## 2023-01-18 LAB
ANION GAP SERPL CALCULATED.3IONS-SCNC: 9 MMOL/L (ref 7–16)
BASOPHILS ABSOLUTE: 0.04 E9/L (ref 0–0.2)
BASOPHILS RELATIVE PERCENT: 0.6 % (ref 0–2)
BUN BLDV-MCNC: 24 MG/DL (ref 6–23)
CALCIUM SERPL-MCNC: 9.5 MG/DL (ref 8.6–10.2)
CHLORIDE BLD-SCNC: 100 MMOL/L (ref 98–107)
CO2: 29 MMOL/L (ref 22–29)
CREAT SERPL-MCNC: 1 MG/DL (ref 0.5–1)
EOSINOPHILS ABSOLUTE: 0.08 E9/L (ref 0.05–0.5)
EOSINOPHILS RELATIVE PERCENT: 1.3 % (ref 0–6)
GFR SERPL CREATININE-BSD FRML MDRD: 57 ML/MIN/1.73
GLUCOSE BLD-MCNC: 116 MG/DL (ref 74–99)
HCT VFR BLD CALC: 34.9 % (ref 34–48)
HEMOGLOBIN: 11.1 G/DL (ref 11.5–15.5)
IMMATURE GRANULOCYTES #: 0.02 E9/L
IMMATURE GRANULOCYTES %: 0.3 % (ref 0–5)
LYMPHOCYTES ABSOLUTE: 1.34 E9/L (ref 1.5–4)
LYMPHOCYTES RELATIVE PERCENT: 21.4 % (ref 20–42)
MCH RBC QN AUTO: 30.7 PG (ref 26–35)
MCHC RBC AUTO-ENTMCNC: 31.8 % (ref 32–34.5)
MCV RBC AUTO: 96.7 FL (ref 80–99.9)
MONOCYTES ABSOLUTE: 0.54 E9/L (ref 0.1–0.95)
MONOCYTES RELATIVE PERCENT: 8.6 % (ref 2–12)
NEUTROPHILS ABSOLUTE: 4.25 E9/L (ref 1.8–7.3)
NEUTROPHILS RELATIVE PERCENT: 67.8 % (ref 43–80)
PDW BLD-RTO: 12.9 FL (ref 11.5–15)
PLATELET # BLD: 184 E9/L (ref 130–450)
PMV BLD AUTO: 11.3 FL (ref 7–12)
POTASSIUM REFLEX MAGNESIUM: 5.1 MMOL/L (ref 3.5–5)
RBC # BLD: 3.61 E12/L (ref 3.5–5.5)
SODIUM BLD-SCNC: 138 MMOL/L (ref 132–146)
WBC # BLD: 6.3 E9/L (ref 4.5–11.5)

## 2023-01-18 PROCEDURE — 87081 CULTURE SCREEN ONLY: CPT

## 2023-01-18 PROCEDURE — 36415 COLL VENOUS BLD VENIPUNCTURE: CPT

## 2023-01-18 PROCEDURE — 80048 BASIC METABOLIC PNL TOTAL CA: CPT

## 2023-01-18 PROCEDURE — 85025 COMPLETE CBC W/AUTO DIFF WBC: CPT

## 2023-01-18 RX ORDER — UMECLIDINIUM BROMIDE AND VILANTEROL TRIFENATATE 62.5; 25 UG/1; UG/1
POWDER RESPIRATORY (INHALATION)
COMMUNITY
Start: 2023-01-01

## 2023-01-18 RX ORDER — LANOLIN ALCOHOL/MO/W.PET/CERES
CREAM (GRAM) TOPICAL
COMMUNITY
Start: 2022-12-11 | End: 2023-01-18

## 2023-01-18 RX ORDER — GLUCOSAMINE/D3/BOSWELLIA SERRA 1500MG-400
TABLET ORAL
COMMUNITY

## 2023-01-18 RX ORDER — MIDAZOLAM HYDROCHLORIDE 2 MG/2ML
1 INJECTION, SOLUTION INTRAMUSCULAR; INTRAVENOUS ONCE
OUTPATIENT
Start: 2023-01-18 | End: 2023-01-18

## 2023-01-18 RX ORDER — ROPIVACAINE HYDROCHLORIDE 5 MG/ML
30 INJECTION, SOLUTION EPIDURAL; INFILTRATION; PERINEURAL ONCE
OUTPATIENT
Start: 2023-01-18

## 2023-01-18 RX ORDER — M-VIT,TX,IRON,MINS/CALC/FOLIC 27MG-0.4MG
1 TABLET ORAL DAILY
COMMUNITY

## 2023-01-18 RX ORDER — FENTANYL CITRATE 50 UG/ML
50 INJECTION, SOLUTION INTRAMUSCULAR; INTRAVENOUS ONCE
OUTPATIENT
Start: 2023-01-18 | End: 2023-01-18

## 2023-01-18 ASSESSMENT — ENCOUNTER SYMPTOMS: SHORTNESS OF BREATH: 1

## 2023-01-18 ASSESSMENT — KOOS JR
RISING FROM SITTING: 3
KOOS JR TOTAL INTERVAL SCORE: 42.281
TWISING OR PIVOTING ON KNEE: 4
STANDING UPRIGHT: 3
GOING UP OR DOWN STAIRS: 4
STRAIGHTENING KNEE FULLY: 0
BENDING TO THE FLOOR TO PICK UP OBJECT: 3
HOW SEVERE IS YOUR KNEE STIFFNESS AFTER FIRST WAKING IN MORNING: 1

## 2023-01-18 ASSESSMENT — LIFESTYLE VARIABLES: SMOKING_STATUS: 0

## 2023-01-18 NOTE — PROGRESS NOTES
[facility] PRE-ADMISSION TESTING INSTRUCTIONS    The Preadmission Testing patient is instructed accordingly using the following criteria (check applicable):    ARRIVAL INSTRUCTIONS:  [x] Parking the day of Surgery is located in the Main Entrance lot. Upon entering the door, make an immediate right to the surgery reception desk    [x] Bring photo ID and insurance card    [x] Please be sure to arrange for responsible adult to provide transportation to and from the hospital    [x] If you awake am of surgery not feeling well or have temperature >100 please call [phone]    GENERAL INSTRUCTIONS:    [x] Nothing by mouth after midnight, including gum, candy, mints or water    [x] You may brush your teeth, but do not swallow any water    [x] Take medications as instructed with 1-2 oz of water    [x] Stop herbal supplements and vitamins 5 days prior to procedure    [x] Follow preop dosing of blood thinners per physician instructions      [x] No tobacco products within 24 hours of surgery     [x] No alcohol or illegal drug use within 24 hours of surgery.     [x] Jewelry, body piercing's, eyeglasses, contact lenses and dentures are not permitted into surgery (bring cases)      [x] Please do not wear any nail polish, make up or hair products on the day of surgery    [x] You can expect a call the business day prior to procedure to notify you if your arrival time changes    [x] If you receive a survey after surgery we would greatly appreciate your comments      [x] Please notify surgeon if you develop any illness between now and time of surgery (cold, cough, sore throat, fever, nausea, vomiting) or any signs of infections  including skin, wounds, and dental.    [x]  The Outpatient Pharmacy is available to fill your prescription here on your day of surgery, ask your preop nurse for details    [] Other instructions    EDUCATIONAL MATERIALS PROVIDED:    [x] PAT Preoperative Education Packet/Booklet [x] Medication List    [x] Shower with soap, lather and rinse well, and use CHG wipes provided the evening before surgery as instructed    [x] Incentive spirometer with instructions

## 2023-01-18 NOTE — PROGRESS NOTES
Patient informed the MA she is unclear as to when to stop her Eliquis before surgery. Letter (in epic reprinted) given to and reviewed w/ the patient. Patient instructed to take her last dose of Eliqius on January 19 th then stop. Informed the patient: Eliquis will be resumed after surgery.

## 2023-01-18 NOTE — PROGRESS NOTES
Referring Provider:   No referring provider defined for this encounter. CHIEF COMPLAINT:   Chief Complaint   Patient presents with    Pre-op Exam     Pre-op Lt TKA 1/23/2023. HPI:    Stefania Briseno is a 76y.o. year old female with left knee pain. She has received pulmonary and cardiac and medical clearance for surgery. She understands that she is high risk but she is here today for her preoperative appointment. She has had no changes. Her left knee pain continues to get worse. She is still multiple rounds of conservative treatment and is excited to get her total knee arthroplasty.     PAST MEDICAL HISTORY  Past Medical History:   Diagnosis Date    Anticoagulated     Eliquis    Arthritis     Atrial fibrillation (HCC)     Tikosyn per Dr. Edward Mac    Bilateral carotid artery stenosis 07/16/2020    Bronchitis     Carotid stenosis, bilateral 05/21/2012    Hyperlipidemia     Hypertension     Left carotid stenosis 04/26/2018    O2 dependent 07/16/2020    On continuous oral anticoagulation     Eliquis    S/P carotid endarterectomy 10/06/2016    Stomach ulcer     Thyroid disease        PAST SURGICAL HISTORY  Past Surgical History:   Procedure Laterality Date    APPENDECTOMY      BACK SURGERY      CHOLECYSTECTOMY      COLONOSCOPY      ENDOSCOPY, COLON, DIAGNOSTIC  01/10/2018    upper endo    EYE SURGERY      BILATERAL CATARACT    HYSTERECTOMY (CERVIX STATUS UNKNOWN)      JOINT REPLACEMENT  2011    RIGHT KNEE    PACEMAKER INSERTION Left 08/31/2022    Successful [employer] dual chamber pacemaker (Sesar)         FAMILY HISTORY   Family History   Problem Relation Age of Onset    Other Mother         rheumatic fever    Cancer Sister         lung       SOCIAL HISTORY  Social History     Socioeconomic History    Marital status:      Spouse name: Not on file    Number of children: Not on file    Years of education: Not on file    Highest education level: Not on file   Occupational History    Occupation: retired- [employer]  store   Tobacco Use    Smoking status: Former     Packs/day: 1.00     Years: 30.00     Pack years: 30.00     Types: Cigarettes     Quit date: 2018     Years since quittin.5    Smokeless tobacco: Never   Vaping Use    Vaping Use: Never used   Substance and Sexual Activity    Alcohol use: Not Currently    Drug use: No    Sexual activity: Not on file   Other Topics Concern    Not on file   Social History Narrative    Not on file     Social Determinants of Health     Financial Resource Strain: Not on file   Food Insecurity: Not on file   Transportation Needs: Not on file   Physical Activity: Not on file   Stress: Not on file   Social Connections: Not on file   Intimate Partner Violence: Not on file   Housing Stability: Not on file     Social History     Occupational History    Occupation: retired- craft  store   Tobacco Use    Smoking status: Former     Packs/day: 1.00     Years: 30.00     Pack years: 30.00     Types: Cigarettes     Quit date: 2018     Years since quittin.5    Smokeless tobacco: Never   Vaping Use    Vaping Use: Never used   Substance and Sexual Activity    Alcohol use: Not Currently    Drug use: No    Sexual activity: Not on file       CURRENT MEDICATIONS     Current Outpatient Medications:     magnesium oxide (MAG-OX) 400 (240 Mg) MG tablet, TAKE 1 TABLET BY MOUTH ONCE DAILY, Disp: , Rfl:     ANORO ELLIPTA 62.5-25 MCG/ACT AEPB, INHALE 1 PUFF INTO LUNGS ONCE DAILY, Disp: , Rfl:     Budeson-Glycopyrrol-Formoterol ([facility] AEROSPHERE IN), Inhale 2 inhalations into the lungs daily, Disp: , Rfl:     magnesium oxide (MAG-OX) 400 MG tablet, Take 1 tablet by mouth daily, Disp: 90 tablet, Rfl: 1    sucralfate (CARAFATE) 1 GM/10ML suspension, TAKE 10 ML BY MOUTH ONCE DAILY AS NEEDED, Disp: , Rfl:     metoprolol tartrate (LOPRESSOR) 25 MG tablet, Take 50 mg in the am and 25 mg in the pm (Patient taking differently: Take 50 mg in the am and 50 mg in the pm), Disp: 270 tablet, Rfl:  2    Cholecalciferol (VITAMIN D) 50 MCG (2000 UT) CAPS capsule, Take by mouth, Disp: , Rfl:     OXYGEN, Inhale 2 L/min into the lungs continuous, Disp: , Rfl:     dofetilide (TIKOSYN) 500 MCG capsule, Take 1 capsule by mouth every 12 hours, Disp: 60 capsule, Rfl: 3    levothyroxine (SYNTHROID) 150 MCG tablet, TAKE 1 TABLET BY MOUTH ONCE DAILY, Disp: , Rfl:     apixaban (ELIQUIS) 5 MG TABS tablet, Take 1 tablet by mouth 2 times daily, Disp: 60 tablet, Rfl: 2    valsartan (DIOVAN) 160 MG tablet, Take 1 tablet by mouth daily, Disp: 30 tablet, Rfl: 3    venlafaxine (EFFEXOR XR) 150 MG extended release capsule, Take 300 mg by mouth daily, Disp: , Rfl:     atorvastatin (LIPITOR) 20 MG tablet, Take 20 mg by mouth daily, Disp: , Rfl:     ALLERGIES  No Known Allergies    Controlled Substances Monitoring:          REVIEW OF SYSTEMS:     Constitutional:  Negative for weight loss, fevers, chills, fatigue  Cardiovascular: Negative for chest pain, palpitations  Pulmonary: Negative for shortness of breath, labored breathing, cough  GI: negative for abdominal pain, nausea, vomitting   MSK: per HPI  Skin: negative for rash, open wounds    All other systems reviewed and are negative         PHYSICAL EXAM     Vitals:    01/18/23 0814   Weight: 251 lb (113.9 kg)   Height: 5' 3\" (1.6 m)       Height: 5' 3\" (1.6 m)  Weight: [unfilled]  BMI:  Body mass index is 44.46 kg/m². General: The patient is alert and oriented x 3, appears to be stated age and in no distress. HEENT: head is normocephalic, atraumatic. EOMI. Neck: supple, trachea midline, no thyromegaly   Cardiovascular: peripheral pulses palpable.   Normal Capillary refill   Respiratory: breathing unlabored, chest expansion symmetric   Skin: no rash, no open wounds, no erythema  Psych: normal affect; mood stable  Neurologic: gait normal, sensation grossly intact in extremities  MSK:        Lower Extremity:   Ipsilateral hip exam shows normal range of motion without pain with impingement testing. Left knee: Atraumatic without any skin lesions. Extremely tender to palpation around the medial joint line and patellofemoral joint. There is significant crepitus with range of motion. Knee flexion and extension is 0 to 130 degrees. Knee is stable to varus and valgus stress testing at 0 and 30 degrees. Negative Lachman. Negative posterior drawer. Calf is soft and compressible without any tenderness           IMAGING:    XR: 4 views of the left bilateral knee show significant tricompartmental osteoarthritis of the left knee with bone-on-bone changes, joint space narrowing, subchondral sclerosis, and osteophyte formation. These were independently reviewed by myself and discussed the patient        ASSESSMENT  Left knee knee osteoarthritis end-stage    PLAN  -She has received medical clearance. She is ready to go for surgery. She understands she is high risk of medical complications. Risk-benefit alternatives surgery were discussed in detail. She understands the risk of infection blood clot blood loss arthrofibrosis damage to surrounding structures including neurovascular structures. Instability, reviewed vision surgery amongst others. Surgery scheduled for Monday for left total knee arthroplasty robotic assisted. All her questions answered in detail.   She will discharge home after surgery      [facility] [address] [facility]   1/18/23  8:41 AM

## 2023-01-18 NOTE — ANESTHESIA PRE PROCEDURE
Department of Anesthesiology  Preprocedure Note       Name:  Radha Flynn   Age:  77 y.o.  :  1945                                          MRN:  [id number]         Date:  2023      Surgeon: Surgeon(s):  Jerson Escudero DO    Procedure: Procedure(s):  LEFT KNEE ZULY ROBOTIC ASSISTED TOTAL ARTHROPLASTY ++ROGELIO++  ++PNB++    Medications prior to admission:   Prior to Admission medications    Medication Sig Start Date End Date Taking? Authorizing Provider   Multiple Vitamins-Minerals (THERAPEUTIC MULTIVITAMIN-MINERALS) tablet Take 1 tablet by mouth daily   Yes Historical Provider, MD   Cranberry 1000 MG CAPS Take by mouth   Yes Historical Provider, MD   Biotin 03736 MCG TABS Take by mouth   Yes Historical Provider, MD   ANORO ELLIPTA 62.5-25 MCG/ACT AEPB INHALE 1 PUFF INTO LUNGS ONCE DAILY  Patient not taking: Reported on 2023   Historical Provider, MD   Budeson-Glycopyrrol-Formoterol (BREZTRI AEROSPHERE IN) Inhale 2 inhalations into the lungs daily    Historical Provider, MD   magnesium oxide (MAG-OX) 400 MG tablet Take 1 tablet by mouth daily 12/10/22 3/10/23  Filiberto Kaba DO   sucralfate (CARAFATE) 1 GM/10ML suspension TAKE 10 ML BY MOUTH ONCE DAILY AS NEEDED 10/24/22   Historical Provider, MD   metoprolol tartrate (LOPRESSOR) 25 MG tablet Take 50 mg in the am and 25 mg in the pm  Patient taking differently: Take 50 mg in the am and 50 mg in the pm 22   Alice Pearce APRN - CNP   Cholecalciferol (VITAMIN D) 50 MCG (2000 UT) CAPS capsule Take by mouth    Historical Provider, MD   OXYGEN Inhale 2 L/min into the lungs continuous    Historical Provider, MD   dofetilide (TIKOSYN) 500 MCG capsule Take 1 capsule by mouth every 12 hours 22   Benton Brambila MD   levothyroxine (SYNTHROID) 150 MCG tablet TAKE 1 TABLET BY MOUTH ONCE DAILY 21   Historical Provider, MD   apixaban (ELIQUIS) 5 MG TABS tablet Take 1 tablet by mouth 2 times daily 19   Seda Caballero APRN - CNP  valsartan (DIOVAN) 160 MG tablet Take 1 tablet by mouth daily 9/26/19   Adam Ramirez, APRN - CNP   venlafaxine (EFFEXOR XR) 150 MG extended release capsule Take 300 mg by mouth daily 4/22/18   Historical Provider, MD   atorvastatin (LIPITOR) 20 MG tablet Take 20 mg by mouth daily    Historical Provider, MD       Current medications:    Current Outpatient Medications   Medication Sig Dispense Refill    Multiple Vitamins-Minerals (THERAPEUTIC MULTIVITAMIN-MINERALS) tablet Take 1 tablet by mouth daily      Cranberry 1000 MG CAPS Take by mouth      Biotin 87309 MCG TABS Take by mouth      ANORO ELLIPTA 62.5-25 MCG/ACT AEPB INHALE 1 PUFF INTO LUNGS ONCE DAILY (Patient not taking: Reported on 1/18/2023)      Budeson-Glycopyrrol-Formoterol (BREZTRI AEROSPHERE IN) Inhale 2 inhalations into the lungs daily      magnesium oxide (MAG-OX) 400 MG tablet Take 1 tablet by mouth daily 90 tablet 1    sucralfate (CARAFATE) 1 GM/10ML suspension TAKE 10 ML BY MOUTH ONCE DAILY AS NEEDED      metoprolol tartrate (LOPRESSOR) 25 MG tablet Take 50 mg in the am and 25 mg in the pm (Patient taking differently: Take 50 mg in the am and 50 mg in the pm) 270 tablet 2    Cholecalciferol (VITAMIN D) 50 MCG (2000 UT) CAPS capsule Take by mouth      OXYGEN Inhale 2 L/min into the lungs continuous      dofetilide (TIKOSYN) 500 MCG capsule Take 1 capsule by mouth every 12 hours 60 capsule 3    levothyroxine (SYNTHROID) 150 MCG tablet TAKE 1 TABLET BY MOUTH ONCE DAILY      apixaban (ELIQUIS) 5 MG TABS tablet Take 1 tablet by mouth 2 times daily 60 tablet 2    valsartan (DIOVAN) 160 MG tablet Take 1 tablet by mouth daily 30 tablet 3    venlafaxine (EFFEXOR XR) 150 MG extended release capsule Take 300 mg by mouth daily      atorvastatin (LIPITOR) 20 MG tablet Take 20 mg by mouth daily       No current facility-administered medications for this encounter.        Allergies:  No Known Allergies    Problem List:    Patient Active Problem List   Diagnosis Code    Essential hypertension I10    Mixed hyperlipidemia E78.2    S/P carotid endarterectomy Z98.890    Hypoxia R09.02    CHF (congestive heart failure) ([facility]) W10.5    Diastolic dysfunction Z32.16    Asthma J45.909    Bilateral carotid artery stenosis I65.23    O2 dependent Z99.81    Primary osteoarthritis of left knee M17.12    Lumbar spondylosis M47.816    Class 2 obesity due to excess calories with body mass index (BMI) of 38.0 to 38.9 in adult E66.09, Z68.38    MCCLELLAND (dyspnea on exertion) R06.09    CKD (chronic kidney disease) stage 3, GFR 30-59 ml/min ([facility]) N18.30    Atrial fibrillation with RVR ([facility]) I48.91    Atrial fibrillation with rapid ventricular response ([facility]) I48.91    Shortness of breath R06.02    Symptomatic bradycardia R00.1    History of ongoing treatment with high-risk medication Z79.899    Unspecified osteoarthritis, unspecified site M19.90    Unspecified injury of head, initial encounter S09.90XA    Hypothyroidism, unspecified E03.9    Fall from non-moving wheelchair W05. 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 Mor[facility] Bilateral carotid artery stenosis 07/16/2020    Bronchitis     Carotid stenosis, bilateral 05/21/2012    Hyperlipidemia     Hypertension     Left carotid stenosis 04/26/2018    O2 dependent 07/16/2020    On continuous oral anticoagulation     Eliquis    S/P carotid endarterectomy 10/06/2016    Stomach ulcer 2003    Thyroid disease        Past Surgical History:        Procedure Laterality Date    APPENDECTOMY      BACK SURGERY      CHOLECYSTECTOMY      COLONOSCOPY      ENDOSCOPY, COLON, DIAGNOSTIC  01/10/2018    upper endo    EYE SURGERY      BILATERAL CATARACT    HYSTERECTOMY (CERVIX STATUS UNKNOWN)      JOINT REPLACEMENT      RIGHT KNEE    PACEMAKER INSERTION Left 2022    Successful [employer] dual chamber pacemaker (Sesar)       Social History:    Social History     Tobacco Use    Smoking status: Former     Packs/day: 1.00     Years: 30.00     Pack years: 30.00     Types: Cigarettes     Quit date: 2018     Years since quittin.5    Smokeless tobacco: Never   Substance Use Topics    Alcohol use: Not Currently                                Counseling given: Not Answered      Vital Signs (Current):   Vitals:    23 0945   BP: (!) 165/72   Pulse: 61   Resp: 16   Temp: 98 °F (36.7 °C)   TempSrc: Skin   SpO2: 94%   Weight: 251 lb (113.9 kg)   Height: 5' 3\" (1.6 m)                                              BP Readings from Last 3 Encounters:   23 (!) 165/72   22 (!) 167/79   22 (!) 183/86       NPO Status:                                                                                 BMI:   Wt Readings from Last 3 Encounters:   23 251 lb (113.9 kg)   23 251 lb (113.9 kg)   22 254 lb (115.2 kg)     Body mass index is 44.46 kg/m².     CBC:   Lab Results   Component Value Date/Time    WBC 6.3 2023 10:42 AM    RBC 3.61 2023 10:42 AM    HGB 11.1 2023 10:42 AM    HCT 34.9 2023 10:42 AM    MCV 96.7 2023 10:42 AM    RDW 12.9 2023 10:42 AM     2023 10:42 AM       CMP:   Lab Results   Component Value Date/Time     2023 10:42 AM    K 5.1 2023 10:42 AM     2023 10:42 AM    CO2 29 2023 10:42 AM    BUN 24 2023 10:42 AM    CREATININE 1.0 2023 10:42 AM    GFRAA >60 2022 10:40 AM    LABGLOM 57 2023 10:42 AM    GLUCOSE 116 2023 10:42 AM    GLUCOSE 156 2012 02:00 AM    PROT 7.0 2022 12:47 PM    CALCIUM 9.5 2023 10:42 AM    BILITOT 0.8 2022 12:47 PM ALKPHOS 68 07/21/2022 12:47 PM    AST 15 07/21/2022 12:47 PM    ALT 11 07/21/2022 12:47 PM       POC Tests: No results for input(s): POCGLU, POCNA, POCK, POCCL, POCBUN, POCHEMO, POCHCT in the last 72 hours. Coags:   Lab Results   Component Value Date/Time    PROTIME 16.1 08/27/2020 01:20 PM    INR 1.4 08/27/2020 01:20 PM    APTT 33.5 08/27/2020 01:20 PM       HCG (If Applicable): No results found for: PREGTESTUR, PREGSERUM, HCG, HCGQUANT     ABGs: No results found for: PHART, PO2ART, ODZ4MXC, HYV5YTQ, BEART, Q1DXCXHD     Type & Screen (If Applicable):  No results found for: LABABO, LABRH    Drug/Infectious Status (If Applicable):  No results found for: HIV, HEPCAB    COVID-19 Screening (If Applicable):   Lab Results   Component Value Date/Time    COVID19 Not Detected 07/21/2022 02:44 PM    COVID19 Not Detected 03/23/2022 10:15 AM           Anesthesia Evaluation  Patient summary reviewed and Nursing notes reviewed no history of anesthetic complications:   Airway: Mallampati: IV  TM distance: <3 FB     Mouth opening: < 3 FB   Dental:    (+) upper dentures and lower dentures      Pulmonary: breath sounds clear to auscultation  (+) COPD:  shortness of breath:  asthma:     (-) not a current smoker (former smoker)                          ROS comment: On 2L NC continuously  Chronic hypoxic resp failure   Cardiovascular:    (+) hypertension:, pacemaker: pacemaker, dysrhythmias: atrial fibrillation, CHF:, MCCLELLAND:, hyperlipidemia      ECG reviewed  Rhythm: regular  Rate: normal  Echocardiogram reviewed    Cleared by cardiology     Beta Blocker:  Dose within 24 Hrs         Neuro/Psych:   Negative Neuro/Psych ROS              GI/Hepatic/Renal:   (+) PUD, renal disease: CRI, morbid obesity          Endo/Other:    (+) hypothyroidism, blood dyscrasia: anticoagulation therapy and anemia:., .          Pt had PAT visit. Abdominal:             Vascular: negative vascular ROS.          Other Findings:           Anesthesia Plan      regional and general     ASA 4       Induction: intravenous. MIPS: Postoperative opioids intended and Prophylactic antiemetics administered. Anesthetic plan and risks discussed with patient and spouse. Plan discussed with CRNA. Patient has received cardiac (Dr Divine Mcpherson) and pulmonary (Dr Cecillia Duverney) clearances. 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 NPO status confirmed. Anesthetic options and risks discussed with patient/legal guardian. Patient/legal guardian verbalized understanding and agrees to proceed. Bradley Cruz MD  Staff Anesthesiologist  January 23, 2023  7:28 AM        Chart reviewed, patient seen. Agree with above assessment.     Fallon Poster, APRN - CRNA    1/23/23

## 2023-01-19 LAB — MRSA CULTURE ONLY: NORMAL

## 2023-01-23 ENCOUNTER — ANESTHESIA (OUTPATIENT)
Dept: OPERATING ROOM | Age: 78
DRG: 470 | End: 2023-01-23
Payer: MEDICARE

## 2023-01-23 ENCOUNTER — APPOINTMENT (OUTPATIENT)
Dept: GENERAL RADIOLOGY | Age: 78
DRG: 470 | End: 2023-01-23
Attending: STUDENT IN AN ORGANIZED HEALTH CARE EDUCATION/TRAINING PROGRAM
Payer: MEDICARE

## 2023-01-23 ENCOUNTER — HOSPITAL ENCOUNTER (INPATIENT)
Age: 78
LOS: 1 days | Discharge: HOME HEALTH | DRG: 470 | End: 2023-01-24
Attending: STUDENT IN AN ORGANIZED HEALTH CARE EDUCATION/TRAINING PROGRAM | Admitting: STUDENT IN AN ORGANIZED HEALTH CARE EDUCATION/TRAINING PROGRAM
Payer: MEDICARE

## 2023-01-23 DIAGNOSIS — Z96.652 S/P TOTAL KNEE ARTHROPLASTY, LEFT: ICD-10-CM

## 2023-01-23 DIAGNOSIS — M17.12 PRIMARY OSTEOARTHRITIS OF LEFT KNEE: Primary | ICD-10-CM

## 2023-01-23 DIAGNOSIS — M17.12 OSTEOARTHRITIS OF LEFT KNEE, UNSPECIFIED OSTEOARTHRITIS TYPE: ICD-10-CM

## 2023-01-23 PROBLEM — I47.20 VENTRICULAR TACHYCARDIA (HCC): Status: ACTIVE | Noted: 2023-01-23

## 2023-01-23 LAB
ANION GAP SERPL CALCULATED.3IONS-SCNC: 12 MMOL/L (ref 7–16)
BASOPHILS ABSOLUTE: 0.02 E9/L (ref 0–0.2)
BASOPHILS RELATIVE PERCENT: 0.1 % (ref 0–2)
BUN BLDV-MCNC: 24 MG/DL (ref 6–23)
CALCIUM SERPL-MCNC: 8.7 MG/DL (ref 8.6–10.2)
CHLORIDE BLD-SCNC: 97 MMOL/L (ref 98–107)
CO2: 25 MMOL/L (ref 22–29)
CREAT SERPL-MCNC: 1 MG/DL (ref 0.5–1)
EKG ATRIAL RATE: 63 BPM
EKG P AXIS: 16 DEGREES
EKG P-R INTERVAL: 200 MS
EKG Q-T INTERVAL: 426 MS
EKG QRS DURATION: 82 MS
EKG QTC CALCULATION (BAZETT): 435 MS
EKG R AXIS: 30 DEGREES
EKG T AXIS: 60 DEGREES
EKG VENTRICULAR RATE: 63 BPM
EOSINOPHILS ABSOLUTE: 0 E9/L (ref 0.05–0.5)
EOSINOPHILS RELATIVE PERCENT: 0 % (ref 0–6)
GFR SERPL CREATININE-BSD FRML MDRD: 57 ML/MIN/1.73
GLUCOSE BLD-MCNC: 215 MG/DL (ref 74–99)
HCT VFR BLD CALC: 31.8 % (ref 34–48)
HEMOGLOBIN: 9.5 G/DL (ref 11.5–15.5)
IMMATURE GRANULOCYTES #: 0.08 E9/L
IMMATURE GRANULOCYTES %: 0.5 % (ref 0–5)
LYMPHOCYTES ABSOLUTE: 1.02 E9/L (ref 1.5–4)
LYMPHOCYTES RELATIVE PERCENT: 6.1 % (ref 20–42)
MAGNESIUM: 2.1 MG/DL (ref 1.6–2.6)
MCH RBC QN AUTO: 29.9 PG (ref 26–35)
MCHC RBC AUTO-ENTMCNC: 29.9 % (ref 32–34.5)
MCV RBC AUTO: 100 FL (ref 80–99.9)
MONOCYTES ABSOLUTE: 0.37 E9/L (ref 0.1–0.95)
MONOCYTES RELATIVE PERCENT: 2.2 % (ref 2–12)
NEUTROPHILS ABSOLUTE: 15.2 E9/L (ref 1.8–7.3)
NEUTROPHILS RELATIVE PERCENT: 91.1 % (ref 43–80)
PDW BLD-RTO: 13 FL (ref 11.5–15)
PLATELET # BLD: 180 E9/L (ref 130–450)
PMV BLD AUTO: 11.8 FL (ref 7–12)
POTASSIUM REFLEX MAGNESIUM: 5.4 MMOL/L (ref 3.5–5)
RBC # BLD: 3.18 E12/L (ref 3.5–5.5)
SODIUM BLD-SCNC: 134 MMOL/L (ref 132–146)
WBC # BLD: 16.7 E9/L (ref 4.5–11.5)

## 2023-01-23 PROCEDURE — 6370000000 HC RX 637 (ALT 250 FOR IP): Performed by: STUDENT IN AN ORGANIZED HEALTH CARE EDUCATION/TRAINING PROGRAM

## 2023-01-23 PROCEDURE — 88311 DECALCIFY TISSUE: CPT

## 2023-01-23 PROCEDURE — C1776 JOINT DEVICE (IMPLANTABLE): HCPCS | Performed by: STUDENT IN AN ORGANIZED HEALTH CARE EDUCATION/TRAINING PROGRAM

## 2023-01-23 PROCEDURE — 73560 X-RAY EXAM OF KNEE 1 OR 2: CPT

## 2023-01-23 PROCEDURE — 6360000002 HC RX W HCPCS: Performed by: STUDENT IN AN ORGANIZED HEALTH CARE EDUCATION/TRAINING PROGRAM

## 2023-01-23 PROCEDURE — 2709999900 HC NON-CHARGEABLE SUPPLY: Performed by: STUDENT IN AN ORGANIZED HEALTH CARE EDUCATION/TRAINING PROGRAM

## 2023-01-23 PROCEDURE — 6360000002 HC RX W HCPCS: Performed by: ANESTHESIOLOGY

## 2023-01-23 PROCEDURE — 3700000001 HC ADD 15 MINUTES (ANESTHESIA): Performed by: STUDENT IN AN ORGANIZED HEALTH CARE EDUCATION/TRAINING PROGRAM

## 2023-01-23 PROCEDURE — 2500000003 HC RX 250 WO HCPCS: Performed by: NURSE ANESTHETIST, CERTIFIED REGISTERED

## 2023-01-23 PROCEDURE — 7100000001 HC PACU RECOVERY - ADDTL 15 MIN: Performed by: STUDENT IN AN ORGANIZED HEALTH CARE EDUCATION/TRAINING PROGRAM

## 2023-01-23 PROCEDURE — 2500000003 HC RX 250 WO HCPCS: Performed by: STUDENT IN AN ORGANIZED HEALTH CARE EDUCATION/TRAINING PROGRAM

## 2023-01-23 PROCEDURE — 2580000003 HC RX 258: Performed by: NURSE ANESTHETIST, CERTIFIED REGISTERED

## 2023-01-23 PROCEDURE — 8E0Y0CZ ROBOTIC ASSISTED PROCEDURE OF LOWER EXTREMITY, OPEN APPROACH: ICD-10-PCS | Performed by: STUDENT IN AN ORGANIZED HEALTH CARE EDUCATION/TRAINING PROGRAM

## 2023-01-23 PROCEDURE — 6360000002 HC RX W HCPCS: Performed by: NURSE ANESTHETIST, CERTIFIED REGISTERED

## 2023-01-23 PROCEDURE — 36415 COLL VENOUS BLD VENIPUNCTURE: CPT

## 2023-01-23 PROCEDURE — 2060000000 HC ICU INTERMEDIATE R&B

## 2023-01-23 PROCEDURE — 83735 ASSAY OF MAGNESIUM: CPT

## 2023-01-23 PROCEDURE — 2580000003 HC RX 258: Performed by: STUDENT IN AN ORGANIZED HEALTH CARE EDUCATION/TRAINING PROGRAM

## 2023-01-23 PROCEDURE — C1713 ANCHOR/SCREW BN/BN,TIS/BN: HCPCS | Performed by: STUDENT IN AN ORGANIZED HEALTH CARE EDUCATION/TRAINING PROGRAM

## 2023-01-23 PROCEDURE — 93005 ELECTROCARDIOGRAM TRACING: CPT | Performed by: STUDENT IN AN ORGANIZED HEALTH CARE EDUCATION/TRAINING PROGRAM

## 2023-01-23 PROCEDURE — 2720000010 HC SURG SUPPLY STERILE: Performed by: STUDENT IN AN ORGANIZED HEALTH CARE EDUCATION/TRAINING PROGRAM

## 2023-01-23 PROCEDURE — 85025 COMPLETE CBC W/AUTO DIFF WBC: CPT

## 2023-01-23 PROCEDURE — 0SRD0J9 REPLACEMENT OF LEFT KNEE JOINT WITH SYNTHETIC SUBSTITUTE, CEMENTED, OPEN APPROACH: ICD-10-PCS | Performed by: STUDENT IN AN ORGANIZED HEALTH CARE EDUCATION/TRAINING PROGRAM

## 2023-01-23 PROCEDURE — A4217 STERILE WATER/SALINE, 500 ML: HCPCS | Performed by: STUDENT IN AN ORGANIZED HEALTH CARE EDUCATION/TRAINING PROGRAM

## 2023-01-23 PROCEDURE — 80048 BASIC METABOLIC PNL TOTAL CA: CPT

## 2023-01-23 PROCEDURE — 64447 NJX AA&/STRD FEMORAL NRV IMG: CPT | Performed by: ANESTHESIOLOGY

## 2023-01-23 PROCEDURE — 88305 TISSUE EXAM BY PATHOLOGIST: CPT

## 2023-01-23 PROCEDURE — 3600000004 HC SURGERY LEVEL 4 BASE: Performed by: STUDENT IN AN ORGANIZED HEALTH CARE EDUCATION/TRAINING PROGRAM

## 2023-01-23 PROCEDURE — 2500000003 HC RX 250 WO HCPCS: Performed by: ANESTHESIOLOGY

## 2023-01-23 PROCEDURE — 3600000014 HC SURGERY LEVEL 4 ADDTL 15MIN: Performed by: STUDENT IN AN ORGANIZED HEALTH CARE EDUCATION/TRAINING PROGRAM

## 2023-01-23 PROCEDURE — 7100000000 HC PACU RECOVERY - FIRST 15 MIN: Performed by: STUDENT IN AN ORGANIZED HEALTH CARE EDUCATION/TRAINING PROGRAM

## 2023-01-23 PROCEDURE — 6360000002 HC RX W HCPCS

## 2023-01-23 PROCEDURE — 3700000000 HC ANESTHESIA ATTENDED CARE: Performed by: STUDENT IN AN ORGANIZED HEALTH CARE EDUCATION/TRAINING PROGRAM

## 2023-01-23 DEVICE — ASYMMETRIC PATELLA
Type: IMPLANTABLE DEVICE | Site: KNEE | Status: FUNCTIONAL
Brand: TRIATHLON

## 2023-01-23 DEVICE — PRIMARY TIBIAL BASEPLATE
Type: IMPLANTABLE DEVICE | Site: KNEE | Status: FUNCTIONAL
Brand: TRIATHLON

## 2023-01-23 DEVICE — SIMPLEX® HV IS A FAST-SETTING ACRYLIC RESIN FOR USE IN BONE SURGERY. MIXING THE TWO SEPARATE STERILE COMPONENTS PRODUCES A DUCTILE BONE CEMENT WHICH, AFTER HARDENING, FIXES THE IMPLANT AND TRANSFERS STRESSES PRODUCED DURING MOVEMENT EVENLY TO THE BONE. SIMPLEX® HV CEMENT POWDER ALSO CONTAINS INSOLUBLE ZIRCONIUM DIOXIDE AS AN X-RAY CONTRAST MEDIUM. SIMPLEX® HV DOES NOT EMIT A SIGNAL AND DOES NOT POSE A SAFETY RISK IN A MAGNETIC RESONANCE ENVIRONMENT.
Type: IMPLANTABLE DEVICE | Site: KNEE | Status: FUNCTIONAL
Brand: SIMPLEX HV

## 2023-01-23 DEVICE — CRUCIATE RETAINING FEMORAL
Type: IMPLANTABLE DEVICE | Site: KNEE | Status: FUNCTIONAL
Brand: TRIATHLON

## 2023-01-23 DEVICE — INSERT TIB CS 4 10 MM ARTC POST KNEE BEAR TECHNOLOGY X3: Type: IMPLANTABLE DEVICE | Site: KNEE | Status: FUNCTIONAL

## 2023-01-23 RX ORDER — LIDOCAINE HYDROCHLORIDE 20 MG/ML
INJECTION, SOLUTION EPIDURAL; INFILTRATION; INTRACAUDAL; PERINEURAL PRN
Status: DISCONTINUED | OUTPATIENT
Start: 2023-01-23 | End: 2023-01-23 | Stop reason: SDUPTHER

## 2023-01-23 RX ORDER — SODIUM CHLORIDE 9 MG/ML
INJECTION, SOLUTION INTRAVENOUS PRN
Status: DISCONTINUED | OUTPATIENT
Start: 2023-01-23 | End: 2023-01-23 | Stop reason: HOSPADM

## 2023-01-23 RX ORDER — MORPHINE SULFATE 2 MG/ML
2 INJECTION, SOLUTION INTRAMUSCULAR; INTRAVENOUS
Status: DISCONTINUED | OUTPATIENT
Start: 2023-01-23 | End: 2023-01-24 | Stop reason: HOSPADM

## 2023-01-23 RX ORDER — METOPROLOL TARTRATE 50 MG/1
50 TABLET, FILM COATED ORAL 2 TIMES DAILY
Status: DISCONTINUED | OUTPATIENT
Start: 2023-01-23 | End: 2023-01-24 | Stop reason: HOSPADM

## 2023-01-23 RX ORDER — OXYCODONE HYDROCHLORIDE 5 MG/1
5 TABLET ORAL EVERY 4 HOURS PRN
Status: DISCONTINUED | OUTPATIENT
Start: 2023-01-23 | End: 2023-01-24 | Stop reason: HOSPADM

## 2023-01-23 RX ORDER — FENTANYL CITRATE 50 UG/ML
INJECTION, SOLUTION INTRAMUSCULAR; INTRAVENOUS PRN
Status: DISCONTINUED | OUTPATIENT
Start: 2023-01-23 | End: 2023-01-23 | Stop reason: SDUPTHER

## 2023-01-23 RX ORDER — FENTANYL CITRATE 50 UG/ML
50 INJECTION, SOLUTION INTRAMUSCULAR; INTRAVENOUS ONCE
Status: DISCONTINUED | OUTPATIENT
Start: 2023-01-23 | End: 2023-01-23 | Stop reason: HOSPADM

## 2023-01-23 RX ORDER — OXYCODONE HYDROCHLORIDE 5 MG/1
5 TABLET ORAL EVERY 6 HOURS PRN
Qty: 28 TABLET | Refills: 0 | Status: SHIPPED | OUTPATIENT
Start: 2023-01-23 | End: 2023-01-23 | Stop reason: HOSPADM

## 2023-01-23 RX ORDER — ROCURONIUM BROMIDE 10 MG/ML
INJECTION, SOLUTION INTRAVENOUS PRN
Status: DISCONTINUED | OUTPATIENT
Start: 2023-01-23 | End: 2023-01-23 | Stop reason: SDUPTHER

## 2023-01-23 RX ORDER — KETOROLAC TROMETHAMINE 30 MG/ML
15 INJECTION, SOLUTION INTRAMUSCULAR; INTRAVENOUS EVERY 6 HOURS
Status: DISCONTINUED | OUTPATIENT
Start: 2023-01-23 | End: 2023-01-24 | Stop reason: HOSPADM

## 2023-01-23 RX ORDER — ATORVASTATIN CALCIUM 20 MG/1
20 TABLET, FILM COATED ORAL NIGHTLY
Status: DISCONTINUED | OUTPATIENT
Start: 2023-01-23 | End: 2023-01-24 | Stop reason: HOSPADM

## 2023-01-23 RX ORDER — ACETAMINOPHEN 500 MG
1000 TABLET ORAL ONCE
Status: COMPLETED | OUTPATIENT
Start: 2023-01-23 | End: 2023-01-23

## 2023-01-23 RX ORDER — DIPHENHYDRAMINE HYDROCHLORIDE 50 MG/ML
12.5 INJECTION INTRAMUSCULAR; INTRAVENOUS
Status: DISCONTINUED | OUTPATIENT
Start: 2023-01-23 | End: 2023-01-23 | Stop reason: HOSPADM

## 2023-01-23 RX ORDER — LABETALOL HYDROCHLORIDE 5 MG/ML
INJECTION, SOLUTION INTRAVENOUS PRN
Status: DISCONTINUED | OUTPATIENT
Start: 2023-01-23 | End: 2023-01-23 | Stop reason: SDUPTHER

## 2023-01-23 RX ORDER — SODIUM CHLORIDE 9 MG/ML
INJECTION, SOLUTION INTRAVENOUS PRN
Status: DISCONTINUED | OUTPATIENT
Start: 2023-01-23 | End: 2023-01-24 | Stop reason: HOSPADM

## 2023-01-23 RX ORDER — MIDAZOLAM HYDROCHLORIDE 2 MG/2ML
1 INJECTION, SOLUTION INTRAMUSCULAR; INTRAVENOUS ONCE
Status: COMPLETED | OUTPATIENT
Start: 2023-01-23 | End: 2023-01-23

## 2023-01-23 RX ORDER — SODIUM CHLORIDE 0.9 % (FLUSH) 0.9 %
5-40 SYRINGE (ML) INJECTION EVERY 12 HOURS SCHEDULED
Status: DISCONTINUED | OUTPATIENT
Start: 2023-01-23 | End: 2023-01-23 | Stop reason: HOSPADM

## 2023-01-23 RX ORDER — SODIUM CHLORIDE 0.9 % (FLUSH) 0.9 %
5-40 SYRINGE (ML) INJECTION EVERY 12 HOURS SCHEDULED
Status: DISCONTINUED | OUTPATIENT
Start: 2023-01-23 | End: 2023-01-24 | Stop reason: HOSPADM

## 2023-01-23 RX ORDER — DEXAMETHASONE SODIUM PHOSPHATE 10 MG/ML
8 INJECTION, SOLUTION INTRAMUSCULAR; INTRAVENOUS ONCE
Status: DISCONTINUED | OUTPATIENT
Start: 2023-01-23 | End: 2023-01-23 | Stop reason: HOSPADM

## 2023-01-23 RX ORDER — OXYCODONE HYDROCHLORIDE 5 MG/1
10 TABLET ORAL EVERY 4 HOURS PRN
Status: DISCONTINUED | OUTPATIENT
Start: 2023-01-23 | End: 2023-01-24 | Stop reason: HOSPADM

## 2023-01-23 RX ORDER — ROPIVACAINE HYDROCHLORIDE 5 MG/ML
30 INJECTION, SOLUTION EPIDURAL; INFILTRATION; PERINEURAL ONCE
Status: COMPLETED | OUTPATIENT
Start: 2023-01-23 | End: 2023-01-23

## 2023-01-23 RX ORDER — OXYCODONE HYDROCHLORIDE 5 MG/1
5 TABLET ORAL EVERY 6 HOURS PRN
Qty: 28 TABLET | Refills: 0 | Status: SHIPPED | OUTPATIENT
Start: 2023-01-23 | End: 2023-01-30

## 2023-01-23 RX ORDER — DIMETHICONE, OXYBENZONE, AND PADIMATE O 2; 2.5; 6.6 G/100G; G/100G; G/100G
STICK TOPICAL
Status: DISPENSED
Start: 2023-01-23 | End: 2023-01-24

## 2023-01-23 RX ORDER — ONDANSETRON 2 MG/ML
4 INJECTION INTRAMUSCULAR; INTRAVENOUS EVERY 6 HOURS PRN
Status: DISCONTINUED | OUTPATIENT
Start: 2023-01-23 | End: 2023-01-24 | Stop reason: HOSPADM

## 2023-01-23 RX ORDER — MELOXICAM 7.5 MG/1
3.75 TABLET ORAL ONCE
Status: COMPLETED | OUTPATIENT
Start: 2023-01-23 | End: 2023-01-23

## 2023-01-23 RX ORDER — MAGNESIUM SULFATE IN WATER 40 MG/ML
2000 INJECTION, SOLUTION INTRAVENOUS ONCE
Status: DISCONTINUED | OUTPATIENT
Start: 2023-01-23 | End: 2023-01-24 | Stop reason: HOSPADM

## 2023-01-23 RX ORDER — VANCOMYCIN HYDROCHLORIDE 1 G/20ML
INJECTION, POWDER, LYOPHILIZED, FOR SOLUTION INTRAVENOUS PRN
Status: DISCONTINUED | OUTPATIENT
Start: 2023-01-23 | End: 2023-01-23 | Stop reason: ALTCHOICE

## 2023-01-23 RX ORDER — SODIUM CHLORIDE 0.9 % (FLUSH) 0.9 %
5-40 SYRINGE (ML) INJECTION PRN
Status: DISCONTINUED | OUTPATIENT
Start: 2023-01-23 | End: 2023-01-24 | Stop reason: HOSPADM

## 2023-01-23 RX ORDER — VENLAFAXINE HYDROCHLORIDE 150 MG/1
300 CAPSULE, EXTENDED RELEASE ORAL
Status: DISCONTINUED | OUTPATIENT
Start: 2023-01-24 | End: 2023-01-24 | Stop reason: HOSPADM

## 2023-01-23 RX ORDER — PROPOFOL 10 MG/ML
INJECTION, EMULSION INTRAVENOUS PRN
Status: DISCONTINUED | OUTPATIENT
Start: 2023-01-23 | End: 2023-01-23 | Stop reason: SDUPTHER

## 2023-01-23 RX ORDER — DEXAMETHASONE SODIUM PHOSPHATE 10 MG/ML
4 INJECTION, SOLUTION INTRAMUSCULAR; INTRAVENOUS ONCE
Status: DISCONTINUED | OUTPATIENT
Start: 2023-01-23 | End: 2023-01-23 | Stop reason: HOSPADM

## 2023-01-23 RX ORDER — SODIUM CHLORIDE 0.9 % (FLUSH) 0.9 %
5-40 SYRINGE (ML) INJECTION PRN
Status: DISCONTINUED | OUTPATIENT
Start: 2023-01-23 | End: 2023-01-23 | Stop reason: HOSPADM

## 2023-01-23 RX ORDER — MAGNESIUM SULFATE IN WATER 40 MG/ML
2000 INJECTION, SOLUTION INTRAVENOUS ONCE
Status: DISCONTINUED | OUTPATIENT
Start: 2023-01-23 | End: 2023-01-23

## 2023-01-23 RX ORDER — MORPHINE SULFATE 4 MG/ML
4 INJECTION, SOLUTION INTRAMUSCULAR; INTRAVENOUS
Status: DISCONTINUED | OUTPATIENT
Start: 2023-01-23 | End: 2023-01-24 | Stop reason: HOSPADM

## 2023-01-23 RX ORDER — LEVOTHYROXINE SODIUM 0.07 MG/1
150 TABLET ORAL DAILY
Status: DISCONTINUED | OUTPATIENT
Start: 2023-01-24 | End: 2023-01-24 | Stop reason: HOSPADM

## 2023-01-23 RX ORDER — ONDANSETRON 4 MG/1
4 TABLET, ORALLY DISINTEGRATING ORAL EVERY 8 HOURS PRN
Status: DISCONTINUED | OUTPATIENT
Start: 2023-01-23 | End: 2023-01-24 | Stop reason: HOSPADM

## 2023-01-23 RX ORDER — SODIUM CHLORIDE 9 MG/ML
INJECTION, SOLUTION INTRAVENOUS CONTINUOUS
Status: DISCONTINUED | OUTPATIENT
Start: 2023-01-23 | End: 2023-01-24 | Stop reason: HOSPADM

## 2023-01-23 RX ORDER — ACETAMINOPHEN 325 MG/1
650 TABLET ORAL EVERY 6 HOURS
Status: DISCONTINUED | OUTPATIENT
Start: 2023-01-23 | End: 2023-01-24 | Stop reason: HOSPADM

## 2023-01-23 RX ORDER — SODIUM CHLORIDE, SODIUM LACTATE, POTASSIUM CHLORIDE, CALCIUM CHLORIDE 600; 310; 30; 20 MG/100ML; MG/100ML; MG/100ML; MG/100ML
INJECTION, SOLUTION INTRAVENOUS CONTINUOUS PRN
Status: DISCONTINUED | OUTPATIENT
Start: 2023-01-23 | End: 2023-01-23 | Stop reason: SDUPTHER

## 2023-01-23 RX ORDER — LIDOCAINE HYDROCHLORIDE 10 MG/ML
10 INJECTION, SOLUTION INFILTRATION; PERINEURAL
Status: COMPLETED | OUTPATIENT
Start: 2023-01-23 | End: 2023-01-23

## 2023-01-23 RX ORDER — MEPERIDINE HYDROCHLORIDE 25 MG/ML
12.5 INJECTION INTRAMUSCULAR; INTRAVENOUS; SUBCUTANEOUS EVERY 5 MIN PRN
Status: DISCONTINUED | OUTPATIENT
Start: 2023-01-23 | End: 2023-01-23 | Stop reason: HOSPADM

## 2023-01-23 RX ORDER — DEXAMETHASONE SODIUM PHOSPHATE 4 MG/ML
INJECTION, SOLUTION INTRA-ARTICULAR; INTRALESIONAL; INTRAMUSCULAR; INTRAVENOUS; SOFT TISSUE PRN
Status: DISCONTINUED | OUTPATIENT
Start: 2023-01-23 | End: 2023-01-23 | Stop reason: SDUPTHER

## 2023-01-23 RX ORDER — ONDANSETRON 2 MG/ML
INJECTION INTRAMUSCULAR; INTRAVENOUS PRN
Status: DISCONTINUED | OUTPATIENT
Start: 2023-01-23 | End: 2023-01-23 | Stop reason: SDUPTHER

## 2023-01-23 RX ADMIN — SODIUM CHLORIDE: 9 INJECTION, SOLUTION INTRAVENOUS at 17:57

## 2023-01-23 RX ADMIN — ACETAMINOPHEN 1000 MG: 500 TABLET ORAL at 07:50

## 2023-01-23 RX ADMIN — SODIUM CHLORIDE, POTASSIUM CHLORIDE, SODIUM LACTATE AND CALCIUM CHLORIDE: 600; 310; 30; 20 INJECTION, SOLUTION INTRAVENOUS at 08:27

## 2023-01-23 RX ADMIN — SODIUM CHLORIDE, POTASSIUM CHLORIDE, SODIUM LACTATE AND CALCIUM CHLORIDE: 600; 310; 30; 20 INJECTION, SOLUTION INTRAVENOUS at 10:09

## 2023-01-23 RX ADMIN — SUGAMMADEX 250 MG: 100 INJECTION, SOLUTION INTRAVENOUS at 11:34

## 2023-01-23 RX ADMIN — MELOXICAM 3.75 MG: 7.5 TABLET ORAL at 07:50

## 2023-01-23 RX ADMIN — LIDOCAINE HYDROCHLORIDE 100 MG: 20 INJECTION, SOLUTION EPIDURAL; INFILTRATION; INTRACAUDAL; PERINEURAL at 09:27

## 2023-01-23 RX ADMIN — TRANEXAMIC ACID 1000 MG: 1 INJECTION, SOLUTION INTRAVENOUS at 12:37

## 2023-01-23 RX ADMIN — WATER 2000 MG: 1 INJECTION INTRAMUSCULAR; INTRAVENOUS; SUBCUTANEOUS at 17:56

## 2023-01-23 RX ADMIN — FENTANYL CITRATE 100 MCG: 50 INJECTION, SOLUTION INTRAMUSCULAR; INTRAVENOUS at 09:27

## 2023-01-23 RX ADMIN — METOPROLOL TARTRATE 50 MG: 50 TABLET, FILM COATED ORAL at 21:05

## 2023-01-23 RX ADMIN — WATER 2000 MG: 1 INJECTION INTRAMUSCULAR; INTRAVENOUS; SUBCUTANEOUS at 09:18

## 2023-01-23 RX ADMIN — ATORVASTATIN CALCIUM 20 MG: 20 TABLET, FILM COATED ORAL at 21:05

## 2023-01-23 RX ADMIN — SODIUM CHLORIDE: 9 INJECTION, SOLUTION INTRAVENOUS at 23:51

## 2023-01-23 RX ADMIN — SODIUM CHLORIDE, PRESERVATIVE FREE 10 ML: 5 INJECTION INTRAVENOUS at 21:05

## 2023-01-23 RX ADMIN — LABETALOL HYDROCHLORIDE 5 MG: 5 INJECTION INTRAVENOUS at 10:32

## 2023-01-23 RX ADMIN — TRANEXAMIC ACID 1000 MG: 1 INJECTION, SOLUTION INTRAVENOUS at 09:34

## 2023-01-23 RX ADMIN — MIDAZOLAM 1 MG: 1 INJECTION INTRAMUSCULAR; INTRAVENOUS at 09:08

## 2023-01-23 RX ADMIN — MAGNESIUM SULFATE HEPTAHYDRATE 2000 MG: 2 INJECTION, SOLUTION INTRAVENOUS at 12:59

## 2023-01-23 RX ADMIN — HYDROMORPHONE HYDROCHLORIDE 0.5 MG: 1 INJECTION, SOLUTION INTRAMUSCULAR; INTRAVENOUS; SUBCUTANEOUS at 12:05

## 2023-01-23 RX ADMIN — FENTANYL CITRATE 50 MCG: 50 INJECTION, SOLUTION INTRAMUSCULAR; INTRAVENOUS at 09:58

## 2023-01-23 RX ADMIN — ROPIVACAINE HYDROCHLORIDE 30 ML: 5 INJECTION, SOLUTION EPIDURAL; INFILTRATION; PERINEURAL at 09:15

## 2023-01-23 RX ADMIN — HYDROMORPHONE HYDROCHLORIDE 0.5 MG: 1 INJECTION, SOLUTION INTRAMUSCULAR; INTRAVENOUS; SUBCUTANEOUS at 12:29

## 2023-01-23 RX ADMIN — ACETAMINOPHEN 650 MG: 325 TABLET ORAL at 17:55

## 2023-01-23 RX ADMIN — DEXAMETHASONE SODIUM PHOSPHATE 10 MG: 4 INJECTION, SOLUTION INTRAMUSCULAR; INTRAVENOUS at 09:41

## 2023-01-23 RX ADMIN — FENTANYL CITRATE 50 MCG: 50 INJECTION, SOLUTION INTRAMUSCULAR; INTRAVENOUS at 09:56

## 2023-01-23 RX ADMIN — LIDOCAINE HYDROCHLORIDE 5 ML: 10 INJECTION, SOLUTION INFILTRATION; PERINEURAL at 09:12

## 2023-01-23 RX ADMIN — ROCURONIUM BROMIDE 40 MG: 10 INJECTION, SOLUTION INTRAVENOUS at 09:27

## 2023-01-23 RX ADMIN — PROPOFOL 25 MG: 10 INJECTION, EMULSION INTRAVENOUS at 10:32

## 2023-01-23 RX ADMIN — PROPOFOL 110 MG: 10 INJECTION, EMULSION INTRAVENOUS at 09:27

## 2023-01-23 RX ADMIN — LABETALOL HYDROCHLORIDE 5 MG: 5 INJECTION INTRAVENOUS at 09:58

## 2023-01-23 RX ADMIN — KETOROLAC TROMETHAMINE 15 MG: 30 INJECTION, SOLUTION INTRAMUSCULAR at 17:55

## 2023-01-23 RX ADMIN — FENTANYL CITRATE 50 MCG: 50 INJECTION, SOLUTION INTRAMUSCULAR; INTRAVENOUS at 10:35

## 2023-01-23 RX ADMIN — ONDANSETRON 4 MG: 2 INJECTION INTRAMUSCULAR; INTRAVENOUS at 09:41

## 2023-01-23 ASSESSMENT — PAIN DESCRIPTION - ORIENTATION
ORIENTATION: LEFT

## 2023-01-23 ASSESSMENT — PAIN SCALES - GENERAL
PAINLEVEL_OUTOF10: 4
PAINLEVEL_OUTOF10: 9
PAINLEVEL_OUTOF10: 0
PAINLEVEL_OUTOF10: 10
PAINLEVEL_OUTOF10: 4
PAINLEVEL_OUTOF10: 3
PAINLEVEL_OUTOF10: 4
PAINLEVEL_OUTOF10: 5

## 2023-01-23 ASSESSMENT — PAIN DESCRIPTION - DESCRIPTORS
DESCRIPTORS: DISCOMFORT
DESCRIPTORS: ACHING;SORE
DESCRIPTORS: ACHING;DISCOMFORT;SORE;THROBBING;SHARP
DESCRIPTORS: DISCOMFORT
DESCRIPTORS: DISCOMFORT;ACHING

## 2023-01-23 ASSESSMENT — PAIN DESCRIPTION - LOCATION
LOCATION: KNEE

## 2023-01-23 NOTE — OP NOTE
OPERATIVE REPORT    PATIENT:  Hyacinth Quinones  [id number]    DATE OF PROCEDURE:  1/23/23    SURGEON: Antoine Peacock DO    ASSISTANT:  Ulysses Castano DO, Brannon Mcgrath DO, Adriel Campbell DO    PREOPERATIVE DIAGNOSIS: Degenerative joint disease , Left knee. POSTOPERATIVE DIAGNOSIS: Degenerative joint disease,  Leftknee. OPERATION: [place] Robot Assisted Left total knee arthroplasty     ANESTHESIA: . general and adductor canal block     OPERATIVE INDICATIONS:  The patient is a 76year old female with end stage degenerative joint disease involving the knee, for which more conservative non operative management has failed. The patient is thus indicated for total knee arthroplasty. We discussed use of the [facility] robot for assistance. The patient was agreeable. The risks and benefits, as well as alternatives were discussed at length with the patient in detail. These risks include, but are not limited to infection, nerve and/or blood vessel injury, intra or post operative fracture, need for revision surgery, failure of implants, deep vein thrombosis and pulmonary embolism, athrofibrosis, and the risks of general anesthesia provided by the anesthesiologist.   The patient understood these risks, signed an informed consent, and elected to proceed    OPERATIVE FINDINGS:   There was extensive eburnation of bone, and full thickness cartilage loss over the femoral and tibial condyles. OPERATIVE PROCEDURE: After satisfactory spinal anesthesia, the patient was placed supine on the operative table. A Bump was placed under the operative leg and a tourniquet was placed high on the operative thigh. The operative extremity was prepped and draped in sterile fashion. Prior to procedure start, a time out was performed including confirmation of operative site and operation to be performed. Antibiotic prophylaxis, 2 g ancef was given prior to incision, as was 1 g of transexamic acid.       The leg was exsanguinated with an Esmarch bandage. The tourniquet was inflated. An anterior midline incision was made centered about the patella. Dissection was carried down in the midline of the extensor mechanism where a medial parapatellar arthrotomy was made. The patella was everted and a free hand method was used to cut the patella after removal of osteophytes. The appropriate sized patellar cutting guide was placed, and found to be a size 32. The patella was then drilled. A patellar protector was placed and the patella was subluxed laterally. We then placed our femoral and tibial pins and assembled the array for the femur and tibia respectively. Femoral and tibial registration buttons were placed. Hip centering and identification of medial and lateral malleoli was performed. The knee was flexed. Appropriate points were registered on the femur followed by the tibia. All osteophytes were then removed. The knee was brought into extension. Baseline measurements were 6 degrees of varus with 6 degree flexion contracture. Appropriate tension was placed on the medial and lateral compartments with sizing spoons and we captured our measurements with correction of deformity. This was repeated with the knee in 90 degrees of flexion. Appropriate adjustments were then made utilizing the [address] to plan for 18 mm gaps in extension and flexion. We then prepared to make our cuts. The knee was flexed. Medial and lateral retractors were placed. Utilizing the [facility] robot arm, we made femoral and tibial cuts. All bone fragments were removed. A lamina  was placed in order to access the posterior aspect of the knee and remove osteophytes and remaining meniscal tissue. The knee was then flexed, and the appropriate size tibial tray was placed in the correct amount of external rotation. A size 4 proved to be best fit. The tray and 10 mm poly trial were placed on the tibia and allowed to float.   The femoral trial was then placed, size 4, and the knee was reduced and taken through a range of motion. Range of motion was found to be excellent including 0 degrees at extension, and 140 degrees of flexion without tibial tray lift off. We noted that we were balanced nicely in extension and flexion based on the measurements on our CT image, 18 mm gaps in flexion and extension. The knee was then flexed again, and the femur was drilled. The tibial tower was placed and the tibia was punched. All trial components were then removed. The knee was thoroughly irrigated. With the knee in extension, bovie electrocautery was utilized to coagulate in the posteromedial and posterolateral gutters. Local anesthetic consisting of 1% lidocaine with epi, 1% lidocaine without epi, and 0.25% Marcaine was then placed into the posterior capsule and into the periosteum of the femur and tibia, and into the anterior capsule. 1 unit of high viscosity cement was mixed on the back table. The bony surfaces were dried and the knee was flexed. Cement was placed via spatula onto the tibial bone and pressed into the bone. The tibial component was then placed using a mallet. The final polyethylene was impacted into place. Cement was then placed along the femoral bone surface, and the femoral component posterior condyle was coated with a small amount of cement. The femoral component was then placed. Excess cement was removed and the components were impacted once again. The knee was then placed in extension with a bump under the knee. The patella was everted, surface dried, and poly component was cemented into place and clamped. Once the cement hardened, the clamp was removed and the knee was irrigated. The tourniquet was released and bleeders were coagulated. A Betadine shock was performed for 3 minutes.   We assessed range of motion and stability once again and noted full extension, flexion 140 degrees without significant tightness in good posterior drawer, as well as symmetric gaps on our CT model. Irrigated once more. The joint capsule was then closed using an O stratafix. 2-O interrupted Monocryl suture was used to close the subcutanseous tissue. Finally, staples were used to close the skin. .  A sterile dressing was placed. The patient was then transferred to their hospital bed, awoken from anesthesia, and transported to the PACU in stable condition. IMPLANTS:   Implant Name Type Inv. Item Serial No.  Lot No. LRB No. Used Action   CEMENT BNE RADIOPAQUE FAST SET ACRYL RESIN HI VISC SIMPLEXHV - [id number]  CEMENT BNE RADIOPAQUE FAST SET ACRYL RESIN HI VISC SIMPLEXHV  [employer] [id number] Left 1 Implanted   IMPLANT PATELLAR [id number] X3 ASYMMETRIC TRIATHLON - [id number]  IMPLANT PATELLAR [id number] X3 ASYMMETRIC TRIATHLON  [employer] [id number] Left 1 Implanted   COMPONENT FEM SZ 3 L ANT KNEE CRUCE RET DUANE REFERENCING - [id number]  COMPONENT FEM SZ 3 L ANT KNEE CRUCE RET DUANE REFERENCING  [employer] [id number] Left 1 Implanted   BASEPLATE TIB SZ 4 KNEE TRITANIUM DUANE CHARLES LO PROF TRIATHLON - [id number]  BASEPLATE TIB SZ 4 KNEE TRITANIUM DUANE CHARLES LO PROF TRIATHLON  [email or web address]ifter [id number] Left 1 Implanted   INSERT TIB CS 4 10 MM ARTC POST KNEE BEAR TECHNOLOGY X3 - [id number]  INSERT TIB CS 4 10 MM ARTC POST KNEE BEAR TECHNOLOGY X3  [employer] [id number] Left 1 Implanted           ESTIMATED BLOOD LOSS: 938SA    COMPLICATIONS: none    POST OPERATIVE PLAN: The patient will be transferred to the orthopaedic floor from PACU once stable. Post operative antibiotics will be continued for 24 hours. Physical therapy will begin immediately, with weight bearing as tolerated. DVT prophylaxis will be with SCD's starting today, and  mg BID starting tomorrow. I anticipate discharge to home/rehab within the first 3 post operative days.        [id number] [facility]   1/23/23  11:23 AM

## 2023-01-23 NOTE — PLAN OF CARE
[facility] cardiology asked to see patient for runs of V. tach s/p surgery.  Patient is known to Dr. Escudero cardiology group.  Spoke with Elyssa LOPEZ in PACU and asked for cardiology consult to be switched to Dr. Escudero at this time.  Please call with any questions/concerns.  Thank you.    Electronically signed by GIO Aquino CNP on 1/23/2023 at 12:22 PM

## 2023-01-23 NOTE — BRIEF OP NOTE
Brief Postoperative Note      Patient: Jessu Morton  YOB: 1945  MRN: [id number]    Date of Procedure: 1/23/2023    Pre-Op Diagnosis: Osteoarthritis of left knee, unspecified osteoarthritis type [M17.12]    Post-Op Diagnosis: Same       Procedure(s):  LEFT KNEE ZULY ROBOTIC ASSISTED TOTAL ARTHROPLASTY ++ROGELIO++  ++PNB++    Surgeon(s):  Donna Winn DO    Assistant:  Resident: Patrizia Lamb DO; Joy Alcantara DO; Diana Osman DO    Anesthesia: General    Estimated Blood Loss (mL): less than 864     Complications: None    Specimens:   ID Type Source Tests Collected by Time Destination   A : BONE- LEFT KNEE Bone Bone SURGICAL PATHOLOGY Donna Winn DO 1/23/2023 1003        Implants:  Implant Name Type Inv.  Item Serial No.  Lot No. LRB No. Used Action   CEMENT BNE RADIOPAQUE FAST SET ACRYL RESIN HI VISC SIMPLEXHV - [id number]  CEMENT BNE RADIOPAQUE FAST SET ACRYL RESIN HI VISC SIMPLEXHV  [employer] [id number] Left 1 Implanted   IMPLANT PATELLAR [id number] X3 ASYMMETRIC TRIATHLON - [id number]  IMPLANT PATELLAR [id number] X3 ASYMMETRIC TRIATHLON  [employer] [id number] Left 1 Implanted   COMPONENT FEM SZ 3 L ANT KNEE CRUCE RET DUANE REFERENCING - [id number]  COMPONENT FEM SZ 3 L ANT KNEE CRUCE RET DUANE REFERENCING  [employer] [id number] Left 1 Implanted   BASEPLATE TIB SZ 4 KNEE TRITANIUM DUANE CHARLES LO PROF TRIATHLON - [id number]  BASEPLATE TIB SZ 4 KNEE TRITANIUM DUANE CHARLES LO PROF TRIATHLON  [employer] [id number] Left 1 Implanted   INSERT TIB CS 4 10 MM ARTC POST KNEE BEAR TECHNOLOGY X3 - [id number]  INSERT TIB CS 4 10 MM ARTC POST KNEE BEAR TECHNOLOGY X3  [employer] [id number] Left 1 Implanted         Drains: * No LDAs found *    Findings: Cemented robotic assisted left total knee arthroplasty    Electronically signed by Gisela Alvarez DO on 1/23/2023 at 11:23 AM

## 2023-01-23 NOTE — PROGRESS NOTES
Dr. Murphy Doctor notified of patient episodes of vtach in surgery. He is aware of EKG reading and vital signs. Order obtained. Dr. Clement Slider and Bedding made aware of unit change. Pat Stone RN.

## 2023-01-23 NOTE — H&P
Department of Orthopedic Surgery  Attending H&P Note            HISTORY OF PRESENT ILLNESS:                The patient is a 76 y.o. female who presents with left knee end-stage osteoarthritis. She has had extensive conservative treatment on this knee and is unhappy with her results. She is here today for left total knee arthroplasty. She received medical clearance and is excited for surgery. Past Medical History:        Diagnosis Date    Arthritis     Atrial fibrillation ([address]     Tikosyn per Dr. Levi Beltre    Bilateral carotid artery stenosis 07/16/2020    Bronchitis     Carotid stenosis, bilateral 05/21/2012    Hyperlipidemia     Hypertension     Left carotid stenosis 04/26/2018    O2 dependent 07/16/2020    On continuous oral anticoagulation     Eliquis    S/P carotid endarterectomy 10/06/2016    Stomach ulcer 2003    Thyroid disease        Past Surgical History:        Procedure Laterality Date    APPENDECTOMY      BACK SURGERY      CHOLECYSTECTOMY      COLONOSCOPY      ENDOSCOPY, COLON, DIAGNOSTIC  01/10/2018    upper endo    EYE SURGERY      BILATERAL CATARACT    HYSTERECTOMY (CERVIX STATUS UNKNOWN)      JOINT REPLACEMENT  2011    RIGHT KNEE    PACEMAKER INSERTION Left 08/31/2022    Successful [employer] dual chamber pacemaker [place])       Current Medications:   No current facility-administered medications for this encounter. Allergies:  Patient has no known allergies. Social History:   TOBACCO:   reports that she quit smoking about 4 years ago. Her smoking use included cigarettes. She has a 30.00 pack-year smoking history. She has never used smokeless tobacco.  ETOH:   reports that she does not currently use alcohol. DRUGS:   reports no history of drug use.     Family History:       Problem Relation Age of Onset    Other Mother         rheumatic fever    Cancer Sister         lung       REVIEW OF SYSTEMS:       Constitutional:  Negative for weight loss, fevers, chills, fatigue  Cardiovascular: Negative for chest pain, palpitations  Pulmonary: Negative for shortness of breath, labored breathing, cough  GI: negative for abdominal pain, nausea, vomitting   MSK: per HPI  Skin: negative for rash, open wounds    All other systems reviewed and are negative         PHYSICAL EXAM:      There were no vitals filed for this visit. Height: [unfilled]  Weight: [unfilled]  BMI:  There is no height or weight on file to calculate BMI. General: The patient is alert and oriented x 3, appears to be stated age and in no distress. HEENT: head is normocephalic, atraumatic. EOMI. Neck: supple, trachea midline, no thyromegaly   Cardiovascular: peripheral pulses palpable. Normal Capillary refill   Respiratory: breathing unlabored, chest expansion symmetric   GI: abdomen NT/ND. No masses   Skin: no rash, no open wounds, no erythema  Psych: normal affect; mood stable  MSK:  Left knee: Atraumatic without any skin lesions. Extremely tender to palpation around the medial joint line and patellofemoral joint. There is significant crepitus with range of motion. Knee flexion and extension is 0 to 130 degrees. Knee is stable to varus and valgus stress testing at 0 and 30 degrees. Negative Lachman. Negative posterior drawer.   Calf is soft and compressible without any tenderness           DATA:      CBC:   Lab Results   Component Value Date/Time    WBC 6.3 01/18/2023 10:42 AM    RBC 3.61 01/18/2023 10:42 AM    HGB 11.1 01/18/2023 10:42 AM    HCT 34.9 01/18/2023 10:42 AM    MCV 96.7 01/18/2023 10:42 AM    MCH 30.7 01/18/2023 10:42 AM    MCHC 31.8 01/18/2023 10:42 AM    RDW 12.9 01/18/2023 10:42 AM     01/18/2023 10:42 AM    MPV 11.3 01/18/2023 10:42 AM     BMP:    Lab Results   Component Value Date/Time     01/18/2023 10:42 AM    K 5.1 01/18/2023 10:42 AM     01/18/2023 10:42 AM    CO2 29 01/18/2023 10:42 AM    BUN 24 01/18/2023 10:42 AM    LABALBU 4.1 07/21/2022 12:47 PM LABALBU 4.8 04/28/2012 02:00 AM    CREATININE 1.0 01/18/2023 10:42 AM    CALCIUM 9.5 01/18/2023 10:42 AM    GFRAA >60 09/01/2022 10:40 AM    LABGLOM 57 01/18/2023 10:42 AM    GLUCOSE 116 01/18/2023 10:42 AM    GLUCOSE 156 04/28/2012 02:00 AM     PT/INR:    Lab Results   Component Value Date/Time    PROTIME 16.1 08/27/2020 01:20 PM    INR 1.4 08/27/2020 01:20 PM     PTT:    Lab Results   Component Value Date/Time    APTT 33.5 08/27/2020 01:20 PM   [APTT}  U/A:    Lab Results   Component Value Date/Time    COLORU Yellow 08/29/2020 11:40 AM    PHUR 6.0 08/29/2020 11:40 AM    LABCAST MODERATE 02/04/2017 01:44 PM    WBCUA >20 08/29/2020 11:40 AM    WBCUA NONE 04/28/2012 01:15 AM    RBCUA 1-3 08/29/2020 11:40 AM    RBCUA NONE 04/28/2012 01:15 AM    BACTERIA MODERATE 08/29/2020 11:40 AM    CLARITYU Clear 08/29/2020 11:40 AM    SPECGRAV 1.020 08/29/2020 11:40 AM    LEUKOCYTESUR TRACE 08/29/2020 11:40 AM    UROBILINOGEN 0.2 08/29/2020 11:40 AM    BILIRUBINUR Negative 08/29/2020 11:40 AM    BILIRUBINUR NEGATIVE 04/28/2012 01:15 AM    BLOODU Negative 08/29/2020 11:40 AM    GLUCOSEU Negative 08/29/2020 11:40 AM    GLUCOSEU NEGATIVE 04/28/2012 01:15 AM       Radiology Review: X-rays reviewed demonstrate end-stage bone-on-bone tricompartmental osteoarthritis of the left knee        IMPRESSION/RECOMMENDATIONS:    76y.o. year old female with left knee osteoarthritis  -She is here today for left total knee arthroplasty. Risk benefits alternative surgery discussed in detail including infection blood loss blood clot aseptic loosening catastrophic failure damage neurovascular structures arthrofibrosis amongst others. She understands and wishes to proceed. My initials were placed on her left leg. Informed sent was obtained and signed and placed in chart. Plan is left total knee arthroplasty today. She will discharge home tomorrow after surgery.   She can resume her home Eliquis for DVT    [address], [address] Surgery 1/23/23  7:13 AM

## 2023-01-23 NOTE — PROGRESS NOTES
Patient report called to 4th floor RN; family waiting room notified of patient transfer. Jude Raphael RN.

## 2023-01-23 NOTE — CONSULTS
[facility] History and Physical      CHIEF COMPLAINT:  Postoperative medical management, ventricular tachycardia    History of Present Illness:    Ms. Anand Moya is a 49-year-old female with past medical history significant for atrial fibrillation s/p pacemaker on Eliquis, chronic hypoxic respiratory failure requiring supplemental oxygen via nasal cannula (baseline 2 L), carotid vascular disease, hypertension, hyperlipidemia, COPD, osteoarthritis who presents with [facility] robotic assisted left knee total arthroplasty by Dr. Sole Espinoza on 1/23/2023. In talking with Ms. Olga Mulligan, she presents today for [facility] robotic assisted left total knee arthroplasty by Dr. Sole Espinoza. There were no surgical complications, however, she was noted to develop ventricular tachycardia intraoperatively. Cardiology was consulted postoperatively and internal medicine was contacted for medical management also postoperatively. She reports a history of atrial fibrillation s/p pacemaker in 2022, currently on Eliquis. She also takes Tikosyn. Eliquis is currently being held until tomorrow morning. She also reports a history of carotid vascular disease and has had a carotid endarterectomy in the past.  She reports a history of COPD and saw pulmonology for preoperative risk stratification prior to the surgery. She is on a baseline of 2 L supplemental oxygen via nasal cannula all the time. She also reports a history of hypertension and hyperlipidemia. Upon ROS, she denies fever, chills, lightheadedness, dizziness, loss of consciousness, seizure, vision change, chest pain, shortness of breath, nausea, vomiting, abdominal pain, constipation, diarrhea, dysuria, increased urinary frequency, myalgias joint pain, bleeding, new skin rash.     Informant(s) for H&P: Patient    REVIEW OF SYSTEMS:  A comprehensive review of systems was negative except for: what is in the HPI      PMH:  Past Medical History:   Diagnosis Date Arthritis     Atrial fibrillation ([facility] [address]     Tikosyn per Dr. Odessa Davila    Bilateral carotid artery stenosis 07/16/2020    Bronchitis     Carotid stenosis, bilateral 05/21/2012    Hyperlipidemia     Hypertension     Left carotid stenosis 04/26/2018    O2 dependent 07/16/2020    On continuous oral anticoagulation     Eliquis    S/P carotid endarterectomy 10/06/2016    Stomach ulcer 2003    Thyroid disease        Surgical History:  Past Surgical History:   Procedure Laterality Date    APPENDECTOMY      BACK SURGERY      CHOLECYSTECTOMY      COLONOSCOPY      ENDOSCOPY, COLON, DIAGNOSTIC  01/10/2018    upper endo    EYE SURGERY      BILATERAL CATARACT    HYSTERECTOMY (CERVIX STATUS UNKNOWN)      JOINT REPLACEMENT  2011    RIGHT KNEE    PACEMAKER INSERTION Left 08/31/2022    Successful [employer] dual chamber pacemaker [employer])    TOTAL KNEE ARTHROPLASTY Left 1/23/2023    LEFT KNEE ZULY ROBOTIC ASSISTED TOTAL ARTHROPLASTY ++ROGELIO++  ++PNB++ performed by Kavon Verma DO at [facility] OR       Medications Prior to Admission:    Prior to Admission medications    Medication Sig Start Date End Date Taking? Authorizing Provider   oxyCODONE (ROXICODONE) 5 MG immediate release tablet Take 1 tablet by mouth every 6 hours as needed for Pain for up to 7 days. Intended supply: 7 days.  Take lowest dose possible to manage pain Max Daily Amount: 20 mg 1/23/23 1/30/23 Yes Alba Stone DO   ANORO ELLIPTA 62.5-25 MCG/ACT AEPB INHALE 1 PUFF INTO LUNGS ONCE DAILY  Patient not taking: No sig reported 1/1/23   Historical Provider, MD   Multiple Vitamins-Minerals (THERAPEUTIC MULTIVITAMIN-MINERALS) tablet Take 1 tablet by mouth daily    Historical Provider, MD   Cranberry 1000 MG CAPS Take by mouth    Historical Provider, MD   Biotin 39462 MCG TABS Take by mouth    Historical Provider, MD   Budeson-Glycopyrrol-Formoterol (BREZTRI AEROSPHERE IN) Inhale 2 inhalations into the lungs daily    Historical Provider, MD   magnesium oxide (MAG-OX) 400 MG tablet Take 1 tablet by mouth daily 12/10/22 3/10/23  Lori Gomez DO   sucralfate (CARAFATE) 1 GM/10ML suspension TAKE 10 ML BY MOUTH ONCE DAILY AS NEEDED 10/24/22   Historical Provider, MD   metoprolol tartrate (LOPRESSOR) 25 MG tablet Take 50 mg in the am and 25 mg in the pm  Patient taking differently: Take 50 mg in the am and 50 mg in the pm 11/1/22   GIO Horton CNP   Cholecalciferol (VITAMIN D) 50 MCG (2000 UT) CAPS capsule Take by mouth    Historical Provider, MD   OXYGEN Inhale 2 L/min into the lungs continuous    Historical Provider, MD   dofetilide (TIKOSYN) 500 MCG capsule Take 1 capsule by mouth every 12 hours 7/25/22   Paula Aden MD   levothyroxine (SYNTHROID) 150 MCG tablet TAKE 1 TABLET BY MOUTH ONCE DAILY 7/26/21   Historical Provider, MD   apixaban (ELIQUIS) 5 MG TABS tablet Take 1 tablet by mouth 2 times daily 9/25/19   GIO Rizo CNP   valsartan (DIOVAN) 160 MG tablet Take 1 tablet by mouth daily 9/26/19   GIO Rizo CNP   venlafaxine (EFFEXOR XR) 150 MG extended release capsule Take 300 mg by mouth daily 4/22/18   Historical Provider, MD   atorvastatin (LIPITOR) 20 MG tablet Take 20 mg by mouth daily    Historical Provider, MD       Allergies:    Patient has no known allergies. Social History:    reports that she quit smoking about 4 years ago. Her smoking use included cigarettes. She has a 30.00 pack-year smoking history. She has never used smokeless tobacco. She reports that she does not currently use alcohol. She reports that she does not use drugs. Family History:   family history includes Cancer in her sister. She denies a family history of heart disease.       PHYSICAL EXAM:  Vitals:  BP (!) 155/68   Pulse 60   Temp 97.4 °F (36.3 °C) (Oral)   Resp 17   Ht 5' 5\" (1.651 m)   Wt 250 lb (113.4 kg)   SpO2 100%   BMI 41.60 kg/m²     General Appearance: alert and oriented to person, place and time and in no acute distress  Skin: warm and dry  Head: normocephalic and atraumatic  Eyes: pupils equal, round, and reactive to light, extraocular eye movements intact, conjunctivae normal  Neck: neck supple and non tender without mass   Pulmonary/Chest: clear to auscultation bilaterally- no wheezes, rales or rhonchi, normal air movement, no respiratory distress  Cardiovascular: normal rate, normal S1 and S2 and no carotid bruits  Abdomen: soft, non-tender, non-distended, normal bowel sounds, no masses or organomegaly  Extremities: no cyanosis, no clubbing and no edema. Left knee wrapped. Neurologic: no cranial nerve deficit and speech normal        LABS:  No results for input(s): NA, K, CL, CO2, BUN, CREATININE, GLUCOSE, CALCIUM in the last 72 hours. No results for input(s): WBC, RBC, HGB, HCT, MCV, MCH, MCHC, RDW, PLT, MPV in the last 72 hours. No results for input(s): POCGLU in the last 72 hours. Radiology:   XR KNEE LEFT (1-2 VIEWS)   Final Result   Left TKA with no unexpected findings. XR KNEE LEFT (1-2 VIEWS)    (Results Pending)       EKG: Atrial paced rhythm, HR 63, QTc 435 MS    ASSESSMENT:      Principal Problem:    S/P total knee arthroplasty, left  Resolved Problems:    * No resolved hospital problems. *      PLAN:    1. Osteoarthritis s/p [place] robotic assisted left total knee arthroplasty by Dr. Suzzette Bumpers 1/23/2023-complicated intraoperatively by ventricular tachycardia. Patient given 2 g of magnesium sulfate postoperatively cardiology consulted for further recommendations. Pain control per orthopedic surgery. DVT prophylaxis as below. 2.  Ventricular tachycardia-cardiology consulted as above, patient given IV magnesium sulfate 2 g postoperatively will defer further management to cardiology. Obtain BMP with reflex to magnesium to screen for electrolyte/magnesium abnormalities. Monitor on telemetry  3. Atrial fibrillation s/p pacemaker on Eliquis-restarting Eliquis 1/24 in the AM per orthopedic surgery.   4. Chronic hypoxic respiratory failure requiring supplemental oxygen via nasal cannula (baseline 2 L)-continue pulse oximetry  5. Hypertension-continue home Lopressor, hold home valsartan, trend BP  6. Hyperlipidemia-continue home Lipitor  7. COPD-can add nebs if needed  8. Carotid vascular disease s/p carotid endarterectomy-continue home statin  9. Obesity class III-follow-up with PCP for diet and lifestyle modifications  10. Depression-continue home Effexor extended release  11. Hypothyroidism-continue home Synthroid    Code Status: Full code  DVT prophylaxis: SCDs, starting Eliquis 1/24 in the AM      NOTE: This report was transcribed using voice recognition software. Every effort was made to ensure accuracy; however, inadvertent computerized transcription errors may be present.   Electronically signed by Kaylee Faulkner MD on 1/23/2023 at 5:07 PM

## 2023-01-23 NOTE — DISCHARGE INSTRUCTIONS
[facility]) Department of Orthopedic Surgery  [address]  [facility]    Dr. Tram Gifford, DO    Orthopaedics Discharge Instructions   Weight bearing Status - Weight bearing as tolerated - bilaterally lower Extremity  Pain medication Per Prescriptions  Contact Office for Medication Refill- [phone]  Office can refill pain med every 7 days  If patient discharging to facility then pain control will be continued per facility physician  Ice to operative/injured site for 15-30 minutes of each hour for next 5 days    Recommend that you continue to ice the area 2-3 times per day after this   Elevate operative/injured limb on 2 pillows at home  Goal is to have limb above the heart if able  Continue DVT Prophylaxis (blood clot prevention) as Prescribed: eliquis    Wound care -please keep your dressing clean and dry. After 48 hours it is okay to remove your dressing and shower over top of your incision. Please replace with a clean dry dressing after showering  Follow Up in Office in 2 weeks. Call the office at [phone] for directions or with any questions. Watch for these significant complications. Call physician if they or any other problems occur:  Fever over 101°, redness, swelling or warmth at the operative site  Unrelieved nausea    Foul smelling or cloudy drainage at the operative site   Unrelieved pain    Blood soaked dressing. (Some oozing may be normal)     Numb, pale, blue, cold or tingling extremity      [facility]    DISCHARGE INSTRUCTIONS FOR TOTAL JOINT REPLACEMENT        Blood Clot Prevention: You are at risk for DVT (Deep Vein Thrombosis) and / or PE (Pulmonary Embolism) for 4 to 6 weeks after surgery. Drink plenty of water  Continue antiembolic stockings (SKY hose) for 4 to 6 weeks from date of surgery  Remove stockings every eight hours. You may remove stockings at bedtime if your surgeon has instructed you to do so.   Check skin for redness or any breakdowns on heels and over inner and outer ankle bones. Do not roll stockings down or fold over (this may cause a band of constriction interfering with circulation and increasing your risk of a blood clot forming or a skin breakdown. If you have not been wearing your stockings and notice increased swelling or excessive swelling in your extremity then: Lie down and elevate legs for 20-30 minutes. Apply stockings. If swelling does not improve, call office. REMEMBER: Mild to moderate swelling of the operative limb is expected for some time after surgery. If your surgeon has chosen not to order SKY hose, continue with the following instructions: Take frequent walks around  your home. Be careful outdoors- watch for uneven ground and pavement, curbs and holes. Gradually increase your activity to prevent fatigue. When at rest (sitting in a chair or lying down,resting) continue circulation exercises at least every hour - foot flaps, ankle rolls, quad and glut sets, 10 cycles each. You will continue a blood thinner at home. Eliqius 5 mg twice a day - if held before surgery, your family doctor will be responsible for managing and ordering this medication upon your discharge. 32  Incision Care: For Robot Assisted Knee Replacement: Remove white [place] dressing on Wednesday, January 25 th. May shower. May get get stitches wet. It is not necessary to use soap on the stitches. After showering and your skin is dry, apply a band aid over the stitches to protect and prevent them from getting snagged on your clothing and inadvertently being pulled out. Do not apply ointments, lotions, creams or liniments to the stitches. For Knee and Hip Replacement Surgery: Remove Aquacel dressing on Monday, January 30 th. A few steri strips (butterfly tapes) may come off with the Aquacel dressing - do not panic - that is OK. Do not remove the remaining steri strips. The tapes will eventually fall off when you shower.   You may continue to shower after removing the Aquacel dressing - REMEMBER - Incision line is healing and tender - protect from [address] water. Let the water roll off your incision. No need to wash incision with soap and water, but it is permissible to use an antibacterial soap. Pat incision dry with clean hand towel or let air dry. Do not apply creams, liniments, lotions or ointments directly on incision line. Approximately 10 to 14 days after surgery when the incision line is free of drainage and scabbing you may begin scar massage using Cocoa Butter cream or lotion (avoid using products that contain perfumes or fragrances). Follow the Handout: Loosen Up / Scar Massage (found in The [employer] Total Joint Folder)  If incision is draining, keep it covered with sterile gauze. Notify your surgeon of the drainage. Change dressing daily and each time you shower. Do not touch incision line with your fingers or scratch incision with your fingernails (your fingernails harbor germs and bacteria). Do not allow pets near your incision - do not allow pets to smell or lick your incision. Signs and symptoms to report to your doctor:    Persistent drainage from the incision. Increased redness or swelling of the incision, joint or operative extremity. Increased or excessive pain at the incision site, joint or in the thigh or calf. Fever over 101 degrees with chills (take your temperature at home and record). Go to Emergency Room if you experience any of the following:    Chest pain or tightness  Shortness of breath or difficulty breathing, profuse sweating  Anxiety or feeling of impending doom     Note: The above are signs and symptoms of a blood clot in your lungs. Although this is rare, it can occur. You must be evaluated in the Emergency Room. Rehabilitation:  Continue exercises at home as instructed by your Home Physical Therapist.  Continue Knee Precautions until cleared by Dr. Elias King.   Begin a formal Outpatient Physical Therapy program as soon as you are able. Post Op Anemia  Follow the handout given to you before your surgery: Patient Education Guide / Iron Replacement: Continue to eat foods high in iron and Vitamin C for one month after surgery to rebuild your blood count, to help your incision heal and to have energy to do your daily exercises and work with your physical therapist.    Constipation / Difficulty Moving Your Bowels  To prevent constipation while taking opioid pain relievers: 1.) Drink plenty of water, eat fruits and vegetables, increase the fiber in your diet, drink prune, plum or apple juice with breakfast, eat dried fruit such as prunes (if you are diabetic, adjust your diet accordingly). 2.) May take OTC aids such as: Stool softeners, suppositories or Fleet enema. If constipation persists or you cannot move your bowels then contact your PCP     Once you are home:  Call office for any questions, concerns or problems at [phone] extension [phone]    First post op visit on Wednesday, February 8 th at 1:20 pm will include:  Stitch removal  X-ray of operative joint (may be ordered by your surgeon)  Exam by your surgeon    Prescription for Outpatient Physical Therapy    Additional Instructions    - Continue breathing exercises at home: Cough, deep breathe, incentive spirometer every 1 to 2 hours while awake until you have returned to your normal activity level. Drink plenty of water. Stay out of bed as much as possible. Sit in the chair. Take frequent walks. Reason: Prevent Pneumonia    - For less pain, you may take Tylenol or Acetaminophen 1000 mg 3 times a day or every 8 hours not to exceed 3000 mg Acetaminophen in 24 hr (read labels on medication bottles for the number of mgs in each tablet or capsule). - If you were taking over the counter arthritis pain relievers, NSAIDS or anti-inflammatories before your surgery, please call the office before resuming these medications once you have returned home.  You must have doctor's approval before restarting these medicines. Reason: Taking arthritis pain relievers while taking aspirin can increase your chance for bleeding or bruising.    - For comfort: You may continue using ice to the operative joint after therapy , after exercising and as needed for discomfort or aching, 20 minutes on, 20 minutes off. You may also use a heating pad to any sore muscles in your thigh. You may apply the heat for as long as needed for comfort. - Always protect your skin from extreme cold or heat, prevent injury. - For knee replacements: You may lie on either side with a pillow between your knees as comfort allows. - For hip replacements: You may lie on your back or the non-operative side until your sutures or staples have been removed. Then you may lie on the operative side as comfort allows. Place a pillow between your knees. - Recommend waiting 6 months after joint replacement before your next dental cleaning. See the dentist ASAP for any dental emergency such a tooth ache, broken tooth, lost filling etc. When making an appointment after your surgery - inform your dentist of your recent surgery - joint replacement. Your dentist may order premedication for you. Rosie Linares RN, BSN, [address] for Dr. Chelsy Finch Surgeon   1/24/2023      Future Appointments   Date Time Provider Jose Luis Edwards   2/8/2023  1:20 PM [facility]   [facility]/10/2023 11:00 AM [facility]   8/10/2023 11:30 AM Eli Chahal MD [facility]         It is the Department of Orthopaedic Trauma's standard of practice that providers will de-escalate(wean) all patients from narcotic(opioid) medications during the post-operative period. We provide multimodal pain control but opioid medications are tapered in all of our patients.   If patient requires referral to pain management for prolonged taper off of opioid pain medication we will facilitate this process.

## 2023-01-23 NOTE — PROGRESS NOTES
Spoke with Dr. Stephani Nj regarding patient taking tikosyn at home, defer to cardiology for orders. Dr. Dhillon Her paged for Tikosyn orders.

## 2023-01-24 VITALS
SYSTOLIC BLOOD PRESSURE: 184 MMHG | DIASTOLIC BLOOD PRESSURE: 47 MMHG | WEIGHT: 250 LBS | RESPIRATION RATE: 18 BRPM | OXYGEN SATURATION: 96 % | BODY MASS INDEX: 41.65 KG/M2 | HEIGHT: 65 IN | HEART RATE: 78 BPM | TEMPERATURE: 98 F

## 2023-01-24 PROBLEM — I47.29 NONSUSTAINED PAROXYSMAL VENTRICULAR TACHYCARDIA (HCC): Status: ACTIVE | Noted: 2023-01-24

## 2023-01-24 LAB
ANION GAP SERPL CALCULATED.3IONS-SCNC: 10 MMOL/L (ref 7–16)
ANION GAP SERPL CALCULATED.3IONS-SCNC: 6 MMOL/L (ref 7–16)
BUN BLDV-MCNC: 30 MG/DL (ref 6–23)
BUN BLDV-MCNC: 33 MG/DL (ref 6–23)
CALCIUM SERPL-MCNC: 8.4 MG/DL (ref 8.6–10.2)
CALCIUM SERPL-MCNC: 8.5 MG/DL (ref 8.6–10.2)
CHLORIDE BLD-SCNC: 100 MMOL/L (ref 98–107)
CHLORIDE BLD-SCNC: 101 MMOL/L (ref 98–107)
CO2: 23 MMOL/L (ref 22–29)
CO2: 25 MMOL/L (ref 22–29)
CREAT SERPL-MCNC: 1.1 MG/DL (ref 0.5–1)
CREAT SERPL-MCNC: 1.2 MG/DL (ref 0.5–1)
GFR SERPL CREATININE-BSD FRML MDRD: 46 ML/MIN/1.73
GFR SERPL CREATININE-BSD FRML MDRD: 51 ML/MIN/1.73
GLUCOSE BLD-MCNC: 129 MG/DL (ref 74–99)
GLUCOSE BLD-MCNC: 141 MG/DL (ref 74–99)
HCT VFR BLD CALC: 26 % (ref 34–48)
HEMOGLOBIN: 8.1 G/DL (ref 11.5–15.5)
MCH RBC QN AUTO: 30.7 PG (ref 26–35)
MCHC RBC AUTO-ENTMCNC: 31.2 % (ref 32–34.5)
MCV RBC AUTO: 98.5 FL (ref 80–99.9)
PDW BLD-RTO: 13.1 FL (ref 11.5–15)
PLATELET # BLD: 164 E9/L (ref 130–450)
PMV BLD AUTO: 12.2 FL (ref 7–12)
POTASSIUM REFLEX MAGNESIUM: 5.3 MMOL/L (ref 3.5–5)
POTASSIUM REFLEX MAGNESIUM: 6.1 MMOL/L (ref 3.5–5)
RBC # BLD: 2.64 E12/L (ref 3.5–5.5)
REASON FOR REJECTION: NORMAL
REJECTED TEST: NORMAL
SODIUM BLD-SCNC: 132 MMOL/L (ref 132–146)
SODIUM BLD-SCNC: 133 MMOL/L (ref 132–146)
WBC # BLD: 13.5 E9/L (ref 4.5–11.5)

## 2023-01-24 PROCEDURE — 6370000000 HC RX 637 (ALT 250 FOR IP): Performed by: STUDENT IN AN ORGANIZED HEALTH CARE EDUCATION/TRAINING PROGRAM

## 2023-01-24 PROCEDURE — 2580000003 HC RX 258: Performed by: STUDENT IN AN ORGANIZED HEALTH CARE EDUCATION/TRAINING PROGRAM

## 2023-01-24 PROCEDURE — 97165 OT EVAL LOW COMPLEX 30 MIN: CPT

## 2023-01-24 PROCEDURE — 97530 THERAPEUTIC ACTIVITIES: CPT

## 2023-01-24 PROCEDURE — 97161 PT EVAL LOW COMPLEX 20 MIN: CPT

## 2023-01-24 PROCEDURE — 97535 SELF CARE MNGMENT TRAINING: CPT

## 2023-01-24 PROCEDURE — 6360000002 HC RX W HCPCS: Performed by: STUDENT IN AN ORGANIZED HEALTH CARE EDUCATION/TRAINING PROGRAM

## 2023-01-24 PROCEDURE — 6370000000 HC RX 637 (ALT 250 FOR IP): Performed by: INTERNAL MEDICINE

## 2023-01-24 PROCEDURE — 36415 COLL VENOUS BLD VENIPUNCTURE: CPT

## 2023-01-24 PROCEDURE — 80048 BASIC METABOLIC PNL TOTAL CA: CPT

## 2023-01-24 PROCEDURE — 2700000000 HC OXYGEN THERAPY PER DAY

## 2023-01-24 PROCEDURE — 85027 COMPLETE CBC AUTOMATED: CPT

## 2023-01-24 RX ORDER — OXYCODONE HYDROCHLORIDE AND ACETAMINOPHEN 5; 325 MG/1; MG/1
1 TABLET ORAL EVERY 6 HOURS PRN
Qty: 28 TABLET | Refills: 0 | Status: SHIPPED | OUTPATIENT
Start: 2023-01-24 | End: 2023-01-24 | Stop reason: HOSPADM

## 2023-01-24 RX ORDER — DOFETILIDE 0.25 MG/1
250 CAPSULE ORAL EVERY 12 HOURS SCHEDULED
Status: COMPLETED | OUTPATIENT
Start: 2023-01-24 | End: 2023-01-24

## 2023-01-24 RX ADMIN — METOPROLOL TARTRATE 50 MG: 50 TABLET, FILM COATED ORAL at 08:29

## 2023-01-24 RX ADMIN — APIXABAN 5 MG: 5 TABLET, FILM COATED ORAL at 08:29

## 2023-01-24 RX ADMIN — KETOROLAC TROMETHAMINE 15 MG: 30 INJECTION, SOLUTION INTRAMUSCULAR at 00:15

## 2023-01-24 RX ADMIN — OXYCODONE HYDROCHLORIDE 5 MG: 5 TABLET ORAL at 08:29

## 2023-01-24 RX ADMIN — SODIUM ZIRCONIUM CYCLOSILICATE 10 G: 10 POWDER, FOR SUSPENSION ORAL at 15:16

## 2023-01-24 RX ADMIN — ACETAMINOPHEN 650 MG: 325 TABLET ORAL at 11:48

## 2023-01-24 RX ADMIN — KETOROLAC TROMETHAMINE 15 MG: 30 INJECTION, SOLUTION INTRAMUSCULAR at 11:48

## 2023-01-24 RX ADMIN — VENLAFAXINE HYDROCHLORIDE 300 MG: 150 CAPSULE, EXTENDED RELEASE ORAL at 08:30

## 2023-01-24 RX ADMIN — LEVOTHYROXINE SODIUM 150 MCG: 75 TABLET ORAL at 05:52

## 2023-01-24 RX ADMIN — WATER 2000 MG: 1 INJECTION INTRAMUSCULAR; INTRAVENOUS; SUBCUTANEOUS at 00:22

## 2023-01-24 RX ADMIN — SODIUM ZIRCONIUM CYCLOSILICATE 10 G: 10 POWDER, FOR SUSPENSION ORAL at 10:19

## 2023-01-24 RX ADMIN — ACETAMINOPHEN 650 MG: 325 TABLET ORAL at 00:14

## 2023-01-24 RX ADMIN — DOFETILIDE 250 MCG: 0.25 CAPSULE ORAL at 05:52

## 2023-01-24 ASSESSMENT — PAIN DESCRIPTION - DESCRIPTORS
DESCRIPTORS: ACHING;DISCOMFORT;SORE
DESCRIPTORS: ACHING
DESCRIPTORS: ACHING

## 2023-01-24 ASSESSMENT — PAIN DESCRIPTION - ONSET: ONSET: ON-GOING

## 2023-01-24 ASSESSMENT — PAIN - FUNCTIONAL ASSESSMENT: PAIN_FUNCTIONAL_ASSESSMENT: PREVENTS OR INTERFERES WITH MANY ACTIVE NOT PASSIVE ACTIVITIES

## 2023-01-24 ASSESSMENT — PAIN DESCRIPTION - FREQUENCY: FREQUENCY: CONTINUOUS

## 2023-01-24 ASSESSMENT — PAIN SCALES - GENERAL
PAINLEVEL_OUTOF10: 8
PAINLEVEL_OUTOF10: 5
PAINLEVEL_OUTOF10: 4

## 2023-01-24 ASSESSMENT — PAIN DESCRIPTION - ORIENTATION
ORIENTATION: LEFT

## 2023-01-24 ASSESSMENT — PAIN DESCRIPTION - PAIN TYPE
TYPE: SURGICAL PAIN
TYPE: SURGICAL PAIN

## 2023-01-24 ASSESSMENT — PAIN DESCRIPTION - LOCATION
LOCATION: KNEE

## 2023-01-24 NOTE — PROGRESS NOTES
Occupational Therapy  OCCUPATIONAL THERAPY INITIAL EVALUATION  [facility] [address]    Date: 2023     Patient Name: Jonnathan Kim  MRN: [id number]  : 1945  Room: [address] [place] [address]    Evaluating OT: Kimberly Albert, OTR/JUSTIN - [id number]    Referring Provider: June Miles DO  Specific Provider Orders/Date: \"OT eval and treat\" - 2023    Diagnosis: Osteoarthritis of left knee, unspecified osteoarthritis type [M17.12]  S/P total knee arthroplasty, left [A52.366]      Surgery: Patient underwent L TKA on 2023. Pertinent Medical History: a-fib, arthritis, HTN, h/o R TKA ()    Precautions: fall risk, FWBAT, chair alarm    Assessment of Current Deficits:    [x] Functional mobility   [x]ADLs  [x] Strength               [x]Cognition   [x] Functional transfers   [x] IADLs         [x] Safety Awareness   [x]Endurance   [] Fine Coordination              [x] Balance      [] Vision/perception   [x]Sensation    []Gross Motor Coordination  [] ROM  [] Delirium                   [] Motor Control     OT PLAN OF CARE   OT POC is based on physician orders, patient diagnosis, and results of clinical assessment.   Frequency/Duration 2-5 days/week for 2 weeks PRN   Specific OT Treatment Interventions to Include:   * Instruction/training on adapted ADL techniques and AE recommendations to increase functional independence within precautions       * Training on energy conservation strategies, correct breathing pattern and techniques to improve independence/tolerance for self-care routine  * Functional transfer/mobility training/DME recommendations for increased independence, safety, and fall prevention  * Patient/Family education to increase follow through with safety techniques and functional independence  * Recommendation of environmental modifications for increased safety with functional transfers/mobility and ADLs  * Therapeutic exercise to improve motor endurance, ROM, and functional strength for ADLs/functional transfers  * Therapeutic activities to facilitate/challenge dynamic balance, stand tolerance for increased safety and independence with ADLs  * Neuro-muscular re-education: facilitation of righting/equilibrium reactions, midline orientation, scapular stability/mobility, normalization of muscle tone, and facilitation of volitional active controled movement    Recommended Adaptive Equipment: TBD     Home Living: Patient lives with her  in a one-floor setup (four steps to enter); laundry is on the main living level. Bathroom Setup: tub shower (grab bars, but no seat available) and elevated toilet seat  Equipment Owned: walker, home O2 (worn as needed prior), straight cane    Prior Level of Function (PLOF): Patient noted that she had been independent with ADLs; patient's  can assist with ADLs and IADLs, as needed. Patient noted that she usually prefers to sponge bathe and rarely gets into the shower at home. Patient was independent with functional mobility prior to this hospitalization. Driving: Yes, but not since October 2022. Patient's  had been assisting with transportation recently. Pain Level: No complaints of pain verbalized/indicated. Cognition: Patient alert and oriented x3. WFL command follow demonstrated. Decreased insight to current abilities/limitations demonstrated.   Memory: Fair  Sequencing: Fair  Problem Solving: Fair  Judgement/Safety: Impaired    Functional Assessment:  AM-PAC Daily Activity Raw Score: 17/24   Initial Eval Status  Date: 1/24/2023 Treatment Status  Date:  Short Term Goals = Long Term Goals   Feeding Independent  N/A   Grooming SBA  Mod I (seated/standing at sink)   UB Dressing Setup  Mod I (including item retrieval)   LB Dressing Mod A  SBA - with use of AE, as needed/appropriate   Bathing Mod A  SBA - with use of AE/DME, as needed/appropriate   Toileting Min A  SBA   Bed Mobility  Not assessed. Independent / Mod I in order to maximize patient's independence with ADLs, re-positioning, and other functional tasks. Functional Transfers Sit-to-Stand: SBA   from bedside chair  Independent   Functional Mobility SBA (with walker) for household+ distance within patient's room and hallway. Mod I with functional mobility (with device, as needed/appropriate) in order to maximize independence with ADLs/IADLs and other functional tasks. Balance Sitting: Good (at EOB)  Standing: Fair (with walker)  Fair+ dynamic standing balance during completion of ADLs/IADLs and other functional tasks. Activity Tolerance Fair  Mild shortness of breath noted with functional mobility during this session. Assistance and cues needed to maximize safety awareness with management of O2 line. Patient will demonstrate Good understanding and consistent implementation of energy conservation techniques and work simplification techniques into ADL/IADL routines. Visual/  Perceptual WFL     N/A     Additional Long-Term Goal: Patient will increase functional independence to PLOF in order to allow patient to live in least restrictive environment. Strength: ROM: Additional Information:    R UE  4/5 WFL    L UE 4/5  WFL      Hearing: Impaired  Sensation: Patient denied experiencing numbness/tingling in B UEs. Tone: WFL  Edema: No    Comments: RN approved patient's participation in [address] [place] [address] activities. Upon arrival for both sessions, patient seated in bedside chair. At end of both sessions, patient seated in bedside chair with call light and phone within reach, chair alarm activated, and all lines and tubes intact. Patient would benefit from continued skilled OT to increase safety and independence with completion of ADL/IADL tasks for functional independence and quality of life.      Easy-Slide issued to patient during the PM session; patient education provided regarding use of Easy-Slide to don SKY hose via demonstration and verbal explanation. Patient verbalized understanding. Patient stated that she owns a grabber. Treatment: OT treatment provided this date included:   Instruction/training on safety and adapted techniques for completion of ADLs - including potential benefits of DME/AE use to maximize independence/safety with ADLs in home environment. Instruction/training on safe functional mobility/transfer techniques. Instruction/training on energy conservation/work simplification for completion of ADLs/IADLs. Instructions/training regarding techniques to maximize safety with management of O2 line within home environment, particularly during ADLs and IADLs. Instruction/training on use of Easy-Slide to don SKY hose and use of reacher to don underwear/pants. Patient education provided regardin) importance of having staff assistance with ADLs and other OOB activities to prevent falls/injury during hospitalization, 2) recommendation to practice tub shower transfer with home therapy prior to attempting a shower within her home environment, 3) importance of proper footwear (e.g. shoes with rubber tread, hospital non-slip socks) during ADLs and functional transfers/mobility. Patient indicated Good understanding. Further skilled OT treatment indicated to increase patient's safety and independence with completion of ADL/IADL tasks in order to maximize patient's functional independence and quality of life. Rehab Potential: Good for established goals. Patient / Family Goal: Patient wants to be able to return to her PLOF. Patient and/or family were instructed on functional diagnosis, prognosis/goals, and OT plan of care. Demonstrated Good understanding.     Eval Complexity: Low    AM Eval/Treatment Session Time In/Out: 1110 - 1135  PM Treatment Session Time In/Out: 1235 - 1250  Total Treatment Time: 25 minutes      Minutes Units   OT Eval Low 23184 15 1   OT Eval Medium 28661     OT Eval High 45799     OT Re-Eval [id number] Therapeutic Ex [id number]     Therapeutic Activities 81394 10 1   ADL/Self Care 34119 15 1   Orthotic Management 37151     Neuro Re-Ed 58162     Non-Billable Time N/A ---     Evaluation time includes thorough review of current medical information, gathering information on past medical history/social history and prior level of function, completion of standardized testing/informal observation of tasks, assessment of data, and education on plan of care and goals. Alea Blackmon, OTR/L  License Number: [id number]

## 2023-01-24 NOTE — PROGRESS NOTES
CLINICAL PHARMACY NOTE: MEDS TO BEDS    Total # of Prescriptions Filled: 1   The following medications were delivered to the patient:  Oxycodone 5    Additional Documentation:

## 2023-01-24 NOTE — CARE COORDINATION
Met w/ patient. Explained role of  and plan of care. POD # 1 L TKA w/ intraoperative VTach- cardio to see. Lives w/ her  in a 2 story house- resides on 1st floor- 4 steps to entrance w/ railing. Has nebulizer, rollator, [employer], cane, high toilet. On Eliquis PTA. Requiring O2 2lNC- confirmed  w/ [facility] pt's last home O2 order is for [address]. PCP is JOSÉ Maza NP and pharmacy is [facility]. PT/OT evals pending. Plan home on discharge- no HHC preference- referral made to [facility] for PT- order on chart- notify when pt is discharging.  will provide transportation home . Will follow Casa Stack RN case manager    The Plan for Transition of Care is related to the following treatment goals: physical conditioning post op    The Patient and/or patient representative Dina Lopez was provided with a choice of provider and agrees   with the discharge plan. [x] Yes [] No    Freedom of choice list was provided with basic dialogue that supports the patient's individualized plan of care/goals, treatment preferences and shares the quality data associated with the providers.  [x] Yes [] No    1515: [facility] notified of probable discharge to home today Casa Stack RN case manager

## 2023-01-24 NOTE — PROGRESS NOTES
[facility] Progress Note    Admitting Date and Time: 1/23/2023  6:45 AM  Admit Dx: Osteoarthritis of left knee, unspecified osteoarthritis type [M17.12]  S/P total knee arthroplasty, left [Z96.652]    Subjective:  Ms. Brandt House is a 61-year-old female with past medical history significant for atrial fibrillation s/p pacemaker on Eliquis, chronic hypoxic respiratory failure requiring supplemental oxygen via nasal cannula (baseline 2 L), carotid vascular disease, hypertension, hyperlipidemia, COPD, osteoarthritis who presents with [facility] robotic assisted left knee total arthroplasty by Dr. Neema Nichols on 1/23/2023. No acute events overnight. This morning she denies pain. She denies other complaints including chest pain, shortness of breath, nausea, vomiting, headache. He is to work with PT/OT. Dr. Neema Nichols, cardiology, to evaluate patient today.     ROS: denies fever, chills, cp, sob, n/v, HA unless stated above.      sodium zirconium cyclosilicate  10 g Oral Once    sodium chloride flush  5-40 mL IntraVENous 2 times per day    acetaminophen  650 mg Oral Q6H    ketorolac  15 mg IntraVENous Q6H    apixaban  5 mg Oral BID    magnesium sulfate  2,000 mg IntraVENous Once    metoprolol tartrate  50 mg Oral BID    levothyroxine  150 mcg Oral Daily    venlafaxine  300 mg Oral Daily with breakfast    atorvastatin  20 mg Oral Nightly     sodium chloride flush, 5-40 mL, PRN  sodium chloride, , PRN  morphine, 2 mg, Q2H PRN   Or  morphine, 4 mg, Q2H PRN  oxyCODONE, 5 mg, Q4H PRN   Or  oxyCODONE, 10 mg, Q4H PRN  bisacodyl, 5 mg, Daily PRN  ondansetron, 4 mg, Q8H PRN   Or  ondansetron, 4 mg, Q6H PRN         Objective:    BP (!) 184/47   Pulse 78   Temp 98 °F (36.7 °C) (Oral)   Resp 18   Ht 5' 5\" (1.651 m)   Wt 250 lb (113.4 kg)   SpO2 96%   BMI 41.60 kg/m²     General Appearance: alert and oriented to person, place and time and in no acute distress, obese  Skin: warm and dry  Head: normocephalic and atraumatic  Eyes: pupils equal, round, and reactive to light, extraocular eye movements intact, conjunctivae normal  Neck: neck supple and non tender without mass   Pulmonary/Chest: clear to auscultation bilaterally- no wheezes, rales or rhonchi, normal air movement, no respiratory distress  Cardiovascular: normal rate, normal S1 and S2 and no carotid bruits  Abdomen: soft, non-tender, non-distended, normal bowel sounds, no masses or organomegaly  Extremities: no cyanosis, no clubbing and no edema. Left knee wrapped. Neurologic: no cranial nerve deficit and speech normal        Recent Labs     01/23/23 1825 01/24/23  0401    132   K 5.4* 6.1*   CL 97* 101   CO2 25 25   BUN 24* 33*   CREATININE 1.0 1.2*   GLUCOSE 215* 141*   CALCIUM 8.7 8.4*       Recent Labs     01/23/23 1825 01/24/23  0401   WBC 16.7* 13.5*   RBC 3.18* 2.64*   HGB 9.5* 8.1*   HCT 31.8* 26.0*   .0* 98.5   MCH 29.9 30.7   MCHC 29.9* 31.2*   RDW 13.0 13.1    164   MPV 11.8 12.2*       Radiology: No new imaging to report. Assessment:    Principal Problem:    S/P total knee arthroplasty, left  Active Problems:    Ventricular tachycardia  Resolved Problems:    * No resolved hospital problems. *      Plan:  1. Osteoarthritis s/p [place] robotic assisted left total knee arthroplasty by Dr. Angela Vázquez 1/23/2023-complicated intraoperatively by ventricular tachycardia. Patient given 2 g of magnesium sulfate postoperatively cardiology consulted for further recommendations. Pain control per orthopedic surgery. DVT prophylaxis as below. 2.  Ventricular tachycardia-cardiology consulted as above, patient given IV magnesium sulfate 2 g postoperatively will defer further management to cardiology. Monitor on telemetry  3. Atrial fibrillation s/p pacemaker on Eliquis-restarting Eliquis 1/24 in the AM per orthopedic surgery. Will defer management of Tikosyn to cardiology.   4.  Chronic hypoxic respiratory failure requiring supplemental oxygen via nasal cannula (baseline 2 L)-continue pulse oximetry  5. Hypertension-continue home Lopressor, hold home valsartan, trend BP  6. Hyperlipidemia-continue home Lipitor  7. COPD-can add nebs if needed  8. Carotid vascular disease s/p carotid endarterectomy-continue home statin  9. Obesity class III-follow-up with PCP for diet and lifestyle modifications  10. Depression-continue home Effexor extended release  11. Hypothyroidism-continue home Synthroid  12. Hyperkalemia-potassium 5.4, will give a dose of Lokelma 10 g once. Recheck morning potassium as it appears hemolyzed. Code Status: Full code  DVT prophylaxis: SCDs, starting Eliquis 1/24 in the AM      NOTE: This report was transcribed using voice recognition software. Every effort was made to ensure accuracy; however, inadvertent computerized transcription errors may be present.   Electronically signed by Gabby Manzo MD on 1/24/2023 at 10:01 AM

## 2023-01-24 NOTE — ANESTHESIA POSTPROCEDURE EVALUATION
Department of Anesthesiology  Postprocedure Note    Patient: Alanis Parents  MRN: [id number]  YOB: 1945  Date of evaluation: 1/24/2023      Procedure Summary     Date: 01/23/23 Room / Location: [facility] OR 04 / [facility]    Anesthesia Start: 0837 Anesthesia Stop: 4359    Procedure: LEFT KNEE ZULY ROBOTIC ASSISTED TOTAL ARTHROPLASTY ++ROGELIO++  ++PNB++ (Left: Knee) Diagnosis:       Osteoarthritis of left knee, unspecified osteoarthritis type      (Osteoarthritis of left knee, unspecified osteoarthritis type [M17.12])    Surgeons: Nura Gilliland DO Responsible Provider: Aurelio Cruz MD    Anesthesia Type: General ASA Status: 4          Anesthesia Type: General    Jai Phase I: Jai Score: 10    Jai Phase II:        Anesthesia Post Evaluation    Patient location during evaluation: PACU  Patient participation: complete - patient participated  Level of consciousness: awake and alert  Airway patency: patent  Nausea & Vomiting: no nausea and no vomiting  Complications: no  Cardiovascular status: hemodynamically stable  Respiratory status: acceptable  Hydration status: euvolemic  Multimodal analgesia pain management approach

## 2023-01-24 NOTE — PROGRESS NOTES
Physical Therapy  Facility/Department: [address] INTERNAL MEDICINE 2  Physical Therapy Initial Assessment    Name: Subhash Dempsey  : 1945  MRN: [id number]  Date of Service: 2023      Patient Diagnosis(es): The primary encounter diagnosis was Primary osteoarthritis of left knee. Diagnoses of Osteoarthritis of left knee, unspecified osteoarthritis type and S/P total knee arthroplasty, left were also pertinent to this visit. Past Medical History:  has a past medical history of Arthritis, Atrial fibrillation ([address] Bilateral carotid artery stenosis, Bronchitis, Carotid stenosis, bilateral, Hyperlipidemia, Hypertension, Left carotid stenosis, O2 dependent, On continuous oral anticoagulation, S/P carotid endarterectomy, Stomach ulcer, and Thyroid disease. Past Surgical History:  has a past surgical history that includes Hysterectomy; Cholecystectomy; Appendectomy; back surgery; Colonoscopy; joint replacement (); eye surgery; Endoscopy, colon, diagnostic (01/10/2018); Pacemaker insertion (Left, 2022); and Total knee arthroplasty (Left, 2023). Evaluating Therapist: [facility] PT      Room #:  3502/9500-V  Diagnosis:  Osteoarthritis of left knee, unspecified osteoarthritis type [M17.12]  S/P total knee arthroplasty, left [Z96.652]  PMHx/PSHx:  A-fib, R TKA  Procedure/Surgery: L TKA  Precautions:  WBAT, falls, O2      Social:  Pt lives with  in a 2 floor plan but stays first floor. 3 steps and 1 rails to enter. Prior to admission independent has cane and ww. Uses home O2 as needed     Initial Evaluation  Date: 23 Treatment      Short Term/ Long Term   Goals   Was pt agreeable to Eval/treatment? yes     Does pt have pain?  Minimal L knee pain     Bed Mobility  Rolling: NT  Supine to sit: SBA  Sit to supine: NT  Scooting: SBA  independent   Transfers Sit to stand: SBA  Stand to sit: SBA  Stand pivot: CGA  independent   Ambulation    50 feet with ww with CGA  150 feet with ww independent   Stair Negotiation  Ascended and descended  NT   3 steps with 1 rail with CGA   LE strength     R LE 4/5 L LE 3/5    4+/5   balance      Fair+     AM-PAC Raw score               17/24         Pt is alert and Oriented   LE ROM: L knee 0-80  Sensation: intact  Edema: L LE  Endurance: fair+  Chair alarm: yes     ASSESSMENT:    Pt displays functional ability as noted in the objective portion of this evaluation. Patient education  Pt educated on PT objectives    Patient response to education:   Pt verbalized understanding Pt demonstrated skill Pt requires further education in this area   yes           Comments:  Pt instructed on safety with transfers and walker safety. Increased time to complete mobility. Easily distracted. Frequent cues for walker safety. SpO2 on room air during session 90-96%    Pt's/ family goals   1. To return home    Conditions Requiring Skilled Therapeutic Intervention:    [x]Decreased strength     [x]Decreased ROM  [x]Decreased functional mobility  [x]Decreased balance   [x]Decreased endurance   []Decreased posture  []Decreased sensation  []Decreased coordination   []Decreased vision  [x]Decreased safety awareness   []Increased pain       Patient and or family understand(s) diagnosis, prognosis, and plan of care.     Prognosis is good for reaching above PT goals    PHYSICAL THERAPY PLAN OF CARE:    PT POC is established based on physician order and patient diagnosis     Referring provider/PT Order: Cb Rankin DO/ PT eval and treat      Current Treatment Recommendations:     [x] Strengthening to improve independence with functional mobility   [x] ROM to improve independence with functional mobility   [x] Balance Training to improve static/dynamic balance and to reduce fall risk  [x] Endurance Training to improve activity tolerance during functional mobility   [x] Transfer Training to improve safety and independence with all functional transfers   [x] Gait Training to improve gait mechanics, endurance and assess need for appropriate assistive device  [x] Stair Training in preparation for safe discharge home and/or into the community   [] Positioning to prevent skin breakdown and contractures  [x] Safety and Education Training   [x] Patient/Caregiver Education   [] HEP  [] Other     PT long term treatment goals are located in above grid    Frequency of treatments: BID  x 2 days. Time in  0820  Time out  0853    Total Treatment Time  15 minutes     Evaluation Time includes thorough review of current medical information, gathering information on past medical history/social history and prior level of function, completion of standardized testing/informal observation of tasks, assessment of data and education on plan of care and goals.       CPT codes:  [x] Low Complexity PT evaluation 41371  [] Moderate Complexity PT evaluation 95074  [] High Complexity PT evaluation 56040  [] PT Re-evaluation 94021  [] Gait training 94983 minutes  [] Manual therapy 21672 minutes  [x] Therapeutic activities 20386 15 minutes  [] Therapeutic exercises 85630 minutes  [] Neuromuscular reeducation 56119 minutes     Kranthi Brain PT [id number]

## 2023-01-24 NOTE — PROGRESS NOTES
Physician Progress Note      PATIENT:               Alysha Gomez  CSN #:                  [id number]  :                       1945  ADMIT DATE:       2023 6:45 AM  DISCH DATE:  RESPONDING  PROVIDER #:        Nell Peres MD          QUERY TEXT:    Pt s/p Left TKA. Pt noted to have drop in H&H. If possible, please document in   the progress notes and discharge summary if you are evaluating and/or   treating any of the following: The medical record reflects the following:  Risk Factors: s/p Left TKA  Clinical Indicators: preop H&H 11.1/349, postop 8.1/26.0  Treatment: monitor H&H    Thank you,  Arnaud Milner RN, CCDS  Clinical Documentation   [email or web address]  Options provided:  -- Postoperative acute blood loss anemia  -- Other - I will add my own diagnosis  -- Disagree - Not applicable / Not valid  -- Disagree - Clinically unable to determine / Unknown  -- Refer to Clinical Documentation Reviewer    PROVIDER RESPONSE TEXT:    This patient has postoperative acute blood loss anemia.     Query created by: Bryn Mccoy on 2023 1:54 PM      Electronically signed by:  Nell Peres MD 2023 2:00 PM

## 2023-01-24 NOTE — DISCHARGE SUMMARY
Physician Discharge Summary     Patient ID:  Jenifer Benitez  [id number]  38 y.o.  1945    Admit date: 1/23/2023    Discharge date and time: 1/24/2023  3:35 PM     Admitting Physician: Kinza Ferrell DO     Discharge Physician: Kinza Ferrell DO    Admission Diagnoses: Osteoarthritis of left knee, unspecified osteoarthritis type [M17.12]  S/P total knee arthroplasty, left [Z96.652]    Discharge Diagnoses: s/p left total Knee arthroplasty    Admission Condition: good    Discharged Condition: good    Hospital Course: The patient was admitted from the pre-operative holding area and underwent an uneventful course of left knee arthroplasty on 1/23/2023 by Kinza Ferrell DO. The patient was subsequently taken to the PACU and to the floor in stable condition. The patient continued antibiotics 24 hours postoperatively, as they received a dose of antibiotics preoperatively for infection prophylaxis. The patient was to begin physical therapy, WBAT, the day of surgery. The patient was also started on DVT prophylaxis. Blood counts were followed daily. She did suffer ventricular tachycardia in the OR and was seen by cardiology and internal medicine and cleared for discharge. On postoperative day 1, the dressing was changed, revealing the wound to be benign in appearance without obvious signs of infection including erythema or purulence.  was consulted for D/C planning as the patient made appropriate gains with PT in regaining independent function. On POD 1, the patient was discharged to home in stable condition. Treatments: s/p left total Knee arthroplasty    Disposition: The patient was provided instructions to follow up with Kinza Ferrell DO in 2 weeks and to call the office for an appointment. staples (s) were to be removed in 2 weeks. Pt was to continue DVT prophylaxis as directed.  Patient was also instructed they may shower on POD 4, and to continue daily dry sterile dressing changes until wound was dry. [unfilled]      Signed:   January Driscoll DO  1/24/2023

## 2023-01-24 NOTE — PROGRESS NOTES
Physical Therapy  Facility/Department: [address] INTERNAL MEDICINE 2  Rehabilitation Physical Therapy Treatment Note    NAME: Lauro Rucker  : 1945 (76 y.o.)  MRN: [id number]  CODE STATUS: Full Code    Date of Service: 23          Evaluating Therapist: Rossana Lloyd PT        Room #:  2743/4083-G  Diagnosis:  Osteoarthritis of left knee, unspecified osteoarthritis type [M17.12]  S/P total knee arthroplasty, left [Z96.652]  PMHx/PSHx:  A-fib, R TKA  Procedure/Surgery: L TKA  Precautions:  WBAT, falls, O2        Social:  Pt lives with  in a 2 floor plan but stays first floor. 3 steps and 1 rails to enter. Prior to admission independent has cane and ww. Uses home O2 as needed       Initial Evaluation  Date: 23 Treatment  23 2nd session    Short Term/ Long Term   Goals   Was pt agreeable to Eval/treatment? yes yes      Does pt have pain? Minimal L knee pain No c/o pain      Bed Mobility  Rolling: NT  Supine to sit: SBA  Sit to supine: NT  Scooting: SBA  NT independent   Transfers Sit to stand: SBA  Stand to sit: SBA  Stand pivot: CGA  sit<> stand SBA independent   Ambulation    50 feet with ww with CGA  120 feet with ww  feet with ww independent   Stair Negotiation  Ascended and descended  NT  2 with 1 rail and cane CGA  3 steps with 1 rail with CGA   LE strength     R LE 4/5 L LE 3/5     4+/5   balance      Fair+       AM-PAC Raw score                         Patient education  Pt was educated on LE sequence on stairs    Patient response to education:   Pt verbalized understanding Pt demonstrated skill Pt requires further education in this area   no With cues yes     Additional Comments: Pt c/o shortness of breath with ambulation. Cues for safety with mobility. Pt instructed on LE sequence and use of rail and cane on stairs. Daly cues for safety. Pt stepping on O2 tubing when first attempting stairs. Pt required assist for management of O2 line.      Pt was left in chair with call light left by patient.    Chair/bed alarm: yes    Time in: 1115  Time out: 1130    Total treatment time 15 minutes    Pt is making good progress toward established Physical Therapy goals.  Continue with physical therapy current plan of care.    Ling Gerber PT [id number]      CPT codes:  [] Low Complexity PT evaluation 18644  [] Moderate Complexity PT evaluation 82679  [] High Complexity PT evaluation 75112  [] PT Re-evaluation 70422  [] Gait training 60176  minutes  [] Manual therapy 31031    minutes  [x] Therapeutic activities 91331  15  minutes  [] Therapeutic exercises 74241     minutes  [] Neuromuscular reeducation 54379     minutes

## 2023-01-24 NOTE — PROGRESS NOTES
MARCELINO Saldana notified of potassium of 6.1. Order to redraw due to it being moderately hemolyzed.

## 2023-01-24 NOTE — PROGRESS NOTES
Department of Orthopedic Surgery  Progress Note    POD 1 status post left total knee. Patient seen and examined. Pain controlled. No new complaints. Denies chest pain, shortness of breath, dizziness/lightheadedness. She had some medical complications after surgery and is awaiting cardiology consult. Hopefully she can get transferred back to the orthopedic floor for further rehabilitation and discharge later today    VITALS:  BP (!) 141/57   Pulse 72   Temp 98.2 °F (36.8 °C) (Oral)   Resp 18   Ht 5' 5\" (1.651 m)   Wt 250 lb (113.4 kg)   SpO2 100%   BMI 41.60 kg/m²     General: alert and oriented to person, place and time, well-developed and well-nourished, in no acute distress    MUSCULOSKELETAL:   left lower extremity:  Dressing changes morning. There are some saturation on the Aquacel. This was removed and replaced with a dry dressing. Compartments soft and compressible  Calf is soft and nontender  +PF/DF/EHL  +2/4 DP & PT pulses, Brisk Cap refill, Toes warm and perfused  Distal sensation grossly intact to Peroneals, Sural, Saphenous, and tibial nrs    CBC:   Lab Results   Component Value Date/Time    WBC 13.5 01/24/2023 04:01 AM    HGB 8.1 01/24/2023 04:01 AM    HCT 26.0 01/24/2023 04:01 AM     01/24/2023 04:01 AM     PT/INR:    Lab Results   Component Value Date/Time    PROTIME 16.1 08/27/2020 01:20 PM    INR 1.4 08/27/2020 01:20 PM       ASSESSMENT  S/P left total knee arthroplasty-postoperative day #1    PLAN      Continue physical therapy and protocol: WBAT - LLE  24 hour abx coverage  Deep venous thrombosis prophylaxis -resume home Eliquis today, early mobilization  Appreciate cardiology and internal medicine consultations.   PT/OT  Pain Control: IV and PO  Monitor H&H  Daily dressing changes  Hopeful to transfer back to orthopedic floor once medically stable for further rehabilitation and discharge planning    Electronically signed by Leonard Roblero DO on 1/24/2023 at 8:19 AM

## 2023-01-24 NOTE — CONSULTS
CARDIOLOGY CONSULTATION    Patient Name:  Jose Antonio Agustin    :  1945    Reason for Consultation:   Nonsustained ventricular tachycardia    History of Present Illness:   Jose Antonio Agustin is admitted to [facility] following history developing a nonsustained run of what was thought to be ventricular tachycardia in the recovery room following surgery of her left knee by Dr. Venessa Hutchins. She was unaware of this and did not feel lightheaded nor complain of palpitations. A copy of the rhythm strip is not available. Upon further questioning, Mrs. Linnette Cornell has a longstanding history of recurrent persistent atrial fibrillation for which she has been on medical therapy. Presently she denies any chest discomfort palpitations nor sudden lightheadedness. In the recovery room she received 2 g of magnesium intravenously and has not had any significant arrhythmias since that time. Past Medical History:   has a past medical history of Arthritis, Atrial fibrillation ([facility] [address] Bilateral carotid artery stenosis, Bronchitis, Carotid stenosis, bilateral, Hyperlipidemia, Hypertension, Left carotid stenosis, O2 dependent, On continuous oral anticoagulation, S/P carotid endarterectomy, Stomach ulcer, and Thyroid disease. Surgical History:   has a past surgical history that includes Hysterectomy; Cholecystectomy; Appendectomy; back surgery; Colonoscopy; joint replacement (); eye surgery; Endoscopy, colon, diagnostic (01/10/2018); Pacemaker insertion (Left, 2022); and Total knee arthroplasty (Left, 2023). Social History:   reports that she quit smoking about 4 years ago. Her smoking use included cigarettes. She has a 30.00 pack-year smoking history. She has never used smokeless tobacco. She reports that she does not currently use alcohol. She reports that she does not use drugs. Family History:  family history includes Cancer in her sister; Other in her mother.   Mother  secondary to complications of rheumatic heart disease and father  in his [de-identified] secondary to complications of Alzheimer's disease and infirmities of old age. Medications:  Prior to Admission medications    Medication Sig Start Date End Date Taking? Authorizing Provider   oxyCODONE-acetaminophen (PERCOCET) 5-325 MG per tablet Take 1 tablet by mouth every 6 hours as needed for Pain for up to 7 days. Intended supply: 7 days. Take lowest dose possible to manage pain Max Daily Amount: 4 tablets 23 Yes Jerson Escudero DO   oxyCODONE (ROXICODONE) 5 MG immediate release tablet Take 1 tablet by mouth every 6 hours as needed for Pain for up to 7 days. Intended supply: 7 days.  Take lowest dose possible to manage pain Max Daily Amount: 20 mg 23 Yes Jeanie Kim DO   ANORO ELLIPTA 62.5-25 MCG/ACT AEPB INHALE 1 PUFF INTO LUNGS ONCE DAILY  Patient not taking: No sig reported 23   Historical Provider, MD   Multiple Vitamins-Minerals (THERAPEUTIC MULTIVITAMIN-MINERALS) tablet Take 1 tablet by mouth daily    Historical Provider, MD   Cranberry 1000 MG CAPS Take by mouth    Historical Provider, MD   Biotin 06886 MCG TABS Take by mouth    Historical Provider, MD   Budeson-Glycopyrrol-Formoterol (BREZTRI AEROSPHERE IN) Inhale 2 inhalations into the lungs daily    Historical Provider, MD   magnesium oxide (MAG-OX) 400 MG tablet Take 1 tablet by mouth daily 12/10/22 3/10/23  Gerldine Duane, DO   sucralfate (CARAFATE) 1 GM/10ML suspension TAKE 10 ML BY MOUTH ONCE DAILY AS NEEDED 10/24/22   Historical Provider, MD   metoprolol tartrate (LOPRESSOR) 25 MG tablet Take 50 mg in the am and 25 mg in the pm  Patient taking differently: No sig reported 22   GIO Lai - CNP   Cholecalciferol (VITAMIN D) 50 MCG ( UT) CAPS capsule Take by mouth    Historical Provider, MD   OXYGEN Inhale 2 L/min into the lungs continuous    Historical Provider, MD   dofetilide (TIKOSYN) 500 MCG capsule Take 1 capsule by mouth every 12 hours  Patient taking differently: Take 500 mcg by mouth 2 times daily 7/25/22   Tommie Leung MD   levothyroxine (SYNTHROID) 150 MCG tablet TAKE 1 TABLET BY MOUTH ONCE DAILY 7/26/21   Historical Provider, MD   apixaban (ELIQUIS) 5 MG TABS tablet Take 1 tablet by mouth 2 times daily 9/25/19   GIO Joshi CNP   valsartan (DIOVAN) 160 MG tablet Take 1 tablet by mouth daily 9/26/19   GIO Joshi CNP   venlafaxine (EFFEXOR XR) 150 MG extended release capsule Take 300 mg by mouth daily 4/22/18   Historical Provider, MD   atorvastatin (LIPITOR) 20 MG tablet Take 20 mg by mouth daily    Historical Provider, MD       Allergies:  Patient has no known allergies. Review of Systems:   Constitutional: there has been no unanticipated weight loss. There's been no significant change in energy level, sleep pattern or activity level. No fever chills or rigors. Eyes: No visual changes or diplopia. No scleral icterus. ENT: No Headaches, hearing loss or vertigo. No mouth sores or sore throat. No change in taste or smell. Cardiovascular: No chest discomfort,  + dyspnea on mild exertion, + palpitations, but no loss of consciousness, no phlebitis, no claudication. Respiratory: No cough or wheezing, no sputum production. No hemoptysis, pleuritic pain. Gastrointestinal: No abdominal pain, appetite loss, blood in stools. No change in bowel habits. No hematemesis  Genitourinary: No dysuria, trouble voiding or hematuria. No nocturia or increased frequency. Musculoskeletal:  No gait disturbance, weakness or joint complaints. Integumentary: No rash or pruritis. Neurological: No headache, diplopia, change in muscle strength, numbness or tingling. No change in gait, balance, coordination, mood, affect, memory, mentation, behavior. Psychiatric: No anxiety or depression. Endocrine: No temperature intolerance. No excessive thirst, fluid intake, or urination. No tremor.   Hematologic/Lymphatic: No abnormal bruising or bleeding, blood clots or swollen lymph nodes. Allergic/Immunologic: No nasal congestion or hives. Physical Examination:    Vital Signs: BP (!) 184/47   Pulse 78   Temp 98 °F (36.7 °C) (Oral)   Resp 18   Ht 5' 5\" (1.651 m)   Wt 250 lb (113.4 kg)   SpO2 96%   BMI 41.60 kg/m²   General appearance: Well preserved, mesomorphic body habitus, alert, no distress. Skin: Skin color, texture, turgor normal. No rashes or lesions. No induration or tightening palpated. Head: Normocephalic. No masses, lesions, tenderness or abnormalities  Eyes: Conjunctivae/corneas clear. PERRL, EOMs intact. Sclera non icteric. Ears: External ears normal. Canals clear. TM's clear bilaterally. Hearing normal to finger rub. Nose/Sinuses: Nares normal. Septum midline. Mucosa normal. No drainage or sinus tenderness. Oropharynx: Lips, mucosa, and tongue normal. Oropharynx clear with no exudate seen. Neck: Neck supple and symmetric. No adenopathy. Thyroid symmetric, normal size, without nodules. Trachea is midline. Carotids brisk in upstroke without bruits, no abnormal JVP noted at 45°. Right carotid cicatrix noted. Chest: Even excursion  Lungs: Lungs grossly clear to auscultation bilaterally. No retractions or use of accessory muscles. No tactile vocal fremitus. No rhonchi, crackles or rales. Heart:  S1 > S2. Irregular, irregular rhythm. No gallop grade 2/6 systolic murmur second right and left intercostal space. No rub, palpable thrill or heave noted. PMI 5th intercostal space midclavicular line. Abdomen: Abdomen soft, moderately protuberant, non-tender. BS normal. No masses, organomegaly. No hernia noted. Extremities: Extremities normal. No deformities, edema, or skin discoloration. No cyanosis or clubbing noted to the nails. Peripheral pulses present 2+ upper extremities and present 1+  lower extremities. Musculoskeletal: Spine ROM normal. Muscular strength intact. Neuro: Cranial nerves intact. Motor: Strength 5/5 in all extremities. Reflexes 2+ in all extremities. No focal weakness. Sensory: grossly normal to touch. Coordination intact. Pertinent Labs:  CBC:     03/22/22 1859 03/23/22  0820   WBC 6.2 6.0   RBC 3.69 3.38*   HGB 11.5 10.3*   HCT 36.3 33.4*   MCV 98.4 98.8   MCH 31.2 30.5   MCHC 31.7* 30.8*   RDW 13.2 13.3    180   MPV 10.6 10.9     BMP:     03/22/22 1859 03/23/22 0820    135   K 4.7 4.7   CL 98 100   CO2 26 28   BUN 23 23   CREATININE 1.0 0.9   GLUCOSE 107* 103*   CALCIUM 9.1 8.8         ABGs:   Lab Results   Component Value Date/Time    PH 7.371 09/19/2019 06:34 PM    PO2 81.4 09/19/2019 06:34 PM    PCO2 58.4 09/19/2019 06:34 PM     INR:   No results for input(s): INR in the last 72 hours. PRO-BNP:   Lab Results   Component Value Date    PROBNP 2,484 (H) 09/19/2019      Cardiac Injury Profile:   No results for input(s): CKTOTAL, CKMB, CKMBINDEX, TROPONINI in the last 72 hours. Lipid Profile:   Lab Results   Component Value Date/Time    TRIG 224 10/19/2021 09:00 AM    HDL 77 10/19/2021 09:00 AM    LDLCALC 65 10/19/2021 09:00 AM    CHOL 187 10/19/2021 09:00 AM      Hemoglobin A1C: No components found for: HGBA1C   ECG:  See report  ECHO: 1/9/2018  Summary   Normal left ventricular systolic function. Ejection fraction is visually estimated at > 65%. Mild left ventricular hypertrophy. Normal right ventricular size and function. There is doppler evidence of stage I diastolic dysfunction. Physiologic and/or trace tricuspid regurgitation. PASP is estimated at 22 mmHg (normal estimated PASP). The inferior vena cava is normal in size with normal respiratory   variation. Radiology:    XR KNEE LEFT (1-2 VIEWS)    Result Date: 1/23/2023  Left TKA with no unexpected findings. Assessment:    Principal Problem:    S/P total knee arthroplasty, left  Active Problems:    Ventricular tachycardia  Resolved Problems:    * No resolved hospital problems.  * Plan:  After reviewing Mrs. Flynn's  history, it is entirely possible that she could have had a transient ischemic episode or that the sudden electrolyte imbalance. Nonetheless she empirically received 1 g of intravenous magnesium with subsequent resolution and no recurrence. She is to remain on her present medical regimen for her underlying atrial fibrillation as well as anticoagulation in order to decrease the risk of systemic embolization. With Geovani Monteiro reviewing notes and laboratory data with greater than 50% of this time instructing and counseling the patient regarding my findings and recommendations and I have answered all questions as posed to me by Ms. Flynn. Thank you, GIO Villalobos - CNP for allowing me to consult in the care of this patient. Anival Marroquin DO DO, FACP, [facility], [facility]    NOTE:  This report was transcribed using voice recognition software. Every effort was made to ensure accuracy; however, inadvertent computerized transcription errors may be present.

## 2023-01-24 NOTE — PLAN OF CARE
Patient's chart updated to reflect:      .    - HF care plan, HF education points and HF discharge instructions.  -Orders: 2 gram sodium diet, daily weights, I/O.  -PCP and/or Cardiologist appointment to be scheduled within 7 days of hospital discharge.  -History of HF, not primary admission Dx.  Patient admitted for total knee arthroplasty.     Lesley Shrestha RN BSN  Heart Failure Navigator

## 2023-01-25 PROCEDURE — 64447 NJX AA&/STRD FEMORAL NRV IMG: CPT | Performed by: ANESTHESIOLOGY

## 2023-01-25 NOTE — ANESTHESIA PROCEDURE NOTES
Peripheral Block    Patient location during procedure: pre-op  Reason for block: post-op pain management  Start time: 1/25/2023 1:47 PM  Staffing  Performed: anesthesiologist   Anesthesiologist: Radha Avery MD  Preanesthetic Checklist  Completed: patient identified, IV checked, site marked, risks and benefits discussed, surgical/procedural consents, equipment checked, pre-op evaluation, timeout performed, anesthesia consent given, oxygen available and monitors applied/VS acknowledged  Peripheral Block   Patient position: supine  Prep: ChloraPrep  Provider prep: mask and sterile gloves  Patient monitoring: cardiac monitor, continuous pulse ox, frequent blood pressure checks and IV access  Block type: Femoral  Adductor canal  Laterality: left  Injection technique: single-shot  Guidance: ultrasound guided  Local infiltration: ropivacaine and decadron  Local infiltration: ropivacaine and decadron    Needle   Needle type: short-bevel   Needle gauge: 22 G  Needle localization: ultrasound guidance  Needle length: 10 cm  Assessment   Injection assessment: negative aspiration for heme, no paresthesia on injection and local visualized surrounding nerve on ultrasound  Paresthesia pain: none  Slow fractionated injection: yes  Hemodynamics: stable  Real-time US image taken/store: yes  Outcomes: patient tolerated procedure well and uncomplicated

## 2023-01-25 NOTE — ADDENDUM NOTE
Addendum  created 01/25/23 1347 by Supriya Agosto MD    Child order released for a procedure order, Clinical Note Signed, Intraprocedure Blocks edited, SmartForm saved

## 2023-02-01 ENCOUNTER — TELEPHONE (OUTPATIENT)
Dept: ORTHOPEDIC SURGERY | Age: 78
End: 2023-02-01

## 2023-02-01 NOTE — TELEPHONE ENCOUNTER
2/01/2023 Late Entry    1/31/2023 1:05 pm Patient phoned the office / Message left on voice mail: I have a question. Patient requesting a return call.    1/31/2023 3:30 pm Follow up phone call / Nova Nobles w/ the patient who reports: I had knee surgery one week ago. Sometimes my calf is achy, not always. Is it ok to apply heat? Patient denies calf pain. Patient denies fever or chills. Patient has been taking her Eliquis as pre op. Informed the patient:   - Occasional ache in the calf not uncommon especially after therapy / after exercising.   - If concerned about a blood clot, should see PCP. Patient expresses understanding but states, I cannot get down my steps yet to get to the doctor. Therapist is coming tomorrow to work w/ me.    Instructions to the patient:  - May use heat for comfort, protect skin from burns  - Continue Eliquis as per prescriber directions  - Drink plenty of water  - Continue circulation exercises  - Stand up, walk in the house at least every hour during the day  - Continue to wear SKY hose (patient reports she never had SKY hose after her surgery)  Will report above to Dr. Dago Pierre

## 2023-02-08 ENCOUNTER — OFFICE VISIT (OUTPATIENT)
Dept: ORTHOPEDIC SURGERY | Age: 78
End: 2023-02-08

## 2023-02-08 VITALS — BODY MASS INDEX: 41.65 KG/M2 | HEIGHT: 65 IN | WEIGHT: 250 LBS

## 2023-02-08 DIAGNOSIS — Z96.652 S/P TOTAL KNEE REPLACEMENT, LEFT: Primary | ICD-10-CM

## 2023-02-08 RX ORDER — OXYCODONE HYDROCHLORIDE AND ACETAMINOPHEN 5; 325 MG/1; MG/1
1 TABLET ORAL EVERY 6 HOURS PRN
Qty: 28 TABLET | Refills: 0 | Status: SHIPPED
Start: 2023-02-08 | End: 2023-02-08

## 2023-02-08 RX ORDER — OXYCODONE HYDROCHLORIDE AND ACETAMINOPHEN 5; 325 MG/1; MG/1
1 TABLET ORAL EVERY 6 HOURS PRN
Qty: 28 TABLET | Refills: 0 | Status: SHIPPED | OUTPATIENT
Start: 2023-02-08 | End: 2023-02-15

## 2023-02-08 NOTE — PROGRESS NOTES
Follow Up Post Operative Visit     Surgery: Robotic assisted left total knee arthroplasty  Date: 1/23/23    Subjective:    Alia Munguia is here for follow up visit s/p above procedure. She has been doing very well. She finished her home therapy and has made a 295 degrees of flexion and 0 degrees of extension. She is having some pain. Her incision has dried up with no drainage. She is happy with the result so far  Controlled Substances Monitoring:        Physical Exam:    Height: 5' 5\" (1.651 m), Weight: 250 lb (113.4 kg)    General: Alert and oriented x3, no acute distress  Cardiovascular/pulmonary: No labored breathing, peripheral perfusion intact  Musculoskeletal:    Left knee: Well approximated surgical incision with mild erythema around her staples from irritation. Staples removed incision well approximated Steri-Strips were placed. Range of motion is full from 0 to 95 degrees. Knee stable to varus valgus stress testing. Calf is soft compressible. There are no signs of infection. Imaging: 3 views the left knee independently reviewed by myself demonstrate a well-placed total knee arthroplasty without evidence of complication    Assessment and Plan: 2-week status post robotic assisted left total knee arthroplasty  -Overall she is doing very well and happy with her results. We will send her to outpatient therapy to continue work on range of motion. I like to see her at 120 degrees by her 6-week follow-up appointment. She is going to continue to take her Eliquis as prescribed by her heart doctor. I will refill her pain medicine.   I will see her back in 4 weeks for range of motion check    Taylor Harper, DO   Orthopaedic Surgery   2/8/23   1:14 PM EST

## 2023-02-17 ENCOUNTER — TELEPHONE (OUTPATIENT)
Dept: ORTHOPEDIC SURGERY | Age: 78
End: 2023-02-17

## 2023-02-17 NOTE — TELEPHONE ENCOUNTER
Patient called stating she saw her family NP Susan Peres for possible post-op wound infection and DVT. Per patient US negative. Per patient NP started antibiotic Doxycycline x 3 days, cultures performed. Unable to access information from Dr Francois Reno, not on Epic and unable to reach office staff. Appt given for Monday. Instructed patient if she would develop fever, chills she is to go to ER [facility] to be evaluated.

## 2023-02-20 ENCOUNTER — ANESTHESIA EVENT (OUTPATIENT)
Dept: OPERATING ROOM | Age: 78
End: 2023-02-20
Payer: MEDICARE

## 2023-02-20 ENCOUNTER — OFFICE VISIT (OUTPATIENT)
Dept: ORTHOPEDIC SURGERY | Age: 78
End: 2023-02-20

## 2023-02-20 VITALS — BODY MASS INDEX: 41.65 KG/M2 | WEIGHT: 250 LBS | HEIGHT: 65 IN

## 2023-02-20 DIAGNOSIS — M17.12 PRIMARY OSTEOARTHRITIS OF LEFT KNEE: Primary | ICD-10-CM

## 2023-02-20 DIAGNOSIS — T81.31XA POSTOPERATIVE WOUND DEHISCENCE, INITIAL ENCOUNTER: ICD-10-CM

## 2023-02-20 PROCEDURE — 99024 POSTOP FOLLOW-UP VISIT: CPT | Performed by: STUDENT IN AN ORGANIZED HEALTH CARE EDUCATION/TRAINING PROGRAM

## 2023-02-20 RX ORDER — METOPROLOL TARTRATE 50 MG/1
50 TABLET, FILM COATED ORAL 2 TIMES DAILY
COMMUNITY

## 2023-02-20 NOTE — PROGRESS NOTES
Follow Up Post Operative Visit     Surgery: Robotic assisted left total knee arthroplasty  Date: 1/23/23    Subjective:    Nitish Gonsales is here for follow up visit s/p above procedure. She is here today for wound check. Apparently 2 weeks ago she had superficial wound dehiscence and was started on some antibiotics by her family physician. She has been draining from her left knee clear fluid for the last 2 weeks. It is saturated bandage. Controlled Substances Monitoring:        Physical Exam:    No data recorded    General: Alert and oriented x3, no acute distress  Cardiovascular/pulmonary: No labored breathing, peripheral perfusion intact  Musculoskeletal:    Left knee: There is clear serous type drainage from the distal aspect of incision. There is no overt dehiscence. There are some mild erythema surrounding her incision range of motion is full from 0 to 95 degrees. Knee stable to varus valgus stress testing. Calf is soft compressible. There are no signs of infection. Imaging: No new imaging    Assessment and Plan: 4-week status post robotic assisted left total knee arthroplasty  -She has developed a wound issue with some drainage. I discussed this with her in detail. I am going to be very aggressive with this because we are trying to avoid a deep periprosthetic infection. I am going to take her to the OR tomorrow for irrigation and debridement and possible poly exchange. If it goes deep to the extensor mechanism we will keep her in the hospital to await cultures and get an infectious disease consult. If it is superficial we will get washed out and closed and dressed with incisional VAC and discharge her home. Risk-benefit and alternative surgery were discussed in detail. She understands the devastating nature of a prosthetic joint infection and she wishes to proceed with surgery. She will hold her Eliquis tomorrow. All of her questions were answered in detail.     Nanette Perea DO Orthopaedic Surgery   2/20/23  8:49 AM

## 2023-02-20 NOTE — PROGRESS NOTES
[address]   PRE-ADMISSION TESTING GENERAL INSTRUCTIONS  [facility] Phone Number: [phone]      GENERAL INSTRUCTIONS:    [x] Antibacterial Soap Shower Night before or AM of surgery. [] CHG Wipes instruction sheet and wipes given. [] Hibiclens shower the night before and the morning of surgery.   -Do not use Hibiclens on your face or head. [x] Do not wear makeup, lotions, powders, deodorant. [x] Nothing by mouth after midnight; including gum, candy, mints, or water. [x] You may brush your teeth, gargle, but do NOT swallow water. [x] No tobacco products, illegal drugs, or alcohol within 24 hours of your surgery. [x] Jewelry or valuables should not be brought to the hospital. All body and/or tongue piercing's must be removed prior to arriving to hospital. No contact lens or hair pins. [x] Arrange transportation with a responsible adult  to and from the hospital. Arrange for someone to be with you for the remainder of the day and for 24 hours after your procedure due to having had anesthesia when discharged.           -Who will be your  for transportation? Lela Cook will be staying with you for 24 hrs after your procedure? Enrike-  [x] [employer] card and photo ID. [x] Bring copy of living will or healthcare power of  papers to be placed in your electronic record. [] Urine Pregnancy test will be preformed the day of surgery. A specimen sample may be brought from home. [] Transfusion Bracelet: Please bring with you to hospital, day of surgery. PARKING INSTRUCTIONS:     [x] ARRIVAL DATE & TIME:  2/21 at 7:30 am  [x] Enter into the [facility]. Two people may accompany you. Masks are not required but are recommended. [x] Parking Lot \"I\" is where you will park. It is located on the corner of [address] and [address]. The entrance is on [address].    Upon entering the parking lot, a voucher ticket will print    EDUCATION INSTRUCTIONS:        [] Knee or Hip replacement booklet & exercise pamphlets given. [] [address] placed in chart. [] Pre-admission Testing educational folder given  [] Incentive Spirometry,coughing & deep breathing exercises reviewed. [] Medication information sheet(s)   [] Fluoroscopy-Xray used in surgery reviewed with patient. Educational pamphlet placed in chart. [] Pain: Post-op pain is normal and to be expected. You will be asked to rate your pain from 0-10. [] Joint camp offered. [] Joint replacement booklets given.  [] Spine Navigator to see in PAT. [] Other:___________________________    MEDICATION INSTRUCTIONS:    [x] Bring a complete list of your medications, please write the last time you took the medicine, give this list to the nurse in Pre-Op. [x] Take only the following medications the morning of surgery with 1-2 ounces of water:  Tikosyn; Lopressor; Effexor XR  [x] Stop all herbal supplements and vitamins 5 days before surgery. Stop NSAIDS 7 days before surgery. [] DO NOT take any diabetic medicine the morning of surgery. Follow instructions for insulin the day before surgery. [] If you are diabetic and your blood sugar is low or you feel symptomatic, you may drink 1-2 ounces of apple juice or take a glucose tablet.            -The morning of your procedure, you may call the pre-op area if you have concerns about your blood sugar [phone]. [x] Use your inhalers the morning of surgery. Bring your emergency inhaler with you day of surgery. Wears O2 at 2 liters/nc. Bring extra O2 tank   [x] Follow physician instructions regarding any blood thinners you may be taking. Eliquis last dose 2/20-am dose    WHAT TO EXPECT:    [x] The day of surgery you will be greeted and checked in by the [employer].  In addition, you will be registered in the [facility] by a Patient Access Representative.  Please bring your photo ID and insurance card. A nurse will greet you in accordance to the time you are needed in the pre-op area to prepare you for surgery. Please do not be discouraged if you are not greeted in the order you arrive as there are many variables that are involved in patient preparation. Your patience is greatly appreciated as you wait for your nurse. Please bring in items such as: books, magazines, newspapers, electronics, or any other items  to occupy your time in the waiting area. [x]  Delays may occur with surgery and staff will make a sincere effort to keep you informed of delays. If any delays occur with your procedure, we apologize ahead of time for your inconvenience as we recognize the value of your time.

## 2023-02-20 NOTE — TELEPHONE ENCOUNTER
4: 45 p.m. Spoke with Kassidy Perez NP. She sates she saw patient approx 10 days ago for \"wound dehiscence\" left knee. States she cultures area, Rx'd Antibiotic and saw patient again earlier this week. States incision looking much better. Instructed patient to call Dr Ninfa Palma for FU incision check.       Dr Ninfa Palma informed of conversation with Kassidy Perez NP.

## 2023-02-20 NOTE — LETTER
Susan Valadez M.D.   [address] Mariah Wing M.D. [facility], [address] - [place] [address]    Surgery Date:                          2/21/2023                                        Regarding Prescription & Non-Prescription Medications:    Medication Name to Discontinue:   Days before surgery  Last dose  Eliquis       Stop now /  No evening dose 2/20/2023                                                                                                                                                                                                                                                                                                                                                                                                                                                                                                                                                                                                                                                                                                                                                                                                                                                                                                                                                                                                                                                                                                                                                                                                                                                                                                                                             ** All medications discontinued prior to surgery may be resumed after your surgery is completed, unless otherwise specified by the physician.     If you have any questions or concerns, please contact our nurse at [phone], Option 2:  Monday through Thursday 9:00 am - 4:00 pm  Friday 10:00 am - 12 noon

## 2023-02-20 NOTE — PROGRESS NOTES
Surgical Procedure:Left Knee Irrigation and Debridement,  Possible Poly Exchange. ICD Code: T81.41XA  CPT Code: 29563  Vendor: Kathren Hammans notified 10:03 a.m. 2/20/2023  Anesthesia: General  Block: ANB  Date: 2/21/2023  Time: 9:30 a.m. Place: [address] [place] [address]  Surgeon: Magdiel Carnes D.O.  OPT, possible exteneded stay if Poly Exchange. Medications reviewed with the patient / holding the following medications: Hold Eliquis dose this evening and tomorrow morning. Pre Op Instructions reviewed with the patient. Informed patient: A hospital nurse will contact her with instructions for the day of surgery. The patient expresses understanding and is in agreement with the plan. Post Op Appointment: Date: 3/1/2023 Time: 9:10 a.m.

## 2023-02-21 ENCOUNTER — ANESTHESIA (OUTPATIENT)
Dept: OPERATING ROOM | Age: 78
End: 2023-02-21
Payer: MEDICARE

## 2023-02-21 ENCOUNTER — APPOINTMENT (OUTPATIENT)
Dept: GENERAL RADIOLOGY | Age: 78
DRG: 486 | End: 2023-02-21
Attending: STUDENT IN AN ORGANIZED HEALTH CARE EDUCATION/TRAINING PROGRAM
Payer: MEDICARE

## 2023-02-21 ENCOUNTER — HOSPITAL ENCOUNTER (INPATIENT)
Age: 78
LOS: 6 days | Discharge: NURSING, REHAB OR LONG-TERM CARE | DRG: 486 | End: 2023-02-27
Attending: STUDENT IN AN ORGANIZED HEALTH CARE EDUCATION/TRAINING PROGRAM | Admitting: STUDENT IN AN ORGANIZED HEALTH CARE EDUCATION/TRAINING PROGRAM
Payer: MEDICARE

## 2023-02-21 DIAGNOSIS — Z96.652 PRESENCE OF LEFT ARTIFICIAL KNEE JOINT: ICD-10-CM

## 2023-02-21 DIAGNOSIS — Z01.812 PRE-OPERATIVE LABORATORY EXAMINATION: Primary | ICD-10-CM

## 2023-02-21 DIAGNOSIS — T81.31XA DEHISCENCE OF OPERATIVE WOUND, INITIAL ENCOUNTER: ICD-10-CM

## 2023-02-21 LAB
ANION GAP SERPL CALCULATED.3IONS-SCNC: 11 MMOL/L (ref 7–16)
BUN BLDV-MCNC: 23 MG/DL (ref 6–23)
CALCIUM SERPL-MCNC: 9.1 MG/DL (ref 8.6–10.2)
CHLORIDE BLD-SCNC: 100 MMOL/L (ref 98–107)
CO2: 27 MMOL/L (ref 22–29)
CREAT SERPL-MCNC: 0.8 MG/DL (ref 0.5–1)
GFR SERPL CREATININE-BSD FRML MDRD: >60 ML/MIN/1.73
GLUCOSE BLD-MCNC: 133 MG/DL (ref 74–99)
HCT VFR BLD CALC: 30.3 % (ref 34–48)
HEMOGLOBIN: 9.1 G/DL (ref 11.5–15.5)
MCH RBC QN AUTO: 29.5 PG (ref 26–35)
MCHC RBC AUTO-ENTMCNC: 30 % (ref 32–34.5)
MCV RBC AUTO: 98.4 FL (ref 80–99.9)
PDW BLD-RTO: 14.5 FL (ref 11.5–15)
PLATELET # BLD: 216 E9/L (ref 130–450)
PMV BLD AUTO: 11.8 FL (ref 7–12)
POTASSIUM SERPL-SCNC: 5 MMOL/L (ref 3.5–5)
RBC # BLD: 3.08 E12/L (ref 3.5–5.5)
SODIUM BLD-SCNC: 138 MMOL/L (ref 132–146)
WBC # BLD: 7.5 E9/L (ref 4.5–11.5)

## 2023-02-21 PROCEDURE — 6370000000 HC RX 637 (ALT 250 FOR IP)

## 2023-02-21 PROCEDURE — 1200000000 HC SEMI PRIVATE

## 2023-02-21 PROCEDURE — 6360000002 HC RX W HCPCS

## 2023-02-21 PROCEDURE — 80048 BASIC METABOLIC PNL TOTAL CA: CPT

## 2023-02-21 PROCEDURE — 97161 PT EVAL LOW COMPLEX 20 MIN: CPT

## 2023-02-21 PROCEDURE — 2580000003 HC RX 258

## 2023-02-21 PROCEDURE — 87015 SPECIMEN INFECT AGNT CONCNTJ: CPT

## 2023-02-21 PROCEDURE — 87205 SMEAR GRAM STAIN: CPT

## 2023-02-21 PROCEDURE — 7100000001 HC PACU RECOVERY - ADDTL 15 MIN: Performed by: STUDENT IN AN ORGANIZED HEALTH CARE EDUCATION/TRAINING PROGRAM

## 2023-02-21 PROCEDURE — 2500000003 HC RX 250 WO HCPCS

## 2023-02-21 PROCEDURE — 0SUW09Z SUPPLEMENT LEFT KNEE JOINT, TIBIAL SURFACE WITH LINER, OPEN APPROACH: ICD-10-PCS | Performed by: STUDENT IN AN ORGANIZED HEALTH CARE EDUCATION/TRAINING PROGRAM

## 2023-02-21 PROCEDURE — 97165 OT EVAL LOW COMPLEX 30 MIN: CPT

## 2023-02-21 PROCEDURE — 3600000015 HC SURGERY LEVEL 5 ADDTL 15MIN: Performed by: STUDENT IN AN ORGANIZED HEALTH CARE EDUCATION/TRAINING PROGRAM

## 2023-02-21 PROCEDURE — 6360000002 HC RX W HCPCS: Performed by: STUDENT IN AN ORGANIZED HEALTH CARE EDUCATION/TRAINING PROGRAM

## 2023-02-21 PROCEDURE — 6370000000 HC RX 637 (ALT 250 FOR IP): Performed by: FAMILY MEDICINE

## 2023-02-21 PROCEDURE — 27310 EXPLORATION OF KNEE JOINT: CPT | Performed by: STUDENT IN AN ORGANIZED HEALTH CARE EDUCATION/TRAINING PROGRAM

## 2023-02-21 PROCEDURE — 97530 THERAPEUTIC ACTIVITIES: CPT

## 2023-02-21 PROCEDURE — 3700000001 HC ADD 15 MINUTES (ANESTHESIA): Performed by: STUDENT IN AN ORGANIZED HEALTH CARE EDUCATION/TRAINING PROGRAM

## 2023-02-21 PROCEDURE — C1776 JOINT DEVICE (IMPLANTABLE): HCPCS | Performed by: STUDENT IN AN ORGANIZED HEALTH CARE EDUCATION/TRAINING PROGRAM

## 2023-02-21 PROCEDURE — 64447 NJX AA&/STRD FEMORAL NRV IMG: CPT | Performed by: ANESTHESIOLOGY

## 2023-02-21 PROCEDURE — 7100000000 HC PACU RECOVERY - FIRST 15 MIN: Performed by: STUDENT IN AN ORGANIZED HEALTH CARE EDUCATION/TRAINING PROGRAM

## 2023-02-21 PROCEDURE — 6360000002 HC RX W HCPCS: Performed by: ANESTHESIOLOGY

## 2023-02-21 PROCEDURE — 73560 X-RAY EXAM OF KNEE 1 OR 2: CPT

## 2023-02-21 PROCEDURE — 6360000002 HC RX W HCPCS: Performed by: INTERNAL MEDICINE

## 2023-02-21 PROCEDURE — 0SPD09Z REMOVAL OF LINER FROM LEFT KNEE JOINT, OPEN APPROACH: ICD-10-PCS | Performed by: STUDENT IN AN ORGANIZED HEALTH CARE EDUCATION/TRAINING PROGRAM

## 2023-02-21 PROCEDURE — 3700000000 HC ANESTHESIA ATTENDED CARE: Performed by: STUDENT IN AN ORGANIZED HEALTH CARE EDUCATION/TRAINING PROGRAM

## 2023-02-21 PROCEDURE — 64445 NJX AA&/STRD SCIATIC NRV IMG: CPT | Performed by: ANESTHESIOLOGY

## 2023-02-21 PROCEDURE — 2580000003 HC RX 258: Performed by: INTERNAL MEDICINE

## 2023-02-21 PROCEDURE — 2709999900 HC NON-CHARGEABLE SUPPLY: Performed by: STUDENT IN AN ORGANIZED HEALTH CARE EDUCATION/TRAINING PROGRAM

## 2023-02-21 PROCEDURE — 2500000003 HC RX 250 WO HCPCS: Performed by: STUDENT IN AN ORGANIZED HEALTH CARE EDUCATION/TRAINING PROGRAM

## 2023-02-21 PROCEDURE — 87070 CULTURE OTHR SPECIMN AEROBIC: CPT

## 2023-02-21 PROCEDURE — 0S9D0ZX DRAINAGE OF LEFT KNEE JOINT, OPEN APPROACH, DIAGNOSTIC: ICD-10-PCS | Performed by: STUDENT IN AN ORGANIZED HEALTH CARE EDUCATION/TRAINING PROGRAM

## 2023-02-21 PROCEDURE — 36415 COLL VENOUS BLD VENIPUNCTURE: CPT

## 2023-02-21 PROCEDURE — 2720000010 HC SURG SUPPLY STERILE: Performed by: STUDENT IN AN ORGANIZED HEALTH CARE EDUCATION/TRAINING PROGRAM

## 2023-02-21 PROCEDURE — 85027 COMPLETE CBC AUTOMATED: CPT

## 2023-02-21 PROCEDURE — 87075 CULTR BACTERIA EXCEPT BLOOD: CPT

## 2023-02-21 PROCEDURE — 94664 DEMO&/EVAL PT USE INHALER: CPT

## 2023-02-21 PROCEDURE — 2580000003 HC RX 258: Performed by: STUDENT IN AN ORGANIZED HEALTH CARE EDUCATION/TRAINING PROGRAM

## 2023-02-21 PROCEDURE — 87116 MYCOBACTERIA CULTURE: CPT

## 2023-02-21 PROCEDURE — 3600000005 HC SURGERY LEVEL 5 BASE: Performed by: STUDENT IN AN ORGANIZED HEALTH CARE EDUCATION/TRAINING PROGRAM

## 2023-02-21 PROCEDURE — 87206 SMEAR FLUORESCENT/ACID STAI: CPT

## 2023-02-21 PROCEDURE — 87102 FUNGUS ISOLATION CULTURE: CPT

## 2023-02-21 DEVICE — INSERT TIB CS 4 10 MM ARTC POST KNEE BEAR TECHNOLOGY X3: Type: IMPLANTABLE DEVICE | Site: KNEE | Status: FUNCTIONAL

## 2023-02-21 RX ORDER — SODIUM CHLORIDE 9 MG/ML
INJECTION, SOLUTION INTRAVENOUS CONTINUOUS
Status: DISCONTINUED | OUTPATIENT
Start: 2023-02-21 | End: 2023-02-27 | Stop reason: HOSPADM

## 2023-02-21 RX ORDER — SODIUM CHLORIDE 9 MG/ML
INJECTION, SOLUTION INTRAVENOUS PRN
Status: DISCONTINUED | OUTPATIENT
Start: 2023-02-21 | End: 2023-02-27 | Stop reason: HOSPADM

## 2023-02-21 RX ORDER — ATORVASTATIN CALCIUM 20 MG/1
20 TABLET, FILM COATED ORAL NIGHTLY
Status: DISCONTINUED | OUTPATIENT
Start: 2023-02-21 | End: 2023-02-27 | Stop reason: HOSPADM

## 2023-02-21 RX ORDER — ROPIVACAINE HYDROCHLORIDE 5 MG/ML
INJECTION, SOLUTION EPIDURAL; INFILTRATION; PERINEURAL
Status: COMPLETED | OUTPATIENT
Start: 2023-02-21 | End: 2023-02-21

## 2023-02-21 RX ORDER — DOFETILIDE 0.5 MG/1
500 CAPSULE ORAL 2 TIMES DAILY
Status: DISCONTINUED | OUTPATIENT
Start: 2023-02-21 | End: 2023-02-27 | Stop reason: HOSPADM

## 2023-02-21 RX ORDER — ONDANSETRON 4 MG/1
4 TABLET, ORALLY DISINTEGRATING ORAL EVERY 8 HOURS PRN
Status: DISCONTINUED | OUTPATIENT
Start: 2023-02-21 | End: 2023-02-27 | Stop reason: HOSPADM

## 2023-02-21 RX ORDER — LABETALOL HYDROCHLORIDE 5 MG/ML
5 INJECTION, SOLUTION INTRAVENOUS
Status: DISCONTINUED | OUTPATIENT
Start: 2023-02-21 | End: 2023-02-21 | Stop reason: HOSPADM

## 2023-02-21 RX ORDER — OXYCODONE HYDROCHLORIDE 5 MG/1
5 TABLET ORAL EVERY 4 HOURS PRN
Status: DISCONTINUED | OUTPATIENT
Start: 2023-02-21 | End: 2023-02-27 | Stop reason: HOSPADM

## 2023-02-21 RX ORDER — SODIUM CHLORIDE 0.9 % (FLUSH) 0.9 %
5-40 SYRINGE (ML) INJECTION PRN
Status: DISCONTINUED | OUTPATIENT
Start: 2023-02-21 | End: 2023-02-21 | Stop reason: HOSPADM

## 2023-02-21 RX ORDER — OXYCODONE HYDROCHLORIDE 10 MG/1
10 TABLET ORAL EVERY 4 HOURS PRN
Status: DISCONTINUED | OUTPATIENT
Start: 2023-02-21 | End: 2023-02-27 | Stop reason: HOSPADM

## 2023-02-21 RX ORDER — LIDOCAINE HYDROCHLORIDE 20 MG/ML
INJECTION, SOLUTION INTRAVENOUS PRN
Status: DISCONTINUED | OUTPATIENT
Start: 2023-02-21 | End: 2023-02-21 | Stop reason: SDUPTHER

## 2023-02-21 RX ORDER — HYDRALAZINE HYDROCHLORIDE 20 MG/ML
INJECTION INTRAMUSCULAR; INTRAVENOUS PRN
Status: DISCONTINUED | OUTPATIENT
Start: 2023-02-21 | End: 2023-02-21 | Stop reason: SDUPTHER

## 2023-02-21 RX ORDER — LEVOTHYROXINE SODIUM 0.15 MG/1
150 TABLET ORAL DAILY
Status: DISCONTINUED | OUTPATIENT
Start: 2023-02-21 | End: 2023-02-27 | Stop reason: HOSPADM

## 2023-02-21 RX ORDER — MORPHINE SULFATE 4 MG/ML
4 INJECTION, SOLUTION INTRAMUSCULAR; INTRAVENOUS
Status: DISCONTINUED | OUTPATIENT
Start: 2023-02-21 | End: 2023-02-27 | Stop reason: HOSPADM

## 2023-02-21 RX ORDER — ACETAMINOPHEN 325 MG/1
650 TABLET ORAL
Status: DISCONTINUED | OUTPATIENT
Start: 2023-02-21 | End: 2023-02-21 | Stop reason: HOSPADM

## 2023-02-21 RX ORDER — DROPERIDOL 2.5 MG/ML
0.62 INJECTION, SOLUTION INTRAMUSCULAR; INTRAVENOUS
Status: DISCONTINUED | OUTPATIENT
Start: 2023-02-21 | End: 2023-02-21 | Stop reason: HOSPADM

## 2023-02-21 RX ORDER — DIPHENHYDRAMINE HYDROCHLORIDE 50 MG/ML
12.5 INJECTION INTRAMUSCULAR; INTRAVENOUS
Status: DISCONTINUED | OUTPATIENT
Start: 2023-02-21 | End: 2023-02-21 | Stop reason: HOSPADM

## 2023-02-21 RX ORDER — FENTANYL CITRATE 50 UG/ML
100 INJECTION, SOLUTION INTRAMUSCULAR; INTRAVENOUS ONCE
Status: COMPLETED | OUTPATIENT
Start: 2023-02-21 | End: 2023-02-21

## 2023-02-21 RX ORDER — ENOXAPARIN SODIUM 100 MG/ML
30 INJECTION SUBCUTANEOUS 2 TIMES DAILY
Status: CANCELLED | OUTPATIENT
Start: 2023-02-22

## 2023-02-21 RX ORDER — POLYETHYLENE GLYCOL 3350 17 G/17G
17 POWDER, FOR SOLUTION ORAL DAILY
Status: DISCONTINUED | OUTPATIENT
Start: 2023-02-21 | End: 2023-02-27 | Stop reason: HOSPADM

## 2023-02-21 RX ORDER — MIDAZOLAM HYDROCHLORIDE 2 MG/2ML
2 INJECTION, SOLUTION INTRAMUSCULAR; INTRAVENOUS EVERY 10 MIN PRN
Status: DISCONTINUED | OUTPATIENT
Start: 2023-02-21 | End: 2023-02-21 | Stop reason: HOSPADM

## 2023-02-21 RX ORDER — CEFAZOLIN SODIUM 1 G/3ML
INJECTION, POWDER, FOR SOLUTION INTRAMUSCULAR; INTRAVENOUS PRN
Status: DISCONTINUED | OUTPATIENT
Start: 2023-02-21 | End: 2023-02-21 | Stop reason: SDUPTHER

## 2023-02-21 RX ORDER — SODIUM CHLORIDE 9 MG/ML
25 INJECTION, SOLUTION INTRAVENOUS PRN
Status: DISCONTINUED | OUTPATIENT
Start: 2023-02-21 | End: 2023-02-21 | Stop reason: HOSPADM

## 2023-02-21 RX ORDER — SODIUM CHLORIDE 0.9 % (FLUSH) 0.9 %
5-40 SYRINGE (ML) INJECTION EVERY 12 HOURS SCHEDULED
Status: DISCONTINUED | OUTPATIENT
Start: 2023-02-21 | End: 2023-02-21 | Stop reason: HOSPADM

## 2023-02-21 RX ORDER — PROPOFOL 10 MG/ML
INJECTION, EMULSION INTRAVENOUS PRN
Status: DISCONTINUED | OUTPATIENT
Start: 2023-02-21 | End: 2023-02-21 | Stop reason: SDUPTHER

## 2023-02-21 RX ORDER — SODIUM CHLORIDE 0.9 % (FLUSH) 0.9 %
5-40 SYRINGE (ML) INJECTION EVERY 12 HOURS SCHEDULED
Status: DISCONTINUED | OUTPATIENT
Start: 2023-02-21 | End: 2023-02-27 | Stop reason: HOSPADM

## 2023-02-21 RX ORDER — FENTANYL CITRATE 50 UG/ML
INJECTION, SOLUTION INTRAMUSCULAR; INTRAVENOUS PRN
Status: DISCONTINUED | OUTPATIENT
Start: 2023-02-21 | End: 2023-02-21 | Stop reason: SDUPTHER

## 2023-02-21 RX ORDER — SODIUM CHLORIDE 9 MG/ML
INJECTION, SOLUTION INTRAVENOUS CONTINUOUS
Status: DISCONTINUED | OUTPATIENT
Start: 2023-02-21 | End: 2023-02-21 | Stop reason: HOSPADM

## 2023-02-21 RX ORDER — ROCURONIUM BROMIDE 10 MG/ML
INJECTION, SOLUTION INTRAVENOUS PRN
Status: DISCONTINUED | OUTPATIENT
Start: 2023-02-21 | End: 2023-02-21 | Stop reason: SDUPTHER

## 2023-02-21 RX ORDER — IPRATROPIUM BROMIDE AND ALBUTEROL SULFATE 2.5; .5 MG/3ML; MG/3ML
1 SOLUTION RESPIRATORY (INHALATION)
Status: DISCONTINUED | OUTPATIENT
Start: 2023-02-21 | End: 2023-02-22

## 2023-02-21 RX ORDER — ONDANSETRON 2 MG/ML
4 INJECTION INTRAMUSCULAR; INTRAVENOUS
Status: DISCONTINUED | OUTPATIENT
Start: 2023-02-21 | End: 2023-02-21 | Stop reason: HOSPADM

## 2023-02-21 RX ORDER — SODIUM CHLORIDE 0.9 % (FLUSH) 0.9 %
5-40 SYRINGE (ML) INJECTION PRN
Status: DISCONTINUED | OUTPATIENT
Start: 2023-02-21 | End: 2023-02-27 | Stop reason: HOSPADM

## 2023-02-21 RX ORDER — FENTANYL CITRATE 50 UG/ML
25 INJECTION, SOLUTION INTRAMUSCULAR; INTRAVENOUS ONCE
Status: COMPLETED | OUTPATIENT
Start: 2023-02-21 | End: 2023-02-21

## 2023-02-21 RX ORDER — DEXAMETHASONE SODIUM PHOSPHATE 10 MG/ML
4 INJECTION, SOLUTION INTRAMUSCULAR; INTRAVENOUS ONCE
Status: COMPLETED | OUTPATIENT
Start: 2023-02-21 | End: 2023-02-21

## 2023-02-21 RX ORDER — SODIUM CHLORIDE 9 MG/ML
INJECTION, SOLUTION INTRAVENOUS PRN
Status: DISCONTINUED | OUTPATIENT
Start: 2023-02-21 | End: 2023-02-21 | Stop reason: HOSPADM

## 2023-02-21 RX ORDER — HYDRALAZINE HYDROCHLORIDE 20 MG/ML
5 INJECTION INTRAMUSCULAR; INTRAVENOUS
Status: DISCONTINUED | OUTPATIENT
Start: 2023-02-21 | End: 2023-02-21 | Stop reason: HOSPADM

## 2023-02-21 RX ORDER — ROPIVACAINE HYDROCHLORIDE 5 MG/ML
30 INJECTION, SOLUTION EPIDURAL; INFILTRATION; PERINEURAL ONCE
Status: COMPLETED | OUTPATIENT
Start: 2023-02-21 | End: 2023-02-21

## 2023-02-21 RX ORDER — VALSARTAN 160 MG/1
160 TABLET ORAL DAILY
Status: DISCONTINUED | OUTPATIENT
Start: 2023-02-21 | End: 2023-02-27 | Stop reason: HOSPADM

## 2023-02-21 RX ORDER — SUCRALFATE 1 G/1
1 TABLET ORAL
Status: DISCONTINUED | OUTPATIENT
Start: 2023-02-21 | End: 2023-02-27 | Stop reason: HOSPADM

## 2023-02-21 RX ORDER — METOPROLOL TARTRATE 50 MG/1
50 TABLET, FILM COATED ORAL 2 TIMES DAILY
Status: DISCONTINUED | OUTPATIENT
Start: 2023-02-21 | End: 2023-02-27 | Stop reason: HOSPADM

## 2023-02-21 RX ORDER — FERROUS SULFATE 325(65) MG
325 TABLET ORAL 2 TIMES DAILY WITH MEALS
Status: DISCONTINUED | OUTPATIENT
Start: 2023-02-21 | End: 2023-02-27 | Stop reason: HOSPADM

## 2023-02-21 RX ORDER — DEXAMETHASONE SODIUM PHOSPHATE 10 MG/ML
INJECTION INTRAMUSCULAR; INTRAVENOUS PRN
Status: DISCONTINUED | OUTPATIENT
Start: 2023-02-21 | End: 2023-02-21 | Stop reason: SDUPTHER

## 2023-02-21 RX ORDER — VENLAFAXINE HYDROCHLORIDE 150 MG/1
300 CAPSULE, EXTENDED RELEASE ORAL DAILY
Status: DISCONTINUED | OUTPATIENT
Start: 2023-02-22 | End: 2023-02-27 | Stop reason: HOSPADM

## 2023-02-21 RX ORDER — IPRATROPIUM BROMIDE AND ALBUTEROL SULFATE 2.5; .5 MG/3ML; MG/3ML
1 SOLUTION RESPIRATORY (INHALATION)
Status: DISCONTINUED | OUTPATIENT
Start: 2023-02-21 | End: 2023-02-21 | Stop reason: HOSPADM

## 2023-02-21 RX ORDER — MORPHINE SULFATE 2 MG/ML
2 INJECTION, SOLUTION INTRAMUSCULAR; INTRAVENOUS
Status: DISCONTINUED | OUTPATIENT
Start: 2023-02-21 | End: 2023-02-27 | Stop reason: HOSPADM

## 2023-02-21 RX ORDER — ONDANSETRON 2 MG/ML
4 INJECTION INTRAMUSCULAR; INTRAVENOUS EVERY 6 HOURS PRN
Status: DISCONTINUED | OUTPATIENT
Start: 2023-02-21 | End: 2023-02-27 | Stop reason: HOSPADM

## 2023-02-21 RX ORDER — ACETAMINOPHEN 325 MG/1
650 TABLET ORAL EVERY 6 HOURS
Status: DISCONTINUED | OUTPATIENT
Start: 2023-02-21 | End: 2023-02-27 | Stop reason: HOSPADM

## 2023-02-21 RX ORDER — VANCOMYCIN HYDROCHLORIDE 1 G/20ML
INJECTION, POWDER, LYOPHILIZED, FOR SOLUTION INTRAVENOUS PRN
Status: DISCONTINUED | OUTPATIENT
Start: 2023-02-21 | End: 2023-02-21 | Stop reason: ALTCHOICE

## 2023-02-21 RX ORDER — MIDAZOLAM HYDROCHLORIDE 2 MG/2ML
2 INJECTION, SOLUTION INTRAMUSCULAR; INTRAVENOUS
Status: DISCONTINUED | OUTPATIENT
Start: 2023-02-21 | End: 2023-02-21 | Stop reason: HOSPADM

## 2023-02-21 RX ORDER — ONDANSETRON 2 MG/ML
INJECTION INTRAMUSCULAR; INTRAVENOUS PRN
Status: DISCONTINUED | OUTPATIENT
Start: 2023-02-21 | End: 2023-02-21 | Stop reason: SDUPTHER

## 2023-02-21 RX ADMIN — OXYCODONE HYDROCHLORIDE 10 MG: 10 TABLET ORAL at 22:48

## 2023-02-21 RX ADMIN — PIPERACILLIN AND TAZOBACTAM 3375 MG: 3; .375 INJECTION, POWDER, FOR SOLUTION INTRAVENOUS at 18:15

## 2023-02-21 RX ADMIN — ACETAMINOPHEN 650 MG: 325 TABLET ORAL at 20:29

## 2023-02-21 RX ADMIN — FENTANYL CITRATE 50 MCG: 50 INJECTION, SOLUTION INTRAMUSCULAR; INTRAVENOUS at 08:27

## 2023-02-21 RX ADMIN — VALSARTAN 160 MG: 160 TABLET ORAL at 20:29

## 2023-02-21 RX ADMIN — TRANEXAMIC ACID 1000 MG: 1 INJECTION, SOLUTION INTRAVENOUS at 11:15

## 2023-02-21 RX ADMIN — HYDRALAZINE HYDROCHLORIDE 5 MG: 20 INJECTION INTRAMUSCULAR; INTRAVENOUS at 09:31

## 2023-02-21 RX ADMIN — SUCRALFATE 1 G: 1 TABLET ORAL at 18:05

## 2023-02-21 RX ADMIN — LEVOTHYROXINE SODIUM 150 MCG: 0.15 TABLET ORAL at 20:29

## 2023-02-21 RX ADMIN — FENTANYL CITRATE 100 MCG: 50 INJECTION, SOLUTION INTRAMUSCULAR; INTRAVENOUS at 08:50

## 2023-02-21 RX ADMIN — OXYCODONE HYDROCHLORIDE 5 MG: 5 TABLET ORAL at 16:12

## 2023-02-21 RX ADMIN — SODIUM CHLORIDE, PRESERVATIVE FREE 10 ML: 5 INJECTION INTRAVENOUS at 23:00

## 2023-02-21 RX ADMIN — SUGAMMADEX 220 MG: 100 INJECTION, SOLUTION INTRAVENOUS at 09:45

## 2023-02-21 RX ADMIN — CEFAZOLIN 3 G: 1 INJECTION, POWDER, FOR SOLUTION INTRAMUSCULAR; INTRAVENOUS at 09:17

## 2023-02-21 RX ADMIN — TRANEXAMIC ACID 1000 MG: 1 INJECTION, SOLUTION INTRAVENOUS at 09:27

## 2023-02-21 RX ADMIN — DEXAMETHASONE SODIUM PHOSPHATE 10 MG: 10 INJECTION INTRAMUSCULAR; INTRAVENOUS at 08:59

## 2023-02-21 RX ADMIN — HYDRALAZINE HYDROCHLORIDE 5 MG: 20 INJECTION INTRAMUSCULAR; INTRAVENOUS at 10:29

## 2023-02-21 RX ADMIN — SODIUM CHLORIDE: 9 INJECTION, SOLUTION INTRAVENOUS at 08:24

## 2023-02-21 RX ADMIN — DEXAMETHASONE SODIUM PHOSPHATE 4 MG: 10 INJECTION INTRAMUSCULAR; INTRAVENOUS at 08:28

## 2023-02-21 RX ADMIN — PIPERACILLIN AND TAZOBACTAM 3375 MG: 3; .375 INJECTION, POWDER, FOR SOLUTION INTRAVENOUS at 23:43

## 2023-02-21 RX ADMIN — SUCRALFATE 1 G: 1 TABLET ORAL at 20:29

## 2023-02-21 RX ADMIN — ROPIVACAINE HYDROCHLORIDE 30 ML: 5 INJECTION, SOLUTION EPIDURAL; INFILTRATION; PERINEURAL at 08:28

## 2023-02-21 RX ADMIN — DOFETILIDE 500 MCG: 0.5 CAPSULE ORAL at 20:29

## 2023-02-21 RX ADMIN — ATORVASTATIN CALCIUM 20 MG: 20 TABLET, FILM COATED ORAL at 20:29

## 2023-02-21 RX ADMIN — ROCURONIUM BROMIDE 50 MG: 50 INJECTION INTRAVENOUS at 08:50

## 2023-02-21 RX ADMIN — ROPIVACAINE HYDROCHLORIDE 10 ML: 5 INJECTION EPIDURAL; INFILTRATION; PERINEURAL at 08:30

## 2023-02-21 RX ADMIN — IPRATROPIUM BROMIDE AND ALBUTEROL SULFATE 1 AMPULE: 2.5; .5 SOLUTION RESPIRATORY (INHALATION) at 20:21

## 2023-02-21 RX ADMIN — ACETAMINOPHEN 650 MG: 325 TABLET ORAL at 14:19

## 2023-02-21 RX ADMIN — ROPIVACAINE HYDROCHLORIDE 20 ML: 5 INJECTION, SOLUTION EPIDURAL; INFILTRATION; PERINEURAL at 08:20

## 2023-02-21 RX ADMIN — LIDOCAINE HYDROCHLORIDE 100 MG: 20 INJECTION, SOLUTION INTRAVENOUS at 08:50

## 2023-02-21 RX ADMIN — METOPROLOL TARTRATE 50 MG: 50 TABLET, FILM COATED ORAL at 20:29

## 2023-02-21 RX ADMIN — PROPOFOL 150 MG: 10 INJECTION, EMULSION INTRAVENOUS at 08:50

## 2023-02-21 RX ADMIN — HYDROMORPHONE HYDROCHLORIDE 0.5 MG: 1 INJECTION, SOLUTION INTRAMUSCULAR; INTRAVENOUS; SUBCUTANEOUS at 11:17

## 2023-02-21 RX ADMIN — HYDROMORPHONE HYDROCHLORIDE 0.5 MG: 1 INJECTION, SOLUTION INTRAMUSCULAR; INTRAVENOUS; SUBCUTANEOUS at 12:00

## 2023-02-21 RX ADMIN — FENTANYL CITRATE 100 MCG: 50 INJECTION, SOLUTION INTRAMUSCULAR; INTRAVENOUS at 09:19

## 2023-02-21 RX ADMIN — SODIUM CHLORIDE: 9 INJECTION, SOLUTION INTRAVENOUS at 14:21

## 2023-02-21 RX ADMIN — MIDAZOLAM 2 MG: 1 INJECTION INTRAMUSCULAR; INTRAVENOUS at 08:27

## 2023-02-21 RX ADMIN — ONDANSETRON 4 MG: 2 INJECTION INTRAMUSCULAR; INTRAVENOUS at 10:10

## 2023-02-21 ASSESSMENT — PAIN DESCRIPTION - DESCRIPTORS
DESCRIPTORS: ACHING
DESCRIPTORS: ACHING;BURNING;DISCOMFORT
DESCRIPTORS: ACHING;BURNING;DISCOMFORT
DESCRIPTORS: ACHING;DISCOMFORT;SORE
DESCRIPTORS: ACHING;BURNING;DISCOMFORT
DESCRIPTORS: ACHING
DESCRIPTORS: ACHING;BURNING;DISCOMFORT
DESCRIPTORS: ACHING
DESCRIPTORS: ACHING;DISCOMFORT;SORE

## 2023-02-21 ASSESSMENT — PAIN DESCRIPTION - LOCATION
LOCATION: KNEE
LOCATION: LEG
LOCATION: KNEE

## 2023-02-21 ASSESSMENT — PAIN DESCRIPTION - PAIN TYPE
TYPE: SURGICAL PAIN

## 2023-02-21 ASSESSMENT — PAIN DESCRIPTION - ORIENTATION
ORIENTATION: LEFT

## 2023-02-21 ASSESSMENT — PAIN SCALES - GENERAL
PAINLEVEL_OUTOF10: 7
PAINLEVEL_OUTOF10: 0
PAINLEVEL_OUTOF10: 3
PAINLEVEL_OUTOF10: 7
PAINLEVEL_OUTOF10: 4
PAINLEVEL_OUTOF10: 0
PAINLEVEL_OUTOF10: 7
PAINLEVEL_OUTOF10: 5
PAINLEVEL_OUTOF10: 4
PAINLEVEL_OUTOF10: 7
PAINLEVEL_OUTOF10: 0

## 2023-02-21 ASSESSMENT — PAIN DESCRIPTION - FREQUENCY
FREQUENCY: CONTINUOUS
FREQUENCY: INTERMITTENT
FREQUENCY: INTERMITTENT
FREQUENCY: CONTINUOUS

## 2023-02-21 ASSESSMENT — PAIN - FUNCTIONAL ASSESSMENT
PAIN_FUNCTIONAL_ASSESSMENT: PREVENTS OR INTERFERES SOME ACTIVE ACTIVITIES AND ADLS
PAIN_FUNCTIONAL_ASSESSMENT: PREVENTS OR INTERFERES WITH MANY ACTIVE NOT PASSIVE ACTIVITIES
PAIN_FUNCTIONAL_ASSESSMENT: 0-10
PAIN_FUNCTIONAL_ASSESSMENT: PREVENTS OR INTERFERES SOME ACTIVE ACTIVITIES AND ADLS
PAIN_FUNCTIONAL_ASSESSMENT: PREVENTS OR INTERFERES WITH MANY ACTIVE NOT PASSIVE ACTIVITIES
PAIN_FUNCTIONAL_ASSESSMENT: PREVENTS OR INTERFERES SOME ACTIVE ACTIVITIES AND ADLS

## 2023-02-21 ASSESSMENT — ENCOUNTER SYMPTOMS: SHORTNESS OF BREATH: 1

## 2023-02-21 ASSESSMENT — LIFESTYLE VARIABLES: SMOKING_STATUS: 0

## 2023-02-21 NOTE — ANESTHESIA PROCEDURE NOTES
Peripheral Block    Patient location during procedure: pre-op  Reason for block: post-op pain management and at surgeon's request  Start time: 2/21/2023 8:20 AM  End time: 2/21/2023 8:30 AM  Staffing  Performed: anesthesiologist   Anesthesiologist: Phoenix Guo MD  Preanesthetic Checklist  Completed: patient identified, IV checked, site marked, risks and benefits discussed, surgical/procedural consents, equipment checked, pre-op evaluation, timeout performed, anesthesia consent given, oxygen available, monitors applied/VS acknowledged, fire risk safety assessment completed and verbalized and blood product R/B/A discussed and consented  Peripheral Block   Patient position: supine  Prep: ChloraPrep  Provider prep: mask and sterile gloves  Patient monitoring: cardiac monitor, continuous pulse ox, frequent blood pressure checks and IV access  Block type: Sciatic  Popliteal  Laterality: left  Injection technique: single-shot  Guidance: ultrasound guided  Local infiltration: lidocaine  Infiltration strength: 1 %  Local infiltration: lidocaine  Dose: 3 mL    Needle   Needle type: insulated echogenic nerve stimulator needle   Needle gauge: 20 G  Needle localization: ultrasound guidance  Needle length: 10 cm  Assessment   Injection assessment: negative aspiration for heme, no paresthesia on injection, local visualized surrounding nerve on ultrasound and no intravascular symptoms  Paresthesia pain: none  Slow fractionated injection: yes  Hemodynamics: stable  Real-time US image taken/store: yes  Outcomes: patient tolerated procedure well and uncomplicated    Additional Notes  U/S [id number]. Time out performed.          Medications Administered  dexamethasone 4 MG/ML - Perineural   4 mg - 2/21/2023 8:20:00 AM  ropivacaine (NAROPIN) injection 0.5% - Perineural   20 mL - 2/21/2023 8:20:00 AM

## 2023-02-21 NOTE — ANESTHESIA PRE PROCEDURE
Department of Anesthesiology  Preprocedure Note       Name:  Chivo Mai   Age:  76 y.o.  :  1945                                          MRN:  [id number]         Date:  2023      Surgeon: Jayy Charles):  Madison Martínez DO    Procedure: Procedure(s):  LEFT KNEE INCISION AND DRAINAGE, POSSIBLE POLY EXCHANGE, ROGELIO, NERVE BLOCK    Medications prior to admission:   Prior to Admission medications    Medication Sig Start Date End Date Taking?  Authorizing Provider   metoprolol tartrate (LOPRESSOR) 50 MG tablet Take 50 mg by mouth 2 times daily    Historical Provider, MD Janette Clark ELLIPTA 62.5-25 MCG/ACT AEPB Inhale 1 puff into the lungs daily Uses in the evening 23   Historical Provider, MD   Multiple Vitamins-Minerals (THERAPEUTIC MULTIVITAMIN-MINERALS) tablet Take 1 tablet by mouth daily    Historical Provider, MD   Cranberry 1000 MG CAPS Take 1 capsule by mouth daily    Historical Provider, MD   magnesium oxide (MAG-OX) 400 MG tablet Take 1 tablet by mouth daily 12/10/22 3/10/23  Eva Hines DO   sucralfate (CARAFATE) 1 GM/10ML suspension TAKE 10 ML BY MOUTH ONCE DAILY AS NEEDED 10/24/22   Historical Provider, MD   Cholecalciferol (VITAMIN D) 50 MCG (2000 UT) CAPS capsule Take 1 capsule by mouth daily    Historical Provider, MD   OXYGEN Inhale 2 L/min into the lungs continuous    Historical Provider, MD   dofetilide (TIKOSYN) 500 MCG capsule Take 1 capsule by mouth every 12 hours  Patient taking differently: Take 500 mcg by mouth 2 times daily 22   Palma Sarabia MD   levothyroxine (SYNTHROID) 150 MCG tablet TAKE 1 TABLET BY MOUTH ONCE DAILY 21   Historical Provider, MD   apixaban (ELIQUIS) 5 MG TABS tablet Take 1 tablet by mouth 2 times daily 19   GIO Lindo CNP   valsartan (DIOVAN) 160 MG tablet Take 1 tablet by mouth daily 19   GIO Lindo CNP   venlafaxine (EFFEXOR XR) 150 MG extended release capsule Take 300 mg by mouth daily 18 Historical Provider, MD   atorvastatin (LIPITOR) 20 MG tablet Take 20 mg by mouth daily    Historical Provider, MD       Current medications:    No current facility-administered medications for this visit. No current outpatient medications on file.      Facility-Administered Medications Ordered in Other Visits   Medication Dose Route Frequency Provider Last Rate Last Admin    0.9 % sodium chloride infusion   IntraVENous Continuous Ellis Ontiveros,         sodium chloride flush 0.9 % injection 5-40 mL  5-40 mL IntraVENous 2 times per day Ellis Ontiveros, DO        sodium chloride flush 0.9 % injection 5-40 mL  5-40 mL IntraVENous PRN Ellis Ontiveros, DO        0.9 % sodium chloride infusion   IntraVENous PRN Ellis Ontiveros, DO        ceFAZolin (ANCEF) 2,000 mg in sterile water 20 mL IV syringe  2,000 mg IntraVENous On Call to Meeta Zee 8, DO           Allergies:  No Known Allergies    Problem List:    Patient Active Problem List   Diagnosis Code    Essential hypertension I10    Mixed hyperlipidemia E78.2    S/P carotid endarterectomy Z98.890    Hypoxia R09.02    CHF (congestive heart failure) ([facility]) T39.8    Diastolic dysfunction P81.90    Asthma J45.909    Bilateral carotid artery stenosis I65.23    O2 dependent Z99.81    Primary osteoarthritis of left knee M17.12    Lumbar spondylosis M47.816    Class 2 obesity due to excess calories with body mass index (BMI) of 38.0 to 38.9 in adult E66.09, Z68.38    MCCLELLAND (dyspnea on exertion) R06.09    CKD (chronic kidney disease) stage 3, GFR 30-59 ml/min ([facility]) N18.30    Atrial fibrillation with RVR ([facility]) I48.91    Atrial fibrillation with rapid ventricular response ([facility]) I48.91    Shortness of breath R06.02    Symptomatic bradycardia R00.1    History of ongoing treatment with high-risk medication Z79.899    Unspecified osteoarthritis, unspecified site M19.90    Unspecified injury of head, initial encounter S09.90XA    Hypothyroidism, unspecified E03.9    Fall from non-moving wheelchair W05. 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 Karolina Ely Bilateral carotid artery stenosis 2020    Bronchitis     Carotid stenosis, bilateral 2012    Hyperlipidemia     Hypertension     Left carotid stenosis 2018    O2 dependent 2020    On continuous oral anticoagulation     Eliquis    S/P carotid endarterectomy 10/06/2016    Stomach ulcer 2003    Thyroid disease        Past Surgical History:        Procedure Laterality Date    APPENDECTOMY      BACK SURGERY      CHOLECYSTECTOMY      COLONOSCOPY      ENDOSCOPY, COLON, DIAGNOSTIC  01/10/2018    upper endo    EYE SURGERY      BILATERAL CATARACT    HYSTERECTOMY (CERVIX STATUS UNKNOWN)      JOINT REPLACEMENT      RIGHT KNEE    PACEMAKER INSERTION Left 2022    Successful [employer] dual chamber pacemaker [place])    TOTAL KNEE ARTHROPLASTY Left 2023    LEFT KNEE ZULY ROBOTIC ASSISTED TOTAL ARTHROPLASTY ++ROGELIO++  ++PNB++ performed by Summer Majano DO at [facility] OR       Social History:    Social History     Tobacco Use    Smoking status: Former     Packs/day: 1.00     Years: 30.00     Pack years: 30.00     Types: Cigarettes     Quit date: 2018     Years since quittin.6    Smokeless tobacco: Never   Substance Use Topics    Alcohol use: Not Currently                                Counseling given: Not Answered      Vital Signs (Current): There were no vitals filed for this visit. BP Readings from Last 3 Encounters:   02/21/23 (!) 175/77   01/24/23 (!) 184/47   01/18/23 (!) 165/72       NPO Status:  2/20/2023 @ 2000                                                                               BMI:   Wt Readings from Last 3 Encounters:   02/21/23 240 lb (108.9 kg)   02/20/23 250 lb (113.4 kg)   02/08/23 250 lb (113.4 kg)     There is no height or weight on file to calculate BMI.    CBC:   Lab Results   Component Value Date/Time    WBC 13.5 01/24/2023 04:01 AM    RBC 2.64 01/24/2023 04:01 AM    HGB 8.1 01/24/2023 04:01 AM    HCT 26.0 01/24/2023 04:01 AM    MCV 98.5 01/24/2023 04:01 AM    RDW 13.1 01/24/2023 04:01 AM     01/24/2023 04:01 AM       CMP:   Lab Results   Component Value Date/Time     01/24/2023 11:35 AM    K 5.3 01/24/2023 11:35 AM     01/24/2023 11:35 AM    CO2 23 01/24/2023 11:35 AM    BUN 30 01/24/2023 11:35 AM    CREATININE 1.1 01/24/2023 11:35 AM    GFRAA >60 09/01/2022 10:40 AM    LABGLOM 51 01/24/2023 11:35 AM    GLUCOSE 129 01/24/2023 11:35 AM    GLUCOSE 156 04/28/2012 02:00 AM    PROT 7.0 07/21/2022 12:47 PM    CALCIUM 8.5 01/24/2023 11:35 AM    BILITOT 0.8 07/21/2022 12:47 PM    ALKPHOS 68 07/21/2022 12:47 PM    AST 15 07/21/2022 12:47 PM    ALT 11 07/21/2022 12:47 PM       POC Tests: No results for input(s): POCGLU, POCNA, POCK, POCCL, POCBUN, POCHEMO, POCHCT in the last 72 hours.     Coags:   Lab Results   Component Value Date/Time    PROTIME 16.1 08/27/2020 01:20 PM    INR 1.4 08/27/2020 01:20 PM    APTT 33.5 08/27/2020 01:20 PM       HCG (If Applicable): No results found for: PREGTESTUR, PREGSERUM, HCG, HCGQUANT     ABGs: No results found for: PHART, PO2ART, FRN4XGD, BRU9XZY, BEART, X3BQHCHK     Type & Screen (If Applicable):  No results found for: LABABO, LABRH    Drug/Infectious Status (If Applicable):  No results found for: HIV, HEPCAB    COVID-19 Screening (If Applicable):   Lab Results   Component Value Date/Time    COVID19 Not Detected 07/21/2022 02:44 PM    COVID19 Not Detected 03/23/2022 10:15 AM           Anesthesia Evaluation    Airway:           Dental:          Pulmonary:                              Cardiovascular:                      Neuro/Psych:               GI/Hepatic/Renal:             Endo/Other:                     Abdominal:             Vascular: Other Findings:             Anesthesia Plan                    Plan discussed with CRNA and attending. Patient has received cardiac (Dr Maddy Reynolds) and pulmonary (Dr Kel Hughes) clearances. 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 NPO status confirmed. Anesthetic options and risks discussed with patient/legal guardian. Patient/legal guardian verbalized understanding and agrees to proceed. Shiva Coppola RN  Staff Anesthesiologist  February 21, 2023  7:48 AM        Chart reviewed, patient seen. Agree with above assessment.     Shiva Coppola RN    2/21/23

## 2023-02-21 NOTE — DISCHARGE INSTRUCTIONS
Orthopedics Discharge Instructions   Weight bearing Status - Weight bearing as tolerated in knee immobilizer/hinged range of motion brace - Operative Extremity  Pain medication Per Prescription  Ice to operative/injured site for 15-30 minutes of each hour for next 3-5 days           Elevate operative/injured limb on 2 pillows at home  Continue DVT Prophylaxis as Prescribed. Continue home Eliquis  Daily dry dressing changes, change dressing for saturation as well  -If excessive drainage occurs please call my office to let us know and we will provide instructions    Post operative you were fit with a Hinged Range Of Motion Knee Brace. This is set 0-30 degrees, do not advance further until seen by orthopedics. This brace should be on at all times with the following exceptions: daily skin checks for irritation or breakdown, hygiene, wound care, therapy, to apply/adjust ACE wraps, and to apply ICE to the area of injury. The brace is adjustable with velcro straps for your comfort. You will be provided additional instructions on brace wear and adjustment of motion limitations at your initial post operative appointment. Please contact Ortho Trauma Office if having skin irritation/breakdown from brace. DO NOT ADVANCE BRACE PARAMETERS UNTIL ORDERED BY ORTHOPEDICS TO DO SO    Follow Up in Office in 2 weeks with Dr. Nadira York      Call the office at [phone] for directions or with any questions. Watch for these significant complications. Call physician if they or any other problems occur:  Fever over 101°, redness, swelling or warmth at the operative site  Unrelieved nausea                          Foul smelling or cloudy drainage at the operative site       Unrelieved pain                   Blood soaked dressing.  (Some oozing may be normal)                Numb, pale, blue, cold or tingling extremity     Future Appointments   Date Time Provider Jose Luis Edwards   3/10/2023  9:40 AM Lupe Dutton DO [facility] ORTHO [facility] 8/10/2023 11:00 AM [facility] RM 1 [address] [place]   8/10/2023 11:30 AM Nida Jones MD [facility]     [facility] IV ANTIBIOTIC PROTOCOL FOR VANCOMYCIN    Consult Clinical Pharmacist to dose Vancomycin unless otherwise directed by the ordering physician    VASCULAR ACCESS DEVICES OR VADs (TLC, PICC, Midline, etc.):   1. VADs will be replaced as necessary. 2.  Draw all blood work from VADs, except for drug levels. 3.  If unable to access a VAD, insert a peripheral catheter temporarily. Contact the Primary Care Physician or [facility] office for surgical referral.  4. VAD Dressing Change Protocol    - Cleanse insertion site with Shur-Clens® or equivalent three times.    - Secure catheter with Steri-Strips®, Bone®, or equivalent securing device. - Apply Opsite® 3000 or equivalent transparent dressing.    - Change dressing twice weekly, maintaining sterile technique. If there is a BioPatch®, SilverSite® or equivalent, change once weekly only or as needed. 5. For occluded VAD: instill alteplase (Cathflo®) 2 mg into occluded catheter but do not force solution into catheter. Let dwell x 30 minutes then check for patency, if not patent, let dwell for another 90 minutes then check patency. If still not patent, instill another 2 mg and repeat above. If still catheter still not patent, contact physician. If not using premixed 1 mg syringe, dilute each vial with 2.2 mL sterile water to final concentration of 1 mg/mL. Mix by gently swirling until completely dissolved. Do not shake. 6.  Remove VAD upon completion of IV antibiotics, unless otherwise specified by the ordering physician. When PICC or VAD is to be removed, documentation of length of inserted PICC. PICC or VAD length is to correlate with inserted length and sent to physician at the time of removal.  If VAD cannot be removed, schedule appointment at office for removal.    ROUTINE LABS TO BE ORDERED AND DRAWN:   1.   Infusion Center to call all abnormal lab values to the physician  2. Fax all labs to the office in a timely manner, during office hours. 3.  Twice weekly (preferably every Monday & Thursday):    - BUN Creatinine and liver panel    - Complete Blood Count with differential (CBC with dif)  4. Once weekly:    - C-Reactive Protein (not high sensitivity CRP)    - Erythrocyte/Westergren Sedimentation Rate (WSR or ESR)  5. For Vancomycin    - Draw Vancomycin trough 30 minutes before the third dose after       - After starting drug, or       - After the dose or dosing interval is changed (goal trough level is between 5 and 20 mcg/ml)    - Draw trough level twice weekly thereafter until troughs are stable (i.e. until trough is between 5 and 20 mcg/ml for two consecutive laboratory values). - Once trough levels stable then check trough level once weekly or every third dose if not ordered daily.    - Only call physician if trough level is < 5 or >20 mcg/ml. If the trough is > 20 mcg/ml call the office for further orders. Do not hold the dose while waiting for the trough level result. 6. When clinically indicated obtain:    - Urine culture - if the patient has a fever with purulent drainage from Lindsey or suprapubic catheter, or foul smelling urine. Do not irrigate a clogged Lindsey catheter. Replace it. - Blood cultures and Wound Gram stain with culture & sensitivity - if the patient has a fever or increasing drainage or foul odor from a wound. Notify the treating physician in a timely manner    - Stool specimen - if diarrhea occurs while on antibiotics, send stools for C. difficile and WBCs. 7.  When a drug is discontinued due to a low white blood cell count (WBCs) draw CBC with differential and CMP twice a week x 2 measurements. NOTIFICATION AND FOLLOW UP WITH INFECTIOUS DISEASE PHYSICIAN:  1. Notify ordering physician or office if patient requires admission to the hospital with reason for admission.   2.  Discontinue all blood work upon completion of IV antibiotics, unless otherwise specified by ordering physician. 3.  Notify ordering physician if the patient does not receive the scheduled antibiotic for 24 hours or more. 5.  Ensure patient has an appointment to follow up with the Infectious Disease physician within 2 weeks of discharge from the hospital or initiation of IV antibiotic therapy. 6.  Continue IV antibiotic therapy until patient is seen in the office or unless specific stop date is noted on the original order or unless otherwise ordered by physician. Contact the Infectious Disease physicians office to ensure stop date is correct before stopping antibiotics. INFECTIOUS DISEASE DISCHARGE INSTRUCTIONS   Follow with ID Clinic in 4-6 weeks       [address] [place] [address] [facility]  ([address])  [address] [facility] [address]  [place], [address] [place]  Phone [phone]   Fax [phone]    Gabbie Garcia. Debbie Sims MD, MD Jacqueline Magdaleno, MD Adan Kohler, MD Asim Golden, MD Lubna Earl, MD Laura Avila, CNS   Burgess Murphy, APRN, CNS  Yvonne Sanchez, APRN-CNP    Selvin Zhang APRN, CNS  Jw Rae, APRN-CNP   Jermaine Bahena MD               STANDING ORDERS (ID Protocol)     Visiting nurses are to write the Primary Care Physician and their own call back number on all laboratory requisition forms. Abnormal lab values are called to the physician by the nurse and NOT by the laboratory. Fax all labs to the office in a timely manner, during office hours. All faxes should include nurses name and call back number. Vascular Access Devices or VADs (TLC, PICC, Midline, etc) will be replaced as necessary. Draw all blood work from VADs, except for drug levels. If unable to access a VAD, insert a peripheral catheter temporarily.  Contact the Primary Care Physician or [facility] office for surgical referral.  Use tPA (Kextil) as per agency protocol to restore patency of VAD. Saline flush 10ml or heparin flush 10U/cc IV daily and as needed to maintain line patency. Remove VAD upon completion of IV antibiotics, unless otherwise specified by the ordering physician. If VAD cannot be removed, schedule appointment at office for removal.  If VAD was placed by Radiology, schedule appointment for removal.  Notify ordering physician or office if patient requires admission to the hospital with reason for admission. Discontinue all blood work upon completion of IV antibiotics, unless otherwise specified by ordering physician. Notify ordering physician if the patient does not receive the scheduled antibiotic for 24 hours or more. The Pharmacy and [address] [place] [address] may adjust the timing of the infusion and blood work to accommodate the patients home care conditions. When PICC or VAD is to be removed, documentation of length of inserted PICC. PICC or VAD length is to correlate with inserted length and sent to physician at the time of removal.  Give the patient a list of antibiotics being administered with:  Drug name  Route  Frequency  Start/Stop date      ROUTINE LABS TO BE DRAWN/ORDERED:  Twice weekly (preferably every Monday & Thursday):  CMP  Complete Blood Count with differential (CBC with dif)  Once weekly:  C-Reactive Protein (not high sensitivity CRP)  Erythrocyte/Westergren Sedimentation Rate (WSR or ESR)  Total CPK for patients on Daptomycin (Cubicin®). Obtain CPK more often if the patient is experiencing muscle weakness or myalgias. Clinical Pharmacist is to adjust Vancomycin and Aminoglycosides. Clinical pharmacist is  encouraged to follow:  \"Therapeutic Monitoring of Vancomycin for Serious Methicillin-resistant Staphylococcus aureus Infections: A Revised Consensus Guideline and Review by the American Society of Health-system Pharmacists, the Lisa [place], the Pediatric Infectious Diseases Society, and the Society of Infectious Diseases Pharmacists\" (https://doi.org/10.1093/mary/qaox557). If a clinical calculator is not available, clinical pharmacist is to follow the orders below:  Draw Vancomycin trough 30 minutes before the third dose  After starting drug, or   After the dosing or interval is changed. If the trough level is between 5 and 20 continue dose as ordered. Draw troughs twice weekly thereafter until troughs are stable (i.e. until trough is between 10 and 20 mcg/ml for two consecutive laboratory values). Once stable check troughs once weekly or every third dose. Please do not call physician unless the trough is < 5 or >20. If the trough is <20 continue dosing as ordered. If the trough is >20 call the office for further orders. Do not hold the dose while waiting for the trough result. Amingoglycosides (e.g. Gentamicin, Tobramycin and Amikacin) peaks and troughs should be drawn twice weekly (preferably on Mondays and Thursdays) or every third dose. Aminoglycoside peaks are not to be drawn if patient on Once-Daily Dosing (ODD). Call physician or office if the trough is:     >1 for gentamicin,   >2 for tobramycin, or   >5 for amikacin  When clinically indicated obtain:  Urine culture. If the patient has a fever with purulent drainage from Lindsey or suprapubic catheter, or foul smelling urine. Do not irrigate a clogged Lindsey catheter. Replace it. Blood cultures and Wound Gram stain with culture & sensitivity. If the patient has a fever or increasing drainage or foul odor from a wound. Notify the treating physician in a timely manner  Stool specimen. If diarrhea occurs while on antibiotics, send stools for C. difficile and WBCs. When a drug is discontinued due to a low white blood cell count (WBCs) draw two consecutive CBC with differential and BUN, Ceatinine. ALLERGIC OR ADVERSE REACTIONS TO MEDICATIONS  Mild reaction: (itching, with or without rash):  Administer Benadryl 50mg po x 1, then 25mg po q6h prn.    Notify office or physician in a timely matter. Moderate reaction (itching with or without rash and/or wheezing, dyspnea, itchy throat):  Administer Benadryl 50 mg IV push x 1. Notify office or physician in a timely manner. Severe reaction i.e. Anaphylaxis (wheezing or stidor, sudden rash, lightheadedness, hypotension):  Administer epinephrine subcutaneous 0.3mg (1:1000) x 1 dose. May repeat twice every 5 minutes if needed. Call EMS and notify office or physician immediately. For all above reactions: administer Solu-Cortef 100mg slow IV push x 1. VASCULAR ACCESS DRESSING CHANGE PROTOCOL  Cleanse insertion site with ChlorPrep® or equivalent three times. Secure catheter with Steri-Strips®, Bone®, or equivalent securing device. Apply Opsite® 3000 or equivalent transparent dressing. Change dressing twice weekly, maintaining sterile technique. If there is a BioPatch®, SilverSite® or equivalent, change once weekly only or as needed. FOLLOW-UP VISITS  Nursing staff should call the office during business hours to schedule a follow-up appointment once the patient is admitted to the service or facility. Every effort should be made to have patient follow-up within 2 weeks of discharge. Exception is made for ventilator-dependent patients. Continue IV antibiotic therapy until patient is seen in the office or unless specific stop date is noted on the original order or unless otherwise ordered by physician. Call office to ensure stop date is correct before stopping antibiotics. MD Jw Jackson.  MD Abdoul De Oliveira MD Sixto Nova, MD Lu Mailman, MD Durrell Laundry, MD Sim Aran, MD

## 2023-02-21 NOTE — PROGRESS NOTES
Pharmacy Consultation Note  (Antibiotic Dosing and Monitoring)    Initial consult date: 2/21/23  Consulting physician/provider: Dr. Mey Hurtado  Drug: Vancomycin  Indication: Bone and joint infection     Age/  Gender Height Weight IBW  Allergy Information   77 y.o./female 5' 5\" (165.1 cm) 240 lb (108.9 kg)     Ideal body weight: 57 kg (125 lb 10.6 oz)  Adjusted ideal body weight: 77.7 kg (171 lb 6.4 oz)   Patient has no known allergies. Renal Function:  Recent Labs     02/21/23  0802   BUN 23   CREATININE 0.8       Intake/Output Summary (Last 24 hours) at 2/21/2023 1719  Last data filed at 2/21/2023 1309  Gross per 24 hour   Intake 1200 ml   Output 100 ml   Net 1100 ml       Vancomycin Monitoring:  Trough:  No results for input(s): VANCOTROUGH in the last 72 hours. Random:  No results for input(s): VANCORANDOM in the last 72 hours. No results for input(s): Dorisrrael Ramona in the last 72 hours. Historical Cultures:  Organism   Date Value Ref Range Status   08/29/2020 Escherichia coli (A)  Final   08/29/2020 Corynebacterium species (A)  Final     No results for input(s): BC in the last 72 hours. Vancomycin Administration Times:  Recent vancomycin administrations                     vancomycin (VANCOCIN) injection (mg) 2,000 mg Given 02/21/23 1015                   Assessment:  Patient is a 76 y.o. female who has been initiated on vancomycin for a left knee prosthetic joint infection. ID has been consulted. Estimated Creatinine Clearance: 70 mL/min (based on SCr of 0.8 mg/dL). To dose vancomycin, pharmacy will be utilizing [employer] calculation software for goal AUC/LAURENCE 400-600 mg/L-hr.  2/21: Scr: 0.8 mg/dL. WBC = 7.5 and afebrile. Plan  Will initiate a maintenance dose of vancomycin 1,750 mg IV every 24 hours. Will check vancomycin levels when appropriate. Will continue to monitor renal function. Pharmacy to follow.        Thank you for the consult,     Yaneth Cardona, PharmD, [address]  PGY1 Pharmacy      2/21/2023 5:24 PM

## 2023-02-21 NOTE — CONSULTS
[address] [place] [address] [facility]    Consultation Note     Admit Date: 2/21/2023  7:29 AM    Reason for Consult:   Left knee prosthetic joint infection    Attending Physician:  Tahmina Moeller DO     Chief Complaint: Left knee prosthetic joint infection    HISTORY OF PRESENT ILLNESS:   75-year-old female with past medical history of A-fib on Eliquis, bilateral carotid artery stenosis, status post carotid endarterectomy, HTN, HLD, thyroid disease presented with wound dehiscence after left TKA 4 weeks ago. Status post left knee incision and drainage with polyethylene exchange 02/21. ID consulted for left knee prosthetic joint infection.     Past Medical History:        Diagnosis Date    Arthritis     Atrial fibrillation ([address]     Tikosyn per Dr. Virgil Sim    Bilateral carotid artery stenosis 07/16/2020    Bronchitis     Carotid stenosis, bilateral 05/21/2012    Hyperlipidemia     Hypertension     Left carotid stenosis 04/26/2018    O2 dependent 07/16/2020    On continuous oral anticoagulation     Eliquis    S/P carotid endarterectomy 10/06/2016    Stomach ulcer 2003    Thyroid disease      Past Surgical History:        Procedure Laterality Date    APPENDECTOMY      BACK SURGERY      CHOLECYSTECTOMY      COLONOSCOPY      ENDOSCOPY, COLON, DIAGNOSTIC  01/10/2018    upper endo    EYE SURGERY      BILATERAL CATARACT    HYSTERECTOMY (CERVIX STATUS UNKNOWN)      JOINT REPLACEMENT  2011    RIGHT KNEE    PACEMAKER INSERTION Left 08/31/2022    Successful [employer] dual chamber pacemaker (Sesar)    TOTAL KNEE ARTHROPLASTY Left 1/23/2023    LEFT KNEE ZULY ROBOTIC ASSISTED TOTAL ARTHROPLASTY ++ROGELIO++  ++PNB++ performed by Tahmina Moeller DO at [facility] OR     Current Medications:   Scheduled Meds:   sodium chloride flush  5-40 mL IntraVENous 2 times per day    acetaminophen  650 mg Oral Q6H    polyethylene glycol  17 g Oral Daily    [START ON 2/22/2023] apixaban  5 mg Oral BID    piperacillin-tazobactam 3,375 mg IntraVENous Q8H     Continuous Infusions:   sodium chloride 125 mL/hr at 23 1421    sodium chloride       PRN Meds:sodium chloride flush, sodium chloride, morphine **OR** morphine, oxyCODONE **OR** oxyCODONE, ondansetron **OR** ondansetron    Allergies:  Patient has no known allergies. 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 Otherwise non-pertinent to the chief complaint. REVIEW OF SYSTEMS:    CONSTITUTIONAL:  No chills, fevers or night sweats. No loss of weight. EYES:  No double vision or drainage from eyes, ears or throat. HEENT:  No neck stiffness. No dysphagia. No drainage from eyes, ears or throat  RESPIRATORY:  No cough, productive sputum or hemoptysis. CARDIOVASCULAR:  No chest pain, palpitations, orthopnea or dyspnea on exertion. GASTROINTESTINAL:  No nausea, vomiting, diarrhea or constipation or hematochezia   GENITOURINARY:  No frequency burning dysuria or hematuria. INTEGUMENT/BREAST:  No rash or breast masses. HEMATOLOGIC/LYMPHATIC:  No lymphadenopathy or blood dyscrasics. ALLERGIC/IMMUNOLOGIC:  No anaphylaxis. ENDOCRINE:  No polyuria or polydipsia or temperature intolerance. MUSCULOSKELETAL:  No myalgia or arthralgia. Full ROM.   NEUROLOGICAL:  No focal motor sensory deficit. BEHAVIOR/PSYCH:  No psychosis. PHYSICAL EXAM:    Vitals:    BP (!) 112/53   Pulse 65   Temp 97.3 °F (36.3 °C) (Temporal)   Resp 20   Ht 5' 5\" (1.651 m)   Wt 240 lb (108.9 kg)   SpO2 99%   BMI 39.94 kg/m²   Constitutional: The patient is awake, alert, and oriented. Skin: Warm and dry. No rashes were noted. No jaundice. HEENT: Eyes show round, and reactive pupils. Moist mucous membranes, no ulcerations, no thrush. Neck: Supple to movements. No lymphadenopathy. Chest: No use of accessory muscles to breathe. Symmetrical expansion. Auscultation reveals no wheezing, crackles, or rhonchi. Cardiovascular: S1 and S2 are rhythmic and regular. No murmurs appreciated. Abdomen: Positive bowel sounds to auscultation. Benign to palpation. No masses felt. No hepatosplenomegaly. Genitourinary: Deferred  Extremities: No clubbing, no cyanosis, no edema. Musculoskeletal: Left knee bandaged.   Neurological: Following commands, no focal neurodeficit  Lines: peripheral      CBC+dif:  Recent Labs     02/21/23  0802   WBC 7.5   HGB 9.1*   HCT 30.3*   MCV 98.4        No results found for: CRP  No results found for: CRP  Lab Results   Component Value Date    SEDRATE 4 01/08/2018     Lab Results   Component Value Date    ALT 11 07/21/2022    AST 15 07/21/2022    ALKPHOS 68 07/21/2022    BILITOT 0.8 07/21/2022     Lab Results   Component Value Date/Time     02/21/2023 08:02 AM    K 5.0 02/21/2023 08:02 AM    K 5.3 01/24/2023 11:35 AM     02/21/2023 08:02 AM    CO2 27 02/21/2023 08:02 AM    BUN 23 02/21/2023 08:02 AM    CREATININE 0.8 02/21/2023 08:02 AM    GFRAA >60 09/01/2022 10:40 AM    LABGLOM >60 02/21/2023 08:02 AM    GLUCOSE 133 02/21/2023 08:02 AM    GLUCOSE 156 04/28/2012 02:00 AM    PROT 7.0 07/21/2022 12:47 PM    LABALBU 4.1 07/21/2022 12:47 PM    LABALBU 4.8 04/28/2012 02:00 AM    CALCIUM 9.1 02/21/2023 08:02 AM    BILITOT 0.8 07/21/2022 12:47 PM    ALKPHOS 68 07/21/2022 12:47 PM    AST 15 07/21/2022 12:47 PM    ALT 11 07/21/2022 12:47 PM       Lab Results   Component Value Date/Time    PROTIME 16.1 08/27/2020 01:20 PM    INR 1.4 08/27/2020 01:20 PM       Lab Results   Component Value Date/Time    TSH 1.210 07/21/2022 02:44 PM       Lab Results   Component Value Date/Time    COLORU Yellow 08/29/2020 11:40 AM    PHUR 6.0 08/29/2020 11:40 AM    LABCAST MODERATE 02/04/2017 01:44 PM    WBCUA >20 08/29/2020 11:40 AM    WBCUA NONE 04/28/2012 01:15 AM    RBCUA 1-3 08/29/2020 11:40 AM    RBCUA NONE 04/28/2012 01:15 AM    BACTERIA MODERATE 08/29/2020 11:40 AM    CLARITYU Clear 08/29/2020 11:40 AM    SPECGRAV 1.020 08/29/2020 11:40 AM    LEUKOCYTESUR TRACE 08/29/2020 11:40 AM    UROBILINOGEN 0.2 08/29/2020 11:40 AM    BILIRUBINUR Negative 08/29/2020 11:40 AM    BILIRUBINUR NEGATIVE 04/28/2012 01:15 AM    BLOODU Negative 08/29/2020 11:40 AM    GLUCOSEU Negative 08/29/2020 11:40 AM    GLUCOSEU NEGATIVE 04/28/2012 01:15 AM       No results found for: RRK0DAR, BEART, Y1RLDIQX, PHART, THGBART, ATE4NOC, PO2ART, EAG6WYD  Radiology:  XR KNEE LEFT (1-2 VIEWS)   Final Result   Left TKA with no unexpected findings. Microbiology:  Pending  No results for input(s): BC in the last 72 hours. No results for input(s): ORG in the last 72 hours. No results for input(s): [place] in the last 72 hours. No results for input(s): STREPNEUMAGU in the last 72 hours. No results for input(s): LP1UAG in the last 72 hours. No results for input(s): ASO in the last 72 hours. No results for input(s): CULTRESP in the last 72 hours.     Assessment:  Left knee prosthetic joint infection  Status post left knee TKA 4 weeks ago followed by wound dehiscence  Status post left knee incision and drainage and poly insulin  02/21    Plan:    Continue with Nuria Mena [address]  Follow operating room cultures  Follow blood cultures  Patient will need at least 6 weeks of IV antibiotic therapy  Antibiotic regimen will be decided based on operating room cultures  We will continue to follow    Thank you for having us see this patient in consultation. I will be discussing this case with the treating physicians.       Electronically signed by Michelle Mayer MD on 2/21/2023 at 3:47 PM

## 2023-02-21 NOTE — LETTER
[address] Medicaid  CERTIFICATION OF NECESSITY  FOR TRANSPORTATION   BY WHEELCHAIR VAN     Individual Information   1. Name: Maty Faulkner 2. [place] Medicaid Billing Number:    3. Address: [address] [facility] [address]      Transportation Provider Information   4. Provider Name:    5. [place] Medicaid Provider Number:  National Provider Identifier (NPI):      Certification  7. Criteria:  By signing this document, the practitioner certifies that two statements are true:  A. This individual must be accompanied by a mobility-related assistive device from the point of pick-up to the point of drop-off. B. Transport of this individual by standard passenger vehicle or common carrier is precluded or contraindicated. 8. Period Beginning Date:                  9. Length  [x] Not more than 1 day(s)  [] One Year     Additional Information Relevant to Certification   10. Comments or Explanations, If Necessary or Appropriate          Certifying Practitioner Information   11. Name of Practitioner: Le Palma DO   15. [place] Medicaid Provider Number, If Applicable:  [address] Provider Identifier (NPI):      Signature Information   14. Date of Signature:  13. Name of Person Signin. Signature and Professional Designation:      [facility] [id number]  Rev. 2015          [address] Encounter Date/Time: 2023 [address] [facility] Account: [de-identified]    MRN: [id number]    Patient: Maty Faulkner    Contact Serial #: [id number]      ENCOUNTER          Patient Class: I Private Enc?   No Unit RM BD: [id number] [id number]   Hospital Service: ORTHOPEDIC S   Encounter DX: Dehiscence of operative *   ADM Provider: Juan Manuel Phillips DO   Procedure: OR I&D DEEP ABSC BUR*   ATT Provider: Juan Manuel Phillips DO   REF Provider:        Admission DX: Dehiscence of operative wound, initial encounter, Presence of left artificial knee joint, S/P revision of total knee, left, History of knee replacement procedure of left knee and DX codes: [address], [id number], [id number], [id number]      PATIENT  Name: Dejah Pendleton : 1945 (77 yrs)   Address: [facility] Sex: Female   Sister [place] city: [facility] [address]         Marital Status:    Employer: RETIRED         Christian: Mandaen   Primary Care Provider: GIO Lawrence -*         Primary Phone: [phone] [address]   Contact Name Legal Guardian? Relationship to Patient Home Phone Work Phone   1. Brenda Patch  2. *No Contact Specified*      Spouse    [phone]                 GUARANTOR            Guarantor: Dejah Every     : 1945   Address: [address] [place] [address] Sex: Female     [place] [address]     Relation to Patient: Self       Home Phone: [phone]   Guarantor ID: [id number]       Work Phone:     Guarantor Employer: RETIRED         Status: RETIRED      COVERAGE        PRIMARY INSURANCE   Payor: [facility] MEDICARE Plan: PAPITO JOHN*   Payor Address: [facility] [id number] [place] [address]       Group Number: [id number] Type: INDEMNITY   Subscriber Name: Vanessa Perez : 1945   Subscriber ID: [id number] Nandini Aragon. Rel. to Sub: Self   SECONDARY INSURANCE   Payor:   Plan:     Payor Address:  ,           Group Number:   Insurance Type:     Subscriber Name:   Subscriber :     Subscriber ID:   Pat.  Rel. to Sub:        CSN: [id number]

## 2023-02-21 NOTE — BRIEF OP NOTE
Brief Postoperative Note      Patient: Jagdish Ngo  YOB: 1945  MRN: [id number]    Date of Procedure: 2/21/2023    Pre-Op Diagnosis: Left total knee drainage with wound dehiscence    Post-Op Diagnosis: Same       Procedure(s):  LEFT KNEE INCISION AND DRAINAGE WITH POLYETHYLENE EXCHANGE    Surgeon(s):  Shantelle Ward DO    Assistant:  Resident: Adilson Bird DO; Luis M Ruiz DO    Anesthesia: General    Estimated Blood Loss (mL): less than 985     Complications: None    Specimens:   ID Type Source Tests Collected by Time Destination   1 : SUPERFICIAL KNEE JOINT Tissue Tissue CULTURE, ANAEROBIC, CULTURE, FUNGUS, GRAM STAIN, CULTURE, SURGICAL, CULTURE WITH SMEAR, ACID FAST BACILLIUS Shantelle Ward DO 2/21/2023 0932    2 : DEEP KNEE JOINT Tissue Tissue CULTURE, ANAEROBIC, CULTURE, FUNGUS, GRAM STAIN, CULTURE, SURGICAL, CULTURE WITH SMEAR, ACID FAST Abel Briceño DO 2/21/2023 0935        Implants:  Implant Name Type Inv.  Item Serial No.  Lot No. LRB No. Used Action   INSERT TIB CS 4 10 MM [employer] POST KNEE [employer] X3 - [id number]  INSERT TIB CS 4 10 MM [employer] POST KNEE [employer] X3  [employer] [facility]- [id number] Left 1 Implanted         Drains: * No LDAs found *    Findings: Left total knee incision and drainage with polyethylene exchange    Electronically signed by Dev Oreilly DO on 2/21/2023 at 10:22 AM

## 2023-02-21 NOTE — CONSULTS
SUBJECTIVE:      Hospital Medicine  Consult History & Physical        Reason for consult:  Medical management     Date of Service: Pt seen/examined in consultation on 2/21/2023    History Of Present Illness:    Ms. Colin Sarabia, a 76y.o. year old female  who  has a past medical history of Arthritis, Atrial fibrillation ([facility] [address] Bilateral carotid artery stenosis, Bronchitis, Carotid stenosis, bilateral, Hyperlipidemia, Hypertension, Left carotid stenosis, O2 dependent, On continuous oral anticoagulation, S/P carotid endarterectomy, Stomach ulcer, and Thyroid disease. Patient is s/p left knee drainage  with polyethylene exchange. Patient tolerated the procedure well . She reports no fever chills chest  pain nausea vomiting. Hospitalist medicine consulted for management of chronic medical conditions .  Lab values reveal sodium 138 potassium 5.0 BUN 23 SCr 0.8 glucose 133  WBC 7.5 HGB 9.1    XR left knee neg              Past Medical History:        Diagnosis Date    Arthritis     Atrial fibrillation ([facility] [address]     Tikosyn per Dr. Jacob Ruiz    Bilateral carotid artery stenosis 07/16/2020    Bronchitis     Carotid stenosis, bilateral 05/21/2012    Hyperlipidemia     Hypertension     Left carotid stenosis 04/26/2018    O2 dependent 07/16/2020    On continuous oral anticoagulation     Eliquis    S/P carotid endarterectomy 10/06/2016    Stomach ulcer 2003    Thyroid disease        Past Surgical History:        Procedure Laterality Date    APPENDECTOMY      BACK SURGERY      CHOLECYSTECTOMY      COLONOSCOPY      ENDOSCOPY, COLON, DIAGNOSTIC  01/10/2018    upper endo    EYE SURGERY      BILATERAL CATARACT    HYSTERECTOMY (CERVIX STATUS UNKNOWN)      JOINT REPLACEMENT  2011    RIGHT KNEE    PACEMAKER INSERTION Left 08/31/2022    Successful [employer] dual chamber pacemaker [employer])    TOTAL KNEE ARTHROPLASTY Left 1/23/2023    LEFT KNEE ZULY ROBOTIC ASSISTED TOTAL ARTHROPLASTY ++ROGELIO++  ++PNB++ performed by Marlin Alas DO [place] [facility] OR       Medications Prior to Admission:    Prior to Admission medications    Medication Sig Start Date End Date Taking? Authorizing Provider   metoprolol tartrate (LOPRESSOR) 50 MG tablet Take 50 mg by mouth 2 times daily   Yes Historical Provider, MD Kimberley Cruz ELLIPTA 62.5-25 MCG/ACT AEPB Inhale 1 puff into the lungs daily Uses in the evening 1/1/23   Historical Provider, MD   Multiple Vitamins-Minerals (THERAPEUTIC MULTIVITAMIN-MINERALS) tablet Take 1 tablet by mouth daily    Historical Provider, MD   Cranberry 1000 MG CAPS Take 1 capsule by mouth daily    Historical Provider, MD   magnesium oxide (MAG-OX) 400 MG tablet Take 1 tablet by mouth daily 12/10/22 3/10/23  Roverto Hayes DO   sucralfate (CARAFATE) 1 GM/10ML suspension TAKE 10 ML BY MOUTH ONCE DAILY AS NEEDED 10/24/22   Historical Provider, MD   Cholecalciferol (VITAMIN D) 50 MCG (2000 UT) CAPS capsule Take 1 capsule by mouth daily    Historical Provider, MD   OXYGEN Inhale 2 L/min into the lungs continuous    Historical Provider, MD   dofetilide (TIKOSYN) 500 MCG capsule Take 1 capsule by mouth every 12 hours  Patient taking differently: Take 500 mcg by mouth 2 times daily 7/25/22   Rochell Kayser, MD   levothyroxine (SYNTHROID) 150 MCG tablet TAKE 1 TABLET BY MOUTH ONCE DAILY 7/26/21   Historical Provider, MD   apixaban (ELIQUIS) 5 MG TABS tablet Take 1 tablet by mouth 2 times daily 9/25/19   GIO Rodarte CNP   valsartan (DIOVAN) 160 MG tablet Take 1 tablet by mouth daily 9/26/19   GIO Rodarte CNP   venlafaxine (EFFEXOR XR) 150 MG extended release capsule Take 300 mg by mouth daily 4/22/18   Historical Provider, MD   atorvastatin (LIPITOR) 20 MG tablet Take 20 mg by mouth daily    Historical Provider, MD       Allergies:  Patient has no known allergies. Social History:      TOBACCO:   reports that she quit smoking about 4 years ago. Her smoking use included cigarettes.  She has a 30.00 pack-year smoking history. She has never used smokeless tobacco.  ETOH:   reports that she does not currently use alcohol. Family History:      Reviewed in detail and negative for DM, CAD, Cancer, CVA. Positive as follows:        Problem Relation Age of Onset    Other Mother         rheumatic fever    Cancer Sister         lung       REVIEW OF SYSTEMS:   Pertinent positives as noted in the HPI. All other systems reviewed and negative. PHYSICAL EXAM:  BP (!) 112/53   Pulse 65   Temp 97.3 °F (36.3 °C) (Temporal)   Resp 20   Ht 5' 5\" (1.651 m)   Wt 240 lb (108.9 kg)   SpO2 99%   BMI 39.94 kg/m²   General appearance: No apparent distress, appears stated age and cooperative. HEENT: Normal cephalic, atraumatic without obvious deformity. Pupils equal, round, and reactive to light. Extra ocular muscles intact. Conjunctivae/corneas clear. Neck: Supple, with full range of motion. No jugular venous distention. Trachea midline. Respiratory:  Normal respiratory effort. Clear to auscultation, bilaterally without crackles or rhonchi  Cardiovascular: normal   rate, normal  rhythm with normal S1/S2 , with no  murmurs  Abdomen: Soft, non-tender, non-distended with normal bowel sounds. Musculoskeletal:  left knee dressing in place clean dry intact  No clubbing, cyanosis or edema bilaterally. Skin: Skin color, texture, turgor normal.  No rashes or lesions. Neurologic:  Neurovascularly intact without any focal sensory/motor deficits. Cranial nerves: II-XII intact, grossly non-focal.  Psychiatric: Alert and oriented, thought content appropriate, normal insight    Labs:     CBC:   Recent Labs     02/21/23  0802   WBC 7.5   RBC 3.08*   HGB 9.1*   HCT 30.3*   MCV 98.4   RDW 14.5        BMP:   Recent Labs     02/21/23  0802      K 5.0      CO2 27   BUN 23   CREATININE 0.8     LFT:  No results for input(s): PROT, ALB, ALKPHOS, ALT, AST, BILITOT, AMYLASE, LIPASE in the last 72 hours.   CE:  No results for input(s): Yesenia Alford in the last 72 hours. PT/INR: No results for input(s): INR, APTT in the last 72 hours. BNP: No results for input(s): BNP in the last 72 hours. Hgb A1C:   Lab Results   Component Value Date    LABA1C 4.9 04/23/2021     No results found for: EAG  ESR:   Lab Results   Component Value Date    SEDRATE 4 01/08/2018     CRP: No results found for: CRP  D Dimer:   Lab Results   Component Value Date    DDIMER <200 04/15/2022     Folate and B12:   Lab Results   Component Value Date    PNZQSUTG66 113 03/23/2022   ,   Lab Results   Component Value Date    FOLATE >20.0 03/23/2022     Lactic Acid:   Lab Results   Component Value Date    LACTA 2.9 (H) 08/27/2020     Thyroid Studies:   Lab Results   Component Value Date    TSH 1.210 07/21/2022    J3CYJMK 6.1 03/23/2022         ASSESSMENT/PLAN:    Left total knee  drainage with wound dehiscence    Patient had left TKA 4 weeks ago and it  started to drain . Patient is Day 0 Left  knee incision with polyethylene exchange . Ortho consulted   DVT px Eliquis Vanc Zosyn surgical cultures PT/Ot pain control     Atrial fibrillation : Eliquis Tikosyn     Hypertension Lopressor Valsartan     Depression : Effexor     Hyperlipidemia : Lipitor     Hypothyroidism : Synthroid     Normocytic anemia : HGB 9.1 monitor sec to recent surgery , iron supplements     Chronic hypoxic respiratory failure : on 2L NC daily     COPD : C/w bronchodilators       Diet: ADULT DIET; Regular  Thank you for allowing us to participate in this patient's care.    +++++++++++++++++++++++++++++++++++++++++++++++++  Solo Mejía MD  [address]  +++++++++++++++++++++++++++++++++++++++++++++++++  NOTE: This report was transcribed using voice recognition software. Every effort was made to ensure accuracy; however, inadvertent computerized transcription errors may be present.

## 2023-02-21 NOTE — OP NOTE
Operative Note      Patient: Carlitos Andre  YOB: 1945  MRN: [id number]    Date of Procedure: 2/21/2023    Pre-Op Diagnosis:  Left total knee drainage with wound dehiscence    Post-Op Diagnosis: Same       Procedure(s):  LEFT KNEE INCISION AND DRAINAGE WITH POLYETHYLENE EXCHANGE    Surgeon(s):  Yann Hathaway DO    Assistant:   Resident: Joshua Khan DO; Dominguez Laughlin DO    Anesthesia: General    Estimated Blood Loss (mL): less than 534     Complications: None    Specimens:   ID Type Source Tests Collected by Time Destination   1 : SUPERFICIAL KNEE JOINT Tissue Tissue CULTURE, ANAEROBIC, CULTURE, FUNGUS, GRAM STAIN, CULTURE, SURGICAL, CULTURE WITH SMEAR, ACID FAST BACILLIUS Yann Hathaway, DO 2/21/2023 0932    2 : DEEP KNEE JOINT Tissue Tissue CULTURE, ANAEROBIC, CULTURE, FUNGUS, GRAM STAIN, CULTURE, SURGICAL, CULTURE WITH SMEAR, ACID FAST Mae Eladio, DO 2/21/2023 0935        Implants:  Implant Name Type Inv. Item Serial No.  Lot No. LRB No. Used Action   INSERT TIB CS 4 10 MM [employer] POST KNEE [employer] X3 - [id number]  INSERT TIB CS 4 10 MM [employer] POST KNEE [employer] X3  [employer]- [id number] Left 1 Implanted         Drains: * No LDAs found *    Findings: See operative report below    Detailed Description of Procedure:   Michela Cosby is a 67-year-old female who underwent a left total knee arthroplasty by myself 1 month ago. Approximately 10 days ago she developed drainage from her distal incision. She denies fever chills. Denies numbness tingling. She was seen in the office and the wound was examined. Due to drainage 4 weeks out from a total knee arthroplasty decision was made to return to the OR for irrigation debridement with polyethylene exchange and obtainment of cultures. She will be admitted to the hospital all cultures result. Risk-benefit alternative surgery discussed. She understands the devastating nature of her prosthetic joint infection.   She understands that this may require reoperation, explant, and the risk of damage to surrounding neurovascular structures, instability, arthrofibrosis, wound healing complications amongst others. She wished to proceed with surgery. Informed sent was obtained and signed and placed in chart on day of surgery. My initials were placed on her left leg. She was rolled to preoperative holding and a block was performed by department anesthesia. She was over the operating room and transferred to the OR table all bony prominences were well-padded and she was in supine position. General anesthesia was induced. Left lower extremity was prepped and draped in standard sterile orthopedic fashion. Appropriate timeout was performed confirming side and site of surgery. Tranexamic acid was infused, preoperative antibiotics were held until cultures. Tourniquet was inflated to 300 mmHg. Her previous midline incision was reopened and the area of dehiscence distally was carefully excised down to the extensor mechanism. At this point there was some serosanguineous fluid and hematoma collection but no gross purulence. Tissue was sent for analysis and culture from this layer. We then irrigated and the superficial layer above the extensor mechanism. We then sharply reopened her medial parapatellar arthrotomy and carried dissection down to the implant. There was some bloody drainage but no gross purulence. At this point a sharp excisional debridement was performed. An extensive synovectomy was performed using Bovie electrocautery and a knife. All of the synovium between the implant and the extensor mechanism on the medial lateral gutters was sharply excised. The wound measured approximately 20 x 4 x 2 cm. Some of this tissue was sent for deep culture analysis. Osteotome was then used to remove the polyethylene insert. Debridement performed posterior the knee removing all synovial type tissue.   Wound was then irrigated with Betadine shock for 3 minutes. This was followed by 3 L of saline irrigation. Irrisept irrigation was then used followed by saline again. Clean drape was placed and gloves were changed. We then sterilely opened a 10 mm polyethylene for a size 4 tibial baseplate. This was carefully impacted into place and confirmed circumferentially to be intact. Knee was taken through range of motion and she had stability in all planes. She had range of motion from 0 to 95 degrees which was her preop motion. Tourniquet was released and meticulous hemostasis was achieved. Vancomycin powder was placed in the wound bed. Extensor mechanism then closed using 0 PDS in running strata fix suture in a watertight fashion. The fat was closed using 0 PDS and the skin was closed using 2-0 Monocryl and 2-0 nylon. An incisional VAC was placed over top of her wound and this was dressed with a double 6 inch Ace bandage. She was then placed in a knee immobilizer to help with wound healing locked in extension. Patient was awakened from anesthesia and transferred PACU in stable condition. She will be admitted to the hospital for further care. We will consult infectious disease and await final cultures. We will start her on empiric Ancef until infectious disease makes recommendation.     Electronically signed by Andry Camacho DO on 2/21/2023 at 10:22 AM

## 2023-02-21 NOTE — PROGRESS NOTES
Physical Therapy  Physical Therapy Initial Assessment     Name: Danitza Leon  : 1945  MRN: [id number]      Date of Service: 2023    Evaluating PT:  Kinsey Naidu, PT, DPT [id number]    Room #:  3515/8197-K  Diagnosis:  Dehiscence of operative wound, initial encounter [T81.31XA]  Presence of left artificial knee joint [Z96.652]  S/P revision of total knee, left [Z96.652]  History of knee replacement procedure of left knee [Z96.652]  PMHx/PSHx:   has a past medical history of Arthritis, Atrial fibrillation ([facility] [address] Bilateral carotid artery stenosis, Bronchitis, Carotid stenosis, bilateral, Hyperlipidemia, Hypertension, Left carotid stenosis, O2 dependent, On continuous oral anticoagulation, S/P carotid endarterectomy, Stomach ulcer, and Thyroid disease. has a past surgical history that includes Hysterectomy; Cholecystectomy; Appendectomy; back surgery; Colonoscopy; joint replacement (); eye surgery; Endoscopy, colon, diagnostic (01/10/2018); Pacemaker insertion (Left, 2022); and Total knee arthroplasty (Left, 2023). Procedure/Surgery:  LEFT KNEE INCISION AND DRAINAGE WITH POLYETHYLENE EXCHANGE (2023)  Precautions:  Falls, O2, L knee immobilizer, WBAT LLE with L knee in full extension, incisional vac  Equipment Needs:  TBD  Equipment Owned:  , FWW    SUBJECTIVE:    Pt lives with  in a 2 story home with 5 stair(s) to enter and 1 rail(s). Pt stays on 1st floor. Pt ambulated with SPC PTA. OBJECTIVE:   Initial Evaluation  Date: 2023 Treatment Short Term/ Long Term   Goals   AM-PAC 6 Clicks      Was pt agreeable to Eval/treatment? Yes     Does pt have pain? No     Bed Mobility  Rolling: NT  Supine to sit: ModA  Sit to supine: NT  Scooting: NT  Rolling: Independent  Supine to sit: Independent  Sit to supine: Independent  Scooting: Independent   Transfers Sit to stand: ModA x2  Stand to sit: ModA x2  Stand pivot: ModA x2 with [employer]  Sit to stand:  Mod I  Stand to sit: Mod I  Stand pivot: Mod I with AAD   Ambulation    3 feet with WW ModA x2  150 feet with AAD Mod I   Stair negotiation: ascended and descended  NT  5 step(s) with 1 rail(s) Mod I   ROM BUE:  See OT note  RLE:  WFL     Strength BUE:  See OT note  RLE:  Grossly 5/5     Balance Sitting EOB:  SBA  Dynamic Standing:  ModA x2 with WW  Sitting EOB:  Independent  Dynamic Standing:  Mod I with AAD     Pt is A & O x 4  Sensation:  R foot sensation to light touch intact; L foot sensation to light touch absent (nursing notified)  Edema:  Unremarkable    Vitals:   HR 64, SpO2 95-97% (3 L O2) at rest.  HR 80, SpO2 91-93% (3 L O2) with activity.    Patient education  Pt educated on role of PT, weight bearing status, safety during functional mobility, use of call light for assistance.    Patient response to education:   Pt verbalized understanding Pt demonstrated skill Pt requires further education in this area   Yes Yes Yes     ASSESSMENT:    Conditions Requiring Skilled Therapeutic Intervention:    [x]Decreased strength     []Decreased ROM  [x]Decreased functional mobility  [x]Decreased balance   [x]Decreased endurance   []Decreased posture  [x]Decreased sensation  []Decreased coordination   []Decreased vision  [x]Decreased safety awareness   []Increased pain       Comments:  Patient in [place]'s position with L knee immobilizer donned, nursing present upon arrival; agreeable to PT session with OT collaboration. Required increased time to perform bed mobility. Patient sat EOB for extended period of time while interdisciplinary care was provided. Required increased time, verbal/tactile cues related to positioning/sequencing to ensure safety during functional transfers. Ambulated from bed to bedside chair with decreased speed and unsteadiness. Required manipulation of assistive device as well as physical assistance to advance LLE during activity. Denied dizziness. Vitals monitored and noted. Patient left sitting in bedside chair with L  knee immobilizer donned, LLE elevated, call light in reach. Instructed not to get up on own and to use call light for assistance; expressed understanding. Nursing notified. Patient would benefit from continued skilled PT services to address functional deficits and prevent deconditioning. Treatment:  Patient practiced and was instructed in the following treatment:    Bed mobility - verbal cues to facilitate proper positioning and sequencing; physical assistance provided as needed during activity  Static/dynamic sitting - performed to promote activity tolerance and balance maintenance  Functional transfers - verbal/tactile cues to facilitate proper positioning and sequencing, particularly related to hand/foot placement; physical assistance provided as needed during activity  Ambulation - verbal cues to facilitate proper positioning and sequencing; physical assistance provided as needed during activity  Skilled positioning - patient positioned optimally to promote comfort  Skilled monitoring of vitals    Pt's/ family goals   1. None stated    Prognosis is good for reaching above PT goals. Patient and or family understand(s) diagnosis, prognosis, and plan of care. Yes    PHYSICAL THERAPY PLAN OF CARE:    PT POC is established based on physician order and patient diagnosis     Referring provider/PT Order:    02/21/23 1415  PT eval and treat  Start:  02/21/23 1415,   End:  02/21/23 1415,   ONE TIME,   Standing Count:  1 Occurrences,   R         Jacinot Graft, DO     Diagnosis:  Dehiscence of operative wound, initial encounter [T81.31XA]  Presence of left artificial knee joint [Z96.652]  S/P revision of total knee, left [Z96.652]  History of knee replacement procedure of left knee [Z96.652]  Specific instructions for next treatment:  Continue to facilitate safe performance of functional mobility; progress as appropriate.     Current Treatment Recommendations:     [x] Strengthening to improve independence with functional mobility   [] ROM to improve independence with functional mobility   [x] Balance Training to improve static/dynamic balance and to reduce fall risk  [x] Endurance Training to improve activity tolerance during functional mobility   [x] Transfer Training to improve safety and independence with all functional transfers   [x] Gait Training to improve gait mechanics, endurance and assess need for appropriate assistive device  [x] Stair Training in preparation for safe discharge home and/or into the community   [x] Positioning to prevent skin breakdown and contractures  [x] Safety and Education Training   [x] Patient/Caregiver Education   [] HEP  [] Other     PT long term treatment goals are located in above grid    Frequency of treatments: 2-5x/week x 1-2 weeks. Time in  1535  Time out  1610    Total Treatment Time  23 minutes     Evaluation Time includes thorough review of current medical information, gathering information on past medical history/social history and prior level of function, completion of standardized testing/informal observation of tasks, assessment of data and education on plan of care and goals.     CPT codes:  [x] Low Complexity PT evaluation 80685  [] Moderate Complexity PT evaluation 31585  [] High Complexity PT evaluation 67777  [] PT Re-evaluation 68360  [] Gait training 78319 0 minutes  [] Manual therapy 75097 0 minutes  [x] Therapeutic activities 16787 23 minutes  [] Therapeutic exercises 91438 0 minutes  [] Neuromuscular reeducation 09083 0 minutes     Mike Spain, PT, DPT  [id number]

## 2023-02-21 NOTE — ANESTHESIA PROCEDURE NOTES
Peripheral Block    Patient location during procedure: pre-op  Reason for block: post-op pain management and at surgeon's request  Start time: 2/21/2023 8:30 AM  End time: 2/21/2023 8:34 AM  Staffing  Performed: anesthesiologist   Anesthesiologist: Isidra Andres MD  Preanesthetic Checklist  Completed: patient identified, IV checked, site marked, risks and benefits discussed, surgical/procedural consents, equipment checked, pre-op evaluation, timeout performed, anesthesia consent given, oxygen available, monitors applied/VS acknowledged, fire risk safety assessment completed and verbalized and blood product R/B/A discussed and consented  Peripheral Block   Patient position: supine  Prep: ChloraPrep  Provider prep: mask, sterile gloves and sterile gown  Patient monitoring: continuous pulse ox, frequent blood pressure checks, IV access and cardiac monitor  Block type: Femoral  Adductor canal  Laterality: left  Injection technique: single-shot  Guidance: ultrasound guided  Local infiltration: ropivacaine  Infiltration strength: 0.5 %  Local infiltration: ropivacaine  Dose: 1 mL    Needle   Needle type: insulated echogenic nerve stimulator needle   Needle gauge: 22 G  Needle localization: ultrasound guidance  Needle length: 10 cm  Assessment   Injection assessment: negative aspiration for heme, no paresthesia on injection, local visualized surrounding nerve on ultrasound and no intravascular symptoms  Paresthesia pain: none  Slow fractionated injection: yes  Hemodynamics: stable  Real-time US image taken/store: yes  Outcomes: uncomplicated and patient tolerated procedure well    Additional Notes  Time out performed.     Medications Administered  ropivacaine (NAROPIN) injection 0.5% - Perineural   10 mL - 2/21/2023 8:30:00 AM

## 2023-02-21 NOTE — PROGRESS NOTES
[address] [place] [address]      Date:2023                                                Patient Name: Alf Olsen  MRN: [id number]  : 1945  Room: [address] [place] [address]     Evaluating OT:Heather Buckley OTR/L   License #  [id number]       Referring Provider: Jose Cruz Jerome DO    Specific Provider Orders/Date: OT evaluation & treatment        Diagnosis: Left total knee drainage with wound dehiscence    Pertinent Medical History:  has a past medical history of Arthritis, Atrial fibrillation ([facility] [address] Bilateral carotid artery stenosis, Bronchitis, Carotid stenosis, bilateral, Hyperlipidemia, Hypertension, Left carotid stenosis, O2 dependent, On continuous oral anticoagulation, S/P carotid endarterectomy, Stomach ulcer, and Thyroid disease. Surgery: 23: LEFT KNEE INCISION AND DRAINAGE WITH POLYETHYLENE EXCHANGE    Past Surgical History:  has a past surgical history that includes Hysterectomy; Cholecystectomy; Appendectomy; back surgery; Colonoscopy; joint replacement (); eye surgery; Endoscopy, colon, diagnostic (01/10/2018); Pacemaker insertion (Left, 2022); and Total knee arthroplasty (Left, 2023).        Precautions:  Fall Risk, L knee incisional vac, WBAT L LE in Knee Immobilizer      Assessment of current deficits    [x] Functional mobility            [x]ADLs           [x] Strength                  [x]Cognition    [x] Functional transfers          [x] IADLs         [x] Safety Awareness   [x]Endurance    [x] Fine Coordination                         [x] Balance      [] Vision/perception   [x]Sensation      []Gross Motor Coordination             [] ROM           [] Delirium                   [] Motor Control      OT PLAN OF CARE   OT POC based on physician orders, patient diagnosis and results of clinical assessment     Frequency/Duration: 2-4 days/wk for 2 weeks PRN   Specific OT Treatment Interventions to include: Instruction/training on adapted ADL techniques and AE recommendations to increase functional independence within precautions  Training on energy conservation strategies, correct breathing pattern and techniques to improve independence/tolerance for self-care routine  Functional transfer/mobility training/DME recommendations for increased independence, safety, and fall prevention  Patient/Family education to increase follow through with safety techniques and functional independence  Recommendation of environmental modifications for increased safety with functional transfers/mobility and ADLs  Cognitive retraining/development of therapeutic activities to improve problem solving, judgement, memory, and attention for increased safety/participation in ADL/IADL tasks  Splinting/positioning for increased function, prevention of contractures, and improve skin integrity  Therapeutic exercise to improve motor endurance, ROM, and functional strength for ADLs/functional transfers  Therapeutic activities to facilitate/challenge dynamic balance, stand tolerance for increased safety and independence with ADLs  Therapeutic activities to facilitate gross/fine motor skills for increased independence with ADLs  Positioning to improve skin integrity, interaction with environment and functional independence     Recommended Adaptive Equipment:  TBD     Home Living: Pt lives with  (able to assist) in a 2 story with 5 steps to enter with 1 HR. B&B on main level. Bathroom setup: tub/shower (pt. Reports she sponge bathes) & elevated toilet   Equipment owned: home O2, ww, spc     Prior Level of Function: Ind. with ADLs , assist from   with IADLs; ambulated spc. Reports she was \"finishing up\" home therapy, ready to start Out pt therapy this week. Driving: active but  as of late  Occupation: enjoys crafts     Pain Level: no c/o pain at rest or with lt. Ax.   Cognition: A&O: 4/4; Follows 2 step directions              Memory:  G              Sequencing:  G              Problem solving:  G              Judgement/safety:  F with cues for technique                Functional Assessment:  AM-PAC Daily Activity Raw Score: 15/24    Initial Eval Status  Date: 2-21-23 Treatment Status  Date: STGs = LTGs  Time frame: 10-14 days   Feeding Ind. Mod I/ Ind   Grooming Set up seated    Modified [place]    UB Dressing SBA seated   Modified [place]    LB Dressing Max A B socks & adjustment of L LE KI   Modified [place]    Bathing Max A with sim. task   Modified [place]    Toileting NT   Modified [place]    Bed Mobility  Supine to sit: Mod A  Sit to supine: NT   Supine to sit: Modified [place]   Sit to supine: Modified [place]    Functional Transfers Mod A x2 sit <> stand with ww   Modified [place]    Functional Mobility Mod A  x2 with ww couple short steps from EOB > bedside chair with minimal WB on L LE, Knee immobilizer in place. Modified [place]    Balance Sitting:     Static:  SBA    Dynamic:Min A  Standing: Mod A       Activity Tolerance F-   G   Visual/  Perceptual Glasses: yes          Vitals spO2 on 2-3L via NC remained 95% or greater. HR WFL. WFL      Hand Dominance L    AROM (PROM) Strength Additional Info:    RUE  WFL 4/5 good  and wfl FMC/dexterity noted during ADL tasks      LUE WFL 4/5 good  and wfl FMC/dexterity noted during ADL tasks         Hearing: [facility]   Sensation:  No c/o numbness or tingling B UE  Tone: WFL B UE  Edema: none noted BUE     Comments: Upon arrival patient supine in bed,  present, agreeable to OT, cleared by Nursing. Therapist facilitated bed mobility/ ADLs/sitting balance at EOB/ functional transfer training with focus on safety, technique, L Knee immobilizer brace & precautions. Pt. &  Instructed RE: safe transfers/mobility, ADLs, role of OT, treatment plan, recs. , prec. & brace.   At end of session, patient seated in bedside chair with L LE elevated in full extension, all needs met, RN notified, with call light and phone within reach, all lines and tubes intact. Overall patient demonstrated decreased strength, balance, independence & safety during completion of ADL/functional transfer/mobility tasks. Pt would benefit from continued skilled OT to increase safety and independence with completion of ADL/IADL tasks for functional independence and quality of life. Treatment: OT treatment provided this date includes:   Instruction/training on safety and adapted techniques for completion of ADLs: to increase [place] in self care   Instruction/training on safe functional mobility/transfer techniques: with focus on safety, technique & precautions   Instruction/training on energy conservation/work simplification for completion of ADLs: techniques to increase [place] with self care ADLs & iADLs, work simplification to improve endurance   Proper Positioning/Alignment: for optimal healing, skin integrity to prevent breakdown, decrease edema  Skilled monitoring of vitals: to include BP, spO2 & HR during session  Sitting/standing Balance/Tolerance- to increased balance & activity tolerance during ADLs as well as facilitate proper posture and/or positioning. Therapeutic exercise- Instruction on B UE ROM exercises to improve strength/function for increased [place] with ADLs & iADLs     Rehab Potential: Good for established goals     Patient / Family Goal: to return home soon       Patient and/or family were instructed on functional diagnosis, prognosis/goals and OT plan of care. Demonstrated F+ understanding,  demo G support.       Eval Complexity: Low     Time In: 15:35  Time Out: 16:05  Total Treatment Time: 15    Min Units   OT Eval Low 06404  x     OT Eval Medium 43572       OT Eval High 13181       OT Re-Eval [id number]       Therapeutic Ex 00813       Therapeutic Activities 37441  10  1   ADL/Self [address] Management [address]       Manual 19768       Neuro Re-Ed 72630       Non-Billable Time          Evaluation Time additionally includes thorough review of current medical information, gathering information on past medical history/social history and prior level of function, interpretation of standardized testing/informal observation of tasks, assessment of data and development of plan of care and goals. Heather Buckley, OTR/L   License #  [id number]

## 2023-02-21 NOTE — ANESTHESIA PRE PROCEDURE
Department of Anesthesiology  Preprocedure Note       Name:  Minda Maciel   Age:  76 y.o.  :  1945                                          MRN:  [id number]         Date:  2023      Surgeon: Malcolm Acevedo):  Juan Kilpatrick DO    Procedure: Procedure(s):  LEFT KNEE INCISION AND DRAINAGE, POSSIBLE POLY EXCHANGE, ROGELIO, NERVE BLOCK    Medications prior to admission:   Prior to Admission medications    Medication Sig Start Date End Date Taking?  Authorizing Provider   metoprolol tartrate (LOPRESSOR) 50 MG tablet Take 50 mg by mouth 2 times daily    Historical Provider, MD Zachary Elizabeth ELLIPTA 62.5-25 MCG/ACT AEPB Inhale 1 puff into the lungs daily Uses in the evening 23   Historical Provider, MD   Multiple Vitamins-Minerals (THERAPEUTIC MULTIVITAMIN-MINERALS) tablet Take 1 tablet by mouth daily    Historical Provider, MD   Cranberry 1000 MG CAPS Take 1 capsule by mouth daily    Historical Provider, MD   magnesium oxide (MAG-OX) 400 MG tablet Take 1 tablet by mouth daily 12/10/22 3/10/23  Yoselyn Love DO   sucralfate (CARAFATE) 1 GM/10ML suspension TAKE 10 ML BY MOUTH ONCE DAILY AS NEEDED 10/24/22   Historical Provider, MD   Cholecalciferol (VITAMIN D) 50 MCG (2000 UT) CAPS capsule Take 1 capsule by mouth daily    Historical Provider, MD   OXYGEN Inhale 2 L/min into the lungs continuous    Historical Provider, MD   dofetilide (TIKOSYN) 500 MCG capsule Take 1 capsule by mouth every 12 hours  Patient taking differently: Take 500 mcg by mouth 2 times daily 22   Rose Lara MD   levothyroxine (SYNTHROID) 150 MCG tablet TAKE 1 TABLET BY MOUTH ONCE DAILY 21   Historical Provider, MD   apixaban (ELIQUIS) 5 MG TABS tablet Take 1 tablet by mouth 2 times daily 19   GIO Mistry CNP   valsartan (DIOVAN) 160 MG tablet Take 1 tablet by mouth daily 19   GIO Mistry - CNP   venlafaxine (EFFEXOR XR) 150 MG extended release capsule Take 300 mg by mouth daily 18 Historical Provider, MD   atorvastatin (LIPITOR) 20 MG tablet Take 20 mg by mouth daily    Historical Provider, MD       Current medications:    No current facility-administered medications for this visit. No current outpatient medications on file.      Facility-Administered Medications Ordered in Other Visits   Medication Dose Route Frequency Provider Last Rate Last Admin    0.9 % sodium chloride infusion   IntraVENous Continuous Jerson Escudero,         sodium chloride flush 0.9 % injection 5-40 mL  5-40 mL IntraVENous 2 times per day Oleta Guess, DO        sodium chloride flush 0.9 % injection 5-40 mL  5-40 mL IntraVENous PRN Oleta Guess, DO        0.9 % sodium chloride infusion   IntraVENous PRN Oleta Guess, DO        ceFAZolin (ANCEF) 2,000 mg in sterile water 20 mL IV syringe  2,000 mg IntraVENous On Call to OR Oleta Guess, DO        fentaNYL (SUBLIMAZE) injection 25 mcg  25 mcg IntraVENous Once Josiane Butterfield MD        Or    fentaNYL (SUBLIMAZE) injection 100 mcg  100 mcg IntraVENous Once Margoth Reyes MD        midazolam (VERSED) PF injection 2 mg/2 mL  2 mg IntraVENous Q10 Min PRN Josiane Butterfield MD        ropivacaine (NAROPIN) 0.5% injection 30 mL  30 mL Infiltration Once Margoth Reyes MD        dexamethasone (DECADRON) (PF) 10 mg/mL injection  4 mg Other Once Margoth Reyes MD           Allergies:  No Known Allergies    Problem List:    Patient Active Problem List   Diagnosis Code    Essential hypertension I10    Mixed hyperlipidemia E78.2    S/P carotid endarterectomy Z98.890    Hypoxia R09.02    CHF (congestive heart failure) (HCC) X24.8    Diastolic dysfunction Q43.29    Asthma J45.909    Bilateral carotid artery stenosis I65.23    O2 dependent Z99.81    Primary osteoarthritis of left knee M17.12    Lumbar spondylosis M47.816    Class 2 obesity due to excess calories with body mass index (BMI) of 38.0 to 38.9 in adult E66.09, Z68.38    MCCLELLAND (dyspnea on exertion) R06.09    CKD (chronic kidney disease) stage 3, GFR 30-59 ml/min ([facility]) N18.30    Atrial fibrillation with RVR ([facility]) I48.91    Atrial fibrillation with rapid ventricular response ([facility]) I48.91    Shortness of breath R06.02    Symptomatic bradycardia R00.1    History of ongoing treatment with high-risk medication Z79.899    Unspecified osteoarthritis, unspecified site M19.90    Unspecified injury of head, initial encounter S09.90XA    Hypothyroidism, unspecified E03.9    Fall from non-moving wheelchair W05. 0XXA    Closed head injury S09.90XA    Chronic obstructive pulmonary disease, unspecified ([facility]) J44.9    AF (atrial fibrillation) ([facility]) I48.91    Tachy-kyle syndrome ([facility]) I49.5    Presence of cardiac pacemaker Z95.0    S/P total knee arthroplasty, left Z96.652    Ventricular tachycardia I47.20    Nonsustained paroxysmal ventricular tachycardia I47.29    S/P revision of total knee, left Z96.652       Past Medical History:        Diagnosis Date    Arthritis     Atrial fibrillation ([facility])     Tikosyn per Dr. Alma Ambrosio Bilateral carotid artery stenosis 07/16/2020    Bronchitis     Carotid stenosis, bilateral 05/21/2012    Hyperlipidemia     Hypertension     Left carotid stenosis 04/26/2018    O2 dependent 07/16/2020    On continuous oral anticoagulation     Eliquis    S/P carotid endarterectomy 10/06/2016    Stomach ulcer 2003    Thyroid disease        Past Surgical History:        Procedure Laterality Date    APPENDECTOMY      BACK SURGERY      CHOLECYSTECTOMY      COLONOSCOPY      ENDOSCOPY, COLON, DIAGNOSTIC  01/10/2018    upper endo    EYE SURGERY      BILATERAL CATARACT    HYSTERECTOMY (CERVIX STATUS UNKNOWN)      JOINT REPLACEMENT  2011    RIGHT KNEE    PACEMAKER INSERTION Left 08/31/2022    Successful [employer] dual chamber pacemaker [place])    TOTAL KNEE ARTHROPLASTY Left 1/23/2023    LEFT KNEE ZULY ROBOTIC ASSISTED TOTAL ARTHROPLASTY ++ROGELIO++ ++PNB++ performed by Chava Martinez DO at [facility] OR       Social History:    Social History     Tobacco Use    Smoking status: Former     Packs/day: 1.00     Years: 30.00     Pack years: 30.00     Types: Cigarettes     Quit date: 2018     Years since quittin.6    Smokeless tobacco: Never   Substance Use Topics    Alcohol use: Not Currently                                Counseling given: Not Answered      Vital Signs (Current): There were no vitals filed for this visit. BP Readings from Last 3 Encounters:   23 (!) 175/77   23 (!) 184/47   23 (!) 165/72       NPO Status:  greater than 8 hours                                                                               BMI:   Wt Readings from Last 3 Encounters:   23 240 lb (108.9 kg)   23 250 lb (113.4 kg)   23 250 lb (113.4 kg)     There is no height or weight on file to calculate BMI.    CBC:   Lab Results   Component Value Date/Time    WBC 13.5 2023 04:01 AM    RBC 2.64 2023 04:01 AM    HGB 8.1 2023 04:01 AM    HCT 26.0 2023 04:01 AM    MCV 98.5 2023 04:01 AM    RDW 13.1 2023 04:01 AM     2023 04:01 AM       CMP:   Lab Results   Component Value Date/Time     2023 11:35 AM    K 5.3 2023 11:35 AM     2023 11:35 AM    CO2 23 2023 11:35 AM    BUN 30 2023 11:35 AM    CREATININE 1.1 2023 11:35 AM    GFRAA >60 2022 10:40 AM    LABGLOM 51 2023 11:35 AM    GLUCOSE 129 2023 11:35 AM    GLUCOSE 156 2012 02:00 AM    PROT 7.0 2022 12:47 PM    CALCIUM 8.5 2023 11:35 AM    BILITOT 0.8 2022 12:47 PM    ALKPHOS 68 2022 12:47 PM    AST 15 2022 12:47 PM    ALT 11 2022 12:47 PM       POC Tests: No results for input(s): POCGLU, POCNA, POCK, POCCL, POCBUN, POCHEMO, POCHCT in the last 72 hours.     Coags:   Lab Results   Component Value Date/Time    PROTIME 16.1 08/27/2020 01:20 PM    INR 1.4 08/27/2020 01:20 PM    APTT 33.5 08/27/2020 01:20 PM       HCG (If Applicable): No results found for: PREGTESTUR, PREGSERUM, HCG, HCGQUANT     ABGs: No results found for: PHART, PO2ART, ABI7QZI, NCK6GSC, BEART, N7MHHNXB     Type & Screen (If Applicable):  No results found for: LABABO, LABRH    Drug/Infectious Status (If Applicable):  No results found for: HIV, HEPCAB    COVID-19 Screening (If Applicable):   Lab Results   Component Value Date/Time    COVID19 Not Detected 07/21/2022 02:44 PM    COVID19 Not Detected 03/23/2022 10:15 AM     XR KNEE LEFT (1-2 VIEWS)    Result Date: 2/8/2023  Radiology exam is complete. No Radiologist dictation. Please follow up with ordering provider. XR KNEE LEFT (1-2 VIEWS)    Result Date: 1/23/2023  EXAMINATION: TWO XRAY VIEWS OF THE LEFT KNEE 1/23/2023 11:24 am COMPARISON: 28 September 2022 HISTORY: ORDERING SYSTEM PROVIDED HISTORY: postop TECHNOLOGIST PROVIDED HISTORY: Of operative side while in recovery room. Reason for exam:->postop FINDINGS: Anatomically aligned recent left TKA with no abnormal bone or soft tissue findings. Left TKA with no unexpected findings.      EKG 12 Lead  Order: [id number]   Status: Final result     Visible to patient: Yes (not seen)     Next appt: 03/01/2023 at 09:10 AM in Orthopedic Surgery Fernando Gongora, )     0 Result Notes  Component Ref Range & Units 1/23/23 1159 9/1/22 0913 8/31/22 1707 8/31/22 1308 8/31/22 0947 7/25/22 1001 7/24/22 2200   Ventricular Rate BPM 63  90  65  120  62  72  55    Atrial Rate BPM 63  234  65  240  62  72  55    P-R Interval ms 200   144   144  136  132    QRS Duration ms 82  124  74  70  74  78  76    Q-T Interval ms 426  434  412  390  448  406  450    QTc Calculation (Bazett) ms 435  530  428  551  454  444  430    P Axis degrees 16   43   56  52  47    R Axis degrees 30  -10  46  51  40  30  29    T Axis degrees 60  118  100  92  94  56  96    Resulting [address]           Narrative & Impression    Atrial-paced rhythm  Abnormal ECG  When compared with ECG of 14-APR-2022 22:11,  Electronic atrial pacemaker has replaced Atrial fibrillation  Vent. rate has decreased BY  88 BPM  Confirmed by Laquita Prakash ([id number]) on 1/23/2023 10:08:04 PM      Specimen Collected: 01/23/23 11:59 EST               TTE:  No evidence of tricuspid regurgitation. Left ventricle grossly normal in size. Borderline asymmetric left ventricular septal hypertrophy. Normal LV segmental wall motion. Estimated left ventricular ejection fraction is 64±5%. Does not meet 50% diagnostic criteria for diastolic dysfunction. The LAESV Index is <34ml/m2. Normal right ventricular size and function. TAPSE is normal   No evidence of tricuspid regurgitation. Unable to accurately assess RV systolic pressure. Physiologic and/or trace pulmonic regurgitation present. Technically good quality study. Technically difficult study due to patient''s body habitus. No comparison study available. Suggest clinical correlation.       Signature      ----------------------------------------------------------------   Electronically signed by Myles Valdes DO(Interpreting   physician) on 03/27/2022 11:30 AM    Anesthesia Evaluation  Patient summary reviewed and Nursing notes reviewed no history of anesthetic complications:   Airway: Mallampati: III  TM distance: <3 FB   Neck ROM: full  Mouth opening: < 3 FB   Dental:    (+) upper dentures and lower dentures      Pulmonary: breath sounds clear to auscultation  (+) COPD:  shortness of breath: chronic,  asthma:     (-) not a current smoker (former smoker)                          ROS comment: On 2L NC continuously  Chronic hypoxic resp failure   Cardiovascular:  Exercise tolerance: poor (<4 METS),   (+) hypertension:, pacemaker: pacemaker, dysrhythmias: atrial fibrillation and ventricular tachycardia, CHF:, MCCLELLAND:, hyperlipidemia      ECG reviewed  Rhythm: regular  Rate: normal  Echocardiogram reviewed  Stress test reviewed  Cleared by cardiology     Beta Blocker:  Dose within 24 Hrs         Neuro/Psych:   Negative Neuro/Psych ROS              GI/Hepatic/Renal:   (+) PUD, renal disease: CRI, morbid obesity          Endo/Other:    (+) hypothyroidism, blood dyscrasia: anticoagulation therapy and anemia, arthritis: OA., .                 Abdominal:   (+) obese,           Vascular:   + PVD, aortic or cerebral (Bilateral carotid stenosis), . Other Findings:             Anesthesia Plan      general     ASA 4     (Patient is agreeable to adductor canal block for post operative pain control)  Induction: intravenous. MIPS: Prophylactic antiemetics administered. Anesthetic plan and risks discussed with patient. Plan discussed with attending. Post-op pain plan if not by surgeon: single peripheral nerve block                         GOI Chirinos CRNA   2/21/2023      DOS STAFF ADDENDUM:    Patient seen and chart reviewed. Physical exam and history updated as indicated. NPO status confirmed. Anesthesia options and plan discussed including risks benefits with patient/legal guardian and family as available. Concerns and questions addressed. Consent verbalized to proceed.   Anesthesia plan, options and intraoperative/postoperative concerns discussed with care team.    Criss Gustafson MD, MD  2/21/2023  8:33 AM

## 2023-02-21 NOTE — H&P
Department of Orthopedic Surgery  Attending H&P Note                HISTORY OF PRESENT ILLNESS:                The patient is a 76 y.o. female who presents with draining left total knee arthroplasty 4 weeks after surgery. This started 10 days ago. Denies fever chills. Denies numbness tingling. She is here today for left knee irrigation debridement obtainment of cultures and polyethylene exchange. She will stay in the hospital until cultures result. .    Past Medical History:        Diagnosis Date    Arthritis     Atrial fibrillation ([address]     Tikosyn per Dr. Sabine Darling    Bilateral carotid artery stenosis 07/16/2020    Bronchitis     Carotid stenosis, bilateral 05/21/2012    Hyperlipidemia     Hypertension     Left carotid stenosis 04/26/2018    O2 dependent 07/16/2020    On continuous oral anticoagulation     Eliquis    S/P carotid endarterectomy 10/06/2016    Stomach ulcer 2003    Thyroid disease        Past Surgical History:        Procedure Laterality Date    APPENDECTOMY      BACK SURGERY      CHOLECYSTECTOMY      COLONOSCOPY      ENDOSCOPY, COLON, DIAGNOSTIC  01/10/2018    upper endo    EYE SURGERY      BILATERAL CATARACT    HYSTERECTOMY (CERVIX STATUS UNKNOWN)      JOINT REPLACEMENT  2011    RIGHT KNEE    PACEMAKER INSERTION Left 08/31/2022    Successful [employer] dual chamber pacemaker ([place])    TOTAL KNEE ARTHROPLASTY Left 1/23/2023    LEFT KNEE ZULY ROBOTIC ASSISTED TOTAL ARTHROPLASTY ++ROGELIO++  ++PNB++ performed by Oscar Terry DO at [facility] OR       Current Medications:   Current Facility-Administered Medications: 0.9 % sodium chloride infusion, , IntraVENous, Continuous  sodium chloride flush 0.9 % injection 5-40 mL, 5-40 mL, IntraVENous, 2 times per day  sodium chloride flush 0.9 % injection 5-40 mL, 5-40 mL, IntraVENous, PRN  0.9 % sodium chloride infusion, , IntraVENous, PRN  ceFAZolin (ANCEF) 2,000 mg in sterile water 20 mL IV syringe, 2,000 mg, IntraVENous, On Call to OR    Allergies: Patient has no known allergies. Social History:   TOBACCO:   reports that she quit smoking about 4 years ago. Her smoking use included cigarettes. She has a 30.00 pack-year smoking history. She has never used smokeless tobacco.  ETOH:   reports that she does not currently use alcohol. DRUGS:   reports no history of drug use. Family History:       Problem Relation Age of Onset    Other Mother         rheumatic fever    Cancer Sister         lung       REVIEW OF SYSTEMS:       Constitutional:  Negative for weight loss, fevers, chills, fatigue  Cardiovascular: Negative for chest pain, palpitations  Pulmonary: Negative for shortness of breath, labored breathing, cough  GI: negative for abdominal pain, nausea, vomitting   MSK: per HPI  Skin: negative for rash, open wounds    All other systems reviewed and are negative         PHYSICAL EXAM:      Vitals:    02/20/23 1134 02/21/23 0742   BP:  (!) 175/77   Pulse:  66   Resp:  20   Temp:  97.6 °F (36.4 °C)   TempSrc:  Temporal   SpO2:  94%   Weight: 240 lb (108.9 kg) 240 lb (108.9 kg)   Height: 5' 5\" (1.651 m) 5' 5\" (1.651 m)       Height: [unfilled]  Weight: [unfilled]  BMI:  Body mass index is 39.94 kg/m². General: The patient is alert and oriented x 3, appears to be stated age and in no distress. HEENT: head is normocephalic, atraumatic. EOMI. Neck: supple, trachea midline, no thyromegaly   Cardiovascular: peripheral pulses palpable. Normal Capillary refill   Respiratory: breathing unlabored, chest expansion symmetric   GI: abdomen NT/ND. No masses   Skin: no rash, no open wounds, no erythema  Psych: normal affect; mood stable  MSK:  Left knee: Serous drainage from the distal aspect of her incision. There is no purulence or erythema. Remainder of her incision is intact. Knee range of motion is 0-95.          DATA:      CBC:   Lab Results   Component Value Date/Time    WBC 13.5 01/24/2023 04:01 AM    RBC 2.64 01/24/2023 04:01 AM    HGB 8.1 01/24/2023 04:01 AM    HCT 26.0 01/24/2023 04:01 AM    MCV 98.5 01/24/2023 04:01 AM    MCH 30.7 01/24/2023 04:01 AM    MCHC 31.2 01/24/2023 04:01 AM    RDW 13.1 01/24/2023 04:01 AM     01/24/2023 04:01 AM    MPV 12.2 01/24/2023 04:01 AM     BMP:    Lab Results   Component Value Date/Time     01/24/2023 11:35 AM    K 5.3 01/24/2023 11:35 AM     01/24/2023 11:35 AM    CO2 23 01/24/2023 11:35 AM    BUN 30 01/24/2023 11:35 AM    LABALBU 4.1 07/21/2022 12:47 PM    LABALBU 4.8 04/28/2012 02:00 AM    CREATININE 1.1 01/24/2023 11:35 AM    CALCIUM 8.5 01/24/2023 11:35 AM    GFRAA >60 09/01/2022 10:40 AM    LABGLOM 51 01/24/2023 11:35 AM    GLUCOSE 129 01/24/2023 11:35 AM    GLUCOSE 156 04/28/2012 02:00 AM     PT/INR:    Lab Results   Component Value Date/Time    PROTIME 16.1 08/27/2020 01:20 PM    INR 1.4 08/27/2020 01:20 PM     PTT:    Lab Results   Component Value Date/Time    APTT 33.5 08/27/2020 01:20 PM   [APTT}  U/A:    Lab Results   Component Value Date/Time    COLORU Yellow 08/29/2020 11:40 AM    PHUR 6.0 08/29/2020 11:40 AM    LABCAST MODERATE 02/04/2017 01:44 PM    WBCUA >20 08/29/2020 11:40 AM    WBCUA NONE 04/28/2012 01:15 AM    RBCUA 1-3 08/29/2020 11:40 AM    RBCUA NONE 04/28/2012 01:15 AM    BACTERIA MODERATE 08/29/2020 11:40 AM    CLARITYU Clear 08/29/2020 11:40 AM    SPECGRAV 1.020 08/29/2020 11:40 AM    LEUKOCYTESUR TRACE 08/29/2020 11:40 AM    UROBILINOGEN 0.2 08/29/2020 11:40 AM    BILIRUBINUR Negative 08/29/2020 11:40 AM    BILIRUBINUR NEGATIVE 04/28/2012 01:15 AM    BLOODU Negative 08/29/2020 11:40 AM    GLUCOSEU Negative 08/29/2020 11:40 AM    GLUCOSEU NEGATIVE 04/28/2012 01:15 AM           IMPRESSION/RECOMMENDATIONS:    76y.o. year old female with draining left total knee arthroplasty 4 weeks from surgery  -Initials were placed on the left leg. Risk benefits alternatives surgery discussed. She understands the serious nature of prosthetic joint infection.   We are going to do a knee I&D and poly exchange with primary closure. We will obtain cultures and get an infectious disease consult. She will remain in the hospital until cultures result. I do not have a high suspicion for infection but since his estrangement could have taken a chance. She understands the risk to surrounding neurovascular structures, arthrofibrosis, continued infection, catastrophic failure amongst others. She wished to proceed.   Informed sent was obtained and signed and placed in chart on day of surgery.  -Patient will be admitted after surgery for continued care    [facility] [facility]   2/21/23  7:51 AM

## 2023-02-21 NOTE — ANESTHESIA POSTPROCEDURE EVALUATION
Department of Anesthesiology  Postprocedure Note    Patient: Ruth Ariza  MRN: [id number]  YOB: 1945  Date of evaluation: 2/21/2023      Procedure Summary     Date: 02/21/23 [place] [facility] [place] [facility]    Anesthesia Start: 0830 Anesthesia Stop: 1032    Procedure: LEFT KNEE INCISION AND DRAINAGE WITH POLYETHYLENE EXCHANGE (Left: Knee) Diagnosis:       Dehiscence of operative wound, initial encounter      Presence of left artificial knee joint      (/)    Surgeons: Jennifer Celis DO Responsible Provider: Inna Zuñiga MD    Anesthesia Type: General ASA Status: 4          Anesthesia Type: General    Jai Phase I: Jai Score: 8    Jai Phase II:        Anesthesia Post Evaluation    Patient location during evaluation: PACU  Patient participation: complete - patient participated  Level of consciousness: awake and alert  Airway patency: patent  Nausea & Vomiting: no nausea and no vomiting  Complications: no  Cardiovascular status: blood pressure returned to baseline and hemodynamically stable  Respiratory status: acceptable and spontaneous ventilation  Hydration status: euvolemic  Multimodal analgesia pain management approach

## 2023-02-22 LAB
ABO/RH: NORMAL
ANTIBODY SCREEN: NORMAL
BLOOD BANK DISPENSE STATUS: NORMAL
BLOOD BANK PRODUCT CODE: NORMAL
BPU ID: NORMAL
DESCRIPTION BLOOD BANK: NORMAL
GRAM STAIN ORDERABLE: NORMAL
GRAM STAIN ORDERABLE: NORMAL
HCT VFR BLD CALC: 22.7 % (ref 34–48)
HCT VFR BLD CALC: 23.9 % (ref 34–48)
HCT VFR BLD CALC: 25.7 % (ref 34–48)
HEMOGLOBIN: 6.6 G/DL (ref 11.5–15.5)
HEMOGLOBIN: 7.2 G/DL (ref 11.5–15.5)
HEMOGLOBIN: 7.8 G/DL (ref 11.5–15.5)
MCH RBC QN AUTO: 29.5 PG (ref 26–35)
MCHC RBC AUTO-ENTMCNC: 29.1 % (ref 32–34.5)
MCV RBC AUTO: 101.3 FL (ref 80–99.9)
PDW BLD-RTO: 14.4 FL (ref 11.5–15)
PLATELET # BLD: 198 E9/L (ref 130–450)
PMV BLD AUTO: 11.9 FL (ref 7–12)
RBC # BLD: 2.24 E12/L (ref 3.5–5.5)
WBC # BLD: 11.3 E9/L (ref 4.5–11.5)

## 2023-02-22 PROCEDURE — P9016 RBC LEUKOCYTES REDUCED: HCPCS

## 2023-02-22 PROCEDURE — 94640 AIRWAY INHALATION TREATMENT: CPT

## 2023-02-22 PROCEDURE — 6360000002 HC RX W HCPCS: Performed by: INTERNAL MEDICINE

## 2023-02-22 PROCEDURE — 2580000003 HC RX 258: Performed by: ANESTHESIOLOGY

## 2023-02-22 PROCEDURE — 86850 RBC ANTIBODY SCREEN: CPT

## 2023-02-22 PROCEDURE — 36430 TRANSFUSION BLD/BLD COMPNT: CPT

## 2023-02-22 PROCEDURE — 85018 HEMOGLOBIN: CPT

## 2023-02-22 PROCEDURE — 86923 COMPATIBILITY TEST ELECTRIC: CPT

## 2023-02-22 PROCEDURE — 2700000000 HC OXYGEN THERAPY PER DAY

## 2023-02-22 PROCEDURE — 6370000000 HC RX 637 (ALT 250 FOR IP)

## 2023-02-22 PROCEDURE — 85014 HEMATOCRIT: CPT

## 2023-02-22 PROCEDURE — 2580000003 HC RX 258

## 2023-02-22 PROCEDURE — 2580000003 HC RX 258: Performed by: INTERNAL MEDICINE

## 2023-02-22 PROCEDURE — 86900 BLOOD TYPING SEROLOGIC ABO: CPT

## 2023-02-22 PROCEDURE — 86901 BLOOD TYPING SEROLOGIC RH(D): CPT

## 2023-02-22 PROCEDURE — 6370000000 HC RX 637 (ALT 250 FOR IP): Performed by: FAMILY MEDICINE

## 2023-02-22 PROCEDURE — 36415 COLL VENOUS BLD VENIPUNCTURE: CPT

## 2023-02-22 PROCEDURE — 6370000000 HC RX 637 (ALT 250 FOR IP): Performed by: STUDENT IN AN ORGANIZED HEALTH CARE EDUCATION/TRAINING PROGRAM

## 2023-02-22 PROCEDURE — 85027 COMPLETE CBC AUTOMATED: CPT

## 2023-02-22 PROCEDURE — 1200000000 HC SEMI PRIVATE

## 2023-02-22 RX ORDER — SODIUM CHLORIDE 9 MG/ML
INJECTION, SOLUTION INTRAVENOUS PRN
Status: DISCONTINUED | OUTPATIENT
Start: 2023-02-22 | End: 2023-02-24

## 2023-02-22 RX ORDER — SODIUM CHLORIDE 9 MG/ML
INJECTION, SOLUTION INTRAVENOUS ONCE
Status: COMPLETED | OUTPATIENT
Start: 2023-02-22 | End: 2023-02-22

## 2023-02-22 RX ORDER — SODIUM CHLORIDE 0.9 % (FLUSH) 0.9 %
5-40 SYRINGE (ML) INJECTION PRN
Status: DISCONTINUED | OUTPATIENT
Start: 2023-02-22 | End: 2023-02-24

## 2023-02-22 RX ORDER — SODIUM CHLORIDE 0.9 % (FLUSH) 0.9 %
5-40 SYRINGE (ML) INJECTION EVERY 12 HOURS SCHEDULED
Status: DISCONTINUED | OUTPATIENT
Start: 2023-02-22 | End: 2023-02-24

## 2023-02-22 RX ORDER — ALBUTEROL SULFATE 90 UG/1
2 AEROSOL, METERED RESPIRATORY (INHALATION) EVERY 6 HOURS PRN
Status: DISCONTINUED | OUTPATIENT
Start: 2023-02-22 | End: 2023-02-24

## 2023-02-22 RX ORDER — IPRATROPIUM BROMIDE AND ALBUTEROL SULFATE 2.5; .5 MG/3ML; MG/3ML
1 SOLUTION RESPIRATORY (INHALATION) EVERY 4 HOURS PRN
Status: DISCONTINUED | OUTPATIENT
Start: 2023-02-22 | End: 2023-02-27 | Stop reason: HOSPADM

## 2023-02-22 RX ADMIN — VANCOMYCIN HYDROCHLORIDE 1750 MG: 10 INJECTION, POWDER, LYOPHILIZED, FOR SOLUTION INTRAVENOUS at 04:35

## 2023-02-22 RX ADMIN — LEVOTHYROXINE SODIUM 150 MCG: 0.15 TABLET ORAL at 09:05

## 2023-02-22 RX ADMIN — VENLAFAXINE HYDROCHLORIDE 300 MG: 150 CAPSULE, EXTENDED RELEASE ORAL at 09:04

## 2023-02-22 RX ADMIN — SUCRALFATE 1 G: 1 TABLET ORAL at 05:44

## 2023-02-22 RX ADMIN — METOPROLOL TARTRATE 50 MG: 50 TABLET, FILM COATED ORAL at 09:05

## 2023-02-22 RX ADMIN — SUCRALFATE 1 G: 1 TABLET ORAL at 10:56

## 2023-02-22 RX ADMIN — SODIUM CHLORIDE, PRESERVATIVE FREE 10 ML: 5 INJECTION INTRAVENOUS at 09:00

## 2023-02-22 RX ADMIN — OXYCODONE HYDROCHLORIDE 10 MG: 10 TABLET ORAL at 20:39

## 2023-02-22 RX ADMIN — SUCRALFATE 1 G: 1 TABLET ORAL at 17:20

## 2023-02-22 RX ADMIN — DOFETILIDE 500 MCG: 0.5 CAPSULE ORAL at 09:04

## 2023-02-22 RX ADMIN — VALSARTAN 160 MG: 160 TABLET ORAL at 09:04

## 2023-02-22 RX ADMIN — APIXABAN 5 MG: 5 TABLET, FILM COATED ORAL at 20:40

## 2023-02-22 RX ADMIN — IPRATROPIUM BROMIDE AND ALBUTEROL SULFATE 1 AMPULE: 2.5; .5 SOLUTION RESPIRATORY (INHALATION) at 13:19

## 2023-02-22 RX ADMIN — FERROUS SULFATE TAB 325 MG (65 MG ELEMENTAL FE) 325 MG: 325 (65 FE) TAB at 09:04

## 2023-02-22 RX ADMIN — APIXABAN 5 MG: 5 TABLET, FILM COATED ORAL at 09:05

## 2023-02-22 RX ADMIN — SUCRALFATE 1 G: 1 TABLET ORAL at 20:39

## 2023-02-22 RX ADMIN — SODIUM CHLORIDE: 9 INJECTION, SOLUTION INTRAVENOUS at 07:30

## 2023-02-22 RX ADMIN — PIPERACILLIN AND TAZOBACTAM 3375 MG: 3; .375 INJECTION, POWDER, FOR SOLUTION INTRAVENOUS at 07:05

## 2023-02-22 RX ADMIN — DOFETILIDE 500 MCG: 0.5 CAPSULE ORAL at 22:51

## 2023-02-22 RX ADMIN — OXYCODONE HYDROCHLORIDE 10 MG: 10 TABLET ORAL at 05:44

## 2023-02-22 RX ADMIN — ATORVASTATIN CALCIUM 20 MG: 20 TABLET, FILM COATED ORAL at 20:40

## 2023-02-22 RX ADMIN — POLYETHYLENE GLYCOL 3350 17 G: 17 POWDER, FOR SOLUTION ORAL at 09:03

## 2023-02-22 RX ADMIN — PIPERACILLIN AND TAZOBACTAM 3375 MG: 3; .375 INJECTION, POWDER, FOR SOLUTION INTRAVENOUS at 14:49

## 2023-02-22 RX ADMIN — ACETAMINOPHEN 650 MG: 325 TABLET ORAL at 14:29

## 2023-02-22 RX ADMIN — ACETAMINOPHEN 650 MG: 325 TABLET ORAL at 20:38

## 2023-02-22 RX ADMIN — OXYCODONE HYDROCHLORIDE 10 MG: 10 TABLET ORAL at 12:58

## 2023-02-22 RX ADMIN — ACETAMINOPHEN 650 MG: 325 TABLET ORAL at 09:05

## 2023-02-22 RX ADMIN — PIPERACILLIN AND TAZOBACTAM 3375 MG: 3; .375 INJECTION, POWDER, FOR SOLUTION INTRAVENOUS at 22:54

## 2023-02-22 RX ADMIN — METOPROLOL TARTRATE 50 MG: 50 TABLET, FILM COATED ORAL at 20:39

## 2023-02-22 ASSESSMENT — PAIN SCALES - GENERAL
PAINLEVEL_OUTOF10: 8
PAINLEVEL_OUTOF10: 8
PAINLEVEL_OUTOF10: 10
PAINLEVEL_OUTOF10: 5
PAINLEVEL_OUTOF10: 7
PAINLEVEL_OUTOF10: 8

## 2023-02-22 ASSESSMENT — PAIN DESCRIPTION - LOCATION
LOCATION: LEG
LOCATION: KNEE
LOCATION: LEG
LOCATION: KNEE

## 2023-02-22 ASSESSMENT — PAIN DESCRIPTION - ORIENTATION
ORIENTATION: LEFT

## 2023-02-22 ASSESSMENT — PAIN DESCRIPTION - FREQUENCY: FREQUENCY: CONTINUOUS

## 2023-02-22 ASSESSMENT — PAIN DESCRIPTION - DESCRIPTORS
DESCRIPTORS: ACHING;THROBBING
DESCRIPTORS: ACHING;SPASM
DESCRIPTORS: ACHING;DISCOMFORT;SORE

## 2023-02-22 ASSESSMENT — PAIN DESCRIPTION - PAIN TYPE: TYPE: SURGICAL PAIN

## 2023-02-22 ASSESSMENT — PAIN - FUNCTIONAL ASSESSMENT: PAIN_FUNCTIONAL_ASSESSMENT: PREVENTS OR INTERFERES SOME ACTIVE ACTIVITIES AND ADLS

## 2023-02-22 NOTE — PROGRESS NOTES
Hospitalist Progress Note      Synopsis: Patient admitted on 2/21/2023 Ms. Dejah Pendleton, a 76y.o. year old female  who  has a past medical history of Arthritis, Atrial fibrillation ([address] Bilateral carotid artery stenosis, Bronchitis, Carotid stenosis, bilateral, Hyperlipidemia, Hypertension, Left carotid stenosis, O2 dependent, On continuous oral anticoagulation, S/P carotid endarterectomy, Stomach ulcer, and Thyroid disease. Patient is s/p left knee drainage  with polyethylene exchange. Patient tolerated the procedure well . She reports no fever chills chest  pain nausea vomiting. Hospitalist medicine consulted for management of chronic medical conditions . Lab values reveal sodium 138 potassium 5.0 BUN 23 SCr 0.8 glucose 133  WBC 7.5 HGB 9.1    XR left knee neg           Subjective  Patient seen and examined chart reviewed. Patient slightly confused this morning she is emotional.  No chest pain or shortness of breath she has some lower extremity pain. Exam:  /60   Pulse 64   Temp 97.8 °F (36.6 °C)   Resp 18   Ht 5' 5\" (1.651 m)   Wt 240 lb (108.9 kg)   SpO2 98%   BMI 39.94 kg/m²   -General appearance: No apparent distress, appears stated age and cooperative. HEENT: Normal cephalic, atraumatic without obvious deformity. Pupils equal, round, and reactive to light. Extra ocular muscles intact. Conjunctivae/corneas clear. Neck: Supple, with full range of motion. No jugular venous distention. Trachea midline. Respiratory:  Normal respiratory effort. Clear to auscultation, bilaterally without crackles or rhonchi  Cardiovascular: normal   rate, normal  rhythm with normal S1/S2 , with no  murmurs  Abdomen: Soft, non-tender, non-distended with normal bowel sounds. Musculoskeletal:  left knee dressing in place clean dry intact  No clubbing, cyanosis or edema bilaterally. Skin: Skin color, texture, turgor normal.  No rashes or lesions.   Neurologic:  Neurovascularly intact without any focal sensory/motor deficits. Cranial nerves: II-XII intact, grossly non-focal.  Psychiatric: Alert and oriented, thought content appropriate, normal insight    Medications:  Reviewed    Infusion Medications    sodium chloride      sodium chloride      sodium chloride 125 mL/hr at 02/21/23 1421    sodium chloride       Scheduled Medications    sodium chloride flush  5-40 mL IntraVENous 2 times per day    sodium chloride flush  5-40 mL IntraVENous 2 times per day    acetaminophen  650 mg Oral Q6H    polyethylene glycol  17 g Oral Daily    apixaban  5 mg Oral BID    piperacillin-tazobactam  3,375 mg IntraVENous Q8H    atorvastatin  20 mg Oral Nightly    dofetilide  500 mcg Oral BID    levothyroxine  150 mcg Oral Daily    metoprolol tartrate  50 mg Oral BID    sucralfate  1 g Oral 4x Daily AC & HS    [Held by provider] valsartan  160 mg Oral Daily    venlafaxine  300 mg Oral Daily    ipratropium-albuterol  1 ampule Inhalation Q4H WA    ferrous sulfate  325 mg Oral BID WC    vancomycin  1,750 mg IntraVENous Q24H     PRN Meds: sodium chloride flush, sodium chloride, albuterol sulfate HFA, sodium chloride flush, sodium chloride, morphine **OR** morphine, oxyCODONE **OR** oxyCODONE, ondansetron **OR** ondansetron    I/O    Intake/Output Summary (Last 24 hours) at 2/22/2023 1039  Last data filed at 2/22/2023 9599  Gross per 24 hour   Intake 1637 ml   Output 150 ml   Net 1487 ml       Labs:   Recent Labs     02/21/23  0802 02/22/23  0426   WBC 7.5 11.3   HGB 9.1* 6.6*   HCT 30.3* 22.7*    198       Recent Labs     02/21/23  0802      K 5.0      CO2 27   BUN 23   CREATININE 0.8   CALCIUM 9.1       No results for input(s): PROT, ALB, ALKPHOS, ALT, AST, BILITOT, AMYLASE, LIPASE in the last 72 hours. No results for input(s): INR in the last 72 hours. No results for input(s): Hazeline Balk in the last 72 hours.     Chronic labs:  Lab Results   Component Value Date    CHOL 187 10/19/2021    TRIG 224 (H) 10/19/2021    HDL 77 10/19/2021    LDLCALC 65 10/19/2021    TSH 1.210 07/21/2022    INR 1.4 08/27/2020    LABA1C 4.9 04/23/2021       Radiology:  Imaging studies reviewed today. ASSESSMENT:    Principal Problem:    S/P revision of total knee, left  Active Problems:    History of knee replacement procedure of left knee  Resolved Problems:    * No resolved hospital problems. *       Assessment/plan    Left total knee  drainage with wound dehiscence    Patient had left TKA 4 weeks ago and it  started to drain . Patient is Day 0 Left  knee incision with polyethylene exchange . Ortho consulted   DVT px Eliquis Vanc Zosyn surgical cultures PT/Ot pain control      Atrial fibrillation : Eliquis Tikosyn      Hypertension Lopressor. Hold valsartan due to hyperkalemia    Hyperkalemia, holding valsartan for now low potassium diet recheck pending     Depression : Effexor      Hyperlipidemia : Lipitor      Hypothyroidism : Synthroid      Normocytic anemia : Acute on chronic anemia likely blood loss from surgery hemoglobin 6.6 this morning transfusing 1 unit PRBC this morning of H&H transfuse for hemoglobin less than 7     Chronic hypoxic respiratory failure : on 2L NC daily      COPD : C/w bronchodilators        Diet: ADULT DIET; Regular  Code Status: Prior  PT/OT Eval Status:     DVT Prophylaxis:     Recommended disposition at discharge:      +++++++++++++++++++++++++++++++++++++++++++++++++  Natalia Woodward MD  2/22/2023  [facility].  +++++++++++++++++++++++++++++++++++++++++++++++++  NOTE: This report was transcribed using voice recognition software. Every effort was made to ensure accuracy; however, inadvertent computerized transcription errors may be present.

## 2023-02-22 NOTE — ACP (ADVANCE CARE PLANNING)
Advance Care Planning     General Advance Care Planning (ACP) Conversation    Date of Conversation: 2/20/2023  Conducted with: Patient with Decision Making Capacity    Healthcare Decision Maker:  No healthcare decision makers have been documented. Click here to complete [address] including selection of the Healthcare Decision Maker Relationship (ie \"Primary\")      Content/Action Overview:   Has ACP document(s) on file - reflects the patient's care preferences  Reviewed DNR/DNI and patient elects Full Code (Attempt Resuscitation)        Length of Voluntary ACP Conversation in minutes:  <16 minutes (Non-Billable)    Hanane Neely RN

## 2023-02-22 NOTE — PROGRESS NOTES
Pharmacy Consultation Note  (Antibiotic Dosing and Monitoring)    Initial consult date: 2/21/23  Consulting physician/provider: Dr. Ramirez Bhatia  Drug: Vancomycin  Indication: Bone and joint infection     Age/  Gender Height Weight IBW  Allergy Information   77 y.o./female 5' 5\" (165.1 cm) 240 lb (108.9 kg)     Ideal body weight: 57 kg (125 lb 10.6 oz)  Adjusted ideal body weight: 77.7 kg (171 lb 6.4 oz)   Patient has no known allergies. Renal Function:  Recent Labs     02/21/23  0802   BUN 23   CREATININE 0.8         Intake/Output Summary (Last 24 hours) at 2/22/2023 1209  Last data filed at 2/22/2023 1121  Gross per 24 hour   Intake 1918.67 ml   Output 150 ml   Net 1768.67 ml         Vancomycin Monitoring:  Trough:  No results for input(s): VANCOTROUGH in the last 72 hours. Random:  No results for input(s): VANCORANDOM in the last 72 hours. No results for input(s): Prudence Ripple in the last 72 hours. Historical Cultures:  Organism   Date Value Ref Range Status   08/29/2020 Escherichia coli (A)  Final   08/29/2020 Corynebacterium species (A)  Final     No results for input(s): BC in the last 72 hours. Vancomycin Administration Times:  Recent vancomycin administrations                     vancomycin (VANCOCIN) 1,750 mg in sodium chloride 0.9 % 500 mL IVPB (mg) 1,750 mg New Bag 02/22/23 0435    vancomycin (VANCOCIN) injection (mg) 2,000 mg Given 02/21/23 1015                    Assessment:  Patient is a 76 y.o. female who has been initiated on vancomycin for a left knee prosthetic joint infection. Estimated Creatinine Clearance: 70 mL/min (based on SCr of 0.8 mg/dL). To dose vancomycin, pharmacy will be utilizing Teal[employer] calculation software for goal AUC/LAURENCE 400-600 mg/L-hr. Plan  Continue vancomycin 1,750 mg IV every 24 hours. Will check vancomycin levels when appropriate. Will continue to monitor renal function. Pharmacy to follow.        Thank you for the consult,     Laretta Castleman, MikeD, BCPS, [facility] 2/22/2023 12:10 PM

## 2023-02-22 NOTE — RT PROTOCOL NOTE
RT Nebulizer Bronchodilator Protocol Note    There is a bronchodilator order in the chart from a provider indicating to follow the RT Bronchodilator Protocol and there is an Initiate RT Bronchodilator Protocol order as well (see protocol at bottom of note). CXR Findings:  No results found. The findings from the last RT Protocol Assessment were as follows:  Smoking: Chronic pulmonary disease  Respiratory Pattern: Regular pattern and RR 12-20 bpm  Breath Sounds: Clear breath sounds  Cough: Strong, spontaneous, non-productive  Indication for Bronchodilator Therapy: None  Bronchodilator Assessment Score: 2    Aerosolized bronchodilator medication orders have been revised according to the RT Nebulizer Bronchodilator Protocol below. Respiratory Therapist to perform RT Therapy Protocol Assessment initially then follow the protocol. Repeat RT Therapy Protocol Assessment PRN for score 0-3 or on second treatment, BID, and PRN for scores above 3. No Indications - adjust the frequency to every 6 hours PRN wheezing or bronchospasm, if no treatments needed after 48 hours then discontinue using Per Protocol order mode. If indication present, adjust the RT bronchodilator orders based on the Bronchodilator Assessment Score as indicated below. If a patient is on this medication at home then do not decrease Frequency below that used at home. 0-3 - enter or revise RT bronchodilator order(s) to equivalent RT Bronchodilator order with Frequency of every 4 hours PRN for wheezing or increased work of breathing using Per Protocol order mode. 4-6 - enter or revise RT Bronchodilator order(s) to two equivalent RT bronchodilator orders with one order with BID Frequency and one order with Frequency of every 4 hours PRN wheezing or increased work of breathing using Per Protocol order mode.          7-10 - enter or revise RT Bronchodilator order(s) to two equivalent RT bronchodilator orders with one order with TID Frequency and one order with Frequency of every 4 hours PRN wheezing or increased work of breathing using Per Protocol order mode. 11-13 - enter or revise RT Bronchodilator order(s) to one equivalent RT bronchodilator order with QID Frequency and an Albuterol order with Frequency of every 4 hours PRN wheezing or increased work of breathing using Per Protocol order mode. Greater than 13 - enter or revise RT Bronchodilator order(s) to one equivalent RT bronchodilator order with every 4 hours Frequency and an Albuterol order with Frequency of every 2 hours PRN wheezing or increased work of breathing using Per Protocol order mode.        Electronically signed by Aarti Payan RCP on 2/22/2023 at 5:17 PM

## 2023-02-22 NOTE — CARE COORDINATION
2/22. Met with the pt and her  at the bedside to discuss transition of care. The pt recently had L TKA and developed drainage and wound dehiscence. POD#1 L knee Incision and Debridement. ID consulted. Explained the possibility of needing IV antibiotics when she returns home. She and her  feel that she will be safer at a facility. List of facilities provided. Will follow up with choices. Patrick Concepcion RN    Case Management Assessment  Initial Evaluation    Date/Time of Evaluation: 2/22/2023 1:57 PM  Assessment Completed by: Patrick Concepcion RN    If patient is discharged prior to next notation, then this note serves as note for discharge by case management. Patient Name: Dejah Pendleton                   YOB: 1945  Diagnosis: Dehiscence of operative wound, initial encounter [T81.31XA]  Presence of left artificial knee joint [Z96.652]  S/P revision of total knee, left [Z96.652]  History of knee replacement procedure of left knee [Z96.652]                   Date / Time: 2/21/2023  7:29 AM    Patient Admission Status: Inpatient   Readmission Risk (Low < 19, Mod (19-27), High > 27): Readmission Risk Score: 21.6    Current PCP: GIO Lawrence CNP  PCP verified by CM? Chart Reviewed: Yes      History Provided by: Patient  Patient Orientation: Alert and Oriented    Patient Cognition: Alert    Hospitalization in the last 30 days (Readmission):  Yes    If yes, Readmission Assessment in CM Navigator will be completed.     Advance Directives:      Code Status: Prior   Patient's Primary Decision Maker is: Named in [address] [place] [address]      Discharge Planning:    Patient lives with: Spouse/Significant Other Type of Home: House  Primary Care Giver: Spouse  Patient Support Systems include: Spouse/Significant Other   Current Financial resources:    Current community resources:    Current services prior to admission: Home Care            Current DME:              Type of Home Care services:  IV Therapy    ADLS  Prior functional level:    Current functional level:      PT AM-PAC: 10 /24  OT AM-PAC: 15 /24    Family can provide assistance at DC: Would you like Case Management to discuss the discharge plan with any other family members/significant others, and if so, who? Plans to Return to Present Housing:    Other Identified Issues/Barriers to RETURNING to current housing:   IV antibiotics/safety/difficulty with ambulation  Potential Assistance needed at discharge: 1 Supriya Gillespie, Trent Rubi            Potential DME:    Patient expects to discharge to: [address] [place] [address] for transportation at discharge:      Financial    Payor: Shadia Lamas / Plan: Carlos Cruz ESSENTIAL/PLUS / Product Type: *No Product type* /     Does insurance require precert for SNF: Yes    Potential assistance Purchasing Medications:    Meds-to-Beds request: Yes      [address] - [facility], [address] [place] [address] [phone] - F [employer]  [address] [facility] [address]  Phone: [employer] Fax: [employer]      Notes:    Factors facilitating achievement of predicted outcomes: Family support/motivation/pleasant    Barriers to discharge: Pain and Lower extremity weakness    Additional Case Management Notes: see above    The Plan for Transition of Care is related to the following treatment goals of Dehiscence of operative wound, initial encounter [T81.31XA]  Presence of left artificial knee joint [Z96.652]  S/P revision of total knee, left [Z96.652]  History of knee replacement procedure of left knee [X34.708]    IF APPLICABLE: The Patient and/or patient representative Monica Hernandez and her family were provided with a choice of provider and agrees with the discharge plan.  Freedom of choice list with basic dialogue that supports the patient's individualized plan of care/goals and shares the quality data associated with the providers was provided to:     Patient Representative Name: The Patient and/or Patient Representative Agree with the Discharge Plan?       Kevin Conrad RN  Case Management Department  Ph: [phone] Fax: [phone]

## 2023-02-22 NOTE — PROGRESS NOTES
I have seen and evaluated the patient and agree with the above assessment on today's visit. I have performed the key components of the history and physical examination and concur completely with the findings and plans as documented. Agree with ROS, examination, FMH, PMH, PSH, SocHx, and allergies as above. She is in good spirits this morning. Her pain is well controlled. Her block is worn off and her motor is returned to her foot. Her VAC is functioning appropriately. Denies fever chills. Denies numbness tingling. We have ordered her a hinged knee brace to be unlocked from 0 to 30 degrees to allow her soft tissues a chance to heal appropriately. We will leave the incisional VAC in place for few more days. We are awaiting final cultures. Appreciate infectious disease input. She currently on empiric antibiotics Vanco and Zosyn. We will resume Eliquis tomorrow. Hemoglobin 6.6 this morning. We are going to transfuse her today 1 units. All of her questions answered in detail. She understands this severity of this problem. Taylor Harper DO   Orthopaedic Surgery   2/22/2023  7:57 AM        Department of Orthopedic Surgery   Progress Note    POD 1 left total knee irrigation debridement with polyethylene exchange patient seen and examined. Pain controlled. Discussed with patient need to follow scheduled pain medication. No new complaints. Denies chest pain, shortness of breath, calf pain, dizziness/lightheadedness, fevers or chills. VITALS:  BP (!) 131/47   Pulse 68   Temp 97.4 °F (36.3 °C) (Temporal)   Resp 16   Ht 5' 5\" (1.651 m)   Wt 240 lb (108.9 kg)   SpO2 100%   BMI 39.94 kg/m²     GENERAL: In bed, comfortable, oriented x4  MUSCULOSKELETAL:   left lower extremity:  Dressing C/D/I.   Incisional VAC in place-no output  Compartments soft and compressible, calf non-tender  Palpable dorsalis pedis and posterior tibialis pulse, brisk cap refill to toes, foot warm and perfused  Sensation intact to light touch in sural/deep peroneal/superficial peroneal/saphenous/posterior tibial nerve distributions to foot/ankle. Demonstrates active ankle plantar/dorsiflexion/great toe extension    CBC:   Lab Results   Component Value Date/Time    WBC 11.3 02/22/2023 04:26 AM    HGB 6.6 02/22/2023 04:26 AM    HCT 22.7 02/22/2023 04:26 AM     02/22/2023 04:26 AM       ASSESSMENT  S/p left knee TKA I&D with polyethylene exchange 2/21    PLAN    Continue physical therapy and protocol: Weightbearing as tolerated left lower extremity with knee in full extension  Continue IV antibiotics -currently on Zosyn and vancomycin- appreciate infectious disease input  Culture pending-follow  Deep venous thrombosis prophylaxis -at home Eliquis to be restarted.   Okay to resume postop day 2, early mobilization  Consult [place], hinged ROM brace to right lower extremity locked in 0 to 30 degrees extension  PT/OT  Pain Control: IV and PO  Monitor H&H 6.6-1 unit of blood to be oldered and transfused  D/C Plan:  pending final antibiotic regimen      Electronically signed by Elton Morris DO,on 2/22/2023 at 6:34 AM

## 2023-02-22 NOTE — PROGRESS NOTES
[address] [place] [address] [facility]  Progress Note      No chief complaint on file. SUBJECTIVE:  58-year-old female with past medical history of A-fib on Eliquis, bilateral carotid artery stenosis, status post carotid endarterectomy, HTN, HLD, thyroid disease presented with wound dehiscence after left TKA 4 weeks ago. Status post left knee incision and drainage with polyethylene exchange . ID consulted for left knee prosthetic joint infection. Patient is tolerating medications. No reported adverse drug reactions. No nausea, vomiting, diarrhea. Review of systems:  As stated above in the chief complaint, otherwise negative.     Medications:  Scheduled Meds:   sodium chloride flush  5-40 mL IntraVENous 2 times per day    sodium chloride flush  5-40 mL IntraVENous 2 times per day    acetaminophen  650 mg Oral Q6H    polyethylene glycol  17 g Oral Daily    apixaban  5 mg Oral BID    piperacillin-tazobactam  3,375 mg IntraVENous Q8H    atorvastatin  20 mg Oral Nightly    dofetilide  500 mcg Oral BID    levothyroxine  150 mcg Oral Daily    metoprolol tartrate  50 mg Oral BID    sucralfate  1 g Oral 4x Daily AC & HS    [Held by provider] valsartan  160 mg Oral Daily    venlafaxine  300 mg Oral Daily    ipratropium-albuterol  1 ampule Inhalation Q4H WA    ferrous sulfate  325 mg Oral BID WC    vancomycin  1,750 mg IntraVENous Q24H     Continuous Infusions:   sodium chloride      sodium chloride 125 mL/hr at 23 1421    sodium chloride       PRN Meds:sodium chloride flush, sodium chloride, albuterol sulfate HFA, sodium chloride flush, sodium chloride, morphine **OR** morphine, oxyCODONE **OR** oxyCODONE, ondansetron **OR** ondansetron    OBJECTIVE:  BP (!) 164/70   Pulse 72   Temp 97.7 °F (36.5 °C) (Temporal)   Resp 18   Ht 5' 5\" (1.651 m)   Wt 240 lb (108.9 kg)   SpO2 98%   BMI 39.94 kg/m²   Temp  Av.6 °F (36.4 °C)  Min: 97.4 °F (36.3 °C)  Max: 97.8 °F (36.6 °C)  Constitutional: The patient is awake, alert, and oriented. Skin: Warm and dry. No rashes were noted. No jaundice. HEENT: Eyes show round, and reactive pupils. Moist mucous membranes, no ulcerations, no thrush. Neck: Supple to movements. No lymphadenopathy. Chest: No use of accessory muscles to breathe. Symmetrical expansion. Auscultation reveals no wheezing, crackles, or rhonchi. Cardiovascular: S1 and S2 are rhythmic and regular. No murmurs appreciated. Abdomen: Positive bowel sounds to auscultation. Benign to palpation. No masses felt. No hepatosplenomegaly. Genitourinary: Deferred  Extremities: No clubbing, no cyanosis, no edema. Musculoskeletal: Left knee bandaged.   Neurological: Following commands, no focal neurodeficit  Lines: peripheral    Laboratory and Tests Review:  Lab Results   Component Value Date    WBC 11.3 02/22/2023    WBC 7.5 02/21/2023    WBC 13.5 (H) 01/24/2023    HGB 6.6 (LL) 02/22/2023    HCT 22.7 (L) 02/22/2023    .3 (H) 02/22/2023     02/22/2023     Lab Results   Component Value Date    NEUTROABS 15.20 (H) 01/23/2023    NEUTROABS 4.25 01/18/2023    NEUTROABS 5.22 07/21/2022     No results found for: [facility]  Lab Results   Component Value Date    ALT 11 07/21/2022    AST 15 07/21/2022    ALKPHOS 68 07/21/2022    BILITOT 0.8 07/21/2022     Lab Results   Component Value Date/Time     02/21/2023 08:02 AM    K 5.0 02/21/2023 08:02 AM    K 5.3 01/24/2023 11:35 AM     02/21/2023 08:02 AM    CO2 27 02/21/2023 08:02 AM    BUN 23 02/21/2023 08:02 AM    CREATININE 0.8 02/21/2023 08:02 AM    CREATININE 1.1 01/24/2023 11:35 AM    CREATININE 1.2 01/24/2023 04:01 AM    GFRAA >60 09/01/2022 10:40 AM    LABGLOM >60 02/21/2023 08:02 AM    GLUCOSE 133 02/21/2023 08:02 AM    GLUCOSE 156 04/28/2012 02:00 AM    PROT 7.0 07/21/2022 12:47 PM    LABALBU 4.1 07/21/2022 12:47 PM    LABALBU 4.8 04/28/2012 02:00 AM    CALCIUM 9.1 02/21/2023 08:02 AM    BILITOT 0.8 07/21/2022 12:47 PM    ALKPHOS 68 07/21/2022 12:47 PM    AST 15 07/21/2022 12:47 PM    ALT 11 07/21/2022 12:47 PM     No results found for: CRP  Lab Results   Component Value Date    SEDRATE 4 01/08/2018     Radiology:      Microbiology:   Lab Results   Component Value Date/Time    BC 5 Days no growth 08/29/2020 09:14 AM    ORG Escherichia coli 08/29/2020 09:04 AM    ORG Corynebacterium species 08/29/2020 09:04 AM     Lab Results   Component Value Date/Time    ORG Escherichia coli 08/29/2020 09:04 AM    ORG Corynebacterium species 08/29/2020 09:04 AM     No results found for: WNDABS  Smear, Respiratory   Date Value Ref Range Status   09/22/2019   Final    Group 5: >25 PMN's/LPF and <10 Epithelial cells/LPF  Moderate Polymorphonuclear leukocytes  Epithelial cells not seen  Moderate Gram negative diplococci  Few Gram positive cocci in clusters  Rare yeast  Rare gram positive rods Diphtheroid-like       No results found for: MPNEUMO, CLAMYDCU, LABLEGI, AFBCX, FUNGSM, LABFUNG  CULTURE, RESPIRATORY   Date Value Ref Range Status   09/22/2019 Oral Pharyngeal Yvonne present  Final     No results found for: CXCATHTIP  No results found for: BFCS  Culture Surgical   Date Value Ref Range Status   02/21/2023 Growth not present, incubation continues  Preliminary   02/21/2023 Growth not present, incubation continues  Preliminary     Urine Culture, Routine   Date Value Ref Range Status   08/29/2020 >100,000 CFU/ml  Final   08/29/2020 50,000 CFU/ml  Final     MRSA Culture Only   Date Value Ref Range Status   01/18/2023 Methicillin resistant Staph aureus not isolated  Final   09/07/2016 Methicillin resistant Staph aureus not isolated  Final       Assessment:  Left knee prosthetic joint infection  Status post left knee TKA 4 weeks ago followed by wound dehiscence  Status post left knee incision and drainage and poly insulin  02/21     Plan:    Continue with Zosyn Vanco  OR Cx NG so far   Blood Cx NG so far  Patient will need at least 6 weeks of IV antibiotic therapy  Antibiotic regimen will be decided based on operating room cultures  We will continue to follow    Ryne Ortiz MD  4:40 PM  2/22/2023

## 2023-02-22 NOTE — PROGRESS NOTES
Occupational Therapy  OT BEDSIDE TREATMENT NOTE   [address] [facility] [address] [address]      HDUW:  Patient Name: Carlitos Andre  MRN: [id number]  : 1945  Room: [address] [place] [address]-A       Pt's chart reviewed and treatment attempted, nursing asked to hold this date due to low hgb. Will attempt at a later time/date.         Adeel Bach 84

## 2023-02-23 LAB
AFB SMEAR: NORMAL
AFB SMEAR: NORMAL
ANION GAP SERPL CALCULATED.3IONS-SCNC: 11 MMOL/L (ref 7–16)
BUN BLDV-MCNC: 30 MG/DL (ref 6–23)
CALCIUM SERPL-MCNC: 8.5 MG/DL (ref 8.6–10.2)
CHLORIDE BLD-SCNC: 101 MMOL/L (ref 98–107)
CO2: 25 MMOL/L (ref 22–29)
CREAT SERPL-MCNC: 1 MG/DL (ref 0.5–1)
GFR SERPL CREATININE-BSD FRML MDRD: 57 ML/MIN/1.73
GLUCOSE BLD-MCNC: 97 MG/DL (ref 74–99)
HCT VFR BLD CALC: 26.3 % (ref 34–48)
HEMOGLOBIN: 7.9 G/DL (ref 11.5–15.5)
MCH RBC QN AUTO: 29.8 PG (ref 26–35)
MCHC RBC AUTO-ENTMCNC: 30 % (ref 32–34.5)
MCV RBC AUTO: 99.2 FL (ref 80–99.9)
PDW BLD-RTO: 14.9 FL (ref 11.5–15)
PLATELET # BLD: 189 E9/L (ref 130–450)
PMV BLD AUTO: 11.7 FL (ref 7–12)
POTASSIUM SERPL-SCNC: 4.6 MMOL/L (ref 3.5–5)
RBC # BLD: 2.65 E12/L (ref 3.5–5.5)
SODIUM BLD-SCNC: 137 MMOL/L (ref 132–146)
WBC # BLD: 10 E9/L (ref 4.5–11.5)

## 2023-02-23 PROCEDURE — 1200000000 HC SEMI PRIVATE

## 2023-02-23 PROCEDURE — 2700000000 HC OXYGEN THERAPY PER DAY

## 2023-02-23 PROCEDURE — 6370000000 HC RX 637 (ALT 250 FOR IP): Performed by: INTERNAL MEDICINE

## 2023-02-23 PROCEDURE — 97530 THERAPEUTIC ACTIVITIES: CPT

## 2023-02-23 PROCEDURE — 6370000000 HC RX 637 (ALT 250 FOR IP)

## 2023-02-23 PROCEDURE — L2134 KAFO FEM FX CAST SEMI-RIGID: HCPCS

## 2023-02-23 PROCEDURE — 85027 COMPLETE CBC AUTOMATED: CPT

## 2023-02-23 PROCEDURE — 6360000002 HC RX W HCPCS: Performed by: INTERNAL MEDICINE

## 2023-02-23 PROCEDURE — 2580000003 HC RX 258

## 2023-02-23 PROCEDURE — 80048 BASIC METABOLIC PNL TOTAL CA: CPT

## 2023-02-23 PROCEDURE — 36415 COLL VENOUS BLD VENIPUNCTURE: CPT

## 2023-02-23 PROCEDURE — 6370000000 HC RX 637 (ALT 250 FOR IP): Performed by: FAMILY MEDICINE

## 2023-02-23 PROCEDURE — 2580000003 HC RX 258: Performed by: ANESTHESIOLOGY

## 2023-02-23 PROCEDURE — 6370000000 HC RX 637 (ALT 250 FOR IP): Performed by: STUDENT IN AN ORGANIZED HEALTH CARE EDUCATION/TRAINING PROGRAM

## 2023-02-23 PROCEDURE — 97535 SELF CARE MNGMENT TRAINING: CPT

## 2023-02-23 PROCEDURE — 2580000003 HC RX 258: Performed by: INTERNAL MEDICINE

## 2023-02-23 RX ORDER — NIFEDIPINE 30 MG/1
30 TABLET, EXTENDED RELEASE ORAL DAILY
Status: DISCONTINUED | OUTPATIENT
Start: 2023-02-23 | End: 2023-02-27 | Stop reason: HOSPADM

## 2023-02-23 RX ADMIN — ACETAMINOPHEN 650 MG: 325 TABLET ORAL at 10:45

## 2023-02-23 RX ADMIN — PIPERACILLIN AND TAZOBACTAM 3375 MG: 3; .375 INJECTION, POWDER, FOR SOLUTION INTRAVENOUS at 22:52

## 2023-02-23 RX ADMIN — APIXABAN 5 MG: 5 TABLET, FILM COATED ORAL at 10:46

## 2023-02-23 RX ADMIN — LEVOTHYROXINE SODIUM 150 MCG: 0.15 TABLET ORAL at 10:46

## 2023-02-23 RX ADMIN — SUCRALFATE 1 G: 1 TABLET ORAL at 06:17

## 2023-02-23 RX ADMIN — SODIUM CHLORIDE: 9 INJECTION, SOLUTION INTRAVENOUS at 06:19

## 2023-02-23 RX ADMIN — ATORVASTATIN CALCIUM 20 MG: 20 TABLET, FILM COATED ORAL at 20:32

## 2023-02-23 RX ADMIN — PIPERACILLIN AND TAZOBACTAM 3375 MG: 3; .375 INJECTION, POWDER, FOR SOLUTION INTRAVENOUS at 15:47

## 2023-02-23 RX ADMIN — OXYCODONE HYDROCHLORIDE 10 MG: 10 TABLET ORAL at 10:52

## 2023-02-23 RX ADMIN — SODIUM CHLORIDE: 9 INJECTION, SOLUTION INTRAVENOUS at 15:50

## 2023-02-23 RX ADMIN — DOFETILIDE 500 MCG: 0.5 CAPSULE ORAL at 10:46

## 2023-02-23 RX ADMIN — METOPROLOL TARTRATE 50 MG: 50 TABLET, FILM COATED ORAL at 20:30

## 2023-02-23 RX ADMIN — VENLAFAXINE HYDROCHLORIDE 300 MG: 150 CAPSULE, EXTENDED RELEASE ORAL at 10:45

## 2023-02-23 RX ADMIN — APIXABAN 5 MG: 5 TABLET, FILM COATED ORAL at 20:32

## 2023-02-23 RX ADMIN — SODIUM CHLORIDE, PRESERVATIVE FREE 10 ML: 5 INJECTION INTRAVENOUS at 20:30

## 2023-02-23 RX ADMIN — NIFEDIPINE 30 MG: 30 TABLET, EXTENDED RELEASE ORAL at 17:38

## 2023-02-23 RX ADMIN — VANCOMYCIN HYDROCHLORIDE 1750 MG: 10 INJECTION, POWDER, LYOPHILIZED, FOR SOLUTION INTRAVENOUS at 04:21

## 2023-02-23 RX ADMIN — ACETAMINOPHEN 650 MG: 325 TABLET ORAL at 15:44

## 2023-02-23 RX ADMIN — METOPROLOL TARTRATE 50 MG: 50 TABLET, FILM COATED ORAL at 10:46

## 2023-02-23 RX ADMIN — DOFETILIDE 500 MCG: 0.5 CAPSULE ORAL at 20:32

## 2023-02-23 RX ADMIN — SUCRALFATE 1 G: 1 TABLET ORAL at 10:45

## 2023-02-23 RX ADMIN — ACETAMINOPHEN 650 MG: 325 TABLET ORAL at 20:30

## 2023-02-23 RX ADMIN — POLYETHYLENE GLYCOL 3350 17 G: 17 POWDER, FOR SOLUTION ORAL at 10:44

## 2023-02-23 RX ADMIN — PIPERACILLIN AND TAZOBACTAM 3375 MG: 3; .375 INJECTION, POWDER, FOR SOLUTION INTRAVENOUS at 06:23

## 2023-02-23 RX ADMIN — SUCRALFATE 1 G: 1 TABLET ORAL at 20:32

## 2023-02-23 ASSESSMENT — PAIN SCALES - GENERAL
PAINLEVEL_OUTOF10: 7
PAINLEVEL_OUTOF10: 10
PAINLEVEL_OUTOF10: 0

## 2023-02-23 ASSESSMENT — PAIN DESCRIPTION - DESCRIPTORS
DESCRIPTORS: ACHING;SORE;THROBBING
DESCRIPTORS: ACHING;SPASM;SORE

## 2023-02-23 ASSESSMENT — PAIN DESCRIPTION - LOCATION
LOCATION: LEG
LOCATION: LEG;KNEE

## 2023-02-23 ASSESSMENT — PAIN DESCRIPTION - ORIENTATION: ORIENTATION: LEFT

## 2023-02-23 ASSESSMENT — PAIN - FUNCTIONAL ASSESSMENT: PAIN_FUNCTIONAL_ASSESSMENT: PREVENTS OR INTERFERES SOME ACTIVE ACTIVITIES AND ADLS

## 2023-02-23 NOTE — CARE COORDINATION
2/23. [facility] has accepted the pt. PASSR/Ambulance form/Envelope completed. Awaiting ID recs. Will need insurance precert.  Mayda Velázquez RN

## 2023-02-23 NOTE — PROGRESS NOTES
Pharmacy Consultation Note  (Antibiotic Dosing and Monitoring)    Initial consult date: 2/21/23  Consulting physician/provider: Dr. Alanis Callahan  Drug: Vancomycin  Indication: Bone and joint infection     Age/  Gender Height Weight IBW  Allergy Information   77 y.o./female 5' 5\" (165.1 cm) 240 lb (108.9 kg)     Ideal body weight: 57 kg (125 lb 10.6 oz)  Adjusted ideal body weight: 77.7 kg (171 lb 6.4 oz)   Patient has no known allergies. Renal Function:  Recent Labs     02/21/23  0802 02/23/23  0439   BUN 23 30*   CREATININE 0.8 1.0         Intake/Output Summary (Last 24 hours) at 2/23/2023 1208  Last data filed at 2/23/2023 0830  Gross per 24 hour   Intake 1120 ml   Output 650 ml   Net 470 ml         Vancomycin Monitoring:  Trough:  No results for input(s): VANCOTROUGH in the last 72 hours. Random:  No results for input(s): VANCORANDOM in the last 72 hours. No results for input(s): [place] in the last 72 hours. Historical Cultures:  Organism   Date Value Ref Range Status   08/29/2020 Escherichia coli (A)  Final   08/29/2020 Corynebacterium species (A)  Final     No results for input(s): BC in the last 72 hours. Vancomycin Administration Times:  Recent vancomycin administrations                     vancomycin (VANCOCIN) 1,750 mg in sodium chloride 0.9 % 500 mL IVPB (mg) 1,750 mg New Bag 02/23/23 0421     1,750 mg New Bag 02/22/23 0435    vancomycin (VANCOCIN) injection (mg) 2,000 mg Given 02/21/23 1015                    Assessment:  Patient is a 76 y.o. female who has been initiated on vancomycin for a left knee prosthetic joint infection. Estimated Creatinine Clearance: 56 mL/min (based on SCr of 1 mg/dL). To dose vancomycin, pharmacy will be utilizing Pressable calculation software for goal AUC/LAURENCE 400-600 mg/L-hr. Plan  Continue vancomycin 1,750 mg IV every 24 hours. Vancomycin trough tomorrow AM, hold dose if level is > 20 mcg/mL  Will continue to monitor renal function.    Pharmacy to follow.        Thank you for the consult,     Raysa Sanders PharmD, BCPS, BCCCP 2/23/2023 12:08 PM Home

## 2023-02-23 NOTE — CARE COORDINATION
2/23. Met with the pt to get choices for skilled nursing. She chose [facility]. Referral made. Awaiting acceptance. Marek Marie RN  The Plan for Transition of Care is related to the following treatment goals: to receive IV antibiotics and therapy to resume PLOF    The Patient was provided with a choice of provider and agrees   with the discharge plan. [x] Yes [] No    Freedom of choice list was provided with basic dialogue that supports the patient's individualized plan of care/goals, treatment preferences and shares the quality data associated with the providers.  [x] Yes [] No

## 2023-02-23 NOTE — PROGRESS NOTES
Hospitalist Progress Note      Synopsis: Patient admitted on 2/21/2023 Ms. Jimmy Lopez, a 76y.o. year old female  who  has a past medical history of Arthritis, Atrial fibrillation ([address] Bilateral carotid artery stenosis, Bronchitis, Carotid stenosis, bilateral, Hyperlipidemia, Hypertension, Left carotid stenosis, O2 dependent, On continuous oral anticoagulation, S/P carotid endarterectomy, Stomach ulcer, and Thyroid disease. Patient is s/p left knee drainage  with polyethylene exchange. Patient tolerated the procedure well . She reports no fever chills chest  pain nausea vomiting. Hospitalist medicine consulted for management of chronic medical conditions . Lab values reveal sodium 138 potassium 5.0 BUN 23 SCr 0.8 glucose 133  WBC 7.5 HGB 9.1    XR left knee neg           Subjective  Patient seen and examined chart reviewed. Patient slightly confused this morning she is emotional.  No chest pain or shortness of breath she has some lower extremity pain. Exam:  BP (!) 186/92   Pulse (!) 108   Temp 97.6 °F (36.4 °C) (Temporal)   Resp 16   Ht 5' 5\" (1.651 m)   Wt 240 lb (108.9 kg)   SpO2 99%   BMI 39.94 kg/m²     General appearance: No apparent distress, appears stated age and cooperative. HEENT:  Conjunctivae/corneas clear. Neck: Supple. No jugular venous distention. Respiratory: Clear to auscultation bilaterally, normal respiratory effort  Cardiovascular: Regular rate rhythm, normal S1-S2  Abdomen: Soft, nontender, nondistended  Musculoskeletal: No clubbing, cyanosis, no bilateral lower extremity edema.    Skin:  No rashes  on visible skin  Neurologic: awake, alert and following commands        Medications:  Reviewed    Infusion Medications    sodium chloride      sodium chloride 125 mL/hr at 02/23/23 1550    sodium chloride       Scheduled Medications    sodium chloride flush  5-40 mL IntraVENous 2 times per day    sodium chloride flush  5-40 mL IntraVENous 2 times per day acetaminophen  650 mg Oral Q6H    polyethylene glycol  17 g Oral Daily    apixaban  5 mg Oral BID    piperacillin-tazobactam  3,375 mg IntraVENous Q8H    atorvastatin  20 mg Oral Nightly    dofetilide  500 mcg Oral BID    levothyroxine  150 mcg Oral Daily    metoprolol tartrate  50 mg Oral BID    sucralfate  1 g Oral 4x Daily AC & HS    [Held by provider] valsartan  160 mg Oral Daily    venlafaxine  300 mg Oral Daily    ferrous sulfate  325 mg Oral BID WC    vancomycin  1,750 mg IntraVENous Q24H     PRN Meds: sodium chloride flush, sodium chloride, albuterol sulfate HFA, ipratropium-albuterol, sodium chloride flush, sodium chloride, morphine **OR** morphine, oxyCODONE **OR** oxyCODONE, ondansetron **OR** ondansetron    I/O    Intake/Output Summary (Last 24 hours) at 2/23/2023 1631  Last data filed at 2/23/2023 1457  Gross per 24 hour   Intake 620 ml   Output 1650 ml   Net -1030 ml       Labs:   Recent Labs     02/21/23  0802 02/22/23  0426 02/22/23  1745 02/22/23  2258 02/23/23  0439   WBC 7.5 11.3  --   --  10.0   HGB 9.1* 6.6* 7.2* 7.8* 7.9*   HCT 30.3* 22.7* 23.9* 25.7* 26.3*    198  --   --  189       Recent Labs     02/21/23  0802 02/23/23  0439    137   K 5.0 4.6    101   CO2 27 25   BUN 23 30*   CREATININE 0.8 1.0   CALCIUM 9.1 8.5*       No results for input(s): PROT, ALB, ALKPHOS, ALT, AST, BILITOT, AMYLASE, LIPASE in the last 72 hours. No results for input(s): INR in the last 72 hours. No results for input(s): [place] in the last 72 hours. Chronic labs:  Lab Results   Component Value Date    CHOL 187 10/19/2021    TRIG 224 (H) 10/19/2021    HDL 77 10/19/2021    LDLCALC 65 10/19/2021    TSH 1.210 07/21/2022    INR 1.4 08/27/2020    LABA1C 4.9 04/23/2021       Radiology:  Imaging studies reviewed today.     ASSESSMENT:    Principal Problem:    S/P revision of total knee, left  Active Problems:    History of knee replacement procedure of left knee  Resolved Problems:    * No resolved hospital problems. *       Assessment/plan    Left total knee  drainage with wound dehiscence    Patient had left TKA 4 weeks ago and it  started to drain . Patient is Day 0 Left  knee incision with polyethylene exchange . Ortho consulted   DVT px Eliquis Vanc Zosyn surgical cultures PT/Ot pain control      Atrial fibrillation : Eliquis Tikosyn      Essential hypertension, poorly controlled. Currently on Lopressor. Hold valsartan due to hyperkalemia. Start nifedipine 30 mg once daily    Hyperkalemia, holding valsartan for now low potassium diet recheck pending     Depression : Effexor      Hyperlipidemia : Lipitor      Hypothyroidism : Synthroid      Normocytic anemia : Acute on chronic anemia likely blood loss from surgery hemoglobin 6.6 this morning transfusing 1 unit PRBC this morning of H&H transfuse for hemoglobin less than 7     Chronic hypoxic respiratory failure : on 2L NC daily      COPD : C/w bronchodilators        Diet: ADULT DIET; Regular; Low Fat/Low Chol/High Fiber/LESTER; Low Potassium (Less than 3000 mg/day)  Code Status: Prior  PT/OT Eval Status:     DVT Prophylaxis:     Recommended disposition at discharge:      +++++++++++++++++++++++++++++++++++++++++++++++++  Iraida Muhammad MD  2/23/2023  [facility].  +++++++++++++++++++++++++++++++++++++++++++++++++  NOTE: This report was transcribed using voice recognition software. Every effort was made to ensure accuracy; however, inadvertent computerized transcription errors may be present.

## 2023-02-23 NOTE — PROGRESS NOTES
[address] [place] [address] [facility]  Progress Note      No chief complaint on file. SUBJECTIVE:  49-year-old female with past medical history of A-fib on Eliquis, bilateral carotid artery stenosis, status post carotid endarterectomy, HTN, HLD, thyroid disease presented with wound dehiscence after left TKA 4 weeks ago. Status post left knee incision and drainage with polyethylene exchange . ID consulted for left knee prosthetic joint infection. Patient is tolerating medications. No reported adverse drug reactions. No nausea, vomiting, diarrhea. Review of systems:  As stated above in the chief complaint, otherwise negative.     Medications:  Scheduled Meds:   sodium chloride flush  5-40 mL IntraVENous 2 times per day    sodium chloride flush  5-40 mL IntraVENous 2 times per day    acetaminophen  650 mg Oral Q6H    polyethylene glycol  17 g Oral Daily    apixaban  5 mg Oral BID    piperacillin-tazobactam  3,375 mg IntraVENous Q8H    atorvastatin  20 mg Oral Nightly    dofetilide  500 mcg Oral BID    levothyroxine  150 mcg Oral Daily    metoprolol tartrate  50 mg Oral BID    sucralfate  1 g Oral 4x Daily AC & HS    [Held by provider] valsartan  160 mg Oral Daily    venlafaxine  300 mg Oral Daily    ferrous sulfate  325 mg Oral BID WC    vancomycin  1,750 mg IntraVENous Q24H     Continuous Infusions:   sodium chloride      sodium chloride 125 mL/hr at 23 0619    sodium chloride       PRN Meds:sodium chloride flush, sodium chloride, albuterol sulfate HFA, ipratropium-albuterol, sodium chloride flush, sodium chloride, morphine **OR** morphine, oxyCODONE **OR** oxyCODONE, ondansetron **OR** ondansetron    OBJECTIVE:  BP (!) 186/92   Pulse (!) 108   Temp 97.6 °F (36.4 °C) (Temporal)   Resp 16   Ht 5' 5\" (1.651 m)   Wt 240 lb (108.9 kg)   SpO2 99%   BMI 39.94 kg/m²   Temp  Av.5 °F (36.4 °C)  Min: 96.9 °F (36.1 °C)  Max: 98.5 °F (36.9 °C)  Constitutional: The patient is awake, alert, and oriented. Skin: Warm and dry. No rashes were noted. No jaundice. HEENT: Eyes show round, and reactive pupils. Moist mucous membranes, no ulcerations, no thrush. Neck: Supple to movements. No lymphadenopathy. Chest: No use of accessory muscles to breathe. Symmetrical expansion. Auscultation reveals no wheezing, crackles, or rhonchi. Cardiovascular: S1 and S2 are rhythmic and regular. No murmurs appreciated. Abdomen: Positive bowel sounds to auscultation. Benign to palpation. No masses felt. No hepatosplenomegaly. Genitourinary: Deferred  Extremities: No clubbing, no cyanosis, no edema. Musculoskeletal: Left knee bandaged.   Neurological: Following commands, no focal neurodeficit  Lines: peripheral    Laboratory and Tests Review:  Lab Results   Component Value Date    WBC 10.0 02/23/2023    WBC 11.3 02/22/2023    WBC 7.5 02/21/2023    HGB 7.9 (L) 02/23/2023    HCT 26.3 (L) 02/23/2023    MCV 99.2 02/23/2023     02/23/2023     Lab Results   Component Value Date    NEUTROABS 15.20 (H) 01/23/2023    NEUTROABS 4.25 01/18/2023    NEUTROABS 5.22 07/21/2022     No results found for: [facility]  Lab Results   Component Value Date    ALT 11 07/21/2022    AST 15 07/21/2022    ALKPHOS 68 07/21/2022    BILITOT 0.8 07/21/2022     Lab Results   Component Value Date/Time     02/23/2023 04:39 AM    K 4.6 02/23/2023 04:39 AM    K 5.3 01/24/2023 11:35 AM     02/23/2023 04:39 AM    CO2 25 02/23/2023 04:39 AM    BUN 30 02/23/2023 04:39 AM    CREATININE 1.0 02/23/2023 04:39 AM    CREATININE 0.8 02/21/2023 08:02 AM    CREATININE 1.1 01/24/2023 11:35 AM    GFRAA >60 09/01/2022 10:40 AM    LABGLOM 57 02/23/2023 04:39 AM    GLUCOSE 97 02/23/2023 04:39 AM    GLUCOSE 156 04/28/2012 02:00 AM    PROT 7.0 07/21/2022 12:47 PM    LABALBU 4.1 07/21/2022 12:47 PM    LABALBU 4.8 04/28/2012 02:00 AM    CALCIUM 8.5 02/23/2023 04:39 AM    BILITOT 0.8 07/21/2022 12:47 PM    ALKPHOS 68 07/21/2022 12:47 PM    AST 15 07/21/2022 12:47 PM    ALT 11 07/21/2022 12:47 PM     No results found for: CRP  Lab Results   Component Value Date    SEDRATE 4 01/08/2018     Radiology:      Microbiology:   Lab Results   Component Value Date/Time    BC 5 Days no growth 08/29/2020 09:14 AM    ORG Escherichia coli 08/29/2020 09:04 AM    ORG Corynebacterium species 08/29/2020 09:04 AM     Lab Results   Component Value Date/Time    ORG Escherichia coli 08/29/2020 09:04 AM    ORG Corynebacterium species 08/29/2020 09:04 AM     No results found for: WNDABS  Smear, Respiratory   Date Value Ref Range Status   09/22/2019   Final    Group 5: >25 PMN's/LPF and <10 Epithelial cells/LPF  Moderate Polymorphonuclear leukocytes  Epithelial cells not seen  Moderate Gram negative diplococci  Few Gram positive cocci in clusters  Rare yeast  Rare gram positive rods Diphtheroid-like       No results found for: MPNEUMO, CLAMYDCU, LABLEGI, AFBCX, FUNGSM, LABFUNG  CULTURE, RESPIRATORY   Date Value Ref Range Status   09/22/2019 Oral Pharyngeal Yvonne present  Final     No results found for: CXCATHTIP  No results found for: BFCS  Culture Surgical   Date Value Ref Range Status   02/21/2023 Growth not present, incubation continues  Preliminary   02/21/2023 Growth not present, incubation continues  Preliminary     Urine Culture, Routine   Date Value Ref Range Status   08/29/2020 >100,000 CFU/ml  Final   08/29/2020 50,000 CFU/ml  Final     MRSA Culture Only   Date Value Ref Range Status   01/18/2023 Methicillin resistant Staph aureus not isolated  Final   09/07/2016 Methicillin resistant Staph aureus not isolated  Final       Assessment:  Left knee prosthetic joint infection  Status post left knee TKA 4 weeks ago followed by wound dehiscence  Status post left knee incision and drainage and poly insulin  02/21     Plan:    Continue with Zosyn Vanco  OR Cx NG so far   Blood Cx NG so far  Patient will need at least 6 weeks of IV antibiotic therapy  Antibiotic regimen will be decided based on operating room cultures  We will continue to follow    Mary Lowry MD  2:30 PM  2/23/2023

## 2023-02-23 NOTE — PROGRESS NOTES
Physical Therapy  Rx    Name: Lynsey Rodriguez  : 1945  MRN: [id number]      Date of Service: 2023    Evaluating PT:  Juliethorosevelt Paz, PT, DPT [id number]    Room #:  8871/4837-H  Diagnosis:  Dehiscence of operative wound, initial encounter [T81.31XA]  Presence of left artificial knee joint [Z96.652]  S/P revision of total knee, left [Z96.652]  History of knee replacement procedure of left knee [Z96.652]  PMHx/PSHx:   has a past medical history of Arthritis, Atrial fibrillation ([facility] [address] Bilateral carotid artery stenosis, Bronchitis, Carotid stenosis, bilateral, Hyperlipidemia, Hypertension, Left carotid stenosis, O2 dependent, On continuous oral anticoagulation, S/P carotid endarterectomy, Stomach ulcer, and Thyroid disease. has a past surgical history that includes Hysterectomy; Cholecystectomy; Appendectomy; back surgery; Colonoscopy; joint replacement (); eye surgery; Endoscopy, colon, diagnostic (01/10/2018); Pacemaker insertion (Left, 2022); Total knee arthroplasty (Left, 2023); and knee surgery (Left, 2023). Procedure/Surgery:  LEFT KNEE INCISION AND DRAINAGE WITH POLYETHYLENE EXCHANGE (2023)  Precautions:  Falls, O2, L knee immobilizer, WBAT LLE with L knee in full extension, incisional vac, ROM brace locked in ext with PRAFO. Equipment Needs:  TBD  Equipment Owned:  , MELANIE    SUBJECTIVE:    Pt lives with  in a 2 story home with 5 stair(s) to enter and 1 rail(s). Pt stays on 1st floor. Pt ambulated with SPC PTA. OBJECTIVE:   Initial Evaluation  Date: 2023 Treatment  23 Short Term/ Long Term   Goals   AM-PAC 6 Clicks 90/42     Was pt agreeable to Eval/treatment? Yes yes    Does pt have pain? No yes    Bed Mobility  Rolling: NT  Supine to sit: ModA  Sit to supine: NT  Scooting: NT Rolling: NT  Supine to sit: ModA  Sit to supine: NT  Scooting: NT Rolling: Independent  Supine to sit:  Independent  Sit to supine: Independent  Scooting: Independent   Transfers Sit to stand: [employer] x2  Stand to sit: ModA x2  Stand pivot: ModA x2 with [employer] Sit to stand: [employer]   Stand to sit: ModA   Stand pivot: ModA  with [employer] Sit to stand: Mod I  Stand to sit: Mod I  Stand pivot: Mod I with AAD   Ambulation    3 feet with [employer] ModA x2 15 feet with Mod A cues for sequencing. 150 feet with AAD Mod I   Stair negotiation: ascended and descended  NT NT 5 step(s) with 1 rail(s) Mod I   ROM BUE:  See OT note  RLE:  WFL     Strength BUE:  See OT note  RLE:  Grossly 5/5     Balance Sitting EOB:  SBA  Dynamic Standing:  ModA x2 with [employer]  Sitting EOB:  Independent  Dynamic Standing: Mod I with AAD     Pt is A & O x 4  Sensation:  R foot sensation to light touch intact; L foot sensation to light touch absent (nursing notified)  Edema:  Unremarkable    Vitals:   HR 64, SpO2 95-97% (3 L O2) at rest.  HR 80, SpO2 87% (RA) with activity. Patient education  Pt educated on role of PT, weight bearing status, safety during functional mobility, use of call light for assistance. Patient response to education:   Pt verbalized understanding Pt demonstrated skill Pt requires further education in this area   Yes Yes Yes     ASSESSMENT:    Conditions Requiring Skilled Therapeutic Intervention:    [x]Decreased strength     []Decreased ROM  [x]Decreased functional mobility  [x]Decreased balance   [x]Decreased endurance   []Decreased posture  [x]Decreased sensation  []Decreased coordination   []Decreased vision  [x]Decreased safety awareness   []Increased pain       Comments:  Patient in One[place]'s position with L ROM donned with KAFO in place. Shoe then donned for RLE. Required increased time to perform bed mobility. Required increased time, verbal/tactile cues related to positioning/sequencing to ensure safety during functional transfers. Ambulated from bed around room then back to bedside chair. Increased cues for sequencing. Increased fall risk. Left up in bedside chair with LLE elevated. Vitals monitored and noted. Nursing notified. Patient would benefit from continued skilled PT services to address functional deficits and prevent deconditioning. Agreeable to a rehab stay. Treatment:  Patient practiced and was instructed in the following treatment:    Bed mobility - verbal cues to facilitate proper positioning and sequencing; physical assistance provided as needed during activity  Static/dynamic sitting - performed to promote activity tolerance and balance maintenance  Functional transfers - verbal/tactile cues to facilitate proper positioning and sequencing, particularly related to hand/foot placement; physical assistance provided as needed during activity  Ambulation - verbal cues to facilitate proper positioning and sequencing; physical assistance provided as needed during activity  Skilled positioning - patient positioned optimally to promote comfort  Skilled monitoring of vitals    Pt's/ family goals   1. None stated    Prognosis is good for reaching above PT goals. Patient and or family understand(s) diagnosis, prognosis, and plan of care. Yes    PHYSICAL THERAPY PLAN OF CARE:    PT POC is established based on physician order and patient diagnosis     Referring provider/PT Order:    02/21/23 1415  PT eval and treat  Start:  02/21/23 1415,   End:  02/21/23 1415,   ONE TIME,   Standing Count:  1 Occurrences,   R         Jacinto Graft, DO     Diagnosis:  Dehiscence of operative wound, initial encounter [T81.31XA]  Presence of left artificial knee joint [Z96.652]  S/P revision of total knee, left [Z96.652]  History of knee replacement procedure of left knee [Z96.652]  Specific instructions for next treatment:  Continue to facilitate safe performance of functional mobility; progress as appropriate.     Current Treatment Recommendations:     [x] Strengthening to improve independence with functional mobility   [] ROM to improve independence with functional mobility   [x] Balance Training to improve static/dynamic balance and to reduce fall risk  [x] Endurance Training to improve activity tolerance during functional mobility   [x] Transfer Training to improve safety and independence with all functional transfers   [x] Gait Training to improve gait mechanics, endurance and assess need for appropriate assistive device  [x] Stair Training in preparation for safe discharge home and/or into the community   [x] Positioning to prevent skin breakdown and contractures  [x] Safety and Education Training   [x] Patient/Caregiver Education   [] HEP  [] Other     PT long term treatment goals are located in above grid    Frequency of treatments: 2-5x/week x 1-2 weeks. Time in  1500  Time out  1530    Total Treatment Time  23 minutes     Evaluation Time includes thorough review of current medical information, gathering information on past medical history/social history and prior level of function, completion of standardized testing/informal observation of tasks, assessment of data and education on plan of care and goals.     CPT codes:  [] Low Complexity PT evaluation 28848  [] Moderate Complexity PT evaluation 44225  [] High Complexity PT evaluation 46446  [] PT Re-evaluation 91826  [] Gait training 46249 0 minutes  [] Manual therapy 76021 0 minutes  [x] Therapeutic activities 11971 25 minutes  [] Therapeutic exercises 59579 0 minutes  [] Neuromuscular reeducation 90515 0 minutes     Mikel Flaherty, [address] [facility]

## 2023-02-23 NOTE — DISCHARGE INSTR - COC
Continuity of Care Form    Patient Name: Lynsey Rodriguez   :  1945  MRN:  [id number]    Admit date:  2023  Discharge date:  23    Code Status Order: Prior   Advance Directives:   [address] [facility] Documentation       Date/Time Healthcare Directive Type of Healthcare Directive Copy in [address] Agent's Name Healthcare Agent's Phone Number    [phone] Yes, patient has an advance directive for healthcare treatment Living will;Durable power of  for health care No, copy requested from family -- -- --            Admitting Physician:  Jayda Medeiros DO  PCP: Bre Adams, APRN - CNP    Discharging Nurse: Nicci Groves RN  [address] Unit/Room#: 114 [facility]  Discharging Unit Phone Number: [phone]    Emergency Contact:   Extended Emergency Contact Information  Primary Emergency Contact: Tanika Cosby  Address: [address] Phone: [phone]  Mobile Phone: [phone]  Relation: Spouse    Past Surgical History:  Past Surgical History:   Procedure Laterality Date    APPENDECTOMY      BACK SURGERY      CHOLECYSTECTOMY      COLONOSCOPY      ENDOSCOPY, COLON, DIAGNOSTIC  01/10/2018    upper endo    EYE SURGERY      BILATERAL CATARACT    HYSTERECTOMY (CERVIX STATUS UNKNOWN)      JOINT REPLACEMENT      RIGHT KNEE    KNEE SURGERY Left 2023    LEFT KNEE INCISION AND DRAINAGE WITH POLYETHYLENE EXCHANGE performed by Jyada Medeiros DO at [address] Left 2022    Successful [employer] dual chamber pacemaker (Sesar)    TOTAL KNEE ARTHROPLASTY Left 2023    LEFT KNEE ZULY ROBOTIC ASSISTED TOTAL ARTHROPLASTY ++ROGELIO++  ++PNB++ performed by Jayda Medeiros DO at [facility] OR       Immunization History:   Immunization History   Administered Date(s) Administered    COVID-19, PFIZER Bivalent BOOSTER, DO NOT Dilute, (age 12y+), IM, 30 mcg/0.3 mL 09/26/2022    COVID-19, PFIZER GRAY top, DO NOT Dilute, (age 15 y+), IM, 30 mcg/0.3 mL 04/08/2022    COVID-19, PFIZER PURPLE top, DILUTE for use, (age 15 y+), 30mcg/0.3mL 01/28/2021, 03/01/2021, 08/18/2021    Influenza Virus Vaccine 09/18/2015    Influenza, Christine Settle (age 72 y+), High Dose, 0.7mL 08/18/2021, 09/26/2022    Influenza, High Dose (Fluzone 65 yrs and older) 11/01/2017, 09/26/2018    Pneumococcal Conjugate 13-valent (Dectdjd76) 12/06/2017    Pneumococcal Polysaccharide (Wviuofthn64) 09/27/2022    Zoster Recombinant (Shingrix) 10/25/2021, 01/13/2022       Active Problems:  Patient Active Problem List   Diagnosis Code    Essential hypertension I10    Mixed hyperlipidemia E78.2    S/P carotid endarterectomy Z98.890    Hypoxia R09.02    CHF (congestive heart failure) ([facility]) E13.0    Diastolic dysfunction N12.26    Asthma J45.909    Bilateral carotid artery stenosis I65.23    O2 dependent Z99.81    Primary osteoarthritis of left knee M17.12    Lumbar spondylosis M47.816    Class 2 obesity due to excess calories with body mass index (BMI) of 38.0 to 38.9 in adult E66.09, Z68.38    MCCLELLAND (dyspnea on exertion) R06.09    CKD (chronic kidney disease) stage 3, GFR 30-59 ml/min ([facility]) N18.30    Atrial fibrillation with RVR ([facility]) I48.91    Atrial fibrillation with rapid ventricular response ([facility]) I48.91    Shortness of breath R06.02    Symptomatic bradycardia R00.1    History of ongoing treatment with high-risk medication Z79.899    Unspecified osteoarthritis, unspecified site M19.90    Unspecified injury of head, initial encounter S09.90XA    Hypothyroidism, unspecified E03.9    Fall from non-moving wheelchair W05. 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    Presence of cardiac pacemaker Z95.0    S/P total knee arthroplasty, left G94.113    Ventricular tachycardia I47.20    Nonsustained paroxysmal ventricular tachycardia I47.29    S/P revision of total knee, left Z96.652    History of knee replacement procedure of left knee Z96.652       Isolation/Infection:   Isolation            No Isolation          Patient Infection Status       Infection Onset Added Last Indicated Last Indicated By Review Planned Expiration Resolved Resolved By    None active    Resolved    COVID-19 (Rule Out) 07/21/22 07/21/22 07/21/22 COVID-19, Rapid (Ordered)   07/21/22 Rule-Out Test Resulted    COVID-19 (Rule Out) 03/23/22 03/23/22 03/23/22 Respiratory Panel, Molecular, with COVID-19 (Restricted: peds pts or suitable admitted adults) (Ordered)   03/23/22 Rule-Out Test Resulted    COVID-19 (Rule Out) 08/28/20 08/28/20 08/28/20 COVID-19 (Ordered)   08/28/20 Rule-Out Test Resulted            Nurse Assessment:  Last Vital Signs: BP (!) 186/92   Pulse (!) 108   Temp 97.6 °F (36.4 °C) (Temporal)   Resp 16   Ht 5' 5\" (1.651 m)   Wt 240 lb (108.9 kg)   SpO2 99%   BMI 39.94 kg/m²     Last documented pain score (0-10 scale): Pain Level: 10  Last Weight:   Wt Readings from Last 1 Encounters:   02/21/23 240 lb (108.9 kg)     Mental Status:  oriented and alert    IV Access:  - PICC - site  R Upper Arm, insertion date: 2-25-23    Nursing Mobility/ADLs:  Walking   Assisted  Transfer  Assisted  Bathing  Dependent  Dressing  Assisted  Toileting  Assisted  Feeding  Independent  Med Admin  Assisted  Med Delivery   whole    Wound Care Documentation and Therapy:  Negative Pressure Wound Therapy Knee Anterior; Left (Active)   Wound Type Surgical 02/22/23 0810   Dressing Status Clean, dry & intact 02/22/23 0810   Drainage Amount None 02/22/23 0810   Output (ml) 0 ml 02/22/23 0635   Number of days: 2       Incision 02/21/23 Knee Anterior; Left (Active)   Dressing Status Clean;Dry; Intact 02/22/23 0810   Dressing/Treatment Negative pressure wound therapy 02/22/23 0810   Closure Sutures 02/21/23 1013   Drainage Amount None 02/21/23 1300 Number of days: 2        Elimination:  Continence: Bowel: Yes  Bladder: No and intermittent  Urinary Catheter: None   Colostomy/Ileostomy/Ileal Conduit: No       Date of Last BM: 2-27-23    Intake/Output Summary (Last 24 hours) at 2/23/2023 1548  Last data filed at 2/23/2023 1457  Gross per 24 hour   Intake 620 ml   Output 1650 ml   Net -1030 ml     I/O last 3 completed shifts: In: 2658.7 [P.O.:680; I.V.:1437; Blood:541.7]  Out: 800 [Urine:800]    Safety Concerns: At Risk for Falls    Impairments/Disabilities:      Vision, Hearing, and glasses, dentures, [place]    Nutrition Therapy:  Current Nutrition Therapy:   - Oral Diet:  Cardiac and low potassium    Routes of Feeding: Oral  Liquids: Thin Liquids  Daily Fluid Restriction: no  Last Modified Barium Swallow with Video (Video Swallowing Test): not done    Treatments at the Time of Hospital Discharge:   Respiratory Treatments: oxygen  Oxygen Therapy:  is on oxygen at 2 L/min per nasal cannula.   Ventilator:    - No ventilator support    Rehab Therapies: Physical Therapy and Occupational Therapy  Weight Bearing Status/Restrictions: No weight bearing restrictions  Other Medical Equipment (for information only, NOT a DME order):  walker and bedside commode  Other Treatments: ***    Patient's personal belongings (please select all that are sent with patient):  Glasses, Dentures upper and lower    RN SIGNATURE:  Electronically signed by Che Andersen RN on 2/27/23 at 12:30 PM EST    CASE MANAGEMENT/SOCIAL WORK SECTION    Inpatient Status Date: ***    Readmission Risk Assessment Score:  Readmission Risk              Risk of Unplanned Readmission:  21           Discharging to Facility/ Agency   Name: [facility]  Address:  Phone:  Fax:    Dialysis Facility (if applicable)   Name:  Address:  Dialysis Schedule:  Phone:  Fax:    / signature: Electronically signed by Trena Malik RN on 2/23/23 at 3:48 PM EST    PHYSICIAN SECTION    Prognosis: {Prognosis:6894697595}    Condition at Discharge: [address] Patient Condition:[id number]}    Rehab Potential (if transferring to Rehab): {Prognosis:7117512003}    Recommended Labs or Other Treatments After Discharge: ***    Physician Certification: I certify the above information and transfer of Rossana Chavira  is necessary for the continuing treatment of the diagnosis listed and that she requires [place] for less 30 days.      Update Admission H&P: {CHP DME Changes in [facility]:[id number]}    PHYSICIAN SIGNATURE:  {Esignature:[id number]}

## 2023-02-23 NOTE — PROGRESS NOTES
I have seen and evaluated the patient and agree with the above assessment on today's visit. I have performed the key components of the history and physical examination and concur completely with the findings and plans as documented. Agree with ROS, examination, FMH, PMH, PSH, SocHx, and allergies as above. Patient seen and examined this morning. She is doing well. Her pain is well controlled. Incisional VAC is properly functioning. We are still waiting her hinged knee brace. We need to get up and moving with therapy today. Cultures have remained negative so far. We will await infectious disease final input for antibiotics. If the cultures remain negative I am not sure she is going to require any antibiotics moving forward. Plan will be to discharge home tomorrow after we get an infectious disease final recommendations. We will change her incisional VAC to a new one prior to discharge as well. Hemoglobin is stabilized this morning at 7.8. We will transfuse for hemoglobin below [address] [facility] DO   Orthopaedic Surgery   2/23/2023  8:07 AM        Department of Orthopedic Surgery   Progress Note    POD 2 left total knee irrigation debridement with polyethylene exchange patient seen and examined. Pain controlled. Found sleeping comfortably. No new complaints. Denies chest pain, shortness of breath, calf pain, dizziness/lightheadedness, fevers or chills. VITALS:  BP (!) 145/67   Pulse 94   Temp 98.5 °F (36.9 °C) (Temporal)   Resp 16   Ht 5' 5\" (1.651 m)   Wt 240 lb (108.9 kg)   SpO2 98%   BMI 39.94 kg/m²     GENERAL: In bed, comfortable, oriented x4  MUSCULOSKELETAL:   left lower extremity:  Dressing C/D/I.   Incisional VAC in place-no output  Compartments soft and compressible, calf non-tender  Palpable dorsalis pedis and posterior tibialis pulse, brisk cap refill to toes, foot warm and perfused  Sensation intact to light touch in sural/deep peroneal/superficial Please check your blood pressure when you get dizzy, avoid standing up quickly. Will call with lab and US result when available. peroneal/saphenous/posterior tibial nerve distributions to foot/ankle. Demonstrates active ankle plantar/dorsiflexion/great toe extension    CBC:   Lab Results   Component Value Date/Time    WBC 11.3 02/22/2023 04:26 AM    HGB 7.8 02/22/2023 10:58 PM    HCT 25.7 02/22/2023 10:58 PM     02/22/2023 04:26 AM       ASSESSMENT  S/p left knee TKA I&D with polyethylene exchange 2/21    PLAN    Continue physical therapy and protocol: Weightbearing as tolerated left lower extremity with knee in full extension  Continue IV antibiotics -currently on Zosyn and vancomycin- appreciate infectious disease input  Culture pending-no growth so far. Incubation continues  Deep venous thrombosis prophylaxis -at home Eliquis to be restarted. Okay to resume today early mobilization   hinged ROM brace to right lower extremity locked in 0 to 30 degrees extension  PT/OT  Pain Control: IV and PO  Monitor H&H 7.8 on 2/22.   Continue to monitor and notify when below 7  D/C Plan:  pending final antibiotic regimen      Electronically signed by Jacinto Siddiqui DOon 2/23/2023 at 5:28 AM

## 2023-02-23 NOTE — PLAN OF CARE
Problem: Discharge Planning  Goal: Discharge to home or other facility with appropriate resources  2/23/2023 0022 by Stacey Faith RN  Outcome: Progressing     Problem: Chronic Conditions and Co-morbidities  Goal: Patient's chronic conditions and co-morbidity symptoms are monitored and maintained or improved  Outcome: Progressing     Problem: Pain  Goal: Verbalizes/displays adequate comfort level or baseline comfort level  2/23/2023 0022 by Stacey Faith RN  Outcome: Progressing     Problem: Safety - Adult  Goal: Free from fall injury  2/23/2023 0022 by Stacey Faith RN  Outcome: Progressing

## 2023-02-23 NOTE — PROGRESS NOTES
OCCUPATIONAL THERAPY TREATMENT NOTE     [facility]      OT BEDSIDE TREATMENT NOTE      Date:2023  Patient Name: Steven Cowden  MRN: [id number]  : 1945  Room: [address] [place] [address]     Evaluating OT:Heather Buckley OTR/L   License #  [id number]        Referring Provider: Jennifer Alan DO    Specific Provider Orders/Date: OT evaluation & treatment        Diagnosis: Left total knee drainage with wound dehiscence    Pertinent Medical History:  has a past medical history of Arthritis, Atrial fibrillation ([address] Bilateral carotid artery stenosis, Bronchitis, Carotid stenosis, bilateral, Hyperlipidemia, Hypertension, Left carotid stenosis, O2 dependent, On continuous oral anticoagulation, S/P carotid endarterectomy, Stomach ulcer, and Thyroid disease. Surgery: 23: LEFT KNEE INCISION AND DRAINAGE WITH POLYETHYLENE EXCHANGE    Past Surgical History:  has a past surgical history that includes Hysterectomy; Cholecystectomy; Appendectomy; back surgery; Colonoscopy; joint replacement (); eye surgery; Endoscopy, colon, diagnostic (01/10/2018); Pacemaker insertion (Left, 2022); and Total knee arthroplasty (Left, 2023).        Precautions:  Fall Risk, L knee incisional vac, WBAT L LE in Knee Immobilizer/ brace locked in ext., pure wick      Assessment of current deficits    [x] Functional mobility            [x]ADLs           [x] Strength                  [x]Cognition    [x] Functional transfers          [x] IADLs         [x] Safety Awareness   [x]Endurance    [x] Fine Coordination                         [x] Balance      [] Vision/perception   [x]Sensation      []Gross Motor Coordination             [] ROM           [] Delirium                   [] Motor Control      OT PLAN OF CARE   OT POC based on physician orders, patient diagnosis and results of clinical assessment     Frequency/Duration: 2-4 days/wk for 2 weeks PRN   Specific OT Treatment Interventions to include: Instruction/training on adapted ADL techniques and AE recommendations to increase functional independence within precautions  Training on energy conservation strategies, correct breathing pattern and techniques to improve independence/tolerance for self-care routine  Functional transfer/mobility training/DME recommendations for increased independence, safety, and fall prevention  Patient/Family education to increase follow through with safety techniques and functional independence  Recommendation of environmental modifications for increased safety with functional transfers/mobility and ADLs  Cognitive retraining/development of therapeutic activities to improve problem solving, judgement, memory, and attention for increased safety/participation in ADL/IADL tasks  Splinting/positioning for increased function, prevention of contractures, and improve skin integrity  Therapeutic exercise to improve motor endurance, ROM, and functional strength for ADLs/functional transfers  Therapeutic activities to facilitate/challenge dynamic balance, stand tolerance for increased safety and independence with ADLs  Therapeutic activities to facilitate gross/fine motor skills for increased independence with ADLs  Positioning to improve skin integrity, interaction with environment and functional independence     Recommended Adaptive Equipment:  TBD     Home Living: Pt lives with  (able to assist) in a 2 story with 5 steps to enter with 1 HR. B&B on main level. Bathroom setup: tub/shower (pt. Reports she sponge bathes) & elevated toilet   Equipment owned: home O2, ww, spc     Prior Level of Function: Ind. with ADLs , assist from   with IADLs; ambulated spc. Reports she was \"finishing up\" home therapy, ready to start Out pt therapy this week. Driving: active but  as of late  Occupation: enjoys [employer]     Pain Level: no c/o pain at rest or with lt. Ax.   Cognition: A&O: 4/4; Follows 2 step directions Memory:  G              Sequencing:  G              Problem solving:  G              Judgement/safety:  F with cues for technique                Functional Assessment:  AM-PAC Daily Activity Raw Score: 15/24    Initial Eval Status  Date: 2-21-23 Treatment Status  Date: 2-23-22 STGs = LTGs  Time frame: 10-14 days   Feeding Ind. Ind. Mod I/ Ind   Grooming Set up seated   set up Modified [place]    UB Dressing SBA seated  SBA with gown seated Modified [place]    LB Dressing Max A B socks & adjustment of L LE KI  Max A B socks & adjustments of L LE brace locked in ext. Modified [place]    Bathing Max A with sim. task  Max A with sim. task per eval Modified [place]    Toileting NT  NT Modified [place]    Bed Mobility  Supine to sit: Mod A  Sit to supine: NT  Sup > Sit: Mod A  Sit > Sup: NT Supine to sit: Modified [place]   Sit to supine: Modified [place]    Functional Transfers Mod A x2 sit <> stand with ww  Mod A with sit <> stand, SPT with ww Modified [place]    Functional Mobility Mod A  x2 with ww couple short steps from EOB > bedside chair with minimal WB on L LE, Knee immobilizer in place. Mod A with ww short in-room distance Modified [place]    Balance Sitting:     Static:  SBA    Dynamic:Min A  Standing: Mod A  Sitting:     Static:  Sup    Dynamic: SBA  Standing: Mod A     Activity Tolerance F-  Fair G   Visual/  Perceptual Glasses: yes          Vitals spO2 on 2-3L via NC remained 95% or greater. HR WFL.  spO2 with 2L via NC, remained WFL. Dropped to 88-89% on RA, improved back to mid-90's placed back on O2  WFL     Comments: Upon arrival pt supine in bed, agreeable to OT, cleared by RN. Therapist facilitated bed mobility/ADLs/functional transfer/mobility training with focus on safety, technique, precautions. Pt. Instructed RE: safe transfers/mobility, ADLs, role of OT, treatment plan, recs. , prec. At end of session pt.  Seated in bedside chair, all needs met, RN notified, all lines and tubes intact, call light within reach. Pt has made F+ progress towards set goals. Continue with current plan of care      Treatment Time In:15:10            Treatment Time Out: 15:40                Treatment Charges: Mins Units   Ther Ex  75465     Manual Therapy [id number] [facility]     Thera Activities 89892 10 1   ADL/Home Mgt 13754 15 1   Neuro Re-ed 17661     Group Therapy      Orthotic manage/training  11067     Non-Billable Time     Total Timed Treatment 25 2     Heather Buckley, OTR/L   License #  [id number]

## 2023-02-24 LAB
ALBUMIN SERPL-MCNC: 3.4 G/DL (ref 3.5–5.2)
ANAEROBIC CULTURE: ABNORMAL
ANION GAP SERPL CALCULATED.3IONS-SCNC: 14 MMOL/L (ref 7–16)
BASOPHILS ABSOLUTE: 0.05 E9/L (ref 0–0.2)
BASOPHILS RELATIVE PERCENT: 0.5 % (ref 0–2)
BUN BLDV-MCNC: 25 MG/DL (ref 6–23)
CALCIUM SERPL-MCNC: 8.6 MG/DL (ref 8.6–10.2)
CHLORIDE BLD-SCNC: 106 MMOL/L (ref 98–107)
CO2: 22 MMOL/L (ref 22–29)
CREAT SERPL-MCNC: 0.9 MG/DL (ref 0.5–1)
EOSINOPHILS ABSOLUTE: 0.23 E9/L (ref 0.05–0.5)
EOSINOPHILS RELATIVE PERCENT: 2.3 % (ref 0–6)
FUNGUS STAIN: NORMAL
FUNGUS STAIN: NORMAL
GFR SERPL CREATININE-BSD FRML MDRD: >60 ML/MIN/1.73
GLUCOSE BLD-MCNC: 97 MG/DL (ref 74–99)
HCT VFR BLD CALC: 26.3 % (ref 34–48)
HEMOGLOBIN: 7.7 G/DL (ref 11.5–15.5)
IMMATURE GRANULOCYTES #: 0.04 E9/L
IMMATURE GRANULOCYTES %: 0.4 % (ref 0–5)
LYMPHOCYTES ABSOLUTE: 1.12 E9/L (ref 1.5–4)
LYMPHOCYTES RELATIVE PERCENT: 11.3 % (ref 20–42)
MCH RBC QN AUTO: 28.9 PG (ref 26–35)
MCHC RBC AUTO-ENTMCNC: 29.3 % (ref 32–34.5)
MCV RBC AUTO: 98.9 FL (ref 80–99.9)
MONOCYTES ABSOLUTE: 0.87 E9/L (ref 0.1–0.95)
MONOCYTES RELATIVE PERCENT: 8.8 % (ref 2–12)
NEUTROPHILS ABSOLUTE: 7.63 E9/L (ref 1.8–7.3)
NEUTROPHILS RELATIVE PERCENT: 76.7 % (ref 43–80)
ORGANISM: ABNORMAL
ORGANISM: ABNORMAL
PDW BLD-RTO: 14.5 FL (ref 11.5–15)
PHOSPHORUS: 3.4 MG/DL (ref 2.5–4.5)
PLATELET # BLD: 190 E9/L (ref 130–450)
PMV BLD AUTO: 11.3 FL (ref 7–12)
POTASSIUM SERPL-SCNC: 4.5 MMOL/L (ref 3.5–5)
RBC # BLD: 2.66 E12/L (ref 3.5–5.5)
SODIUM BLD-SCNC: 142 MMOL/L (ref 132–146)
VANCOMYCIN TROUGH: 14.9 MCG/ML (ref 5–16)
WBC # BLD: 9.9 E9/L (ref 4.5–11.5)

## 2023-02-24 PROCEDURE — 2580000003 HC RX 258: Performed by: INTERNAL MEDICINE

## 2023-02-24 PROCEDURE — 97535 SELF CARE MNGMENT TRAINING: CPT

## 2023-02-24 PROCEDURE — 6370000000 HC RX 637 (ALT 250 FOR IP): Performed by: FAMILY MEDICINE

## 2023-02-24 PROCEDURE — 80202 ASSAY OF VANCOMYCIN: CPT

## 2023-02-24 PROCEDURE — 1200000000 HC SEMI PRIVATE

## 2023-02-24 PROCEDURE — 85025 COMPLETE CBC W/AUTO DIFF WBC: CPT

## 2023-02-24 PROCEDURE — 6370000000 HC RX 637 (ALT 250 FOR IP)

## 2023-02-24 PROCEDURE — 80069 RENAL FUNCTION PANEL: CPT

## 2023-02-24 PROCEDURE — 36415 COLL VENOUS BLD VENIPUNCTURE: CPT

## 2023-02-24 PROCEDURE — 97530 THERAPEUTIC ACTIVITIES: CPT

## 2023-02-24 PROCEDURE — 6370000000 HC RX 637 (ALT 250 FOR IP): Performed by: INTERNAL MEDICINE

## 2023-02-24 PROCEDURE — 2580000003 HC RX 258

## 2023-02-24 PROCEDURE — 6360000002 HC RX W HCPCS: Performed by: INTERNAL MEDICINE

## 2023-02-24 RX ORDER — HEPARIN SODIUM (PORCINE) LOCK FLUSH IV SOLN 100 UNIT/ML 100 UNIT/ML
3 SOLUTION INTRAVENOUS PRN
Status: DISCONTINUED | OUTPATIENT
Start: 2023-02-24 | End: 2023-02-27 | Stop reason: HOSPADM

## 2023-02-24 RX ORDER — SODIUM CHLORIDE 0.9 % (FLUSH) 0.9 %
5-40 SYRINGE (ML) INJECTION EVERY 12 HOURS SCHEDULED
Status: DISCONTINUED | OUTPATIENT
Start: 2023-02-24 | End: 2023-02-27 | Stop reason: HOSPADM

## 2023-02-24 RX ORDER — SODIUM CHLORIDE 0.9 % (FLUSH) 0.9 %
5-40 SYRINGE (ML) INJECTION PRN
Status: DISCONTINUED | OUTPATIENT
Start: 2023-02-24 | End: 2023-02-27 | Stop reason: HOSPADM

## 2023-02-24 RX ORDER — LIDOCAINE HYDROCHLORIDE 10 MG/ML
5 INJECTION, SOLUTION EPIDURAL; INFILTRATION; INTRACAUDAL; PERINEURAL ONCE
Status: DISCONTINUED | OUTPATIENT
Start: 2023-02-24 | End: 2023-02-27 | Stop reason: HOSPADM

## 2023-02-24 RX ORDER — SODIUM CHLORIDE 9 MG/ML
INJECTION, SOLUTION INTRAVENOUS PRN
Status: DISCONTINUED | OUTPATIENT
Start: 2023-02-24 | End: 2023-02-27 | Stop reason: HOSPADM

## 2023-02-24 RX ORDER — HEPARIN SODIUM (PORCINE) LOCK FLUSH IV SOLN 100 UNIT/ML 100 UNIT/ML
3 SOLUTION INTRAVENOUS EVERY 12 HOURS SCHEDULED
Status: DISCONTINUED | OUTPATIENT
Start: 2023-02-24 | End: 2023-02-27 | Stop reason: HOSPADM

## 2023-02-24 RX ADMIN — VANCOMYCIN HYDROCHLORIDE 1750 MG: 10 INJECTION, POWDER, LYOPHILIZED, FOR SOLUTION INTRAVENOUS at 04:17

## 2023-02-24 RX ADMIN — OXYCODONE HYDROCHLORIDE 10 MG: 10 TABLET ORAL at 09:40

## 2023-02-24 RX ADMIN — APIXABAN 5 MG: 5 TABLET, FILM COATED ORAL at 21:42

## 2023-02-24 RX ADMIN — ACETAMINOPHEN 650 MG: 325 TABLET ORAL at 09:36

## 2023-02-24 RX ADMIN — ACETAMINOPHEN 650 MG: 325 TABLET ORAL at 21:41

## 2023-02-24 RX ADMIN — VENLAFAXINE HYDROCHLORIDE 300 MG: 150 CAPSULE, EXTENDED RELEASE ORAL at 09:37

## 2023-02-24 RX ADMIN — POLYETHYLENE GLYCOL 3350 17 G: 17 POWDER, FOR SOLUTION ORAL at 09:36

## 2023-02-24 RX ADMIN — NIFEDIPINE 30 MG: 30 TABLET, EXTENDED RELEASE ORAL at 09:37

## 2023-02-24 RX ADMIN — DOFETILIDE 500 MCG: 0.5 CAPSULE ORAL at 21:41

## 2023-02-24 RX ADMIN — LEVOTHYROXINE SODIUM 150 MCG: 0.15 TABLET ORAL at 09:37

## 2023-02-24 RX ADMIN — METOPROLOL TARTRATE 50 MG: 50 TABLET, FILM COATED ORAL at 21:42

## 2023-02-24 RX ADMIN — FERROUS SULFATE TAB 325 MG (65 MG ELEMENTAL FE) 325 MG: 325 (65 FE) TAB at 09:36

## 2023-02-24 RX ADMIN — SUCRALFATE 1 G: 1 TABLET ORAL at 06:49

## 2023-02-24 RX ADMIN — APIXABAN 5 MG: 5 TABLET, FILM COATED ORAL at 09:36

## 2023-02-24 RX ADMIN — SODIUM CHLORIDE: 9 INJECTION, SOLUTION INTRAVENOUS at 00:49

## 2023-02-24 RX ADMIN — ACETAMINOPHEN 650 MG: 325 TABLET ORAL at 18:10

## 2023-02-24 RX ADMIN — METOPROLOL TARTRATE 50 MG: 50 TABLET, FILM COATED ORAL at 09:36

## 2023-02-24 RX ADMIN — DOFETILIDE 500 MCG: 0.5 CAPSULE ORAL at 09:37

## 2023-02-24 RX ADMIN — ATORVASTATIN CALCIUM 20 MG: 20 TABLET, FILM COATED ORAL at 21:41

## 2023-02-24 ASSESSMENT — PAIN SCALES - GENERAL
PAINLEVEL_OUTOF10: 5
PAINLEVEL_OUTOF10: 7

## 2023-02-24 ASSESSMENT — PAIN DESCRIPTION - LOCATION: LOCATION: KNEE

## 2023-02-24 ASSESSMENT — PAIN DESCRIPTION - DESCRIPTORS: DESCRIPTORS: ACHING;CRAMPING;DISCOMFORT

## 2023-02-24 ASSESSMENT — PAIN DESCRIPTION - ORIENTATION: ORIENTATION: LEFT

## 2023-02-24 NOTE — PLAN OF CARE
Problem: Discharge Planning  Goal: Discharge to home or other facility with appropriate resources  2/24/2023 0111 by Marissa Barba RN  Outcome: Progressing     Problem: Chronic Conditions and Co-morbidities  Goal: Patient's chronic conditions and co-morbidity symptoms are monitored and maintained or improved  2/24/2023 0111 by Marissa Barba RN  Outcome: Progressing     Problem: Pain  Goal: Verbalizes/displays adequate comfort level or baseline comfort level  2/24/2023 0111 by Marissa Barba RN  Outcome: Progressing     Problem: Safety - Adult  Goal: Free from fall injury  2/24/2023 0111 by Marissa Barba RN  Outcome: Progressing     Problem: ABCDS Injury Assessment  Goal: Absence of physical injury  2/24/2023 0111 by Marissa Barba RN  Outcome: Progressing     Problem: Skin/Tissue Integrity  Goal: Absence of new skin breakdown  Description: 1. Monitor for areas of redness and/or skin breakdown  2. Assess vascular access sites hourly  3. Every 4-6 hours minimum:  Change oxygen saturation probe site  4. Every 4-6 hours:  If on nasal continuous positive airway pressure, respiratory therapy assess nares and determine need for appliance change or resting period.   2/24/2023 0111 by Marissa Barba RN  Outcome: Progressing

## 2023-02-24 NOTE — PROGRESS NOTES
PT IV access infiltrated. Unable to regain access .  PT placed on IV team. PT needing 6 weeks of IV ABX

## 2023-02-24 NOTE — PROGRESS NOTES
Hospitalist Progress Note      Synopsis: Patient admitted on 2/21/2023 Ms. [place], a 76y.o. year old female  who  has a past medical history of Arthritis, Atrial fibrillation ([address] Bilateral carotid artery stenosis, Bronchitis, Carotid stenosis, bilateral, Hyperlipidemia, Hypertension, Left carotid stenosis, O2 dependent, On continuous oral anticoagulation, S/P carotid endarterectomy, Stomach ulcer, and Thyroid disease. Patient is s/p left knee drainage  with polyethylene exchange. Patient tolerated the procedure well . She reports no fever chills chest  pain nausea vomiting. Hospitalist medicine consulted for management of chronic medical conditions . Lab values reveal sodium 138 potassium 5.0 BUN 23 SCr 0.8 glucose 133  WBC 7.5 HGB 9.1    XR left knee neg           Subjective  Patient seen and examined chart reviewed. No chest pain or shortness of breath she has some lower extremity pain. Exam:  /63   Pulse 65   Temp 98.1 °F (36.7 °C) (Temporal)   Resp 18   Ht 5' 5\" (1.651 m)   Wt 240 lb (108.9 kg)   SpO2 94%   BMI 39.94 kg/m²     General appearance: No apparent distress, appears stated age and cooperative. HEENT:  Conjunctivae/corneas clear. Neck: Supple. No jugular venous distention. Respiratory: Clear to auscultation bilaterally, normal respiratory effort  Cardiovascular: Regular rate rhythm, normal S1-S2  Abdomen: Soft, nontender, nondistended  Musculoskeletal: Left lower extremity dressing and drain intact, no clubbing, cyanosis, no bilateral lower extremity edema.    Skin:  No rashes  on visible skin  Neurologic: awake, alert and following commands        Medications:  Reviewed    Infusion Medications    sodium chloride      sodium chloride 125 mL/hr at 02/24/23 0049    sodium chloride       Scheduled Medications    apixaban  5 mg Oral BID    lidocaine PF  5 mL IntraDERmal Once    sodium chloride flush  5-40 mL IntraVENous 2 times per day    heparin flush  3 mL IntraVENous 2 times per day    NIFEdipine  30 mg Oral Daily    sodium chloride flush  5-40 mL IntraVENous 2 times per day    acetaminophen  650 mg Oral Q6H    polyethylene glycol  17 g Oral Daily    atorvastatin  20 mg Oral Nightly    dofetilide  500 mcg Oral BID    levothyroxine  150 mcg Oral Daily    metoprolol tartrate  50 mg Oral BID    sucralfate  1 g Oral 4x Daily AC & HS    [Held by provider] valsartan  160 mg Oral Daily    venlafaxine  300 mg Oral Daily    ferrous sulfate  325 mg Oral BID WC    vancomycin  1,750 mg IntraVENous Q24H     PRN Meds: sodium chloride flush, sodium chloride, heparin flush, ipratropium-albuterol, sodium chloride flush, sodium chloride, morphine **OR** morphine, oxyCODONE **OR** oxyCODONE, ondansetron **OR** ondansetron    I/O    Intake/Output Summary (Last 24 hours) at 2/24/2023 1556  Last data filed at 2/24/2023 1400  Gross per 24 hour   Intake 730 ml   Output 100 ml   Net 630 ml         Labs:   Recent Labs     02/22/23  0426 02/22/23  1745 02/22/23  2258 02/23/23  0439 02/24/23  1137   WBC 11.3  --   --  10.0 9.9   HGB 6.6*   < > 7.8* 7.9* 7.7*   HCT 22.7*   < > 25.7* 26.3* 26.3*     --   --  189 190    < > = values in this interval not displayed. Recent Labs     02/23/23  0439 02/24/23  0413    142   K 4.6 4.5    106   CO2 25 22   BUN 30* 25*   CREATININE 1.0 0.9   CALCIUM 8.5* 8.6   PHOS  --  3.4         No results for input(s): PROT, ALB, ALKPHOS, ALT, AST, BILITOT, AMYLASE, LIPASE in the last 72 hours. No results for input(s): INR in the last 72 hours. No results for input(s): [place] in the last 72 hours. Chronic labs:  Lab Results   Component Value Date    CHOL 187 10/19/2021    TRIG 224 (H) 10/19/2021    HDL 77 10/19/2021    LDLCALC 65 10/19/2021    TSH 1.210 07/21/2022    INR 1.4 08/27/2020    LABA1C 4.9 04/23/2021       Radiology:  Imaging studies reviewed today.     ASSESSMENT:    Principal Problem:    S/P revision of total knee, left  Active Problems:    History of knee replacement procedure of left knee  Resolved Problems:    * No resolved hospital problems. *       Assessment/plan    Left total knee  drainage with wound dehiscence    Patient had left TKA 4 weeks ago and it  started to drain . Patient is post Left  knee incision with polyethylene exchange . Ortho and infectious disease following. Continue IV antibiotics. Follow wound cultures. Atrial fibrillation : Eliquis Tikosyn      Essential hypertension: Now improved, continue current antihypertensives. Hyperkalemia: resolved. Depression : Effexor      Hyperlipidemia : Lipitor      Hypothyroidism : Synthroid      Normocytic anemia : Acute on chronic anemia likely blood loss from surgery hemoglobin 6.6 this morning transfusing 1 unit PRBC this morning of H&H transfuse for hemoglobin less than 7     Chronic hypoxic respiratory failure : on 2L NC daily      COPD : C/w bronchodilators        Diet: ADULT DIET; Regular; Low Fat/Low Chol/High Fiber/LESTER; Low Potassium (Less than 3000 mg/day)  Code Status: Prior  PT/OT Eval Status:     DVT Prophylaxis:     Recommended disposition at discharge:      +++++++++++++++++++++++++++++++++++++++++++++++++  Sandi Lora MD  2/24/2023  [facility].  +++++++++++++++++++++++++++++++++++++++++++++++++  NOTE: This report was transcribed using voice recognition software. Every effort was made to ensure accuracy; however, inadvertent computerized transcription errors may be present.

## 2023-02-24 NOTE — PROGRESS NOTES
OCCUPATIONAL THERAPY TREATMENT NOTE     [facility]      OT BEDSIDE TREATMENT NOTE      Date:2023  Patient Name: Rayray Haq  MRN: [id number]  : 1945  Room: [address] [place] [address]     Evaluating OT:Heather Buckley, OTR/L   License #  [id number]        Referring Provider: Pepe Weston DO    Specific Provider Orders/Date: OT evaluation & treatment        Diagnosis: Left total knee drainage with wound dehiscence    Pertinent Medical History:  has a past medical history of Arthritis, Atrial fibrillation ([address] Bilateral carotid artery stenosis, Bronchitis, Carotid stenosis, bilateral, Hyperlipidemia, Hypertension, Left carotid stenosis, O2 dependent, On continuous oral anticoagulation, S/P carotid endarterectomy, Stomach ulcer, and Thyroid disease. Surgery: 23: LEFT KNEE INCISION AND DRAINAGE WITH POLYETHYLENE EXCHANGE    Past Surgical History:  has a past surgical history that includes Hysterectomy; Cholecystectomy; Appendectomy; back surgery; Colonoscopy; joint replacement (); eye surgery; Endoscopy, colon, diagnostic (01/10/2018); Pacemaker insertion (Left, 2022); and Total knee arthroplasty (Left, 2023).        Precautions:  Fall Risk, L knee incisional vac, WBAT L LE in Knee Immobilizer/ brace locked in ext., pure wick      Assessment of current deficits    [x] Functional mobility            [x]ADLs           [x] Strength                  [x]Cognition    [x] Functional transfers          [x] IADLs         [x] Safety Awareness   [x]Endurance    [x] Fine Coordination                         [x] Balance      [] Vision/perception   [x]Sensation      []Gross Motor Coordination             [] ROM           [] Delirium                   [] Motor Control      OT PLAN OF CARE   OT POC based on physician orders, patient diagnosis and results of clinical assessment     Frequency/Duration: 2-4 days/wk for 2 weeks PRN   Specific OT Treatment Interventions to include: Instruction/training on adapted ADL techniques and AE recommendations to increase functional independence within precautions  Training on energy conservation strategies, correct breathing pattern and techniques to improve independence/tolerance for self-care routine  Functional transfer/mobility training/DME recommendations for increased independence, safety, and fall prevention  Patient/Family education to increase follow through with safety techniques and functional independence  Recommendation of environmental modifications for increased safety with functional transfers/mobility and ADLs  Cognitive retraining/development of therapeutic activities to improve problem solving, judgement, memory, and attention for increased safety/participation in ADL/IADL tasks  Splinting/positioning for increased function, prevention of contractures, and improve skin integrity  Therapeutic exercise to improve motor endurance, ROM, and functional strength for ADLs/functional transfers  Therapeutic activities to facilitate/challenge dynamic balance, stand tolerance for increased safety and independence with ADLs  Therapeutic activities to facilitate gross/fine motor skills for increased independence with ADLs  Positioning to improve skin integrity, interaction with environment and functional independence     Recommended Adaptive Equipment:  TBD     Home Living: Pt lives with  (able to assist) in a 2 story with 5 steps to enter with 1 HR. B&B on main level. Bathroom setup: tub/shower (pt. Reports she sponge bathes) & elevated toilet   Equipment owned: home O2, ww, spc     Prior Level of Function: Ind. with ADLs , assist from   with IADLs; ambulated spc. Reports she was \"finishing up\" home therapy, ready to start Out pt therapy this week. Driving: active but  as of late  Occupation: enjoys [employer]     Pain Level: no c/o pain at rest or Min with Ax.   Cognition: A&O: 4/4; Follows 2 step directions Memory:  G              Sequencing:  F+              Problem solving:  F              Judgement/safety:  F with cues for technique                Functional Assessment:  AM-PAC Daily Activity Raw Score: 15/24    Initial Eval Status  Date: 2-21-23 Treatment Status  Date: 2-24-22 STGs = LTGs  Time frame: 10-14 days   Feeding Ind. Ind. Mod I/ Ind   Grooming Set up seated   set up  To wash face, comb hair, and apply deodorant seated EOB Modified [place]    UB Dressing SBA seated  SBA   To don/doff gown seated EOB Modified [place]    LB Dressing Max A B socks & adjustment of L LE KI  dependent  To don/doff socks seated EOB Modified [place]    Bathing Max A with sim. task  Mod A   Seated EOB and standing for posterior Modified [place]    Toileting NT  Max A- hygiene Modified [place]    Bed Mobility  Supine to sit: Mod A  Sit to supine: NT  Sup > Sit: Mod A  Sit > Sup: NT  Educated pt on technique to increase independence. Supine to sit: Modified [place]   Sit to supine: Modified [place]    Functional Transfers Mod A x2 sit <> stand with ww  Mod A x 2- sit <> stand  Cuing for hand placement and body mechanics Modified [place]    Functional Mobility Mod A  x2 with ww couple short steps from EOB > bedside chair with minimal WB on L LE, Knee immobilizer in place. Mod A with ww short in-room distance Modified [place]    Balance Sitting:     Static:  SBA    Dynamic:Min A  Standing: Mod A  Sitting:     Static:  SBA    Dynamic: Min A  Standing: Mod A     Activity Tolerance F-  Fair G   Visual/  Perceptual Glasses: yes          Vitals spO2 on 2-3L via NC remained 95% or greater. HR WFL. WFL     Comments: Upon arrival pt supine in bed. Pt educated on techniques to increase independence and safety during ADL's, bed mobility, and functional transfers. At end of session pt. Seated in bedside chair, all needs met, all lines and tubes intact, call light within reach.      Pt has made F+ progress towards set goals.    Continue with current plan of care      Treatment Time In: 2:50            Treatment Time Out: 3:30                Treatment Charges: Mins Units   Ther Ex  67309     Manual Therapy 25869     Thera Activities [id number] 15 1   ADL/Home Mgt [id number] 25 1   Neuro Re-ed 87033     Group Therapy      Orthotic manage/training  10232     Non-Billable Time     Total Timed Treatment [address] [place] [address]

## 2023-02-24 NOTE — PROGRESS NOTES
I have seen and evaluated the patient and agree with the above assessment on today's visit. I have performed the key components of the history and physical examination and concur completely with the findings and plans as documented. Agree with ROS, examination, FMH, PMH, PSH, SocHx, and allergies as above. Patient seen and examined this morning. Her pain is very well controlled. She is somewhat discouraged that the cultures were taken so long to result. Overall she is doing well. She did get her hinged knee brace. Her VAC is functioning appropriately. We are going to change her with incisional wound VAC today and put a new one on. Preliminary cultures appear to be growing some sort of anaerobic organism. We are awaiting infectious disease input for final antibiotics at discharge. Once this is done we can get her discharged to a skilled nursing facility. All of her questions were answered in detail. She will be patient and await final cultures. We will continue to follow closely      Buck Washington DO   Orthopaedic Surgery   2/24/2023  11:09 AM          Department of Orthopedic Surgery   Progress Note    POD3 left total knee irrigation debridement with polyethylene exchange patient seen and examined. Pain controlled. Complains of sore throat that started last night. On 2 L O2 supplement. No new complaints. Denies chest pain, shortness of breath, calf pain, dizziness/lightheadedness, fevers or chills. VITALS:  /75   Pulse 60   Temp 97.3 °F (36.3 °C) (Temporal)   Resp 16   Ht 5' 5\" (1.651 m)   Wt 240 lb (108.9 kg)   SpO2 97%   BMI 39.94 kg/m²     GENERAL: In bed, comfortable, oriented x4  MUSCULOSKELETAL:   left lower extremity:  Dressing C/D/I.   Incisional VAC in place-no output  Compartments soft and compressible, calf non-tender  Palpable dorsalis pedis and posterior tibialis pulse, brisk cap refill to toes, foot warm and perfused  Sensation intact to light touch in sural/deep peroneal/superficial peroneal/saphenous/posterior tibial nerve distributions to foot/ankle. Demonstrates active ankle plantar/dorsiflexion/great toe extension    CBC:   Lab Results   Component Value Date/Time    WBC 10.0 02/23/2023 04:39 AM    HGB 7.9 02/23/2023 04:39 AM    HCT 26.3 02/23/2023 04:39 AM     02/23/2023 04:39 AM       ASSESSMENT  S/p left knee TKA I&D with polyethylene exchange 2/21    PLAN    Continue physical therapy and protocol: Weightbearing as tolerated left lower extremity with knee in full extension  Continue IV antibiotics -currently on Zosyn and vancomycin- appreciate infectious disease input  PICC line placement  Culture pending-growth presents on anaerobic culture, no species isolated. Incubation continues  Deep venous thrombosis prophylaxis -at home 5 mg Eliquis t restarted. Okay to continue, early mobilization   hinged ROM brace to right lower extremity locked in 0 to 30 degrees extension in place  PT/OT  Pain Control: IV and PO  Monitor H&H 7. 9 on 2/23.   Continue to monitor and notify when below 7  D/C Plan:  pending final antibiotic regimen      Electronically signed by Otilio Sanchez DOon 2/24/2023 at 7:02 AM

## 2023-02-24 NOTE — PROGRESS NOTES
Physical Therapy  Rx    Name: Vaibhav Singh  : 1945  MRN: [id number]      Date of Service: 2023    Evaluating PT:  Patrick Garcia PT, DPT [id number]    Room #:  0945/8019-M  Diagnosis:  Dehiscence of operative wound, initial encounter [T81.31XA]  Presence of left artificial knee joint [Z96.652]  S/P revision of total knee, left [Z96.652]  History of knee replacement procedure of left knee [Z96.652]  PMHx/PSHx:   has a past medical history of Arthritis, Atrial fibrillation ([facility] [address] Bilateral carotid artery stenosis, Bronchitis, Carotid stenosis, bilateral, Hyperlipidemia, Hypertension, Left carotid stenosis, O2 dependent, On continuous oral anticoagulation, S/P carotid endarterectomy, Stomach ulcer, and Thyroid disease. has a past surgical history that includes Hysterectomy; Cholecystectomy; Appendectomy; back surgery; Colonoscopy; joint replacement (); eye surgery; Endoscopy, colon, diagnostic (01/10/2018); Pacemaker insertion (Left, 2022); Total knee arthroplasty (Left, 2023); and knee surgery (Left, 2023). Procedure/Surgery:  LEFT KNEE INCISION AND DRAINAGE WITH POLYETHYLENE EXCHANGE (2023)  Precautions:  Falls, O2, L knee immobilizer, WBAT LLE with L knee in full extension, incisional vac, ROM brace locked in ext with PRAFO. Equipment Needs:  TBD  Equipment Owned:  SPC, FWW    SUBJECTIVE:    Pt lives with  in a 2 story home with 5 stair(s) to enter and 1 rail(s). Pt stays on 1st floor. Pt ambulated with SPC PTA. OBJECTIVE:   Initial Evaluation  Date: 2023 Treatment  23 Short Term/ Long Term   Goals   AM-PAC 6 Clicks      Was pt agreeable to Eval/treatment? Yes yes    Does pt have pain? No yes    Bed Mobility  Rolling: NT  Supine to sit: ModA  Sit to supine: NT  Scooting: NT Rolling: NT  Supine to sit: NT  Sit to supine: NT  Scooting: NT Rolling: Independent  Supine to sit:  Independent  Sit to supine: Independent  Scooting: Independent   Transfers Sit to stand: [address] x2  Stand to sit: ModA x2  Stand pivot: ModA x2 with [address] Sit to stand: [address]   Stand to sit: ModA   Stand pivot: ModA  with [address] Sit to stand: Mod I  Stand to sit: Mod I  Stand pivot: Mod I with AAD   Ambulation    3 feet with [address] ModA x2 6 feet with ww  Mod A cues for sequencing. 150 feet with AAD Mod I   Stair negotiation: ascended and descended  NT NT 5 step(s) with 1 rail(s) Mod I   ROM BUE:  See OT note  RLE:  WFL     Strength BUE:  See OT note  RLE:  Grossly 5/5     Balance Sitting EOB:  SBA  Dynamic Standing:  ModA x2 with [address]  Sitting EOB:  Independent  Dynamic Standing: Mod I with AAD   Pt is A & O x  3  Edema:  L knee region      Therapeutic Exercises: right laq,  right ankle pumps, hip flex, glut sets, left quad sets. Instructed pt to do ex within tolerance intermittant through out the day    Patient education  Pt educated on  use of ue through ww with ambulation    Patient response to education:   Pt verbalized understanding Pt demonstrated skill Pt requires further education in this area   x x x     ASSESSMENT:    Comments:  pt up on eob with miguel upon arrival.   Pt performed function as per above. Educated pt on proper mechanics of sitting on eob to prevent her from sliding off the bed. Pt had difficulty advancing her L le fwd due to no shoe on her right leg. Pt had a shoe so donned it on her right foot and explained to her how that should make her more even with standing with the brace on her left with foot piece. Pt performed seated ex and educated her on doing ex intermittent within tolerance. Pt up in the chair and Dr came to change pt dressing. Did not attempt to stand pt again, session ended pt up in chair and Doctors with pt. Pt 02 was donned and pulse ox was 93% when checked. Treatment:  Patient practiced and was instructed in the following treatment:    transfer\    Gait   -    Pt education  Therapeutic activity.         PLAN:    Patient is making  progress towards established goals. Will continue with current POC.       Time in  330  Time out  355    Total Treatment Time  25 minutes       CPT codes:  [] Therapeutic activities 13160 25 minutes  [] Therapeutic exercises 33514 0  minutes      Darwin Cedillo, [address]

## 2023-02-24 NOTE — PROGRESS NOTES
Pharmacy Consultation Note  (Antibiotic Dosing and Monitoring)    Initial consult date: 2/21/23  Consulting physician/provider: Dr. Maria Teresa Davies  Drug: Vancomycin  Indication: Bone and joint infection     Age/  Gender Height Weight IBW  Allergy Information   77 y.o./female 5' 5\" (165.1 cm) 240 lb (108.9 kg)     Ideal body weight: 57 kg (125 lb 10.6 oz)  Adjusted ideal body weight: 77.7 kg (171 lb 6.4 oz)   Patient has no known allergies. Renal Function:  Recent Labs     02/23/23  0439 02/24/23 0413   BUN 30* 25*   CREATININE 1.0 0.9         Intake/Output Summary (Last 24 hours) at 2/24/2023 1140  Last data filed at 2/23/2023 2008  Gross per 24 hour   Intake 550 ml   Output 1100 ml   Net -550 ml         Vancomycin Monitoring:  Trough:    Recent Labs     02/24/23 0413   VANCOTROUGH 14.9     Random:  No results for input(s): VANCORANDOM in the last 72 hours. No results for input(s): Blenda Mart in the last 72 hours. Historical Cultures:  Organism   Date Value Ref Range Status   08/29/2020 Escherichia coli (A)  Final   08/29/2020 Corynebacterium species (A)  Final     No results for input(s): BC in the last 72 hours. Vancomycin Administration Times:  Recent vancomycin administrations                     vancomycin (VANCOCIN) 1,750 mg in sodium chloride 0.9 % 500 mL IVPB (mg) 1,750 mg New Bag 02/24/23 0417     1,750 mg New Bag 02/23/23 0421     1,750 mg New Bag 02/22/23 0435                    Assessment:  Patient is a 76 y.o. female who has been initiated on vancomycin for a left knee prosthetic joint infection. Estimated Creatinine Clearance: 62 mL/min (based on SCr of 0.9 mg/dL). To dose vancomycin, pharmacy will be utilizing [employer] calculation software for goal AUC/LAURENCE 400-600 mg/L-hr. Plan  Continue vancomycin 1,750 mg IV every 24 hours. Will check levels as needed  Will continue to monitor renal function. Pharmacy to follow.        Thank you for the consult,     Jeffery Plaza, MikeD, BCPS, [facility] 2/24/2023 11:40 AM

## 2023-02-24 NOTE — CARE COORDINATION
2/24. Discharge plan is [facility]. Awaiting ID recs. Precert can be obtained the same day as it is submitted. Will follow.  Savage Jara RN

## 2023-02-24 NOTE — PROGRESS NOTES
[address] [place] [address] [facility]  [facility]  Progress Note      No chief complaint on file. SUBJECTIVE:  49-year-old female with past medical history of A-fib on Eliquis, bilateral carotid artery stenosis, status post carotid endarterectomy, HTN, HLD, thyroid disease presented with wound dehiscence after left TKA 4 weeks ago. Status post left knee incision and drainage with polyethylene exchange . ID consulted for left knee prosthetic joint infection. Patient is tolerating medications. No reported adverse drug reactions. No nausea, vomiting, diarrhea. Review of systems:  As stated above in the chief complaint, otherwise negative. Medications:  Scheduled Meds:   apixaban  5 mg Oral BID    NIFEdipine  30 mg Oral Daily    sodium chloride flush  5-40 mL IntraVENous 2 times per day    acetaminophen  650 mg Oral Q6H    polyethylene glycol  17 g Oral Daily    atorvastatin  20 mg Oral Nightly    dofetilide  500 mcg Oral BID    levothyroxine  150 mcg Oral Daily    metoprolol tartrate  50 mg Oral BID    sucralfate  1 g Oral 4x Daily AC & HS    [Held by provider] valsartan  160 mg Oral Daily    venlafaxine  300 mg Oral Daily    ferrous sulfate  325 mg Oral BID WC    vancomycin  1,750 mg IntraVENous Q24H     Continuous Infusions:   sodium chloride 125 mL/hr at 23 0049    sodium chloride       PRN Meds:ipratropium-albuterol, sodium chloride flush, sodium chloride, morphine **OR** morphine, oxyCODONE **OR** oxyCODONE, ondansetron **OR** ondansetron    OBJECTIVE:  /63   Pulse 65   Temp 98.1 °F (36.7 °C) (Temporal)   Resp 18   Ht 5' 5\" (1.651 m)   Wt 240 lb (108.9 kg)   SpO2 94%   BMI 39.94 kg/m²   Temp  Av.4 °F (36.3 °C)  Min: 96.9 °F (36.1 °C)  Max: 98.1 °F (36.7 °C)  Constitutional: The patient is awake, alert, and oriented. Skin: Warm and dry. No rashes were noted. No jaundice. HEENT: Eyes show round, and reactive pupils. Moist mucous membranes, no ulcerations, no thrush. Neck: Supple to movements. No lymphadenopathy. Chest: No use of accessory muscles to breathe. Symmetrical expansion. Auscultation reveals no wheezing, crackles, or rhonchi. Cardiovascular: S1 and S2 are rhythmic and regular. No murmurs appreciated. Abdomen: Positive bowel sounds to auscultation. Benign to palpation. No masses felt. No hepatosplenomegaly. Genitourinary: Deferred  Extremities: No clubbing, no cyanosis, no edema. Musculoskeletal: Left knee bandaged.   Neurological: Following commands, no focal neurodeficit  Lines: peripheral    Laboratory and Tests Review:  Lab Results   Component Value Date    WBC 9.9 02/24/2023    WBC 10.0 02/23/2023    WBC 11.3 02/22/2023    HGB 7.7 (L) 02/24/2023    HCT 26.3 (L) 02/24/2023    MCV 98.9 02/24/2023     02/24/2023     Lab Results   Component Value Date    NEUTROABS 7.63 (H) 02/24/2023    NEUTROABS 15.20 (H) 01/23/2023    NEUTROABS 4.25 01/18/2023     No results found for: [facility]  Lab Results   Component Value Date    ALT 11 07/21/2022    AST 15 07/21/2022    ALKPHOS 68 07/21/2022    BILITOT 0.8 07/21/2022     Lab Results   Component Value Date/Time     02/24/2023 04:13 AM    K 4.5 02/24/2023 04:13 AM    K 5.3 01/24/2023 11:35 AM     02/24/2023 04:13 AM    CO2 22 02/24/2023 04:13 AM    BUN 25 02/24/2023 04:13 AM    CREATININE 0.9 02/24/2023 04:13 AM    CREATININE 1.0 02/23/2023 04:39 AM    CREATININE 0.8 02/21/2023 08:02 AM    GFRAA >60 09/01/2022 10:40 AM    LABGLOM >60 02/24/2023 04:13 AM    GLUCOSE 97 02/24/2023 04:13 AM    GLUCOSE 156 04/28/2012 02:00 AM    PROT 7.0 07/21/2022 12:47 PM    LABALBU 3.4 02/24/2023 04:13 AM    LABALBU 4.8 04/28/2012 02:00 AM    CALCIUM 8.6 02/24/2023 04:13 AM    BILITOT 0.8 07/21/2022 12:47 PM    ALKPHOS 68 07/21/2022 12:47 PM    AST 15 07/21/2022 12:47 PM    ALT 11 07/21/2022 12:47 PM     No results found for: CRP  Lab Results   Component Value Date    SEDRATE 4 01/08/2018     Radiology:      Microbiology: Lab Results   Component Value Date/Time    BC 5 Days no growth 08/29/2020 09:14 AM    ORG Staphylococcus coagulase-negative 02/21/2023 09:35 AM    ORG Staphylococcus coagulase-negative 02/21/2023 09:32 AM    ORG Escherichia coli 08/29/2020 09:04 AM    ORG Corynebacterium species 08/29/2020 09:04 AM     Lab Results   Component Value Date/Time    ORG Staphylococcus coagulase-negative 02/21/2023 09:35 AM    ORG Staphylococcus coagulase-negative 02/21/2023 09:32 AM    ORG Escherichia coli 08/29/2020 09:04 AM    ORG Corynebacterium species 08/29/2020 09:04 AM     No results found for: WNDABS  Smear, Respiratory   Date Value Ref Range Status   09/22/2019   Final    Group 5: >25 PMN's/LPF and <10 Epithelial cells/LPF  Moderate Polymorphonuclear leukocytes  Epithelial cells not seen  Moderate Gram negative diplococci  Few Gram positive cocci in clusters  Rare yeast  Rare gram positive rods Diphtheroid-like       No results found for: MPNEUMO, CLAMYDCU, LABLEGI, AFBCX, FUNGSM, LABFUNG  CULTURE, RESPIRATORY   Date Value Ref Range Status   09/22/2019 Oral Pharyngeal Yvonne present  Final     No results found for: CXCATHTIP  No results found for: BFCS  Culture Surgical   Date Value Ref Range Status   02/21/2023   Preliminary    Growth not present, incubation continues  Neisseria gonorrhoeae not isolated     02/21/2023   Preliminary    Growth not present, incubation continues  Neisseria gonorrhoeae not isolated       Urine Culture, Routine   Date Value Ref Range Status   08/29/2020 >100,000 CFU/ml  Final   08/29/2020 50,000 CFU/ml  Final     MRSA Culture Only   Date Value Ref Range Status   01/18/2023 Methicillin resistant Staph aureus not isolated  Final   09/07/2016 Methicillin resistant Staph aureus not isolated  Final       Assessment:  Left knee prosthetic joint infection  CONS prosthetic joint infection  Status post left knee TKA 4 weeks ago followed by wound dehiscence  Status post left knee incision and drainage and poly ethylene 02/21     Plan:    DC Zosyn. Continue vancomycin. Vancomycin to be continued for total of 6 weeks. OR Cx growing coagulase-negative staph.   Blood Cx NG so far  PICC line ordered  Patient will need at least 6 weeks of IV antibiotic therapy  We will continue to follow    Carin Cameron MD  3:20 PM  2/24/2023

## 2023-02-25 LAB
ANION GAP SERPL CALCULATED.3IONS-SCNC: 13 MMOL/L (ref 7–16)
BASOPHILS ABSOLUTE: 0.04 E9/L (ref 0–0.2)
BASOPHILS RELATIVE PERCENT: 0.5 % (ref 0–2)
BUN BLDV-MCNC: 14 MG/DL (ref 6–23)
CALCIUM SERPL-MCNC: 8.8 MG/DL (ref 8.6–10.2)
CHLORIDE BLD-SCNC: 104 MMOL/L (ref 98–107)
CO2: 25 MMOL/L (ref 22–29)
CREAT SERPL-MCNC: 0.7 MG/DL (ref 0.5–1)
EOSINOPHILS ABSOLUTE: 0.22 E9/L (ref 0.05–0.5)
EOSINOPHILS RELATIVE PERCENT: 2.7 % (ref 0–6)
GFR SERPL CREATININE-BSD FRML MDRD: >60 ML/MIN/1.73
GLUCOSE BLD-MCNC: 117 MG/DL (ref 74–99)
HCT VFR BLD CALC: 26.5 % (ref 34–48)
HEMOGLOBIN: 8.1 G/DL (ref 11.5–15.5)
IMMATURE GRANULOCYTES #: 0.03 E9/L
IMMATURE GRANULOCYTES %: 0.4 % (ref 0–5)
LYMPHOCYTES ABSOLUTE: 1.22 E9/L (ref 1.5–4)
LYMPHOCYTES RELATIVE PERCENT: 14.9 % (ref 20–42)
MCH RBC QN AUTO: 28.7 PG (ref 26–35)
MCHC RBC AUTO-ENTMCNC: 30.6 % (ref 32–34.5)
MCV RBC AUTO: 94 FL (ref 80–99.9)
MONOCYTES ABSOLUTE: 0.72 E9/L (ref 0.1–0.95)
MONOCYTES RELATIVE PERCENT: 8.8 % (ref 2–12)
NEUTROPHILS ABSOLUTE: 5.96 E9/L (ref 1.8–7.3)
NEUTROPHILS RELATIVE PERCENT: 72.7 % (ref 43–80)
PDW BLD-RTO: 14.3 FL (ref 11.5–15)
PLATELET # BLD: 210 E9/L (ref 130–450)
PMV BLD AUTO: 11.8 FL (ref 7–12)
POTASSIUM SERPL-SCNC: 4.1 MMOL/L (ref 3.5–5)
RBC # BLD: 2.82 E12/L (ref 3.5–5.5)
SODIUM BLD-SCNC: 142 MMOL/L (ref 132–146)
WBC # BLD: 8.2 E9/L (ref 4.5–11.5)

## 2023-02-25 PROCEDURE — 2580000003 HC RX 258: Performed by: INTERNAL MEDICINE

## 2023-02-25 PROCEDURE — C1751 CATH, INF, PER/CENT/MIDLINE: HCPCS

## 2023-02-25 PROCEDURE — 6360000002 HC RX W HCPCS: Performed by: INTERNAL MEDICINE

## 2023-02-25 PROCEDURE — 2580000003 HC RX 258

## 2023-02-25 PROCEDURE — 6370000000 HC RX 637 (ALT 250 FOR IP): Performed by: FAMILY MEDICINE

## 2023-02-25 PROCEDURE — 85025 COMPLETE CBC W/AUTO DIFF WBC: CPT

## 2023-02-25 PROCEDURE — 6370000000 HC RX 637 (ALT 250 FOR IP)

## 2023-02-25 PROCEDURE — 2700000000 HC OXYGEN THERAPY PER DAY

## 2023-02-25 PROCEDURE — 6370000000 HC RX 637 (ALT 250 FOR IP): Performed by: INTERNAL MEDICINE

## 2023-02-25 PROCEDURE — 1200000000 HC SEMI PRIVATE

## 2023-02-25 PROCEDURE — 80048 BASIC METABOLIC PNL TOTAL CA: CPT

## 2023-02-25 PROCEDURE — 97530 THERAPEUTIC ACTIVITIES: CPT

## 2023-02-25 PROCEDURE — 76937 US GUIDE VASCULAR ACCESS: CPT

## 2023-02-25 PROCEDURE — 36569 INSJ PICC 5 YR+ W/O IMAGING: CPT

## 2023-02-25 PROCEDURE — 36415 COLL VENOUS BLD VENIPUNCTURE: CPT

## 2023-02-25 RX ORDER — LIDOCAINE HYDROCHLORIDE 20 MG/ML
15 SOLUTION OROPHARYNGEAL
Status: DISCONTINUED | OUTPATIENT
Start: 2023-02-25 | End: 2023-02-27 | Stop reason: HOSPADM

## 2023-02-25 RX ADMIN — ACETAMINOPHEN 650 MG: 325 TABLET ORAL at 08:10

## 2023-02-25 RX ADMIN — VENLAFAXINE HYDROCHLORIDE 300 MG: 150 CAPSULE, EXTENDED RELEASE ORAL at 08:10

## 2023-02-25 RX ADMIN — SODIUM CHLORIDE: 9 INJECTION, SOLUTION INTRAVENOUS at 20:30

## 2023-02-25 RX ADMIN — APIXABAN 5 MG: 5 TABLET, FILM COATED ORAL at 08:10

## 2023-02-25 RX ADMIN — SUCRALFATE 1 G: 1 TABLET ORAL at 20:12

## 2023-02-25 RX ADMIN — SUCRALFATE 1 G: 1 TABLET ORAL at 11:47

## 2023-02-25 RX ADMIN — SODIUM CHLORIDE, PRESERVATIVE FREE 10 ML: 5 INJECTION INTRAVENOUS at 20:16

## 2023-02-25 RX ADMIN — VANCOMYCIN HYDROCHLORIDE 1750 MG: 10 INJECTION, POWDER, LYOPHILIZED, FOR SOLUTION INTRAVENOUS at 04:37

## 2023-02-25 RX ADMIN — NIFEDIPINE 30 MG: 30 TABLET, EXTENDED RELEASE ORAL at 08:10

## 2023-02-25 RX ADMIN — METOPROLOL TARTRATE 50 MG: 50 TABLET, FILM COATED ORAL at 08:09

## 2023-02-25 RX ADMIN — METOPROLOL TARTRATE 50 MG: 50 TABLET, FILM COATED ORAL at 20:13

## 2023-02-25 RX ADMIN — DOFETILIDE 500 MCG: 0.5 CAPSULE ORAL at 20:12

## 2023-02-25 RX ADMIN — LEVOTHYROXINE SODIUM 150 MCG: 0.15 TABLET ORAL at 08:11

## 2023-02-25 RX ADMIN — HEPARIN 300 UNITS: 100 SYRINGE at 20:15

## 2023-02-25 RX ADMIN — DOFETILIDE 500 MCG: 0.5 CAPSULE ORAL at 08:10

## 2023-02-25 RX ADMIN — ACETAMINOPHEN 650 MG: 325 TABLET ORAL at 20:13

## 2023-02-25 RX ADMIN — ATORVASTATIN CALCIUM 20 MG: 20 TABLET, FILM COATED ORAL at 20:13

## 2023-02-25 RX ADMIN — FERROUS SULFATE TAB 325 MG (65 MG ELEMENTAL FE) 325 MG: 325 (65 FE) TAB at 08:10

## 2023-02-25 RX ADMIN — OXYCODONE HYDROCHLORIDE 10 MG: 10 TABLET ORAL at 13:35

## 2023-02-25 RX ADMIN — ACETAMINOPHEN 650 MG: 325 TABLET ORAL at 02:29

## 2023-02-25 RX ADMIN — APIXABAN 5 MG: 5 TABLET, FILM COATED ORAL at 20:14

## 2023-02-25 RX ADMIN — LIDOCAINE HYDROCHLORIDE 15 ML: 20 SOLUTION ORAL; TOPICAL at 11:47

## 2023-02-25 RX ADMIN — SUCRALFATE 1 G: 1 TABLET ORAL at 06:15

## 2023-02-25 RX ADMIN — POLYETHYLENE GLYCOL 3350 17 G: 17 POWDER, FOR SOLUTION ORAL at 08:09

## 2023-02-25 RX ADMIN — LIDOCAINE HYDROCHLORIDE 15 ML: 20 SOLUTION ORAL; TOPICAL at 20:19

## 2023-02-25 ASSESSMENT — PAIN DESCRIPTION - ORIENTATION
ORIENTATION: LEFT
ORIENTATION: LOWER

## 2023-02-25 ASSESSMENT — PAIN DESCRIPTION - LOCATION
LOCATION: KNEE
LOCATION: BACK

## 2023-02-25 ASSESSMENT — PAIN SCALES - GENERAL
PAINLEVEL_OUTOF10: 4
PAINLEVEL_OUTOF10: 2
PAINLEVEL_OUTOF10: 0
PAINLEVEL_OUTOF10: 7
PAINLEVEL_OUTOF10: 6

## 2023-02-25 ASSESSMENT — PAIN DESCRIPTION - DESCRIPTORS
DESCRIPTORS: ACHING
DESCRIPTORS: DULL

## 2023-02-25 NOTE — PROGRESS NOTES
[address] [place] [address] [facility]  [facility]  Progress Note      No chief complaint on file. SUBJECTIVE:  40-year-old female with past medical history of A-fib on Eliquis, bilateral carotid artery stenosis, status post carotid endarterectomy, HTN, HLD, thyroid disease presented with wound dehiscence after left TKA 4 weeks ago. Status post left knee incision and drainage with polyethylene exchange . ID consulted for left knee prosthetic joint infection. Patient is tolerating medications. No reported adverse drug reactions. No nausea, vomiting, diarrhea. Review of systems:  As stated above in the chief complaint, otherwise negative.     Medications:  Scheduled Meds:   apixaban  5 mg Oral BID    lidocaine PF  5 mL IntraDERmal Once    sodium chloride flush  5-40 mL IntraVENous 2 times per day    heparin flush  3 mL IntraVENous 2 times per day    NIFEdipine  30 mg Oral Daily    sodium chloride flush  5-40 mL IntraVENous 2 times per day    acetaminophen  650 mg Oral Q6H    polyethylene glycol  17 g Oral Daily    atorvastatin  20 mg Oral Nightly    dofetilide  500 mcg Oral BID    levothyroxine  150 mcg Oral Daily    metoprolol tartrate  50 mg Oral BID    sucralfate  1 g Oral 4x Daily AC & HS    [Held by provider] valsartan  160 mg Oral Daily    venlafaxine  300 mg Oral Daily    ferrous sulfate  325 mg Oral BID WC    vancomycin  1,750 mg IntraVENous Q24H     Continuous Infusions:   sodium chloride      sodium chloride 125 mL/hr at 23 0049    sodium chloride       PRN Meds:lidocaine viscous hcl, sodium chloride flush, sodium chloride, heparin flush, ipratropium-albuterol, sodium chloride flush, sodium chloride, morphine **OR** morphine, oxyCODONE **OR** oxyCODONE, ondansetron **OR** ondansetron    OBJECTIVE:  /60   Pulse 66   Temp 97 °F (36.1 °C) (Temporal)   Resp 18   Ht 5' 5\" (1.651 m)   Wt 240 lb (108.9 kg)   SpO2 100%   BMI 39.94 kg/m²   Temp  Av.1 °F (36.2 °C)  Min: 97 °F (36.1 °C)  Max: 97.4 °F (36.3 °C)  Constitutional: The patient is awake, alert, and oriented. Skin: Warm and dry. No rashes were noted. No jaundice. HEENT: Eyes show round, and reactive pupils. Moist mucous membranes, no ulcerations, no thrush. Neck: Supple to movements. No lymphadenopathy. Chest: No use of accessory muscles to breathe. Symmetrical expansion. Auscultation reveals no wheezing, crackles, or rhonchi. Cardiovascular: S1 and S2 are rhythmic and regular. No murmurs appreciated. Abdomen: Positive bowel sounds to auscultation. Benign to palpation. No masses felt. No hepatosplenomegaly. Genitourinary: Deferred  Extremities: No clubbing, no cyanosis, no edema. Musculoskeletal: Left knee bandaged.   Neurological: Following commands, no focal neurodeficit  Lines: peripheral    Laboratory and Tests Review:  Lab Results   Component Value Date    WBC 8.2 02/25/2023    WBC 9.9 02/24/2023    WBC 10.0 02/23/2023    HGB 8.1 (L) 02/25/2023    HCT 26.5 (L) 02/25/2023    MCV 94.0 02/25/2023     02/25/2023     Lab Results   Component Value Date    NEUTROABS 5.96 02/25/2023    NEUTROABS 7.63 (H) 02/24/2023    NEUTROABS 15.20 (H) 01/23/2023     No results found for: [facility]  Lab Results   Component Value Date    ALT 11 07/21/2022    AST 15 07/21/2022    ALKPHOS 68 07/21/2022    BILITOT 0.8 07/21/2022     Lab Results   Component Value Date/Time     02/25/2023 05:58 AM    K 4.1 02/25/2023 05:58 AM    K 5.3 01/24/2023 11:35 AM     02/25/2023 05:58 AM    CO2 25 02/25/2023 05:58 AM    BUN 14 02/25/2023 05:58 AM    CREATININE 0.7 02/25/2023 05:58 AM    CREATININE 0.9 02/24/2023 04:13 AM    CREATININE 1.0 02/23/2023 04:39 AM    GFRAA >60 09/01/2022 10:40 AM    LABGLOM >60 02/25/2023 05:58 AM    GLUCOSE 117 02/25/2023 05:58 AM    GLUCOSE 156 04/28/2012 02:00 AM    PROT 7.0 07/21/2022 12:47 PM    LABALBU 3.4 02/24/2023 04:13 AM    LABALBU 4.8 04/28/2012 02:00 AM    CALCIUM 8.8 02/25/2023 05:58 AM BILITOT 0.8 07/21/2022 12:47 PM    ALKPHOS 68 07/21/2022 12:47 PM    AST 15 07/21/2022 12:47 PM    ALT 11 07/21/2022 12:47 PM     No results found for: CRP  Lab Results   Component Value Date    SEDRATE 4 01/08/2018     Radiology:      Microbiology:   Lab Results   Component Value Date/Time    BC 5 Days no growth 08/29/2020 09:14 AM    ORG Staphylococcus coagulase-negative 02/21/2023 09:35 AM    ORG Staphylococcus coagulase-negative 02/21/2023 09:32 AM    ORG Escherichia coli 08/29/2020 09:04 AM    ORG Corynebacterium species 08/29/2020 09:04 AM     Lab Results   Component Value Date/Time    ORG Staphylococcus coagulase-negative 02/21/2023 09:35 AM    ORG Staphylococcus coagulase-negative 02/21/2023 09:32 AM    ORG Escherichia coli 08/29/2020 09:04 AM    ORG Corynebacterium species 08/29/2020 09:04 AM     No results found for: WNDABS  Smear, Respiratory   Date Value Ref Range Status   09/22/2019   Final    Group 5: >25 PMN's/LPF and <10 Epithelial cells/LPF  Moderate Polymorphonuclear leukocytes  Epithelial cells not seen  Moderate Gram negative diplococci  Few Gram positive cocci in clusters  Rare yeast  Rare gram positive rods Diphtheroid-like           Component Value Date/Time    FUNGSM No Fungal elements seen 02/21/2023 0935     CULTURE, RESPIRATORY   Date Value Ref Range Status   09/22/2019 Oral Pharyngeal Yvonne present  Final     No results found for: CXCATHTIP  No results found for: BFCS  Culture Surgical   Date Value Ref Range Status   02/21/2023   Preliminary    Growth not present, incubation continues  Neisseria gonorrhoeae not isolated  24 Hours growth not present; incubation continues     02/21/2023   Preliminary    Growth not present, incubation continues  Neisseria gonorrhoeae not isolated  24 Hours growth not present; incubation continues       Urine Culture, Routine   Date Value Ref Range Status   08/29/2020 >100,000 CFU/ml  Final   08/29/2020 50,000 CFU/ml  Final     MRSA Culture Only   Date Value Ref Range Status   01/18/2023 Methicillin resistant Staph aureus not isolated  Final   09/07/2016 Methicillin resistant Staph aureus not isolated  Final       Assessment:  Left knee prosthetic joint infection  CONS prosthetic joint infection  Status post left knee TKA 4 weeks ago followed by wound dehiscence  Status post left knee incision and drainage and poly ethylene 02/21     Plan:    Continue vancomycin. Vancomycin to be continued for total of 6 weeks. OR Cx growing coagulase-negative staph.   Blood Cx NG so far  PICC line ordered  Patient will need at least 6 weeks of IV antibiotic therapy  We will continue to follow    Odell Harada, MD  1:41 PM  2/25/2023

## 2023-02-25 NOTE — PROGRESS NOTES
Physical Therapy  Treatment Note    Name: Vaibhav Singh  : 1945  MRN: [id number]      Date of Service: 2023    Evaluating PT:  Patrick Radha, PT, DPT [id number]    Room #:  8791/6951-A  Diagnosis:  Dehiscence of operative wound, initial encounter [T81.31XA]  Presence of left artificial knee joint [Z96.652]  S/P revision of total knee, left [Z96.652]  History of knee replacement procedure of left knee [Z96.652]  PMHx/PSHx:   has a past medical history of Arthritis, Atrial fibrillation ([facility] [address] Bilateral carotid artery stenosis, Bronchitis, Carotid stenosis, bilateral, Hyperlipidemia, Hypertension, Left carotid stenosis, O2 dependent, On continuous oral anticoagulation, S/P carotid endarterectomy, Stomach ulcer, and Thyroid disease. has a past surgical history that includes Hysterectomy; Cholecystectomy; Appendectomy; back surgery; Colonoscopy; joint replacement (); eye surgery; Endoscopy, colon, diagnostic (01/10/2018); Pacemaker insertion (Left, 2022); Total knee arthroplasty (Left, 2023); and knee surgery (Left, 2023). Procedure/Surgery:  LEFT KNEE INCISION AND DRAINAGE WITH POLYETHYLENE EXCHANGE (2023)  Precautions:  Falls, O2, L knee immobilizer, WBAT LLE with L knee in full extension, incisional vac, ROM brace locked in ext with PRAFO. Equipment Needs:  TBD  Equipment Owned:  , MELANIE    SUBJECTIVE:    Pt lives with  in a 2 story home with 5 stair(s) to enter and 1 rail(s). Pt stays on 1st floor. Pt ambulated with SPC PTA. OBJECTIVE:   Initial Evaluation  Date: 2023 Treatment  23 Short Term/ Long Term   Goals   AM-PAC 6 Clicks 62/51 93/26    Was pt agreeable to Eval/treatment? Yes yes    Does pt have pain? No yes    Bed Mobility  Rolling: NT  Supine to sit: ModA  Sit to supine: NT  Scooting: NT Rolling: NT  Supine to sit: Edwige with HOB elevated  Sit to supine: NT  Scooting: Edwige Rolling: Independent  Supine to sit:  Independent  Sit to supine: Independent  Scooting: Independent   Transfers Sit to stand: ModA x2  Stand to sit: ModA x2  Stand pivot: ModA x2 with WW Sit to stand: ModA   Stand to sit: ModA   Stand pivot: Edwige  with WW Sit to stand: Mod I  Stand to sit: Mod I  Stand pivot: Mod I with AAD   Ambulation    3 feet with WW ModA x2 60 feet with Edwige with  feet with AAD Mod I   Stair negotiation: ascended and descended  NT NT 5 step(s) with 1 rail(s) Mod I   ROM BUE:  See OT note  RLE:  WFL     Strength BUE:  See OT note  RLE:  Grossly 5/5     Balance Sitting EOB:  SBA  Dynamic Standing:  ModA x2 with WW Sitting EOB:  SBA  Dynamic Standing:  Edwige with WW Sitting EOB:  Independent  Dynamic Standing:  Mod I with AAD     Pt is A & O x  3  Edema:  L knee region    Therapeutic Exercises: NA    Patient education  Pt educated on  use of ue through ww with ambulation    Patient response to education:   Pt verbalized understanding Pt demonstrated skill Pt requires further education in this area   x x x     ASSESSMENT:    Comments:  Pt was in bed upon arrival, agreeable to treatment session.  Reviewed WB status prior to activity.  Pt demonstrated improved mobility and tolerance to activity this session.  Pt ambulated with decreased gait speed and required cues for managing WW and approximation.  Occasional cues provided for sequencing.  Fatigue limited activity.  Pt was left in chair with all needs met and call light in reach.  RN notified.     Treatment:  Patient practiced and was instructed in the following treatment:    Bed mobility training - pt given verbal and tactile cues to facilitate proper sequencing and safety during rolling and supine>sit as well as provided with physical assistance to complete task   Transfer training - pt was given verbal and tactile cues to facilitate proper hand placement, technique and safety during sit to stand and stand to sit as well as provided with physical assistance.  Gait training- pt was given verbal and tactile  cues to facilitate safety, balance and use of WW during ambulation as well as provided with physical assistance. PLAN:    Patient is making good progress towards established goals. Will continue with current POC.       Time in  1102  Time out  1130    Total Treatment Time  28 minutes       CPT codes:  [x] Therapeutic activities 73278 28 minutes  [] Therapeutic exercises 83087 0  minutes      Reilly Clark PT, DPT  [id number]

## 2023-02-25 NOTE — PROGRESS NOTES
Power picc line  Placement 2/25/2023    Product number: [id number]   Lot Number: [id number]      Ultrasound: yes   Right Brachial vein:                Upper Arm Circumference: 47cm    Size: 5.5frdl    Exposed Length: 1cm    Internal Length: 37cm   Cut: 17cm   Vein Measurement: 0.60cm    Inez Bagley RN  2/25/2023  3:33 PM      Power picc line placed with vps tip conformation. Tip in lower 1/3 svc/caj.  Wesley amaya

## 2023-02-25 NOTE — PROGRESS NOTES
Hospitalist Progress Note      Synopsis: Patient admitted on 2/21/2023 Ms. Alia Munguia, a 76y.o. year old female  who  has a past medical history of Arthritis, Atrial fibrillation ([address] Bilateral carotid artery stenosis, Bronchitis, Carotid stenosis, bilateral, Hyperlipidemia, Hypertension, Left carotid stenosis, O2 dependent, On continuous oral anticoagulation, S/P carotid endarterectomy, Stomach ulcer, and Thyroid disease. Patient is s/p left knee drainage  with polyethylene exchange. Patient tolerated the procedure well . She reports no fever chills chest  pain nausea vomiting. Hospitalist medicine consulted for management of chronic medical conditions . Lab values reveal sodium 138 potassium 5.0 BUN 23 SCr 0.8 glucose 133  WBC 7.5 HGB 9.1    XR left knee neg           Subjective  Patient seen and examined chart reviewed. No chest pain or shortness of breath     Exam:  /60   Pulse 66   Temp 97 °F (36.1 °C) (Temporal)   Resp 18   Ht 5' 5\" (1.651 m)   Wt 240 lb (108.9 kg)   SpO2 100%   BMI 39.94 kg/m²     General appearance: No apparent distress, appears stated age and cooperative. HEENT:  Conjunctivae/corneas clear. Neck: Supple. No jugular venous distention. Respiratory: Clear to auscultation bilaterally, normal respiratory effort  Cardiovascular: Regular rate rhythm, normal S1-S2  Abdomen: Soft, nontender, nondistended  Musculoskeletal: Left lower extremity dressing and drain intact, no clubbing, cyanosis, no bilateral lower extremity edema.    Skin:  No rashes  on visible skin  Neurologic: awake, alert and following commands        Medications:  Reviewed    Infusion Medications    sodium chloride      sodium chloride 125 mL/hr at 02/24/23 0049    sodium chloride       Scheduled Medications    apixaban  5 mg Oral BID    lidocaine PF  5 mL IntraDERmal Once    sodium chloride flush  5-40 mL IntraVENous 2 times per day    heparin flush  3 mL IntraVENous 2 times per day    NIFEdipine 30 mg Oral Daily    sodium chloride flush  5-40 mL IntraVENous 2 times per day    acetaminophen  650 mg Oral Q6H    polyethylene glycol  17 g Oral Daily    atorvastatin  20 mg Oral Nightly    dofetilide  500 mcg Oral BID    levothyroxine  150 mcg Oral Daily    metoprolol tartrate  50 mg Oral BID    sucralfate  1 g Oral 4x Daily AC & HS    [Held by provider] valsartan  160 mg Oral Daily    venlafaxine  300 mg Oral Daily    ferrous sulfate  325 mg Oral BID WC    vancomycin  1,750 mg IntraVENous Q24H     PRN Meds: lidocaine viscous hcl, sodium chloride flush, sodium chloride, heparin flush, ipratropium-albuterol, sodium chloride flush, sodium chloride, morphine **OR** morphine, oxyCODONE **OR** oxyCODONE, ondansetron **OR** ondansetron    I/O    Intake/Output Summary (Last 24 hours) at 2/25/2023 1509  Last data filed at 2/25/2023 1401  Gross per 24 hour   Intake 300 ml   Output 2300 ml   Net -2000 ml         Labs:   Recent Labs     02/23/23  0439 02/24/23  1137 02/25/23  0558   WBC 10.0 9.9 8.2   HGB 7.9* 7.7* 8.1*   HCT 26.3* 26.3* 26.5*    190 210         Recent Labs     02/23/23  0439 02/24/23  0413 02/25/23  0558    142 142   K 4.6 4.5 4.1    106 104   CO2 25 22 25   BUN 30* 25* 14   CREATININE 1.0 0.9 0.7   CALCIUM 8.5* 8.6 8.8   PHOS  --  3.4  --          No results for input(s): PROT, ALB, ALKPHOS, ALT, AST, BILITOT, AMYLASE, LIPASE in the last 72 hours. No results for input(s): INR in the last 72 hours. No results for input(s): Michael Boxer in the last 72 hours. Chronic labs:  Lab Results   Component Value Date    CHOL 187 10/19/2021    TRIG 224 (H) 10/19/2021    HDL 77 10/19/2021    LDLCALC 65 10/19/2021    TSH 1.210 07/21/2022    INR 1.4 08/27/2020    LABA1C 4.9 04/23/2021       Radiology:  Imaging studies reviewed today.     ASSESSMENT:    Principal Problem:    S/P revision of total knee, left  Active Problems:    History of knee replacement procedure of left knee  Resolved Problems:    * No resolved hospital problems. *       Assessment/plan    Left total knee  drainage with wound dehiscence    Patient had left TKA 4 weeks ago and it  started to drain . Patient is post Left  knee incision with polyethylene exchange . Ortho and infectious disease following. Infectious disease recommends 6 weeks of IV vancomycin. PICC line has been ordered and pending. Follow  for home health set up for placement arrangements. Atrial fibrillation : Eliquis Tikosyn      Essential hypertension: Now improved, continue current antihypertensives. Hyperkalemia: resolved. Depression : Effexor      Hyperlipidemia : Lipitor      Hypothyroidism : Synthroid      Normocytic anemia : Acute on chronic anemia likely blood loss from surgery hemoglobin 6.6 this morning transfusing 1 unit PRBC this morning of H&H transfuse for hemoglobin less than 7     Chronic hypoxic respiratory failure : on 2L NC daily      COPD : C/w bronchodilators        Diet: ADULT DIET; Regular; Low Fat/Low Chol/High Fiber/LESTER; Low Potassium (Less than 3000 mg/day)  Code Status: Full Code  PT/OT Eval Status:     DVT Prophylaxis:         Disposition: Pending PICC line, home health set up and placement    +++++++++++++++++++++++++++++++++++++++++++++++++  Rosalba Sumner MD  2/25/2023  [facility].  +++++++++++++++++++++++++++++++++++++++++++++++++  NOTE: This report was transcribed using voice recognition software. Every effort was made to ensure accuracy; however, inadvertent computerized transcription errors may be present.

## 2023-02-25 NOTE — PROGRESS NOTES
I have seen and evaluated the patient and agree with the above assessment on today's visit. I have performed the key components of the history and physical examination and concur completely with the findings and plans as documented. Agree with ROS, examination, FMH, PMH, PSH, SocHx, and allergies as above. Patient seen and examined. Incisional VAC changes today. Her only complaint is a sore throat today. Cultures have grown coagulase-negative staph. Infectious disease has recommended 6 weeks of IV vancomycin. We are awaiting PICC line placement and nursing home approval.  After all this is complete she can discharge and follow-up in my office on Wednesday. All of her questions were answered in detail. We will continue to follow along closely. Ajith Boyle DO   Orthopaedic Surgery   2/25/2023  10:28 AM        Department of Orthopedic Surgery   Progress Note    POD4 left total knee irrigation debridement with polyethylene exchange patient seen and examined. Pain controlled. Complains of sore throat that started last night. On 2 L O2 supplement. No new complaints. Denies chest pain, shortness of breath, calf pain, dizziness/lightheadedness, fevers or chills. VITALS:  BP (!) 112/59   Pulse (!) 118   Temp 97.4 °F (36.3 °C) (Temporal)   Resp 18   Ht 5' 5\" (1.651 m)   Wt 240 lb (108.9 kg)   SpO2 95%   BMI 39.94 kg/m²     GENERAL: In bed, comfortable, oriented x4  MUSCULOSKELETAL:   left lower extremity:  Dressing C/D/I. Incisional VAC in place-no output  Compartments soft and compressible, calf non-tender  Palpable dorsalis pedis and posterior tibialis pulse, brisk cap refill to toes, foot warm and perfused  Sensation intact to light touch in sural/deep peroneal/superficial peroneal/saphenous/posterior tibial nerve distributions to foot/ankle.   Demonstrates active ankle plantar/dorsiflexion/great toe extension    CBC:   Lab Results   Component Value Date/Time    WBC 8.2 02/25/2023 05:58 AM    HGB 8.1 02/25/2023 05:58 AM    HCT 26.5 02/25/2023 05:58 AM     02/25/2023 05:58 AM       ASSESSMENT  S/p left knee TKA I&D with polyethylene exchange 2/21    PLAN    Continue physical therapy and protocol: Weightbearing as tolerated left lower extremity with knee in full extension  Continue IV antibiotics -appreciate infectious disease input  PICC line placement  Culture pending-growth presents on anaerobic culture, no species isolated. Incubation continues  Deep venous thrombosis prophylaxis -at home 5 mg Eliquis t restarted. Okay to continue, early mobilization   hinged ROM brace to right lower extremity locked in 0 to 30 degrees extension in place  PT/OT  Pain Control: IV and PO  Monitor H&H 8.2 this morning. Continue to monitor and notify when below 7  D/C Plan:  pending final antibiotic regimen, okay for discharge from orthopedic standpoint once the patient gets her PICC line and final antibiotic plan is chosen.       Electronically signed by Jerilyn Lora DOon 2/25/2023 at 8:21 AM

## 2023-02-25 NOTE — PROGRESS NOTES
Pharmacy Consultation Note  (Antibiotic Dosing and Monitoring)    Initial consult date: 2/21/23  Consulting physician/provider: Dr. Alanis Callahan  Drug: Vancomycin  Indication: Bone and joint infection     Age/  Gender Height Weight IBW  Allergy Information   77 y.o./female 5' 5\" (165.1 cm) 240 lb (108.9 kg)     Ideal body weight: 57 kg (125 lb 10.6 oz)  Adjusted ideal body weight: 77.7 kg (171 lb 6.4 oz)   Patient has no known allergies. Renal Function:  Recent Labs     02/23/23  0439 02/24/23  0413 02/25/23  0558   BUN 30* 25* 14   CREATININE 1.0 0.9 0.7         Intake/Output Summary (Last 24 hours) at 2/25/2023 1031  Last data filed at 2/25/2023 0958  Gross per 24 hour   Intake 240 ml   Output 2300 ml   Net -2060 ml         Vancomycin Monitoring:  Trough:    Recent Labs     02/24/23 0413   VANCOTROUGH 14.9       Random:  No results for input(s): VANCORANDOM in the last 72 hours. No results for input(s): Susana Jackson in the last 72 hours. Historical Cultures:  Organism   Date Value Ref Range Status   02/21/2023 Staphylococcus coagulase-negative (A)  Final     No results for input(s): BC in the last 72 hours. Vancomycin Administration Times:  Recent vancomycin administrations                     vancomycin (VANCOCIN) 1,750 mg in sodium chloride 0.9 % 500 mL IVPB (mg) 1,750 mg New Bag 02/25/23 0437     1,750 mg New Bag 02/24/23 0417     1,750 mg New Bag 02/23/23 0421                 Assessment:  Patient is a 76 y.o. female who has been initiated on vancomycin for a left knee prosthetic joint infection. Estimated Creatinine Clearance: 80 mL/min (based on SCr of 0.7 mg/dL). To dose vancomycin, pharmacy will be utilizing [employer] calculation software for goal AUC/LAURENCE 400-600 mg/L-hr.  Scr: 0.7 mg/dL. WBC = 8.2 and afebrile. Final 2/2 anaerobic cultures = staphylococcus coagulase-negative     Plan  Continue vancomycin 1,750 mg IV every 24 hours [e AUC: 570 mg/L*hr; Trough: 17 mcg/mL].    Will order vancomycin levels when appropriate. Will continue to monitor renal function. Pharmacy to follow.        Thank you for the consult,     Nga Jalloh, PharmD, [address]  PGY1 Pharmacy Resident     2/25/2023 10:34 AM

## 2023-02-25 NOTE — PLAN OF CARE
DR JARQUIN NOTIFIED THAT PT IS C/O A SORE THROAT AND HER MEWS SCORE IS A 3 DUE TO ELEVATED PULSE RATE.

## 2023-02-26 LAB
ANION GAP SERPL CALCULATED.3IONS-SCNC: 9 MMOL/L (ref 7–16)
BASOPHILS ABSOLUTE: 0.04 E9/L (ref 0–0.2)
BASOPHILS RELATIVE PERCENT: 0.5 % (ref 0–2)
BUN BLDV-MCNC: 11 MG/DL (ref 6–23)
CALCIUM SERPL-MCNC: 8.6 MG/DL (ref 8.6–10.2)
CHLORIDE BLD-SCNC: 101 MMOL/L (ref 98–107)
CO2: 27 MMOL/L (ref 22–29)
CREAT SERPL-MCNC: 0.7 MG/DL (ref 0.5–1)
CULTURE SURGICAL: NORMAL
CULTURE SURGICAL: NORMAL
EOSINOPHILS ABSOLUTE: 0.27 E9/L (ref 0.05–0.5)
EOSINOPHILS RELATIVE PERCENT: 3.4 % (ref 0–6)
GFR SERPL CREATININE-BSD FRML MDRD: >60 ML/MIN/1.73
GLUCOSE BLD-MCNC: 118 MG/DL (ref 74–99)
HCT VFR BLD CALC: 25.2 % (ref 34–48)
HEMOGLOBIN: 7.7 G/DL (ref 11.5–15.5)
IMMATURE GRANULOCYTES #: 0.04 E9/L
IMMATURE GRANULOCYTES %: 0.5 % (ref 0–5)
LYMPHOCYTES ABSOLUTE: 1.14 E9/L (ref 1.5–4)
LYMPHOCYTES RELATIVE PERCENT: 14.5 % (ref 20–42)
MCH RBC QN AUTO: 28.4 PG (ref 26–35)
MCHC RBC AUTO-ENTMCNC: 30.6 % (ref 32–34.5)
MCV RBC AUTO: 93 FL (ref 80–99.9)
MONOCYTES ABSOLUTE: 0.65 E9/L (ref 0.1–0.95)
MONOCYTES RELATIVE PERCENT: 8.3 % (ref 2–12)
NEUTROPHILS ABSOLUTE: 5.71 E9/L (ref 1.8–7.3)
NEUTROPHILS RELATIVE PERCENT: 72.8 % (ref 43–80)
PDW BLD-RTO: 14.3 FL (ref 11.5–15)
PLATELET # BLD: 212 E9/L (ref 130–450)
PMV BLD AUTO: 11.5 FL (ref 7–12)
POTASSIUM SERPL-SCNC: 4.1 MMOL/L (ref 3.5–5)
RBC # BLD: 2.71 E12/L (ref 3.5–5.5)
SODIUM BLD-SCNC: 137 MMOL/L (ref 132–146)
WBC # BLD: 7.9 E9/L (ref 4.5–11.5)

## 2023-02-26 PROCEDURE — 6370000000 HC RX 637 (ALT 250 FOR IP)

## 2023-02-26 PROCEDURE — 6360000002 HC RX W HCPCS: Performed by: INTERNAL MEDICINE

## 2023-02-26 PROCEDURE — 2580000003 HC RX 258: Performed by: INTERNAL MEDICINE

## 2023-02-26 PROCEDURE — 2580000003 HC RX 258

## 2023-02-26 PROCEDURE — 85025 COMPLETE CBC W/AUTO DIFF WBC: CPT

## 2023-02-26 PROCEDURE — 2700000000 HC OXYGEN THERAPY PER DAY

## 2023-02-26 PROCEDURE — 80048 BASIC METABOLIC PNL TOTAL CA: CPT

## 2023-02-26 PROCEDURE — 36415 COLL VENOUS BLD VENIPUNCTURE: CPT

## 2023-02-26 PROCEDURE — 6370000000 HC RX 637 (ALT 250 FOR IP): Performed by: INTERNAL MEDICINE

## 2023-02-26 PROCEDURE — 1200000000 HC SEMI PRIVATE

## 2023-02-26 PROCEDURE — 6370000000 HC RX 637 (ALT 250 FOR IP): Performed by: FAMILY MEDICINE

## 2023-02-26 RX ADMIN — HEPARIN 300 UNITS: 100 SYRINGE at 08:49

## 2023-02-26 RX ADMIN — METOPROLOL TARTRATE 50 MG: 50 TABLET, FILM COATED ORAL at 08:48

## 2023-02-26 RX ADMIN — LEVOTHYROXINE SODIUM 150 MCG: 0.15 TABLET ORAL at 08:48

## 2023-02-26 RX ADMIN — POLYETHYLENE GLYCOL 3350 17 G: 17 POWDER, FOR SOLUTION ORAL at 08:48

## 2023-02-26 RX ADMIN — METOPROLOL TARTRATE 50 MG: 50 TABLET, FILM COATED ORAL at 19:58

## 2023-02-26 RX ADMIN — VANCOMYCIN HYDROCHLORIDE 1750 MG: 10 INJECTION, POWDER, LYOPHILIZED, FOR SOLUTION INTRAVENOUS at 04:28

## 2023-02-26 RX ADMIN — NIFEDIPINE 30 MG: 30 TABLET, EXTENDED RELEASE ORAL at 08:48

## 2023-02-26 RX ADMIN — SODIUM CHLORIDE, PRESERVATIVE FREE 10 ML: 5 INJECTION INTRAVENOUS at 08:49

## 2023-02-26 RX ADMIN — HEPARIN 300 UNITS: 100 SYRINGE at 22:21

## 2023-02-26 RX ADMIN — VENLAFAXINE HYDROCHLORIDE 300 MG: 150 CAPSULE, EXTENDED RELEASE ORAL at 08:48

## 2023-02-26 RX ADMIN — APIXABAN 5 MG: 5 TABLET, FILM COATED ORAL at 08:48

## 2023-02-26 RX ADMIN — ACETAMINOPHEN 650 MG: 325 TABLET ORAL at 08:48

## 2023-02-26 RX ADMIN — DOFETILIDE 500 MCG: 0.5 CAPSULE ORAL at 08:48

## 2023-02-26 RX ADMIN — DOFETILIDE 500 MCG: 0.5 CAPSULE ORAL at 20:00

## 2023-02-26 RX ADMIN — SODIUM CHLORIDE: 9 INJECTION, SOLUTION INTRAVENOUS at 03:07

## 2023-02-26 RX ADMIN — OXYCODONE HYDROCHLORIDE 10 MG: 10 TABLET ORAL at 04:39

## 2023-02-26 RX ADMIN — SODIUM CHLORIDE: 9 INJECTION, SOLUTION INTRAVENOUS at 21:42

## 2023-02-26 RX ADMIN — APIXABAN 5 MG: 5 TABLET, FILM COATED ORAL at 19:58

## 2023-02-26 RX ADMIN — ATORVASTATIN CALCIUM 20 MG: 20 TABLET, FILM COATED ORAL at 19:57

## 2023-02-26 RX ADMIN — FERROUS SULFATE TAB 325 MG (65 MG ELEMENTAL FE) 325 MG: 325 (65 FE) TAB at 08:48

## 2023-02-26 ASSESSMENT — PAIN SCALES - GENERAL
PAINLEVEL_OUTOF10: 7
PAINLEVEL_OUTOF10: 7
PAINLEVEL_OUTOF10: 3
PAINLEVEL_OUTOF10: 6
PAINLEVEL_OUTOF10: 4

## 2023-02-26 ASSESSMENT — PAIN DESCRIPTION - FREQUENCY: FREQUENCY: CONTINUOUS

## 2023-02-26 ASSESSMENT — PAIN DESCRIPTION - LOCATION
LOCATION: KNEE

## 2023-02-26 ASSESSMENT — PAIN DESCRIPTION - ORIENTATION
ORIENTATION: LEFT

## 2023-02-26 ASSESSMENT — PAIN DESCRIPTION - DESCRIPTORS
DESCRIPTORS: ACHING

## 2023-02-26 ASSESSMENT — PAIN DESCRIPTION - PAIN TYPE: TYPE: ACUTE PAIN;SURGICAL PAIN

## 2023-02-26 ASSESSMENT — PAIN DESCRIPTION - ONSET: ONSET: ON-GOING

## 2023-02-26 NOTE — PROGRESS NOTES
Hospitalist Progress Note      Synopsis: Patient admitted on 2/21/2023 Ms. Alia Munguia, a 76y.o. year old female  who  has a past medical history of Arthritis, Atrial fibrillation ([address] Bilateral carotid artery stenosis, Bronchitis, Carotid stenosis, bilateral, Hyperlipidemia, Hypertension, Left carotid stenosis, O2 dependent, On continuous oral anticoagulation, S/P carotid endarterectomy, Stomach ulcer, and Thyroid disease. Patient is s/p left knee drainage  with polyethylene exchange. Patient tolerated the procedure well . She reports no fever chills chest  pain nausea vomiting. Hospitalist medicine consulted for management of chronic medical conditions . Lab values reveal sodium 138 potassium 5.0 BUN 23 SCr 0.8 glucose 133  WBC 7.5 HGB 9.1    XR left knee neg           Subjective  Patient seen and examined chart reviewed. No chest pain or shortness of breath     Exam:  /60   Pulse 65   Temp 96.9 °F (36.1 °C) (Temporal)   Resp 18   Ht 5' 5\" (1.651 m)   Wt 240 lb (108.9 kg)   SpO2 99%   BMI 39.94 kg/m²     General appearance: No apparent distress, appears stated age and cooperative. HEENT:  Conjunctivae/corneas clear. Neck: Supple. No jugular venous distention. Respiratory: Clear to auscultation bilaterally, normal respiratory effort  Cardiovascular: Regular rate rhythm, normal S1-S2  Abdomen: Soft, nontender, nondistended  Musculoskeletal: Left lower extremity dressing and drain intact, no clubbing, cyanosis, no bilateral lower extremity edema.    Skin:  No rashes  on visible skin  Neurologic: awake, alert and following commands        Medications:  Reviewed    Infusion Medications    sodium chloride      sodium chloride 125 mL/hr at 02/26/23 0307    sodium chloride       Scheduled Medications    apixaban  5 mg Oral BID    lidocaine PF  5 mL IntraDERmal Once    sodium chloride flush  5-40 mL IntraVENous 2 times per day    heparin flush  3 mL IntraVENous 2 times per day    NIFEdipine 30 mg Oral Daily    sodium chloride flush  5-40 mL IntraVENous 2 times per day    acetaminophen  650 mg Oral Q6H    polyethylene glycol  17 g Oral Daily    atorvastatin  20 mg Oral Nightly    dofetilide  500 mcg Oral BID    levothyroxine  150 mcg Oral Daily    metoprolol tartrate  50 mg Oral BID    sucralfate  1 g Oral 4x Daily AC & HS    [Held by provider] valsartan  160 mg Oral Daily    venlafaxine  300 mg Oral Daily    ferrous sulfate  325 mg Oral BID WC    vancomycin  1,750 mg IntraVENous Q24H     PRN Meds: lidocaine viscous hcl, sodium chloride flush, sodium chloride, heparin flush, ipratropium-albuterol, sodium chloride flush, sodium chloride, morphine **OR** morphine, oxyCODONE **OR** oxyCODONE, ondansetron **OR** ondansetron    I/O    Intake/Output Summary (Last 24 hours) at 2/26/2023 1528  Last data filed at 2/26/2023 0900  Gross per 24 hour   Intake 1519 ml   Output --   Net 1519 ml         Labs:   Recent Labs     02/24/23  1137 02/25/23  0558 02/26/23  0512   WBC 9.9 8.2 7.9   HGB 7.7* 8.1* 7.7*   HCT 26.3* 26.5* 25.2*    210 212         Recent Labs     02/24/23  0413 02/25/23  0558 02/26/23  0512    142 137   K 4.5 4.1 4.1    104 101   CO2 22 25 27   BUN 25* 14 11   CREATININE 0.9 0.7 0.7   CALCIUM 8.6 8.8 8.6   PHOS 3.4  --   --          No results for input(s): PROT, ALB, ALKPHOS, ALT, AST, BILITOT, AMYLASE, LIPASE in the last 72 hours. No results for input(s): INR in the last 72 hours. No results for input(s): Demario Hoffman in the last 72 hours. Chronic labs:  Lab Results   Component Value Date    CHOL 187 10/19/2021    TRIG 224 (H) 10/19/2021    HDL 77 10/19/2021    LDLCALC 65 10/19/2021    TSH 1.210 07/21/2022    INR 1.4 08/27/2020    LABA1C 4.9 04/23/2021       Radiology:  Imaging studies reviewed today.     ASSESSMENT:    Principal Problem:    S/P revision of total knee, left  Active Problems:    History of knee replacement procedure of left knee  Resolved Problems:    * No resolved hospital problems. *       Assessment/plan    Left total knee  drainage with wound dehiscence    Patient had left TKA 4 weeks ago and it  started to drain . Patient is post Left  knee incision with polyethylene exchange . Ortho and infectious disease following. Infectious disease recommends 6 weeks of IV vancomycin. PICC placed  Follow  for placement arrangements. Atrial fibrillation : Eliquis Tikosyn      Essential hypertension: Now improved, continue current antihypertensives. Hyperkalemia: resolved. Depression : Effexor      Hyperlipidemia : Lipitor      Hypothyroidism : Synthroid      Normocytic anemia : Acute on chronic anemia likely blood loss from surgery hemoglobin 6.6 this morning transfusing 1 unit PRBC this morning of H&H transfuse for hemoglobin less than 7     Chronic hypoxic respiratory failure : on 2L NC daily      COPD : C/w bronchodilators       Disposition: Pending rehab placement           +++++++++++++++++++++++++++++++++++++++++++++++++  Perez Obregon MD  2/26/2023  [facility].  +++++++++++++++++++++++++++++++++++++++++++++++++  NOTE: This report was transcribed using voice recognition software. Every effort was made to ensure accuracy; however, inadvertent computerized transcription errors may be present.

## 2023-02-26 NOTE — PROGRESS NOTES
Pharmacy Consultation Note  (Antibiotic Dosing and Monitoring)    Initial consult date: 2/21/23  Consulting physician/provider: Dr. Marija Vasquez  Drug: Vancomycin  Indication: Bone and joint infection     Age/  Gender Height Weight IBW  Allergy Information   77 y.o./female 5' 5\" (165.1 cm) 240 lb (108.9 kg)     Ideal body weight: 57 kg (125 lb 10.6 oz)  Adjusted ideal body weight: 77.7 kg (171 lb 6.4 oz)   Patient has no known allergies. Renal Function:  Recent Labs     02/24/23  0413 02/25/23  0558 02/26/23  0512   BUN 25* 14 11   CREATININE 0.9 0.7 0.7         Intake/Output Summary (Last 24 hours) at 2/26/2023 0848  Last data filed at 2/26/2023 0308  Gross per 24 hour   Intake 1699 ml   Output 1100 ml   Net 599 ml         Vancomycin Monitoring:  Trough:    Recent Labs     02/24/23  0413   VANCOTROUGH 14.9       Random:  No results for input(s): VANCORANDOM in the last 72 hours. No results for input(s): Kade Medrano in the last 72 hours. Historical Cultures:  Organism   Date Value Ref Range Status   02/21/2023 Staphylococcus coagulase-negative (A)  Final     No results for input(s): BC in the last 72 hours. Vancomycin Administration Times:  Recent vancomycin administrations                     vancomycin (VANCOCIN) 1,750 mg in sodium chloride 0.9 % 500 mL IVPB (mg) 1,750 mg New Bag 02/26/23 0428     1,750 mg New Bag 02/25/23 0437     1,750 mg New Bag 02/24/23 0417             Assessment:  Patient is a 76 y.o. female who has been initiated on vancomycin for a left knee prosthetic joint infection. Estimated Creatinine Clearance: 80 mL/min (based on SCr of 0.7 mg/dL). To dose vancomycin, pharmacy will be utilizing [employer] calculation software for goal AUC/LAURENCE 400-600 mg/L-hr.  Scr: 0.7 mg/dL. WBC = 7.9 and afebrile. Final 2/2 anaerobic cultures = staphylococcus coagulase-negative     Plan  Continue vancomycin 1,750 mg IV every 24 hours [e AUC: 572 mg/L*hr; Trough: 17.1 mcg/mL].    Will order vancomycin levels when appropriate. Will continue to monitor renal function. Pharmacy to follow.        Thank you for the consult,     Edilma López, PharmD, [address]  PGY1 Pharmacy Resident     2/26/2023 8:48 AM

## 2023-02-26 NOTE — PROGRESS NOTES
I have seen and evaluated the patient and agree with the above assessment on today's visit. I have performed the key components of the history and physical examination and concur completely with the findings and plans as documented. Agree with ROS, examination, FMH, PMH, PSH, SocHx, and allergies as above. Patient seen and examined at bedside. She is 5 days from her total knee irrigation debridement with polyethylene exchange on the left. Her incisional VAC is functioning appropriately. She had a PICC line placed. Final antibiotic recommendations been made. We are still waiting social work to get her approved to go to a nursing home. This will likely not be till tomorrow. If she is still tomorrow we will change her dressing and take a look at her incisions. All of her questions answered in detail. She is sitting in chair comfortably. She is happy with her treatment so far. Buck Washington DO   Orthopaedic Surgery   2/26/2023  2:58 PM          Department of Orthopedic Surgery   Progress Note    POD5 left total knee irrigation debridement with polyethylene exchange patient seen and examined. Pain controlled. No new complaints. Denies chest pain, shortness of breath, calf pain, dizziness/lightheadedness, fevers or chills. VITALS:  BP (!) 117/56   Pulse 63   Temp 97.1 °F (36.2 °C) (Temporal)   Resp 18   Ht 5' 5\" (1.651 m)   Wt 240 lb (108.9 kg)   SpO2 99%   BMI 39.94 kg/m²     GENERAL: In bed, comfortable, oriented x4  MUSCULOSKELETAL:   left lower extremity:  Dressing C/D/I. Incisional VAC in place-no output  Compartments soft and compressible, calf non-tender  Palpable dorsalis pedis and posterior tibialis pulse, brisk cap refill to toes, foot warm and perfused  Sensation intact to light touch in sural/deep peroneal/superficial peroneal/saphenous/posterior tibial nerve distributions to foot/ankle.   Demonstrates active ankle plantar/dorsiflexion/great toe extension    CBC:   Lab Results   Component Value Date/Time    WBC 7.9 02/26/2023 05:12 AM    HGB 7.7 02/26/2023 05:12 AM    HCT 25.2 02/26/2023 05:12 AM     02/26/2023 05:12 AM       ASSESSMENT  S/p left knee TKA I&D with polyethylene exchange 2/21    PLAN    Continue physical therapy and protocol: Weightbearing as tolerated left lower extremity with knee in full extension  Continue IV antibiotics -appreciate infectious disease input  PICC line placement  Culture pending-growth presents on anaerobic culture, no species isolated. Incubation continues  Deep venous thrombosis prophylaxis -at home 5 mg Eliquis t restarted. Okay to continue, early mobilization   hinged ROM brace to right lower extremity locked in 0 to 30 degrees extension in place  PT/OT  Pain Control: IV and PO  Monitor H&H 7.7 this morning. Continue to monitor and notify when below 7  D/C Plan: Patient received her PICC line. She is okay for discharge from orthopedic standpoint once antibiotics are finalized. Patient states she will likely be discharged tomorrow to nursing facility.       Electronically signed by Otho Essex, DO,on 2/26/2023 at 7:47 AM

## 2023-02-26 NOTE — PROGRESS NOTES
[address] [place] [address] [facility]  Progress Note      No chief complaint on file. SUBJECTIVE:  78-year-old female with past medical history of A-fib on Eliquis, bilateral carotid artery stenosis, status post carotid endarterectomy, HTN, HLD, thyroid disease presented with wound dehiscence after left TKA 4 weeks ago. Status post left knee incision and drainage with polyethylene exchange . ID consulted for left knee prosthetic joint infection. Patient is tolerating medications. No reported adverse drug reactions. No nausea, vomiting, diarrhea. Review of systems:  As stated above in the chief complaint, otherwise negative.     Medications:  Scheduled Meds:   apixaban  5 mg Oral BID    lidocaine PF  5 mL IntraDERmal Once    sodium chloride flush  5-40 mL IntraVENous 2 times per day    heparin flush  3 mL IntraVENous 2 times per day    NIFEdipine  30 mg Oral Daily    sodium chloride flush  5-40 mL IntraVENous 2 times per day    acetaminophen  650 mg Oral Q6H    polyethylene glycol  17 g Oral Daily    atorvastatin  20 mg Oral Nightly    dofetilide  500 mcg Oral BID    levothyroxine  150 mcg Oral Daily    metoprolol tartrate  50 mg Oral BID    sucralfate  1 g Oral 4x Daily AC & HS    [Held by provider] valsartan  160 mg Oral Daily    venlafaxine  300 mg Oral Daily    ferrous sulfate  325 mg Oral BID WC    vancomycin  1,750 mg IntraVENous Q24H     Continuous Infusions:   sodium chloride      sodium chloride 125 mL/hr at 23 0307    sodium chloride       PRN Meds:lidocaine viscous hcl, sodium chloride flush, sodium chloride, heparin flush, ipratropium-albuterol, sodium chloride flush, sodium chloride, morphine **OR** morphine, oxyCODONE **OR** oxyCODONE, ondansetron **OR** ondansetron    OBJECTIVE:  /60   Pulse 65   Temp 96.9 °F (36.1 °C) (Temporal)   Resp 18   Ht 5' 5\" (1.651 m)   Wt 240 lb (108.9 kg)   SpO2 99%   BMI 39.94 kg/m²   Temp  Av °F (36.1 °C)  Min: 96.9 °F (36.1 °C)  Max: 97.1 °F (36.2 °C)  Constitutional: The patient is awake, alert, and oriented. Skin: Warm and dry. No rashes were noted. No jaundice. HEENT: Eyes show round, and reactive pupils. Moist mucous membranes, no ulcerations, no thrush. Neck: Supple to movements. No lymphadenopathy. Chest: No use of accessory muscles to breathe. Symmetrical expansion. Auscultation reveals no wheezing, crackles, or rhonchi. Cardiovascular: S1 and S2 are rhythmic and regular. No murmurs appreciated. Abdomen: Positive bowel sounds to auscultation. Benign to palpation. No masses felt. No hepatosplenomegaly. Genitourinary: Deferred  Extremities: No clubbing, no cyanosis, no edema. Musculoskeletal: Left knee bandaged.   Neurological: Following commands, no focal neurodeficit  Lines: peripheral    Laboratory and Tests Review:  Lab Results   Component Value Date    WBC 7.9 02/26/2023    WBC 8.2 02/25/2023    WBC 9.9 02/24/2023    HGB 7.7 (L) 02/26/2023    HCT 25.2 (L) 02/26/2023    MCV 93.0 02/26/2023     02/26/2023     Lab Results   Component Value Date    NEUTROABS 5.71 02/26/2023    NEUTROABS 5.96 02/25/2023    NEUTROABS 7.63 (H) 02/24/2023     No results found for: [facility]  Lab Results   Component Value Date    ALT 11 07/21/2022    AST 15 07/21/2022    ALKPHOS 68 07/21/2022    BILITOT 0.8 07/21/2022     Lab Results   Component Value Date/Time     02/26/2023 05:12 AM    K 4.1 02/26/2023 05:12 AM    K 5.3 01/24/2023 11:35 AM     02/26/2023 05:12 AM    CO2 27 02/26/2023 05:12 AM    BUN 11 02/26/2023 05:12 AM    CREATININE 0.7 02/26/2023 05:12 AM    CREATININE 0.7 02/25/2023 05:58 AM    CREATININE 0.9 02/24/2023 04:13 AM    GFRAA >60 09/01/2022 10:40 AM    LABGLOM >60 02/26/2023 05:12 AM    GLUCOSE 118 02/26/2023 05:12 AM    GLUCOSE 156 04/28/2012 02:00 AM    PROT 7.0 07/21/2022 12:47 PM    LABALBU 3.4 02/24/2023 04:13 AM    LABALBU 4.8 04/28/2012 02:00 AM    CALCIUM 8.6 02/26/2023 05:12 AM    BILITOT 0.8 07/21/2022 12:47 PM    ALKPHOS 68 07/21/2022 12:47 PM    AST 15 07/21/2022 12:47 PM    ALT 11 07/21/2022 12:47 PM     No results found for: CRP  Lab Results   Component Value Date    SEDRATE 4 01/08/2018     Radiology:      Microbiology:   Lab Results   Component Value Date/Time    BC 5 Days no growth 08/29/2020 09:14 AM    ORG Staphylococcus coagulase-negative 02/21/2023 09:35 AM    ORG Staphylococcus coagulase-negative 02/21/2023 09:32 AM    ORG Escherichia coli 08/29/2020 09:04 AM    ORG Corynebacterium species 08/29/2020 09:04 AM     Lab Results   Component Value Date/Time    ORG Staphylococcus coagulase-negative 02/21/2023 09:35 AM    ORG Staphylococcus coagulase-negative 02/21/2023 09:32 AM    ORG Escherichia coli 08/29/2020 09:04 AM    ORG Corynebacterium species 08/29/2020 09:04 AM     No results found for: WNDABS  Smear, Respiratory   Date Value Ref Range Status   09/22/2019   Final    Group 5: >25 PMN's/LPF and <10 Epithelial cells/LPF  Moderate Polymorphonuclear leukocytes  Epithelial cells not seen  Moderate Gram negative diplococci  Few Gram positive cocci in clusters  Rare yeast  Rare gram positive rods Diphtheroid-like           Component Value Date/Time    FUNGSM No Fungal elements seen 02/21/2023 0935     CULTURE, RESPIRATORY   Date Value Ref Range Status   09/22/2019 Oral Pharyngeal Yvonne present  Final     No results found for: CXCATHTIP  No results found for: BFCS  Culture Surgical   Date Value Ref Range Status   02/21/2023   Final    Neisseria gonorrhoeae not isolated  Growth not present     02/21/2023   Final    Neisseria gonorrhoeae not isolated  Growth not present       Urine Culture, Routine   Date Value Ref Range Status   08/29/2020 >100,000 CFU/ml  Final   08/29/2020 50,000 CFU/ml  Final     MRSA Culture Only   Date Value Ref Range Status   01/18/2023 Methicillin resistant Staph aureus not isolated  Final   09/07/2016 Methicillin resistant Staph aureus not isolated  Final Assessment:  Left knee prosthetic joint infection  CONS prosthetic joint infection  Status post left knee TKA 4 weeks ago followed by wound dehiscence  Status post left knee incision and drainage and poly ethylene 02/21     Plan:    Continue vancomycin. Vancomycin to be continued for total of 6 weeks until 04/03. Vancomycin prescription in chart. OR Cx growing coagulase-negative staph. Blood Cx NG so far  PICC line ordered  Patient will need at least 6 weeks of IV antibiotic therapy  ID clinic follow-up in 4 to 5 weeks  We will continue to follow.     Abena Madrigal MD  3:24 PM  2/26/2023

## 2023-02-27 VITALS
OXYGEN SATURATION: 96 % | HEART RATE: 85 BPM | TEMPERATURE: 98.7 F | RESPIRATION RATE: 17 BRPM | BODY MASS INDEX: 39.99 KG/M2 | HEIGHT: 65 IN | DIASTOLIC BLOOD PRESSURE: 62 MMHG | WEIGHT: 240 LBS | SYSTOLIC BLOOD PRESSURE: 144 MMHG

## 2023-02-27 LAB
ANION GAP SERPL CALCULATED.3IONS-SCNC: 11 MMOL/L (ref 7–16)
BASOPHILS ABSOLUTE: 0.04 E9/L (ref 0–0.2)
BASOPHILS RELATIVE PERCENT: 0.4 % (ref 0–2)
BUN BLDV-MCNC: 9 MG/DL (ref 6–23)
CALCIUM SERPL-MCNC: 7.8 MG/DL (ref 8.6–10.2)
CHLORIDE BLD-SCNC: 104 MMOL/L (ref 98–107)
CO2: 25 MMOL/L (ref 22–29)
CREAT SERPL-MCNC: 0.8 MG/DL (ref 0.5–1)
EOSINOPHILS ABSOLUTE: 0.21 E9/L (ref 0.05–0.5)
EOSINOPHILS RELATIVE PERCENT: 1.9 % (ref 0–6)
GFR SERPL CREATININE-BSD FRML MDRD: >60 ML/MIN/1.73
GLUCOSE BLD-MCNC: 124 MG/DL (ref 74–99)
HCT VFR BLD CALC: 27.9 % (ref 34–48)
HEMOGLOBIN: 8.4 G/DL (ref 11.5–15.5)
IMMATURE GRANULOCYTES #: 0.06 E9/L
IMMATURE GRANULOCYTES %: 0.6 % (ref 0–5)
LYMPHOCYTES ABSOLUTE: 1.1 E9/L (ref 1.5–4)
LYMPHOCYTES RELATIVE PERCENT: 10.2 % (ref 20–42)
MCH RBC QN AUTO: 29.2 PG (ref 26–35)
MCHC RBC AUTO-ENTMCNC: 30.1 % (ref 32–34.5)
MCV RBC AUTO: 96.9 FL (ref 80–99.9)
MONOCYTES ABSOLUTE: 0.81 E9/L (ref 0.1–0.95)
MONOCYTES RELATIVE PERCENT: 7.5 % (ref 2–12)
NEUTROPHILS ABSOLUTE: 8.55 E9/L (ref 1.8–7.3)
NEUTROPHILS RELATIVE PERCENT: 79.4 % (ref 43–80)
PDW BLD-RTO: 13.9 FL (ref 11.5–15)
PLATELET # BLD: 234 E9/L (ref 130–450)
PMV BLD AUTO: 11.5 FL (ref 7–12)
POTASSIUM SERPL-SCNC: 3.4 MMOL/L (ref 3.5–5)
RBC # BLD: 2.88 E12/L (ref 3.5–5.5)
SARS-COV-2, NAAT: NOT DETECTED
SODIUM BLD-SCNC: 140 MMOL/L (ref 132–146)
WBC # BLD: 10.8 E9/L (ref 4.5–11.5)

## 2023-02-27 PROCEDURE — 80048 BASIC METABOLIC PNL TOTAL CA: CPT

## 2023-02-27 PROCEDURE — 85025 COMPLETE CBC W/AUTO DIFF WBC: CPT

## 2023-02-27 PROCEDURE — 6360000002 HC RX W HCPCS: Performed by: INTERNAL MEDICINE

## 2023-02-27 PROCEDURE — 6370000000 HC RX 637 (ALT 250 FOR IP)

## 2023-02-27 PROCEDURE — 6370000000 HC RX 637 (ALT 250 FOR IP): Performed by: FAMILY MEDICINE

## 2023-02-27 PROCEDURE — 87635 SARS-COV-2 COVID-19 AMP PRB: CPT

## 2023-02-27 PROCEDURE — 2580000003 HC RX 258

## 2023-02-27 PROCEDURE — 36415 COLL VENOUS BLD VENIPUNCTURE: CPT

## 2023-02-27 PROCEDURE — 2700000000 HC OXYGEN THERAPY PER DAY

## 2023-02-27 PROCEDURE — 6370000000 HC RX 637 (ALT 250 FOR IP): Performed by: INTERNAL MEDICINE

## 2023-02-27 PROCEDURE — 2580000003 HC RX 258: Performed by: INTERNAL MEDICINE

## 2023-02-27 RX ORDER — POTASSIUM CHLORIDE 20 MEQ/1
40 TABLET, EXTENDED RELEASE ORAL ONCE
Status: COMPLETED | OUTPATIENT
Start: 2023-02-27 | End: 2023-02-27

## 2023-02-27 RX ORDER — OXYCODONE HYDROCHLORIDE AND ACETAMINOPHEN 5; 325 MG/1; MG/1
1 TABLET ORAL EVERY 6 HOURS PRN
Qty: 28 TABLET | Refills: 0 | Status: SHIPPED | OUTPATIENT
Start: 2023-02-27 | End: 2023-03-06

## 2023-02-27 RX ADMIN — SODIUM CHLORIDE: 9 INJECTION, SOLUTION INTRAVENOUS at 05:01

## 2023-02-27 RX ADMIN — SODIUM CHLORIDE, PRESERVATIVE FREE 10 ML: 5 INJECTION INTRAVENOUS at 10:48

## 2023-02-27 RX ADMIN — VENLAFAXINE HYDROCHLORIDE 300 MG: 150 CAPSULE, EXTENDED RELEASE ORAL at 10:31

## 2023-02-27 RX ADMIN — LIDOCAINE HYDROCHLORIDE 15 ML: 20 SOLUTION ORAL; TOPICAL at 01:42

## 2023-02-27 RX ADMIN — APIXABAN 5 MG: 5 TABLET, FILM COATED ORAL at 10:31

## 2023-02-27 RX ADMIN — POTASSIUM CHLORIDE 40 MEQ: 1500 TABLET, EXTENDED RELEASE ORAL at 11:59

## 2023-02-27 RX ADMIN — ACETAMINOPHEN 650 MG: 325 TABLET ORAL at 10:31

## 2023-02-27 RX ADMIN — LEVOTHYROXINE SODIUM 150 MCG: 0.15 TABLET ORAL at 10:32

## 2023-02-27 RX ADMIN — HEPARIN 300 UNITS: 100 SYRINGE at 10:33

## 2023-02-27 RX ADMIN — VANCOMYCIN HYDROCHLORIDE 1750 MG: 10 INJECTION, POWDER, LYOPHILIZED, FOR SOLUTION INTRAVENOUS at 03:55

## 2023-02-27 RX ADMIN — OXYCODONE HYDROCHLORIDE 5 MG: 5 TABLET ORAL at 01:42

## 2023-02-27 RX ADMIN — METOPROLOL TARTRATE 50 MG: 50 TABLET, FILM COATED ORAL at 10:31

## 2023-02-27 RX ADMIN — DOFETILIDE 500 MCG: 0.5 CAPSULE ORAL at 10:32

## 2023-02-27 RX ADMIN — NIFEDIPINE 30 MG: 30 TABLET, EXTENDED RELEASE ORAL at 10:32

## 2023-02-27 ASSESSMENT — PAIN DESCRIPTION - DESCRIPTORS
DESCRIPTORS: ACHING;DISCOMFORT
DESCRIPTORS: ACHING;DISCOMFORT

## 2023-02-27 ASSESSMENT — PAIN SCALES - GENERAL
PAINLEVEL_OUTOF10: 4
PAINLEVEL_OUTOF10: 0
PAINLEVEL_OUTOF10: 3

## 2023-02-27 ASSESSMENT — PAIN DESCRIPTION - LOCATION
LOCATION: KNEE
LOCATION: KNEE

## 2023-02-27 ASSESSMENT — PAIN DESCRIPTION - PAIN TYPE: TYPE: ACUTE PAIN;SURGICAL PAIN

## 2023-02-27 ASSESSMENT — PAIN DESCRIPTION - ORIENTATION
ORIENTATION: LEFT
ORIENTATION: LEFT

## 2023-02-27 NOTE — PROGRESS NOTES
Hospitalist Progress Note      Synopsis: Patient admitted on 2/21/2023 Ms. Danitza Leon, a 76y.o. year old female  who  has a past medical history of Arthritis, Atrial fibrillation ([address] Bilateral carotid artery stenosis, Bronchitis, Carotid stenosis, bilateral, Hyperlipidemia, Hypertension, Left carotid stenosis, O2 dependent, On continuous oral anticoagulation, S/P carotid endarterectomy, Stomach ulcer, and Thyroid disease. Patient is s/p left knee drainage  with polyethylene exchange. Patient tolerated the procedure well . She reports no fever chills chest  pain nausea vomiting. Hospitalist medicine consulted for management of chronic medical conditions . Lab values reveal sodium 138 potassium 5.0 BUN 23 SCr 0.8 glucose 133  WBC 7.5 HGB 9.1    XR left knee neg           Subjective  Patient seen and examined chart reviewed. No chest pain or shortness of breath     Exam:  BP (!) 144/62   Pulse 85   Temp 98.7 °F (37.1 °C) (Temporal)   Resp 17   Ht 5' 5\" (1.651 m)   Wt 240 lb (108.9 kg)   SpO2 96%   BMI 39.94 kg/m²     General appearance: No apparent distress, appears stated age and cooperative. HEENT:  Conjunctivae/corneas clear. Neck: Supple. No jugular venous distention. Respiratory: Clear to auscultation bilaterally, normal respiratory effort  Cardiovascular: Regular rate rhythm, normal S1-S2  Abdomen: Soft, nontender, nondistended  Musculoskeletal: Left lower extremity dressing and drain intact, no clubbing, cyanosis, no bilateral lower extremity edema.    Skin:  No rashes  on visible skin  Neurologic: awake, alert and following commands        Medications:  Reviewed    Infusion Medications   REM    Scheduled Medications   REM    PRN Meds: REM    I/O    Intake/Output Summary (Last 24 hours) at 2/27/2023 1620  Last data filed at 2/27/2023 0811  Gross per 24 hour   Intake 300 ml   Output 750 ml   Net -450 ml         Labs:   Recent Labs     02/25/23  0558 02/26/23  0512 02/27/23  1042   WBC 8.2 7.9 10.8   HGB 8.1* 7.7* 8.4*   HCT 26.5* 25.2* 27.9*    212 234         Recent Labs     02/25/23  0558 02/26/23  0512 02/27/23  0635    137 140   K 4.1 4.1 3.4*    101 104   CO2 25 27 25   BUN 14 11 9   CREATININE 0.7 0.7 0.8   CALCIUM 8.8 8.6 7.8*         No results for input(s): PROT, ALB, ALKPHOS, ALT, AST, BILITOT, AMYLASE, LIPASE in the last 72 hours. No results for input(s): INR in the last 72 hours. No results for input(s): Jordon Rosalesman in the last 72 hours. Chronic labs:  Lab Results   Component Value Date    CHOL 187 10/19/2021    TRIG 224 (H) 10/19/2021    HDL 77 10/19/2021    LDLCALC 65 10/19/2021    TSH 1.210 07/21/2022    INR 1.4 08/27/2020    LABA1C 4.9 04/23/2021       Radiology:  Imaging studies reviewed today. ASSESSMENT:    Principal Problem:    S/P revision of total knee, left  Active Problems:    History of knee replacement procedure of left knee  Resolved Problems:    * No resolved hospital problems. *       Assessment/plan    Left total knee  drainage with wound dehiscence    Patient had left TKA 4 weeks ago and it  started to drain . Patient is post Left  knee incision with polyethylene exchange . Ortho and infectious disease following. Infectious disease recommends 6 weeks of IV vancomycin. PICC placed  Follow  for placement arrangements. Atrial fibrillation : Eliquis Tikosyn      Essential hypertension: Now improved, continue current antihypertensives. Hyperkalemia: resolved.       Depression : Effexor      Hyperlipidemia : Lipitor      Hypothyroidism : Synthroid      Normocytic anemia : Acute on chronic anemia likely blood loss from surgery hemoglobin 6.6 this morning transfusing 1 unit PRBC this morning of H&H transfuse for hemoglobin less than 7     Chronic hypoxic respiratory failure : on 2L NC daily      COPD : C/w bronchodilators       Disposition: Pending rehab placement +++++++++++++++++++++++++++++++++++++++++++++++++  Sri Chen MD  2/27/2023  [facility].  +++++++++++++++++++++++++++++++++++++++++++++++++  NOTE: This report was transcribed using voice recognition software. Every effort was made to ensure accuracy; however, inadvertent computerized transcription errors may be present.

## 2023-02-27 NOTE — DISCHARGE SUMMARY
Physician Discharge Summary     Patient ID:  Carlitos Andre  [id number]  36 y.o.  1945    Admit date: 2/21/2023    Discharge date and time: No discharge date for patient encounter. Admitting Physician: Yann Hathaway DO     Discharge Physician: Yann Hathaway DO    Admission Diagnoses: Dehiscence of operative wound, initial encounter [T81.31XA]  Presence of left artificial knee joint [Z96.652]  S/P revision of total knee, left [Z96.652]  History of knee replacement procedure of left knee [Z96.652]    Discharge Diagnoses: left total knee wound dehiscence with infection    Admission Condition: stable    Discharged Condition: stable    Hospital Course: The patient was admitted from the pre-operative holding area and underwent a left total knee arthroplasty irrigation debridement with polyethylene exchange and cultures. Cultures grew coagulation negative staph. She was seen by infectious disease. She received DVT prophylaxis in the hospital.  After final cultures 6 weeks of IV vancomycin was recommended. An incisional wound VAC was maintained throughout her stay. She worked physical therapy and did well. Pain was well controlled. Her dressing was changed on the day of discharge and her incision was well approximated without drainage. She will be discharged to a skilled nursing facility for 6 weeks of IV antibiotics. She will follow-up with one of my partners next week for wound check and then follow-up with me after that. Treatments: s/p total knee arthroplasty irrigation debridement with polyethylene exchange    Disposition: The patient was provided instructions to follow up with Yann Hathaway DO in 1 week. She will receive 6 weeks of IV antibiotics. She will resume her home Eliquis for DVT prophylaxis. Pain prescriptions have been sent. She will continue to work with physical therapy and her hinged knee brace unlocked from 0 to 90 degrees. [unfilled]      Signed:   Jimmy Lopez DO  2/27/2023

## 2023-02-27 NOTE — PROGRESS NOTES
Pharmacy Consultation Note  (Antibiotic Dosing and Monitoring)    Initial consult date: 2/21/23  Consulting physician/provider: Dr. Hermilo Iglesias  Drug: Vancomycin  Indication: Bone and joint infection     Age/  Gender Height Weight IBW  Allergy Information   77 y.o./female 5' 5\" (165.1 cm) 240 lb (108.9 kg)     Ideal body weight: 57 kg (125 lb 10.6 oz)  Adjusted ideal body weight: 77.7 kg (171 lb 6.4 oz)   Patient has no known allergies. Renal Function:  Recent Labs     02/25/23  0558 02/26/23  0512 02/27/23  0635   BUN 14 11 9   CREATININE 0.7 0.7 0.8         Intake/Output Summary (Last 24 hours) at 2/27/2023 1032  Last data filed at 2/26/2023 2300  Gross per 24 hour   Intake 480 ml   Output 750 ml   Net -270 ml         Vancomycin Monitoring:  Trough:  No results for input(s): VANCOTROUGH in the last 72 hours. Random:  No results for input(s): VANCORANDOM in the last 72 hours. No results for input(s): Bobby Mcfadden in the last 72 hours. Historical Cultures:  Organism   Date Value Ref Range Status   02/21/2023 Staphylococcus coagulase-negative (A)  Final     No results for input(s): BC in the last 72 hours. Vancomycin Administration Times:  Recent vancomycin administrations                     vancomycin (VANCOCIN) 1,750 mg in sodium chloride 0.9 % 500 mL IVPB (mg) 1,750 mg New Bag 02/27/23 0355     1,750 mg New Bag 02/26/23 0428     1,750 mg New Bag 02/25/23 0437               Assessment:  Patient is a 76 y.o. female who has been initiated on vancomycin for a left knee prosthetic joint infection. Estimated Creatinine Clearance: 70 mL/min (based on SCr of 0.8 mg/dL). To dose vancomycin, pharmacy will be utilizing [employer] calculation software for goal AUC/LAURENCE 400-600 mg/L-hr.  Scr: 0.8 mg/dL. WBC = 7.9 and afebrile. Final 2/2 anaerobic cultures = staphylococcus coagulase-negative     Plan  Continue vancomycin 1,750 mg IV every 24 hours [e AUC: 573 mg/L*hr; Trough: 17.1 mcg/mL].    Will order vancomycin trough prior to 2/28 0430 dose (HOLD vancomycin dose if Trough level [>20 mCg/mL]. Will continue to monitor renal function. Pharmacy to follow.        Thank you for the consult,     Keyur Spain, PharmD, [address]  PGY1 Pharmacy Resident     2/27/2023 10:32 AM

## 2023-02-27 NOTE — PLAN OF CARE
Problem: Discharge Planning  Goal: Discharge to home or other facility with appropriate resources  2/27/2023 1230 by Dominick Calderon RN  Outcome: Completed  2/27/2023 1221 by Dominick Calderon RN  Outcome: Progressing  Flowsheets (Taken 2/27/2023 0945)  Discharge to home or other facility with appropriate resources: Arrange for needed discharge resources and transportation as appropriate     Problem: Chronic Conditions and Co-morbidities  Goal: Patient's chronic conditions and co-morbidity symptoms are monitored and maintained or improved  2/27/2023 1230 by Dominick Calderon RN  Outcome: Completed  2/27/2023 1221 by Dominick Calderon RN  Outcome: Progressing     Problem: Pain  Goal: Verbalizes/displays adequate comfort level or baseline comfort level  2/27/2023 1230 by Dominick Calderon RN  Outcome: Completed  2/27/2023 1221 by Dominick Calderon RN  Outcome: Progressing     Problem: Safety - Adult  Goal: Free from fall injury  2/27/2023 1230 by Dominick Calderon RN  Outcome: Completed  2/27/2023 1221 by Dominick Calderon RN  Outcome: Progressing  Flowsheets (Taken 2/27/2023 0945)  Free From Fall Injury: Instruct family/caregiver on patient safety     Problem: ABCDS Injury Assessment  Goal: Absence of physical injury  2/27/2023 1230 by Dominick Calderon RN  Outcome: Completed  2/27/2023 1221 by Dominick Calderon RN  Outcome: Progressing     Problem: Skin/Tissue Integrity  Goal: Absence of new skin breakdown  Description: 1. Monitor for areas of redness and/or skin breakdown  2. Assess vascular access sites hourly  3. Every 4-6 hours minimum:  Change oxygen saturation probe site  4. Every 4-6 hours:  If on nasal continuous positive airway pressure, respiratory therapy assess nares and determine need for appliance change or resting period.   2/27/2023 1230 by Dominick Calderon RN  Outcome: Completed  2/27/2023 1221 by Dominick Calderon RN  Outcome: Progressing

## 2023-02-27 NOTE — CARE COORDINATION
2/27. Transportation arranged with [facility] for 1:00.  at bedside and pt aware. Nursing and facility aware.  Figueroa Zaragoza RN

## 2023-02-27 NOTE — PROGRESS NOTES
I have seen and evaluated the patient and agree with the above assessment on today's visit. I have performed the key components of the history and physical examination and concur completely with the findings and plans as documented. Agree with ROS, examination, FMH, PMH, PSH, SocHx, and allergies as above. Patient seen and examined at bedside this morning. She is doing well and ambulating much better. We are going to change her wound VAC today and likely discontinue this. Dry dressing as long as her incision looks good. We are hopeful to get her discharged to skilled nursing facility pending social work care. All of her questions answered in detail. Follow-up instructions and prescriptions are in the chart. Le Palma DO   Orthopaedic Surgery   2/27/2023  10:17 AM        Department of Orthopedic Surgery   Progress Note    POD6 left total knee irrigation debridement with polyethylene exchange patient seen and examined. Found sitting in chair and reading. Pain controlled. No new complaints. Denies chest pain, shortness of breath, calf pain, dizziness/lightheadedness, fevers or chills. VITALS:  BP (!) 132/58   Pulse 65   Temp 98.2 °F (36.8 °C) (Temporal)   Resp 16   Ht 5' 5\" (1.651 m)   Wt 240 lb (108.9 kg)   SpO2 98%   BMI 39.94 kg/m²     GENERAL: In bed, comfortable, oriented x4  MUSCULOSKELETAL:   left lower extremity:  Dressing C/D/I. Incisional VAC in place-no output  Compartments soft and compressible, calf non-tender  Palpable dorsalis pedis and posterior tibialis pulse, brisk cap refill to toes, foot warm and perfused  Sensation intact to light touch in sural/deep peroneal/superficial peroneal/saphenous/posterior tibial nerve distributions to foot/ankle.   Demonstrates active ankle plantar/dorsiflexion/great toe extension    CBC:   Lab Results   Component Value Date/Time    WBC 7.9 02/26/2023 05:12 AM    HGB 7.7 02/26/2023 05:12 AM    HCT 25.2 02/26/2023 05:12 AM     02/26/2023 05:12 AM       ASSESSMENT  S/p left knee TKA I&D with polyethylene exchange 2/21    PLAN    Continue physical therapy and protocol: Weightbearing as tolerated left lower extremity with knee in full extension  Continue IV antibiotics- infectious disease recommend IV vancomycin for 6 weeks-  PICC line in placed  Culture show coagulative negative staph   deep venous thrombosis prophylaxis -at home 5 mg Eliquis t restarted. Okay to continue, early mobilization   hinged ROM brace to right lower extremity locked in 0 to 30 degrees extension in place  PT/OT  Pain Control: IV and PO  Monitor H&H 7.7 this morning. Continue to monitor and notify when below 7  D/C Plan: Pending social work final input. Likely to nursing home.   Discharge orders to be placed once plan is finalized by social work      Electronically signed by Nimisha Townsend DOon 2/27/2023 at 6:38 AM

## 2023-02-27 NOTE — PROGRESS NOTES
Report called to Maria Teresa Villagomez at [facility]. All questions/concerns addressed. Info faxed to [phone].

## 2023-02-27 NOTE — PROGRESS NOTES
[address] [place] [address] [facility]  Progress Note      C/C: Left knee PJI infection     Pt denies fever or chills  Left knee pain - controlled   Afebrile           Review of systems:  As stated above in the chief complaint, otherwise negative. Medications:  Scheduled Meds:   potassium chloride  40 mEq Oral Once    apixaban  5 mg Oral BID    lidocaine PF  5 mL IntraDERmal Once    sodium chloride flush  5-40 mL IntraVENous 2 times per day    heparin flush  3 mL IntraVENous 2 times per day    NIFEdipine  30 mg Oral Daily    sodium chloride flush  5-40 mL IntraVENous 2 times per day    acetaminophen  650 mg Oral Q6H    polyethylene glycol  17 g Oral Daily    atorvastatin  20 mg Oral Nightly    dofetilide  500 mcg Oral BID    levothyroxine  150 mcg Oral Daily    metoprolol tartrate  50 mg Oral BID    sucralfate  1 g Oral 4x Daily AC & HS    [Held by provider] valsartan  160 mg Oral Daily    venlafaxine  300 mg Oral Daily    ferrous sulfate  325 mg Oral BID WC    vancomycin  1,750 mg IntraVENous Q24H     Continuous Infusions:   sodium chloride      sodium chloride 125 mL/hr at 23 0501    sodium chloride       PRN Meds:lidocaine viscous hcl, sodium chloride flush, sodium chloride, heparin flush, ipratropium-albuterol, sodium chloride flush, sodium chloride, morphine **OR** morphine, oxyCODONE **OR** oxyCODONE, ondansetron **OR** ondansetron    OBJECTIVE:  BP (!) 144/62   Pulse 85   Temp 98.7 °F (37.1 °C) (Temporal)   Resp 17   Ht 5' 5\" (1.651 m)   Wt 240 lb (108.9 kg)   SpO2 96%   BMI 39.94 kg/m²   Temp  Av.2 °F (36.8 °C)  Min: 97.8 °F (36.6 °C)  Max: 98.7 °F (37.1 °C)  Constitutional: The patient is awake, alert, and oriented. Skin: Warm and dry. No rashes were noted. No jaundice. HEENT: Eyes show round, and reactive pupils. Moist mucous membranes, no ulcerations, no thrush. Neck: Supple to movements. No lymphadenopathy. Chest: No use of accessory muscles to breathe. Symmetrical expansion. Auscultation reveals no wheezing, crackles, or rhonchi. Cardiovascular: S1 and S2 are rhythmic and regular. No murmurs appreciated. Abdomen: Positive bowel sounds to auscultation. Benign to palpation. No masses felt. No hepatosplenomegaly. Genitourinary: Deferred  Extremities: No clubbing, no cyanosis, no edema. Musculoskeletal: Left knee bandaged.   Neurological: Following commands, no focal neurodeficit  Lines: peripheral    Laboratory and Tests Review:  Lab Results   Component Value Date    WBC 7.9 02/26/2023    WBC 8.2 02/25/2023    WBC 9.9 02/24/2023    HGB 7.7 (L) 02/26/2023    HCT 25.2 (L) 02/26/2023    MCV 93.0 02/26/2023     02/26/2023     Lab Results   Component Value Date    NEUTROABS 5.71 02/26/2023    NEUTROABS 5.96 02/25/2023    NEUTROABS 7.63 (H) 02/24/2023     No results found for: [facility]  Lab Results   Component Value Date    ALT 11 07/21/2022    AST 15 07/21/2022    ALKPHOS 68 07/21/2022    BILITOT 0.8 07/21/2022     Lab Results   Component Value Date/Time     02/27/2023 06:35 AM    K 3.4 02/27/2023 06:35 AM    K 5.3 01/24/2023 11:35 AM     02/27/2023 06:35 AM    CO2 25 02/27/2023 06:35 AM    BUN 9 02/27/2023 06:35 AM    CREATININE 0.8 02/27/2023 06:35 AM    CREATININE 0.7 02/26/2023 05:12 AM    CREATININE 0.7 02/25/2023 05:58 AM    GFRAA >60 09/01/2022 10:40 AM    LABGLOM >60 02/27/2023 06:35 AM    GLUCOSE 124 02/27/2023 06:35 AM    GLUCOSE 156 04/28/2012 02:00 AM    PROT 7.0 07/21/2022 12:47 PM    LABALBU 3.4 02/24/2023 04:13 AM    LABALBU 4.8 04/28/2012 02:00 AM    CALCIUM 7.8 02/27/2023 06:35 AM    BILITOT 0.8 07/21/2022 12:47 PM    ALKPHOS 68 07/21/2022 12:47 PM    AST 15 07/21/2022 12:47 PM    ALT 11 07/21/2022 12:47 PM     No results found for: CRP  Lab Results   Component Value Date    SEDRATE 4 01/08/2018     Radiology:      Microbiology:   Lab Results   Component Value Date/Time    BC 5 Days no growth 08/29/2020 09:14 AM    ORG Staphylococcus coagulase-negative 02/21/2023 09:35 AM    ORG Staphylococcus coagulase-negative 02/21/2023 09:32 AM    ORG Escherichia coli 08/29/2020 09:04 AM    ORG Corynebacterium species 08/29/2020 09:04 AM     Lab Results   Component Value Date/Time    ORG Staphylococcus coagulase-negative 02/21/2023 09:35 AM    ORG Staphylococcus coagulase-negative 02/21/2023 09:32 AM    ORG Escherichia coli 08/29/2020 09:04 AM    ORG Corynebacterium species 08/29/2020 09:04 AM     No results found for: WNDABS  Smear, Respiratory   Date Value Ref Range Status   09/22/2019   Final    Group 5: >25 PMN's/LPF and <10 Epithelial cells/LPF  Moderate Polymorphonuclear leukocytes  Epithelial cells not seen  Moderate Gram negative diplococci  Few Gram positive cocci in clusters  Rare yeast  Rare gram positive rods Diphtheroid-like           Component Value Date/Time    FUNGSM No Fungal elements seen 02/21/2023 0935     CULTURE, RESPIRATORY   Date Value Ref Range Status   09/22/2019 Oral Pharyngeal Yvonne present  Final     No results found for: CXCATHTIP  No results found for: BFCS  Culture Surgical   Date Value Ref Range Status   02/21/2023   Final    Neisseria gonorrhoeae not isolated  Growth not present     02/21/2023   Final    Neisseria gonorrhoeae not isolated  Growth not present       Urine Culture, Routine   Date Value Ref Range Status   08/29/2020 >100,000 CFU/ml  Final   08/29/2020 50,000 CFU/ml  Final     MRSA Culture Only   Date Value Ref Range Status   01/18/2023 Methicillin resistant Staph aureus not isolated  Final   09/07/2016 Methicillin resistant Staph aureus not isolated  Final     Component 2/21/23 0935    Anaerobic Culture Anaerobes not isolated Abnormal     Organism Staphylococcus coagulase-negative Abnormal     Anaerobic Culture One colony   Organism isolated from anaerobic culture.     Resulting Agency [address] [facility]           Narrative  Performed by: [address] [place] [address] Lab  Source: TISSU       Site: DEEP KNEE                Specimen Collected: 02/21/23 09:35 EST Last Resulted: 02/24/23 12:27 EST                Assessment:  Left knee prosthetic joint infection s/p I & D and poly exchange ( 2/21)   ( CONS )        Plan:    Continue vancomycin 1750 mg IV q 24 hrs for 6 weeks   rifampin not given due to drug interaction with apixaban   Monitor labs ( sed rate , CRP, Cr )   Follow up in 1-2 weeks       Macarena Ellison MD  11:03 AM  2/27/2023

## 2023-02-28 LAB
ALBUMIN SERPL-MCNC: 3.3 G/DL (ref 3.5–5.2)
ALP BLD-CCNC: 96 U/L (ref 35–104)
ALT SERPL-CCNC: 8 U/L (ref 0–32)
ANION GAP SERPL CALCULATED.3IONS-SCNC: 12 MMOL/L (ref 7–16)
AST SERPL-CCNC: 19 U/L (ref 0–31)
BILIRUB SERPL-MCNC: 0.5 MG/DL (ref 0–1.2)
BUN BLDV-MCNC: 13 MG/DL (ref 6–23)
CALCIUM SERPL-MCNC: 8.9 MG/DL (ref 8.6–10.2)
CHLORIDE BLD-SCNC: 101 MMOL/L (ref 98–107)
CHOLESTEROL, TOTAL: 143 MG/DL (ref 0–199)
CO2: 26 MMOL/L (ref 22–29)
CREAT SERPL-MCNC: 1.5 MG/DL (ref 0.5–1)
GFR SERPL CREATININE-BSD FRML MDRD: 35 ML/MIN/1.73
GLUCOSE BLD-MCNC: 100 MG/DL (ref 74–99)
HCT VFR BLD CALC: 24.2 % (ref 34–48)
HDLC SERPL-MCNC: 55 MG/DL
HEMOGLOBIN: 7.4 G/DL (ref 11.5–15.5)
LDL CHOLESTEROL CALCULATED: 62 MG/DL (ref 0–99)
MCH RBC QN AUTO: 28.8 PG (ref 26–35)
MCHC RBC AUTO-ENTMCNC: 30.6 % (ref 32–34.5)
MCV RBC AUTO: 94.2 FL (ref 80–99.9)
PDW BLD-RTO: 14.3 FL (ref 11.5–15)
PLATELET # BLD: 194 E9/L (ref 130–450)
PMV BLD AUTO: 11.6 FL (ref 7–12)
POTASSIUM SERPL-SCNC: 4.1 MMOL/L (ref 3.5–5)
RBC # BLD: 2.57 E12/L (ref 3.5–5.5)
SODIUM BLD-SCNC: 139 MMOL/L (ref 132–146)
TOTAL PROTEIN: 6 G/DL (ref 6.4–8.3)
TRIGL SERPL-MCNC: 128 MG/DL (ref 0–149)
TSH SERPL DL<=0.05 MIU/L-ACNC: 7.55 UIU/ML (ref 0.27–4.2)
VANCOMYCIN TROUGH: 21 MCG/ML (ref 5–16)
VITAMIN D 25-HYDROXY: 31 NG/ML (ref 30–100)
VLDLC SERPL CALC-MCNC: 26 MG/DL
WBC # BLD: 9.8 E9/L (ref 4.5–11.5)

## 2023-03-01 LAB
ANION GAP SERPL CALCULATED.3IONS-SCNC: 11 MMOL/L (ref 7–16)
BUN BLDV-MCNC: 15 MG/DL (ref 6–23)
CALCIUM SERPL-MCNC: 9.2 MG/DL (ref 8.6–10.2)
CHLORIDE BLD-SCNC: 99 MMOL/L (ref 98–107)
CO2: 27 MMOL/L (ref 22–29)
CREAT SERPL-MCNC: 1.9 MG/DL (ref 0.5–1)
GFR SERPL CREATININE-BSD FRML MDRD: 26 ML/MIN/1.73
GLUCOSE BLD-MCNC: 96 MG/DL (ref 74–99)
POTASSIUM SERPL-SCNC: 4.5 MMOL/L (ref 3.5–5)
SODIUM BLD-SCNC: 137 MMOL/L (ref 132–146)

## 2023-03-03 LAB — VANCOMYCIN TROUGH: 29.3 MCG/ML (ref 5–16)

## 2023-03-06 DIAGNOSIS — Z96.652 S/P TOTAL KNEE REPLACEMENT, LEFT: Primary | ICD-10-CM

## 2023-03-06 LAB
ALBUMIN SERPL-MCNC: 3.7 G/DL (ref 3.5–5.2)
ALP BLD-CCNC: 97 U/L (ref 35–104)
ALT SERPL-CCNC: 9 U/L (ref 0–32)
ANION GAP SERPL CALCULATED.3IONS-SCNC: 12 MMOL/L (ref 7–16)
AST SERPL-CCNC: 16 U/L (ref 0–31)
BASOPHILS ABSOLUTE: 0.05 E9/L (ref 0–0.2)
BASOPHILS RELATIVE PERCENT: 0.6 % (ref 0–2)
BILIRUB SERPL-MCNC: 0.5 MG/DL (ref 0–1.2)
BUN BLDV-MCNC: 20 MG/DL (ref 6–23)
C-REACTIVE PROTEIN: 3.4 MG/DL (ref 0–0.4)
CALCIUM SERPL-MCNC: 8.4 MG/DL (ref 8.6–10.2)
CHLORIDE BLD-SCNC: 101 MMOL/L (ref 98–107)
CO2: 25 MMOL/L (ref 22–29)
CREAT SERPL-MCNC: 1.5 MG/DL (ref 0.5–1)
EOSINOPHILS ABSOLUTE: 0.18 E9/L (ref 0.05–0.5)
EOSINOPHILS RELATIVE PERCENT: 2.2 % (ref 0–6)
FUNGUS (MYCOLOGY) CULTURE: NORMAL
FUNGUS (MYCOLOGY) CULTURE: NORMAL
FUNGUS STAIN: NORMAL
FUNGUS STAIN: NORMAL
GFR SERPL CREATININE-BSD FRML MDRD: 35 ML/MIN/1.73
GLUCOSE BLD-MCNC: 116 MG/DL (ref 74–99)
HCT VFR BLD CALC: 24.4 % (ref 34–48)
HEMOGLOBIN: 7.4 G/DL (ref 11.5–15.5)
IMMATURE GRANULOCYTES #: 0.04 E9/L
IMMATURE GRANULOCYTES %: 0.5 % (ref 0–5)
LYMPHOCYTES ABSOLUTE: 0.84 E9/L (ref 1.5–4)
LYMPHOCYTES RELATIVE PERCENT: 10.3 % (ref 20–42)
MCH RBC QN AUTO: 27.7 PG (ref 26–35)
MCHC RBC AUTO-ENTMCNC: 30.3 % (ref 32–34.5)
MCV RBC AUTO: 91.4 FL (ref 80–99.9)
MONOCYTES ABSOLUTE: 0.57 E9/L (ref 0.1–0.95)
MONOCYTES RELATIVE PERCENT: 7 % (ref 2–12)
NEUTROPHILS ABSOLUTE: 6.46 E9/L (ref 1.8–7.3)
NEUTROPHILS RELATIVE PERCENT: 79.4 % (ref 43–80)
PDW BLD-RTO: 14.5 FL (ref 11.5–15)
PLATELET # BLD: 210 E9/L (ref 130–450)
PMV BLD AUTO: 11.6 FL (ref 7–12)
POTASSIUM SERPL-SCNC: 3.8 MMOL/L (ref 3.5–5)
RBC # BLD: 2.67 E12/L (ref 3.5–5.5)
SEDIMENTATION RATE, ERYTHROCYTE: 50 MM/HR (ref 0–20)
SODIUM BLD-SCNC: 138 MMOL/L (ref 132–146)
TOTAL PROTEIN: 6.7 G/DL (ref 6.4–8.3)
WBC # BLD: 8.1 E9/L (ref 4.5–11.5)

## 2023-03-07 ENCOUNTER — OFFICE VISIT (OUTPATIENT)
Dept: ORTHOPEDIC SURGERY | Age: 78
End: 2023-03-07

## 2023-03-07 VITALS — WEIGHT: 240 LBS | HEIGHT: 65 IN | BODY MASS INDEX: 39.99 KG/M2

## 2023-03-07 DIAGNOSIS — Z96.652 S/P TOTAL KNEE REPLACEMENT, LEFT: ICD-10-CM

## 2023-03-07 DIAGNOSIS — M17.12 PRIMARY OSTEOARTHRITIS OF LEFT KNEE: Primary | ICD-10-CM

## 2023-03-07 LAB
AFB CULTURE (MYCOBACTERIA): NORMAL
AFB CULTURE (MYCOBACTERIA): NORMAL
AFB SMEAR: NORMAL
AFB SMEAR: NORMAL
VANCOMYCIN RANDOM: 32.6 MCG/ML (ref 5–40)

## 2023-03-07 PROCEDURE — 99024 POSTOP FOLLOW-UP VISIT: CPT | Performed by: ORTHOPAEDIC SURGERY

## 2023-03-07 RX ORDER — SENNOSIDES 8.6 MG
650 CAPSULE ORAL EVERY 4 HOURS PRN
COMMUNITY

## 2023-03-07 RX ORDER — ASCORBIC ACID 500 MG
500 TABLET ORAL 2 TIMES DAILY
COMMUNITY

## 2023-03-07 RX ORDER — LOSARTAN POTASSIUM 100 MG/1
100 TABLET ORAL DAILY
COMMUNITY

## 2023-03-07 NOTE — PATIENT INSTRUCTIONS
Weightbearing as tolerated left lower extremity. Okay to stop wearing knee brace. IV antibiotics per ID. Follow-up with Dr. Estela Espinoza in 2 weeks. Call if any questions or concerns.

## 2023-03-08 NOTE — PROGRESS NOTES
Chief Complaint   Patient presents with    Post-Op Check     F/u 4 weeks SE LT knee ZULY 01/23/23, no pain just occasional spasms       SUBJECTIVE: Patient is a very pleasant 77-year-old female status post irrigation debridement of left total knee arthroplasty original surgery performed on 1/23/2023 and irrigation debridement and poly exchange performed on 2/21/2023 by Dr. Esucdero.  Patient states she is doing well she is currently in a facility.  She is receiving antibiotics.  She denies any fevers or chills.  She states her knee feels much better.  She denies any drainage as well.  Denies any falls or traumas.      Review of Systems -   General ROS: negative for - chills, fatigue, fever or night sweats  Respiratory ROS: no cough, shortness of breath, or wheezing  Cardiovascular ROS: no chest pain or dyspnea on exertion  Gastrointestinal ROS: no abdominal pain, change in bowel habits, or black or bloody stools  Genitourinary: no hematuria, dysuria, or incontinence   Musculoskeletal ROS:see above  Neurological ROS: no TIA or stroke symptoms       OBJECTIVE:   Alert and oriented X 3, no acute distress, respirations easy and unlabored with no audible wheezes, skin warm and dry, speech and dress appropriate for noted age, affect euthymic.    Extremity:  Left knee  Sutures removed today, skin intact with no drainage  No erythema or warmth  Patient's range of motion is -3 to 90 degrees with a soft endpoint  Patient's knee is stable to varus valgus at full extension and 30 degrees of flexion  She has a negative anterior posterior draw  Compartments are soft compressible  Calves are soft nontender  Patient fires her L4, L5, and S1 nerve roots well  Sensation is grossly intact distally  She has palpable DP and PT pulses and capillary refill less than 3 seconds    XR: 3/8/23   X-rays show appropriately aligned left total knee arthroplasty with no complicating features obvious.    Ht 5' 5\" (1.651 m)   Wt 240 lb (108.9 kg)    BMI 39.94 kg/m²     ASSESSMENT:     Diagnosis Orders   1. Primary osteoarthritis of left knee        2. S/P total knee replacement, left            PLAN:  Continue antibiotics    Start weightbearing as tolerated    May discontinue brace    Work aggressively for range of motion    Follow-up with Dr. Elias Morel in 2 weeks in the office, call sooner with any questions or concerns and continue antibiotics.     ELECTRONICALLY signed by:    Sunil Ruth MD  3/8/23

## 2023-03-20 ENCOUNTER — TELEPHONE (OUTPATIENT)
Dept: NON INVASIVE DIAGNOSTICS | Age: 78
End: 2023-03-20

## 2023-03-20 LAB
FUNGUS SPEC CULT: NORMAL
FUNGUS SPEC CULT: NORMAL
LOEFFLER MB STN SPEC: NORMAL
LOEFFLER MB STN SPEC: NORMAL

## 2023-03-20 RX ORDER — METOPROLOL TARTRATE 100 MG/1
100 TABLET ORAL 2 TIMES DAILY
Qty: 60 TABLET | Refills: 5 | Status: SHIPPED | OUTPATIENT
Start: 2023-03-20

## 2023-03-20 RX ORDER — METOPROLOL TARTRATE 100 MG/1
100 TABLET ORAL 2 TIMES DAILY
COMMUNITY
End: 2023-03-20 | Stop reason: SDUPTHER

## 2023-03-20 NOTE — TELEPHONE ENCOUNTER
----- Message from Kate Gao sent at 3/20/2023  8:20 AM EDT -----  Regarding: FW: AF w/ RVR    ----- Message -----  From: Monica Dyer DO  Sent: 3/18/2023   2:30 PM EDT  To: Raymond Hector MA  Subject: AF w/ RVR                                        AF w/ RVR on remote monitor. Recommend increase metoprolol from 50 mg BID  to 100 mg BID.     Monica Dyer DO

## 2023-03-20 NOTE — TELEPHONE ENCOUNTER
Spoke to the patient and informed her of the monitor results and the recommendations. The patient is agreeable to increase her medication. Updated med list in the computer and sent to NP for refill.

## 2023-03-21 LAB
ACID FAST STN SPEC QL: NORMAL
ACID FAST STN SPEC QL: NORMAL
MYCOBACTERIUM SPEC CULT: NORMAL
MYCOBACTERIUM SPEC CULT: NORMAL

## 2023-03-22 ENCOUNTER — OFFICE VISIT (OUTPATIENT)
Dept: ORTHOPEDIC SURGERY | Age: 78
End: 2023-03-22

## 2023-03-22 VITALS — BODY MASS INDEX: 39.99 KG/M2 | HEIGHT: 65 IN | WEIGHT: 240 LBS

## 2023-03-22 DIAGNOSIS — M17.12 PRIMARY OSTEOARTHRITIS OF LEFT KNEE: ICD-10-CM

## 2023-03-22 DIAGNOSIS — T81.31XA POSTOPERATIVE WOUND DEHISCENCE, INITIAL ENCOUNTER: ICD-10-CM

## 2023-03-22 DIAGNOSIS — G89.29 CHRONIC PAIN OF LEFT KNEE: ICD-10-CM

## 2023-03-22 DIAGNOSIS — M25.562 CHRONIC PAIN OF LEFT KNEE: ICD-10-CM

## 2023-03-22 DIAGNOSIS — Z96.652 S/P TOTAL KNEE REPLACEMENT, LEFT: Primary | ICD-10-CM

## 2023-03-22 PROCEDURE — 99024 POSTOP FOLLOW-UP VISIT: CPT | Performed by: STUDENT IN AN ORGANIZED HEALTH CARE EDUCATION/TRAINING PROGRAM

## 2023-03-22 RX ORDER — LANOLIN ALCOHOL/MO/W.PET/CERES
400 CREAM (GRAM) TOPICAL DAILY
COMMUNITY
Start: 2023-03-08

## 2023-03-22 RX ORDER — MULTIVITAMIN
1 TABLET ORAL DAILY
COMMUNITY
Start: 2023-03-08

## 2023-03-22 RX ORDER — VANCOMYCIN 1.5 G/300ML
INJECTION, SOLUTION INTRAVENOUS
COMMUNITY
Start: 2023-03-06

## 2023-03-22 RX ORDER — VANCOMYCIN HYDROCHLORIDE 1 G/20ML
INJECTION, POWDER, LYOPHILIZED, FOR SOLUTION INTRAVENOUS
COMMUNITY
Start: 2023-03-15 | End: 2023-03-22

## 2023-03-22 NOTE — PROGRESS NOTES
Follow Up Post Operative Visit     Surgery: Left knee irrigation debridement with polyethylene exchange  Date: 2023    Subjective:    Cristiana Valadez is here for follow up visit s/p above procedure. She is doing well. She has been living at home doing IV antibiotics with her . He states that she has been somewhat lethargic and sleepy since coming back from the hospital.  She was seen by my partner 2 weeks ago and her first postoperative appointment. She is about 2 weeks of antibiotics left. Overall she is doing well and is happy with her results. Denies fever and chills. Denies any drainage from her knees. Controlled Substances Monitoring:        Physical Exam:    Height: 5' 5\" (1.651 m), Weight: 240 lb (108.9 kg)    General: Alert and oriented x3, no acute distress  Cardiovascular/pulmonary: No labored breathing, peripheral perfusion intact  Musculoskeletal:    Left knee: Incision well approximated without signs of infection. The knee is not warm or swollen. She has range of motion from 0 to 95 degrees. The knee is stable to varus valgus stress testing. Calf soft compressible. No signs of continued infection. Imagin views of her left knee from her last visit demonstrate well-placed total knee arthroplasty without signs of complication    Assessment and Plan: 4-week status post left knee irrigation debridement polyethylene exchange  -She continues her IV vancomycin for periprosthetic infection. She appears to be doing well and keeping this infection suppressed. 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 She will finish her IV antibiotics. I will see her back in 4 weeks. She will continue outpatient therapy which I will give her prescription for. She is happy with her results and is doing well overall. I will see her back in 1 month to see how she is doing.   At

## 2023-03-23 ENCOUNTER — HOSPITAL ENCOUNTER (EMERGENCY)
Age: 78
Discharge: HOME | End: 2023-03-23
Attending: STUDENT IN AN ORGANIZED HEALTH CARE EDUCATION/TRAINING PROGRAM | Admitting: STUDENT IN AN ORGANIZED HEALTH CARE EDUCATION/TRAINING PROGRAM
Payer: MEDICARE

## 2023-03-23 ENCOUNTER — APPOINTMENT (OUTPATIENT)
Dept: GENERAL RADIOLOGY | Age: 78
End: 2023-03-23
Payer: MEDICARE

## 2023-03-23 VITALS
TEMPERATURE: 97.9 F | HEART RATE: 60 BPM | BODY MASS INDEX: 39.99 KG/M2 | SYSTOLIC BLOOD PRESSURE: 172 MMHG | DIASTOLIC BLOOD PRESSURE: 68 MMHG | HEIGHT: 65 IN | WEIGHT: 240 LBS | OXYGEN SATURATION: 99 % | RESPIRATION RATE: 16 BRPM

## 2023-03-23 DIAGNOSIS — Z45.89 ENCOUNTER FOR MANAGEMENT OF PERIPHERALLY INSERTED CENTRAL CATHETER (PICC): ICD-10-CM

## 2023-03-23 DIAGNOSIS — T82.524A DISPLACEMENT OF PERIPHERALLY INSERTED CENTRAL CATHETER (PICC) (HCC): Primary | ICD-10-CM

## 2023-03-23 LAB
ALBUMIN SERPL-MCNC: 3.7 G/DL (ref 3.5–5.2)
ALP SERPL-CCNC: 94 U/L (ref 35–104)
ALT SERPL-CCNC: 9 U/L (ref 0–32)
ANION GAP SERPL CALCULATED.3IONS-SCNC: 9 MMOL/L (ref 7–16)
ANISOCYTOSIS: ABNORMAL
AST SERPL-CCNC: 16 U/L (ref 0–31)
BASOPHILS # BLD: 0.05 E9/L (ref 0–0.2)
BASOPHILS NFR BLD: 0.6 % (ref 0–2)
BILIRUB SERPL-MCNC: 0.5 MG/DL (ref 0–1.2)
BUN SERPL-MCNC: 22 MG/DL (ref 6–23)
CALCIUM SERPL-MCNC: 9 MG/DL (ref 8.6–10.2)
CHLORIDE SERPL-SCNC: 100 MMOL/L (ref 98–107)
CO2 SERPL-SCNC: 27 MMOL/L (ref 22–29)
CREAT SERPL-MCNC: 1 MG/DL (ref 0.5–1)
EOSINOPHIL # BLD: 0.23 E9/L (ref 0.05–0.5)
EOSINOPHIL NFR BLD: 2.7 % (ref 0–6)
ERYTHROCYTE [DISTWIDTH] IN BLOOD BY AUTOMATED COUNT: 15.4 FL (ref 11.5–15)
GLUCOSE SERPL-MCNC: 110 MG/DL (ref 74–99)
HCT VFR BLD AUTO: 25.7 % (ref 34–48)
HGB BLD-MCNC: 7.4 G/DL (ref 11.5–15.5)
HYPOCHROMIA: ABNORMAL
IMM GRANULOCYTES # BLD: 0.01 E9/L
IMM GRANULOCYTES NFR BLD: 0.1 % (ref 0–5)
LYMPHOCYTES # BLD: 1.05 E9/L (ref 1.5–4)
LYMPHOCYTES NFR BLD: 12.2 % (ref 20–42)
MCH RBC QN AUTO: 27.4 PG (ref 26–35)
MCHC RBC AUTO-ENTMCNC: 28.8 % (ref 32–34.5)
MCV RBC AUTO: 95.2 FL (ref 80–99.9)
MONOCYTES # BLD: 0.7 E9/L (ref 0.1–0.95)
MONOCYTES NFR BLD: 8.1 % (ref 2–12)
NEUTROPHILS # BLD: 6.59 E9/L (ref 1.8–7.3)
NEUTS SEG NFR BLD: 76.3 % (ref 43–80)
OVALOCYTES: ABNORMAL
PLATELET # BLD AUTO: 210 E9/L (ref 130–450)
PMV BLD AUTO: 11.3 FL (ref 7–12)
POIKILOCYTES: ABNORMAL
POLYCHROMASIA: ABNORMAL
POTASSIUM SERPL-SCNC: 4.7 MMOL/L (ref 3.5–5)
PROT SERPL-MCNC: 7.5 G/DL (ref 6.4–8.3)
RBC # BLD AUTO: 2.7 E12/L (ref 3.5–5.5)
SODIUM SERPL-SCNC: 136 MMOL/L (ref 132–146)
VANCOMYCIN TROUGH SERPL-MCNC: 12.4 MCG/ML (ref 5–16)
WBC # BLD: 8.6 E9/L (ref 4.5–11.5)

## 2023-03-23 PROCEDURE — 76937 US GUIDE VASCULAR ACCESS: CPT

## 2023-03-23 PROCEDURE — 36569 INSJ PICC 5 YR+ W/O IMAGING: CPT

## 2023-03-23 PROCEDURE — C1751 CATH, INF, PER/CENT/MIDLINE: HCPCS

## 2023-03-23 PROCEDURE — 80053 COMPREHEN METABOLIC PANEL: CPT

## 2023-03-23 PROCEDURE — 2580000003 HC RX 258: Performed by: NURSE PRACTITIONER

## 2023-03-23 PROCEDURE — 2500000003 HC RX 250 WO HCPCS: Performed by: NURSE PRACTITIONER

## 2023-03-23 PROCEDURE — 36415 COLL VENOUS BLD VENIPUNCTURE: CPT

## 2023-03-23 PROCEDURE — 80202 ASSAY OF VANCOMYCIN: CPT

## 2023-03-23 PROCEDURE — 71045 X-RAY EXAM CHEST 1 VIEW: CPT

## 2023-03-23 PROCEDURE — 99284 EMERGENCY DEPT VISIT MOD MDM: CPT

## 2023-03-23 PROCEDURE — 85025 COMPLETE CBC W/AUTO DIFF WBC: CPT

## 2023-03-23 RX ORDER — LIDOCAINE HYDROCHLORIDE 10 MG/ML
5 INJECTION, SOLUTION EPIDURAL; INFILTRATION; INTRACAUDAL; PERINEURAL ONCE
Status: COMPLETED | OUTPATIENT
Start: 2023-03-23 | End: 2023-03-23

## 2023-03-23 RX ORDER — SODIUM CHLORIDE 0.9 % (FLUSH) 0.9 %
5-40 SYRINGE (ML) INJECTION PRN
Status: DISCONTINUED | OUTPATIENT
Start: 2023-03-23 | End: 2023-03-23 | Stop reason: HOSPADM

## 2023-03-23 RX ORDER — SODIUM CHLORIDE 0.9 % (FLUSH) 0.9 %
5-40 SYRINGE (ML) INJECTION EVERY 12 HOURS SCHEDULED
Status: DISCONTINUED | OUTPATIENT
Start: 2023-03-23 | End: 2023-03-23 | Stop reason: HOSPADM

## 2023-03-23 RX ORDER — SODIUM CHLORIDE 9 MG/ML
INJECTION, SOLUTION INTRAVENOUS PRN
Status: DISCONTINUED | OUTPATIENT
Start: 2023-03-23 | End: 2023-03-23 | Stop reason: HOSPADM

## 2023-03-23 RX ADMIN — LIDOCAINE HYDROCHLORIDE 0.5 ML: 10 SOLUTION INTRAVENOUS at 16:58

## 2023-03-23 RX ADMIN — SODIUM CHLORIDE, PRESERVATIVE FREE 10 ML: 5 INJECTION INTRAVENOUS at 16:57

## 2023-03-23 ASSESSMENT — PAIN - FUNCTIONAL ASSESSMENT: PAIN_FUNCTIONAL_ASSESSMENT: NONE - DENIES PAIN

## 2023-03-23 NOTE — PROCEDURES
PICC    Catheter insertion date: 3/23/2023     Product Number:  [id number]   Lot No: [id number]   Gauge: 17   Lumen: single   R Cephalic    Vein Diameter: 0.50cm   Arm circumference at insertion site: 46cm   Catheter Length: 42cm   Internal Length: 41cm   External Catheter Length: 1cm   Ultrasound Used: yes  VPS Blue [place] confirms PICC tip is placed in the lower 1/3 of the SVC or at the Cavoatrial junction. Floor nurse notified PICC is okay to use.    : Antwon Gonzales RN   ASSISTANT: DREW Khan-BC

## 2023-03-23 NOTE — ED NOTES
Discharge instructions reviewed with pt and she verbalized understanding. Will follow up with primary care.       Tremaine Curry RN  03/23/23 8312

## 2023-03-23 NOTE — ED PROVIDER NOTES
Pertinent positives and negatives are stated within HPI, all other systems reviewed and are negative. Past Medical History:  has a past medical history of Arthritis, Atrial fibrillation ([address] Bilateral carotid artery stenosis, Bronchitis, Carotid stenosis, bilateral, Hyperlipidemia, Hypertension, Left carotid stenosis, O2 dependent, On continuous oral anticoagulation, S/P carotid endarterectomy, Stomach ulcer, and Thyroid disease. Surgical History:  has a past surgical history that includes Hysterectomy; Cholecystectomy; Appendectomy; back surgery; Colonoscopy; joint replacement (2011); eye surgery; Endoscopy, colon, diagnostic (01/10/2018); Pacemaker insertion (Left, 08/31/2022); Total knee arthroplasty (Left, 1/23/2023); and knee surgery (Left, 2/21/2023). Social History:  reports that she quit smoking about 4 years ago. Her smoking use included cigarettes. She has a 30.00 pack-year smoking history. She has never used smokeless tobacco. She reports that she does not currently use alcohol. She reports that she does not use drugs. Family History: family history includes Cancer in her sister; Other in her mother. Allergies: Patient has no known allergies. Physical Exam   Oxygen Saturation Interpretation: Normal.        ED Triage Vitals [03/23/23 1222]   BP Temp Temp Source Heart Rate Resp SpO2 Height Weight   (!) 172/68 97.9 °F (36.6 °C) Tympanic 60 16 99 % 5' 5\" (1.651 m) 240 lb (108.9 kg)         Constitutional:  Alert, development consistent with age. HEENT:  NC/NT. Airway patent. Neck:  Normal ROM. Supple. Respiratory:  Clear to auscultation and breath sounds equal.    CV: Regular rate and rhythm, normal heart sounds, without pathological murmurs, ectopy, gallops, or rubs. GI:  normal appearing, non-distended with no visible hernias. Bowel sounds: normal bowel sounds. Tenderness: No abdominal tenderness, guarding, rebound, rigidity or pulsatile mass. .        Liver: had any fevers, denies any accidental dislodgment of the line. Differentials considered include displacement of PICC line, clot occlusion of PICC line. Chest x-ray was ordered to evaluate placement of PICC line prior to use of Activase. On my review of chest x-ray and verified by radiology the PICC line is not deeply into the SVC, it is just at the junction of the SVC and the right IJ, I discussed with patient that this is not optimal placement as she is going home. I discussed with her that I would like to have the PICC line replaced, she is agreeable. The PICC line was replaced with a single-lumen PICC to the right cephalic vein by IV team.  Labs were drawn and sent, these were noted as above. Amount and/or Complexity of Data Reviewed  Labs: ordered. Decision-making details documented in ED Course. Radiology: ordered. Decision-making details documented in ED Course. Risk  Risk Details: Patient was explicitly instructed on specific signs and symptoms on which to return to the emergency room for. Patient was instructed to return to the ER for any new or worsening symptoms. Additional discharge instructions were given verbally. All questions were answered. Patient is comfortable and agreeable with discharge plan. Patient in no acute distress and non-toxic in appearance. Plan of Care/Counseling:  GIO Mann CNP reviewed today's visit with the patient in addition to providing specific details for the plan of care and counseling regarding the diagnosis and prognosis. Questions are answered at this time and are agreeable with the plan. Assessment      1. Displacement of peripherally inserted central catheter (PICC) ([address]    2. Encounter for management of peripherally inserted central catheter (PICC)      Plan   Discharged home.   Patient condition is good    New Medications     New Prescriptions    No medications on file     Electronically signed by GIO Mann CNP

## 2023-03-23 NOTE — DISCHARGE INSTRUCTIONS
Please continue the PICC line flushes as instructed. Your Vanco trough was completed, please contact infectious disease for continued dosing of vancomycin.

## 2023-04-19 ENCOUNTER — OFFICE VISIT (OUTPATIENT)
Dept: ORTHOPEDIC SURGERY | Age: 78
End: 2023-04-19

## 2023-04-19 VITALS — BODY MASS INDEX: 39.99 KG/M2 | HEIGHT: 65 IN | WEIGHT: 240 LBS

## 2023-04-19 DIAGNOSIS — Z96.652 S/P TOTAL KNEE REPLACEMENT, LEFT: Primary | ICD-10-CM

## 2023-04-19 PROCEDURE — 99024 POSTOP FOLLOW-UP VISIT: CPT | Performed by: STUDENT IN AN ORGANIZED HEALTH CARE EDUCATION/TRAINING PROGRAM

## 2023-04-19 RX ORDER — DIGOXIN 125 MCG
125 TABLET ORAL DAILY
COMMUNITY
End: 2023-04-19 | Stop reason: SDUPTHER

## 2023-04-19 RX ORDER — DIGOXIN 125 MCG
125 TABLET ORAL DAILY
Qty: 30 TABLET | Refills: 11 | Status: SHIPPED | OUTPATIENT
Start: 2023-04-19

## 2023-04-21 ENCOUNTER — EVALUATION (OUTPATIENT)
Dept: PHYSICAL THERAPY | Age: 78
End: 2023-04-21

## 2023-04-21 DIAGNOSIS — Z96.652 S/P TOTAL KNEE REPLACEMENT, LEFT: Primary | ICD-10-CM

## 2023-04-21 NOTE — PROGRESS NOTES
pending MD visit [] Discharge    Plan for Next Session:  continue to progress L knee strength and ROM      Electronically signed by:  Terrell Vale PT, DPT  [id number]
(ASCORBIC ACID) 500 MG tablet Take 1 tablet by mouth 2 times daily      magnesium hydroxide (MILK OF MAGNESIA) 400 MG/5ML suspension Take 30 mLs by mouth daily as needed for Constipation      ANORO ELLIPTA 62.5-25 MCG/ACT AEPB Inhale 1 puff into the lungs daily Uses in the evening      Multiple Vitamins-Minerals (THERAPEUTIC MULTIVITAMIN-MINERALS) tablet Take 1 tablet by mouth daily      Cranberry 1000 MG CAPS Take 1 capsule by mouth daily      sucralfate (CARAFATE) 1 GM/10ML suspension TAKE 10 ML BY MOUTH ONCE DAILY AS NEEDED      Vitamin D (CHOLECALCIFEROL) 25 MCG (1000 UT) TABS tablet Take 1 tablet by mouth daily      OXYGEN Inhale 2 L/min into the lungs continuous      dofetilide (TIKOSYN) 500 MCG capsule Take 1 capsule by mouth every 12 hours (Patient taking differently: Take 1 capsule by mouth 2 times daily) 60 capsule 3    levothyroxine (SYNTHROID) 150 MCG tablet TAKE 1 TABLET BY MOUTH ONCE DAILY      apixaban (ELIQUIS) 5 MG TABS tablet Take 1 tablet by mouth 2 times daily 60 tablet 2    valsartan (DIOVAN) 160 MG tablet Take 1 tablet by mouth daily 30 tablet 3    venlafaxine (EFFEXOR XR) 150 MG extended release capsule Take 2 capsules by mouth daily      atorvastatin (LIPITOR) 20 MG tablet Take 1 tablet by mouth daily       No current facility-administered medications for this visit. Occupation: retired. Physical demands include:  N/A . Status:  N/A . Exercise regimen: none    Hobbies: reading, spending time with family at sporting events, playing games on computer, DIY projects    Patient Goals: pain relief, get back to normal, walk normally, learn how to manage condition     Precautions/Contraindications: recent surgery, pacemaker / defibrillator, anti-coagulation medication(eliquis)    OBJECTIVE:     Observations: well nourished female    Inspection:  unable to inspect incision as pt wearing jeans to appt this date.     Edema: mild global    Gait:  very slow gait with SPC in R UE, WBOS with short

## 2023-04-26 ENCOUNTER — TREATMENT (OUTPATIENT)
Dept: PHYSICAL THERAPY | Age: 78
End: 2023-04-26
Payer: MEDICARE

## 2023-04-26 DIAGNOSIS — Z96.652 S/P TOTAL KNEE REPLACEMENT, LEFT: Primary | ICD-10-CM

## 2023-04-26 PROCEDURE — 97110 THERAPEUTIC EXERCISES: CPT

## 2023-04-26 NOTE — PROGRESS NOTES
[facility] [id number]       Treatment/Activity Tolerance:  [x] Patient tolerated treatment well [] Patient limited by fatigue  [] Patient limited by pain  [] Patient limited by other medical complications  [] Other:     Prognosis: [x] Good [] Fair  [] Poor    Patient Requires Follow-up: [x] Yes  [] No    Plan:   [x] Continue per plan of care [] Alter current plan (see comments)  [] Plan of care initiated [] Hold pending MD visit [] Discharge    Plan for Next Session:  continue to progress L knee strength and ROM      Electronically signed by:  Leah Almeida PTA [id number]

## 2023-04-28 NOTE — TELEPHONE ENCOUNTER
I faxed in office pacemaker check to [facility] at [phone] as requested.     Sergo Sosa RN, BSN  [facility] 1 yes

## 2023-05-02 ENCOUNTER — TREATMENT (OUTPATIENT)
Dept: PHYSICAL THERAPY | Age: 78
End: 2023-05-02
Payer: MEDICARE

## 2023-05-02 DIAGNOSIS — Z96.652 S/P TOTAL KNEE REPLACEMENT, LEFT: Primary | ICD-10-CM

## 2023-05-02 PROCEDURE — 97110 THERAPEUTIC EXERCISES: CPT

## 2023-05-02 NOTE — PROGRESS NOTES
[facility]          Phone: [phone] Fax: [phone]    Physical Therapy Daily Treatment Note  Date:  2023    Patient Name:  Vaibhav Singh    :  1945  MRN: [id number]    Evaluating therapist: Nikky Tyler PT, DPT  [id number]    Restrictions/Precautions:      Diagnosis:     Diagnosis Orders   1. S/P total knee replacement, left          Treatment Diagnosis:    Insurance/Certification information:  [facility] Medicare [place] [address]  Referring Physician:  Trey Bernal DO  Plan of care signed (Y/N):    Visit# / total visits:  3/8  (AIM auth 14 visits through 2023)  Pain level: 0/10   Time In:  1032  Time Out:  1110    Subjective:  pt arrived on 2L/min O2. Amb with SPC, WBOS, mildly antalgic. Pt reported no L knee pain , but is having some pain in her LB. She reported this happened when she had her R knee done also    Exercises:  Exercise/Equipment Resistance/Repetitions Other comments     Bike  X 5 mins      Quad set seated w/ stool 10x 5 sec hold      Heel slide seated w/ towel 10x 5 sec hold      LAQ 10x2 5/2 HEP     Sit<>stand 5x2      Seated ABD RTB 3 sec x 10 reps  5/2 HEP     Seated ADD Ball 3 sec x 10 reps  5/2 HEP     Seated knee flex  RTB 3 sec x 10 reps  5/2 HEP     4 \" step ups fwd Pt too fatigued to perform today                                                                              Other Therapeutic Activities:   Pt tolerated TE, and performed with good form. Limited by fatigue and mild SOB. Seated rests PRN    Home Exercise Program:  Heel slides, quad sets, LAQ, sit<>stand. 5/2 HEP provided and reviewed w/ pt. RTB provided.      Manual Treatments:  N/A    Modalities:  N/A     Time-in Time-out Total Time   66912  Evaluation Low Complexity      59473  Evaluation Med Complexity      35600  Evaluation High Complexity      71465  Ther Ex 1032 1110 38   87124  Neuro Re-ed        06803  Ther Activities        35195  Manual Therapy       78494  E-stim

## 2023-05-05 ENCOUNTER — TREATMENT (OUTPATIENT)
Dept: PHYSICAL THERAPY | Age: 78
End: 2023-05-05

## 2023-05-05 DIAGNOSIS — Z96.652 S/P TOTAL KNEE REPLACEMENT, LEFT: Primary | ICD-10-CM

## 2023-05-05 NOTE — PROGRESS NOTES
[facility]          Phone: [phone] Fax: [phone]    Physical Therapy Daily Treatment Note  Date:  2023    Patient Name:  Jagdish Ngo    :  1945  MRN: [id number]    Evaluating therapist: Denisha Clark PT, DPT  [id number]    Restrictions/Precautions:      Diagnosis:     Diagnosis Orders   1. S/P total knee replacement, left            Treatment Diagnosis:    Insurance/Certification information:  [facility] Medicare [place] [address]  Referring Physician:  Shantelle Ward DO  Plan of care signed (Y/N):    Visit# / total visits:    (AIM auth 14 visits through 2023)  Pain level: 0/10   Time In:  1042  Time Out:  1122    Subjective:  Pt arrived on 3L/min O2. Amb with SPC, WBOS, mildly antalgic. Pt reported no L knee pain , but is concerned with persistent nausea when waking. Pt encouraged to call PCP to discuss symptoms. Exercises:  Exercise/Equipment Resistance/Repetitions Other comments     Bike  X 5 mins      Quad set seated w/ stool 10x 5 sec hold      Heel slide seated w/ towel 10x 5 sec hold      LAQ 10x2 5/2 HEP, 2# weight at ankle     Sit<>stand 5x2      Seated ABD RTB 3 sec x 10 reps  5/2 HEP     Seated ADD Ball 3 sec x 10 reps  5/2 HEP     Seated knee flex  RTB 10x 2 5/2 HEP     4 \" step ups fwd X 10 reps w/ 2 rails                                                                               Other Therapeutic Activities:   Pt tolerated progression of TE's with decreased rest breaks required. She tolerated increased resistance with hip abduction and LAQ without issues. Pt continues to have limited endurance overall. Home Exercise Program:  Heel slides, quad sets, LAQ, sit<>stand. 5/2 HEP provided and reviewed w/ pt. RTB provided.      Manual Treatments:  N/A    Modalities:  N/A     Time-in Time-out Total Time   93678  Evaluation Low Complexity      69578  Evaluation Med Complexity      [id number]  Evaluation High Complexity      73002  Ther Ex 1042

## 2023-05-09 ENCOUNTER — TREATMENT (OUTPATIENT)
Dept: PHYSICAL THERAPY | Age: 78
End: 2023-05-09
Payer: MEDICARE

## 2023-05-09 DIAGNOSIS — Z96.652 S/P TOTAL KNEE REPLACEMENT, LEFT: Primary | ICD-10-CM

## 2023-05-09 PROCEDURE — 97110 THERAPEUTIC EXERCISES: CPT | Performed by: PHYSICAL THERAPIST

## 2023-05-09 NOTE — PROGRESS NOTES
[facility]          Phone: [phone] Fax: [phone]    Physical Therapy Daily Treatment Note  Date:  2023    Patient Name:  Chang Newman    :  1945  MRN: [id number]    Evaluating therapist: Nicole Jc PT, DPT  [id number]    Restrictions/Precautions:      Diagnosis:     Diagnosis Orders   1. S/P total knee replacement, left              Treatment Diagnosis:    Insurance/Certification information:  [facility] Medicare [address]  Referring Physician:  Najma Dominguez DO  Plan of care signed (Y/N):    Visit# / total visits:    (AIM auth 14 visits through 2023)  Pain level: 0/10   Time In:  1048  Time Out:  1127    Subjective:  Pt arrived on 3L/min O2. Amb with SPC, WBOS. Pt reports no pain L knee this morning. She reports she continues to have some LBP. Exercises:  Exercise/Equipment Resistance/Repetitions Other comments     Bike  X 5 mins      Quad set seated w/ stool 10x 5 sec hold      Heel slide seated w/ towel 10x 5 sec hold      LAQ 10x2  2.5# 5/2 HEP, 2# weight at ankle     Sit<>stand 5x2      Seated ABD RTB 3 sec x 15 reps  5/2 HEP     Seated ADD Ball 3 sec x 15 reps  5/2 HEP     Seated knee flex  RTB 10x 2 5/2 HEP     4 \" step ups fwd X 10 reps w/ 2 rails      Calf raises 2 x10   @ // bars                    23  Supine AROM L knee 0-108°              Other Therapeutic Activities:   Patient performs exercises with good effort and pacing. She tolerates progressions well today with minimally increased fatigue (per patient). Home Exercise Program:  Heel slides, quad sets, LAQ, sit<>stand. 5/2 HEP provided and reviewed w/ pt. RTB provided.      Manual Treatments:  N/A    Modalities:  N/A     Time-in Time-out Total Time   53906  Evaluation Low Complexity      61902  Evaluation Med Complexity      39255  Evaluation High Complexity      76270  Ther Ex 1048 1127 39   [id number]  Neuro Re-ed        00578  Ther Activities        23699  Manual

## 2023-05-16 ENCOUNTER — TREATMENT (OUTPATIENT)
Dept: PHYSICAL THERAPY | Age: 78
End: 2023-05-16

## 2023-05-16 DIAGNOSIS — Z96.652 S/P TOTAL KNEE REPLACEMENT, LEFT: Primary | ICD-10-CM

## 2023-05-16 NOTE — PROGRESS NOTES
Pt arrived today for therapy for TKR. Pt reported she didn't feel well. Pt reported to therapist that she has been sleeping from 10 pm to 10 am, and in addition taking 2-3 naps during the day. Pt had light perspiration on neck, and arms were clammy to touch. Pt was wearing her O2. Her BP was 100/60 taken manually as it would not read on Dynam[employer] after several attempts. Pt reported he BP usually runs high. Pt reported she saw her PCP @ [facility] practice yesterday and is being referred to a cardiologist for consult. Evaluating PT was notified of pt symptoms and it was deemed to place pt therapy on hold at this time until evaluated by her physician and cleared to return to therapy.      Dev Jean Baptiste PTA [id number]

## 2023-05-17 ENCOUNTER — TELEPHONE (OUTPATIENT)
Dept: NON INVASIVE DIAGNOSTICS | Age: 78
End: 2023-05-17

## 2023-05-30 RX ORDER — METOPROLOL TARTRATE 100 MG/1
150 TABLET ORAL 2 TIMES DAILY
Qty: 270 TABLET | Refills: 2 | Status: SHIPPED | OUTPATIENT
Start: 2023-05-30

## 2023-05-30 NOTE — TELEPHONE ENCOUNTER
Discussed with Dr Venus Padilla and increased metoprolol to 150 mg BID     Spoke with patient and made changes.    Anshu Ramsey, APRN - CNP

## 2023-06-01 ENCOUNTER — TREATMENT (OUTPATIENT)
Dept: PHYSICAL THERAPY | Age: 78
End: 2023-06-01

## 2023-06-01 DIAGNOSIS — Z96.652 S/P TOTAL KNEE REPLACEMENT, LEFT: Primary | ICD-10-CM

## 2023-06-01 NOTE — PROGRESS NOTES
[facility]          Phone: [phone] Fax: [phone]    Physical Therapy Daily Treatment Note  Date:  2023    Patient Name:  Rowena Davila    :  1945  MRN: [id number]    Evaluating therapist: Oscar Farmer PT, DPT  [id number]    Restrictions/Precautions:      Diagnosis:     Diagnosis Orders   1. S/P total knee replacement, left              Treatment Diagnosis:    Insurance/Certification information:  [facility] Medicare [address]  Referring Physician:  Gabriel Joseph DO  Plan of care signed (Y/N):    Visit# / total visits:    (AIM auth 14 visits through 2023)  Pain level: 0/10   Time In:  1300  Time Out:  1332    Subjective:  Pt arrived on 3L/min O2. Amb with SPC, WBOS. Pt reports no pain L knee . She reports she continues to have some LBP. Exercises:  Exercise/Equipment Resistance/Repetitions Other comments     Bike  X 5 mins      Quad set seated w/ stool 10x 5 sec hold      Heel slide seated w/ towel 10x 5 sec hold      LAQ 10x2  2.5# 5/2 HEP, 2# weight at ankle     Sit<>stand 5x2      Seated ABD RTB 3 sec x 15 reps  5/2 HEP     Seated ADD Ball 3 sec x 15 reps  5/2 HEP     Seated knee flex  RTB 10x 2 5/2 HEP     4 \" step ups fwd X 10 reps w/ 2 rails      Calf raises 2 x10   @ // bars                    23  Supine AROM L knee 0-108°              Other Therapeutic Activities:   Patient performs exercises with good effort and pacing. Reported increased fatigue after session    Home Exercise Program:  Heel slides, quad sets, LAQ, sit<>stand. 5/2 HEP provided and reviewed w/ pt. RTB provided.      Manual Treatments:  N/A    Modalities:  N/A     Time-in Time-out Total Time   59861  Evaluation Low Complexity      27125  Evaluation Med Complexity      30882  Evaluation High Complexity      39501  Ther Ex 1300 1332 32   [id number]  Neuro Re-ed        30878  Ther Activities        06663  Manual Therapy       40911  E-stim       16829  Ultrasound

## 2023-06-02 DIAGNOSIS — I65.23 BILATERAL CAROTID ARTERY STENOSIS: Primary | ICD-10-CM

## 2023-06-02 DIAGNOSIS — Z98.890 S/P CAROTID ENDARTERECTOMY: ICD-10-CM

## 2023-06-05 ENCOUNTER — TREATMENT (OUTPATIENT)
Dept: PHYSICAL THERAPY | Age: 78
End: 2023-06-05
Payer: MEDICARE

## 2023-06-05 DIAGNOSIS — Z96.652 S/P TOTAL KNEE REPLACEMENT, LEFT: Primary | ICD-10-CM

## 2023-06-05 PROCEDURE — 97110 THERAPEUTIC EXERCISES: CPT

## 2023-06-05 NOTE — PROGRESS NOTES
Exercise Program:  Heel slides, quad sets, LAQ, sit<>stand. 5/2 HEP provided and reviewed w/ pt. RTB provided.      Manual Treatments:  N/A    Modalities:  N/A     Time-in Time-out Total Time   29465  Evaluation Low Complexity      51423  Evaluation Med Complexity      53830  Evaluation High Complexity      55965  Ther Ex 1335 1413 38   27132  Neuro Re-ed        37555  Ther Activities        70239  Manual Therapy       30749  E-stim       35466  Ultrasound            Session 3774 2856 03       Treatment/Activity Tolerance:  [x] Patient tolerated treatment well [] Patient limited by fatigue  [] Patient limited by pain  [] Patient limited by other medical complications  [] Other:     Prognosis: [x] Good [] Fair  [] Poor    Patient Requires Follow-up: [x] Yes  [] No    Plan:   [x] Continue per plan of care [] Alter current plan (see comments)  [] Plan of care initiated [] Hold pending MD visit [] Discharge    Plan for Next Session:  continue to progress L knee strength and ROM      Electronically signed by:  Merritt Bradshaw, PTA 2009

## 2023-06-07 ENCOUNTER — TREATMENT (OUTPATIENT)
Dept: PHYSICAL THERAPY | Age: 78
End: 2023-06-07
Payer: MEDICARE

## 2023-06-07 DIAGNOSIS — Z96.652 S/P TOTAL KNEE REPLACEMENT, LEFT: Primary | ICD-10-CM

## 2023-06-07 PROCEDURE — 97110 THERAPEUTIC EXERCISES: CPT

## 2023-06-07 NOTE — PROGRESS NOTES
[facility]                Phone: [phone] Fax: [phone]    Physical Therapy  Outpatient Discharge Summary     Date:  2023    Patient Name:  Angella Ramirez    :  1945  MRN: [id number]    DIAGNOSIS:     Diagnosis Orders   1. S/P total knee replacement, left          REFERRING PHYSICIAN:  Magdiel Carnes DO    ATTENDANCE:  Pt has attended 8 of 8 scheduled treatments from 23 to 23. TREATMENTS RECEIVED:  ROM,stretching, strength training, functional retraining, education in a HEP    INITIAL STATUS:  Pain left knee 0-5/10  ROM left knee 3° to 88°  Strength L LE 4-/5 throughout  Stairs: nonreciprocal pattern with single HR and SPC with cues for safety  LEFS:     FINAL STATUS:  Pain left knee 0/10  ROM left knee 0° to 108°  Strength L LE 4/5 throughout except hip abd and add 4+/5  Stairs: pt is unable to perform 12 steps w/ 1 rail. Limited by SOB and co morbidities. LEFS:   Pt is independent with HEP    GOALS:  4 out of 5 Long Term Goals were obtained. LONG TERM GOALS NOT OBTAINED/REASON:  Pt unable to perform stairs with 1 HR and LEFS goal not met due to limitations from comorbidities. PATIENT GOALS:  pain relief, get back to normal, walk normally, learn how to manage condition     REASON FOR DISCHARGE:  Patient has met most goals and is able to manage condition independently. PATIENT EDUCATION/INSTRUCTIONS:  Pt educated in ROM and strength training activities for HEP. RECOMMENDATIONS:  Discharge to HEP        Thank you for the opportunity to work with your patient. If you have questions or comments, please feel free to contact me by phone or fax.       Electronically Signed by: Rosendo Chavez, [address] [place] [address], [id number]  2023
15   97574  Neuro Re-ed        42823  Ther Activities        92227  Manual Therapy       12174  E-stim       16470  Ultrasound            Session 5960 0421 38       Treatment/Activity Tolerance:  [x] Patient tolerated treatment well [] Patient limited by fatigue  [] Patient limited by pain  [] Patient limited by other medical complications  [] Other:     Prognosis: [x] Good [] Fair  [] Poor    Patient Requires Follow-up: [] Yes  [] No    Plan:   [] Continue per plan of care [] Alter current plan (see comments)  [] Plan of care initiated [] Hold pending MD visit [x] Discharge    Plan for Next Session:        Electronically signed by:  Lee Saini, PTA [id number]

## 2023-07-06 ENCOUNTER — OFFICE VISIT (OUTPATIENT)
Dept: ORTHOPEDIC SURGERY | Age: 78
End: 2023-07-06

## 2023-07-06 VITALS — WEIGHT: 254 LBS | HEIGHT: 67 IN | BODY MASS INDEX: 39.87 KG/M2

## 2023-07-06 DIAGNOSIS — Z96.652 S/P TOTAL KNEE REPLACEMENT, LEFT: Primary | ICD-10-CM

## 2023-07-06 NOTE — PROGRESS NOTES
Follow Up Post Operative Visit     Surgery: Left knee irrigation debridement with polyethylene exchange  Date: 2023    Subjective:    Anam Martinez is here for follow up visit s/p above procedure. She is healed up from her infection and knee revision without issue. Her pain is currently 1 out of 10. She has no complaints but her knee. She is getting around doing therapy. She is happy with the results. She is almost 5 months out from her revision surgery    Controlled Substances Monitoring:        Physical Exam:    Height: 5' 7\" (1.702 m), Weight - Scale: 254 lb (115.2 kg)    General: Alert and oriented x3, no acute distress  Cardiovascular/pulmonary: No labored breathing, peripheral perfusion intact  Musculoskeletal:    Left knee: Incision well approximated without signs of infection. The knee is not warm or swollen. She has range of motion from 0 to 95 degrees. The knee is stable to varus valgus stress testing. Calf soft compressible. No signs of continued infection. Imagin views of her left knee from her last visit demonstrate well-placed total knee arthroplasty without signs of complication images were independently interpreted by myself discussed with the patient    Assessment and Plan: Approximately 5 months status post left knee irrigation debridement polyethylene exchange  -She is taking chronic suppressive doxycycline as per infectious disease. She is doing well without signs of infection. She is [place] appropriately. She is happy with her knee replacement results. She will likely need to continue suppressive antibiotics for lifetime. All questions answered in detail. Continue activity without restriction. I will see her back annually at this point. She will give us a call if she develops any concerning signs of infection.   This was discussed in detail with the patient    [address] [facility]   23  1:52 PM

## 2023-07-10 ENCOUNTER — TELEPHONE (OUTPATIENT)
Dept: NON INVASIVE DIAGNOSTICS | Age: 78
End: 2023-07-10

## 2023-07-18 ENCOUNTER — TELEPHONE (OUTPATIENT)
Dept: NON INVASIVE DIAGNOSTICS | Age: 78
End: 2023-07-18

## 2023-07-18 NOTE — TELEPHONE ENCOUNTER
----- Message from Abida Green DO sent at 7/18/2023  8:50 AM EDT -----  Regarding: RE: Tikosyn dosing  Please have her come in for an ECG. -Abida Green DO   ----- Message -----  From: Delmer Jean RN  Sent: 7/10/2023  12:06 PM EDT  To: Abida Green DO  Subject: Tikosyn dosing                                   Radha called the office stating she had inadvertently been taking 750 mcg of Tikosyn twice daily. She had a previous prescription of 250 mcg and mixed them with her 500 mcg pills. The pharmacy caught the error over the weekend. The patient states she may have been doing this for a month. We received a Latitude transmission on her pacemaker on 6/21/23.  The patient is now taking the correct dose, 500 mcg twice daily

## 2023-07-18 NOTE — TELEPHONE ENCOUNTER
Patient is unable to come in today for an EKG as she has a . She will come in tomorrow morning.     Arik Dupree RN, BSN  [address] [facility]

## 2023-07-19 ENCOUNTER — NURSE ONLY (OUTPATIENT)
Dept: NON INVASIVE DIAGNOSTICS | Age: 78
End: 2023-07-19
Payer: MEDICARE

## 2023-07-19 DIAGNOSIS — I48.19 PERSISTENT ATRIAL FIBRILLATION (HCC): Primary | ICD-10-CM

## 2023-07-19 PROCEDURE — 93000 ELECTROCARDIOGRAM COMPLETE: CPT | Performed by: STUDENT IN AN ORGANIZED HEALTH CARE EDUCATION/TRAINING PROGRAM

## 2023-07-19 NOTE — PROGRESS NOTES
EKG preformed today as per Dr Sanaz Simpson, for incorrect Tikosyn dose.      Electronically signed by Bert Steele MA on 7/19/2023 at 10:29 AM

## 2023-08-24 ENCOUNTER — HOSPITAL ENCOUNTER (OUTPATIENT)
Dept: CARDIOLOGY | Age: 78
Discharge: HOME | End: 2023-08-24
Payer: MEDICARE

## 2023-08-24 ENCOUNTER — OFFICE VISIT (OUTPATIENT)
Dept: VASCULAR SURGERY | Age: 78
End: 2023-08-24
Payer: MEDICARE

## 2023-08-24 VITALS — HEIGHT: 67 IN | BODY MASS INDEX: 39.78 KG/M2

## 2023-08-24 DIAGNOSIS — I65.23 BILATERAL CAROTID ARTERY STENOSIS: ICD-10-CM

## 2023-08-24 DIAGNOSIS — Z98.890 S/P CAROTID ENDARTERECTOMY: ICD-10-CM

## 2023-08-24 DIAGNOSIS — Z99.81 O2 DEPENDENT: ICD-10-CM

## 2023-08-24 DIAGNOSIS — I65.23 BILATERAL CAROTID ARTERY STENOSIS: Primary | ICD-10-CM

## 2023-08-24 PROCEDURE — 99213 OFFICE O/P EST LOW 20 MIN: CPT | Performed by: SURGERY

## 2023-08-24 PROCEDURE — 1123F ACP DISCUSS/DSCN MKR DOCD: CPT | Performed by: SURGERY

## 2023-08-24 PROCEDURE — 93880 EXTRACRANIAL BILAT STUDY: CPT

## 2023-08-24 NOTE — PROGRESS NOTES
[facility] - [facility]    This is a pre read worksheet - prior to official physician interpretation    Mynor Butler  1945  Date of study: 8/24/23    Indication for study:  Carotid artery stenosis  Study : Bilateral Carotid Artery Duplex Examination    Duplex examination of the RIGHT carotid artery system identifies atherosclerotic plaque. The peak systolic velocity in internal carotid artery was 177 centimeters / second. The maximum end diastolic velocity was 28 centimeters / second. The ICA/CCA ratio is 1.5. The right vertebral artery has antegrade flow. Duplex examination of the LEFT carotid artery system identifies atherosclerotic plaque. The peak systolic velocity in internal carotid artery was 168 centimeters / second. The maximum end diastolic velocity was 32 centimeters / second. The ICA/CCA ratio is 1.8. The left vertebral artery has antegrade flow.     RIGHT 40%  LEFT 40%  BOTH distal ICAS tortuous        LAST STUDY  7/28/2022 @ [facility]  Rt 0-50  Lt 50-69

## 2023-08-25 NOTE — PROCEDURES
[address], [facility] [address]                                VASCULAR REPORT    PATIENT NAME: Taz Christianson                      :        1945  MED REC NO:   [id number]                            ROOM:  ACCOUNT NO:   [de-identified]                           ADMIT DATE: 2023  PROVIDER:     Akira Cassidy MD    DATE OF PROCEDURE:  2023    CAROTID ULTRASOUND REPORT    INDICATION:  Bilateral carotid artery stenosis. FINDINGS:  Duplex scanning of the right carotid artery revealed the  patient does have moderate plaque with peak internal carotid velocity of  965, diastolic velocity of 30 cm/sec with plaque causing 40% stenosis. Duplex scanning of the left carotid artery revealed moderate plaque with  peak internal carotid velocity of 571, diastolic velocity of 32 cm/sec  with plaque causing 40% plus stenosis. IMPRESSION:  40% stenosis of the right carotid artery associated with  40% stenosis of the left carotid artery, unchanged from last year.         Akira Cassidy MD    D: 2023 12:42:55       T: 2023 12:45:16     [id number]  Job#: [id number]     Doc#: [id number]    CC:

## 2023-09-01 ENCOUNTER — APPOINTMENT (OUTPATIENT)
Dept: GENERAL RADIOLOGY | Age: 78
End: 2023-09-01
Payer: MEDICARE

## 2023-09-01 ENCOUNTER — HOSPITAL ENCOUNTER (EMERGENCY)
Age: 78
Discharge: HOME | End: 2023-09-01
Attending: STUDENT IN AN ORGANIZED HEALTH CARE EDUCATION/TRAINING PROGRAM
Payer: MEDICARE

## 2023-09-01 ENCOUNTER — APPOINTMENT (OUTPATIENT)
Dept: CT IMAGING | Age: 78
End: 2023-09-01
Payer: MEDICARE

## 2023-09-01 VITALS
HEIGHT: 67 IN | BODY MASS INDEX: 31.39 KG/M2 | TEMPERATURE: 98.1 F | WEIGHT: 200 LBS | OXYGEN SATURATION: 95 % | RESPIRATION RATE: 18 BRPM | HEART RATE: 65 BPM | SYSTOLIC BLOOD PRESSURE: 139 MMHG | DIASTOLIC BLOOD PRESSURE: 78 MMHG

## 2023-09-01 DIAGNOSIS — M25.561 ACUTE PAIN OF RIGHT KNEE: ICD-10-CM

## 2023-09-01 DIAGNOSIS — S09.90XA CLOSED HEAD INJURY, INITIAL ENCOUNTER: Primary | ICD-10-CM

## 2023-09-01 PROCEDURE — 70450 CT HEAD/BRAIN W/O DYE: CPT

## 2023-09-01 PROCEDURE — 99284 EMERGENCY DEPT VISIT MOD MDM: CPT

## 2023-09-01 PROCEDURE — 73562 X-RAY EXAM OF KNEE 3: CPT

## 2023-09-01 PROCEDURE — 72125 CT NECK SPINE W/O DYE: CPT

## 2023-09-01 RX ORDER — LOSARTAN POTASSIUM 100 MG/1
100 TABLET ORAL DAILY
COMMUNITY

## 2023-09-01 RX ORDER — DOXYCYCLINE HYCLATE 100 MG/1
100 CAPSULE ORAL 2 TIMES DAILY
COMMUNITY

## 2023-09-01 ASSESSMENT — PAIN DESCRIPTION - ORIENTATION
ORIENTATION: RIGHT
ORIENTATION: RIGHT

## 2023-09-01 ASSESSMENT — PAIN - FUNCTIONAL ASSESSMENT
PAIN_FUNCTIONAL_ASSESSMENT: 0-10
PAIN_FUNCTIONAL_ASSESSMENT: NONE - DENIES PAIN

## 2023-09-01 ASSESSMENT — PAIN DESCRIPTION - PAIN TYPE
TYPE: ACUTE PAIN
TYPE: ACUTE PAIN

## 2023-09-01 ASSESSMENT — ENCOUNTER SYMPTOMS
SORE THROAT: 0
COUGH: 0
SHORTNESS OF BREATH: 0
PHOTOPHOBIA: 0
BACK PAIN: 0
NAUSEA: 0
DIARRHEA: 0
ABDOMINAL PAIN: 0

## 2023-09-01 ASSESSMENT — PAIN DESCRIPTION - LOCATION
LOCATION: LEG
LOCATION: KNEE;LEG

## 2023-09-01 ASSESSMENT — PAIN SCALES - GENERAL
PAINLEVEL_OUTOF10: 5
PAINLEVEL_OUTOF10: 4

## 2023-09-01 ASSESSMENT — PAIN DESCRIPTION - ONSET
ONSET: ON-GOING
ONSET: SUDDEN

## 2023-09-01 ASSESSMENT — PAIN DESCRIPTION - FREQUENCY
FREQUENCY: CONTINUOUS
FREQUENCY: CONTINUOUS

## 2023-09-01 ASSESSMENT — PAIN DESCRIPTION - DESCRIPTORS
DESCRIPTORS: PATIENT UNABLE TO DESCRIBE
DESCRIPTORS: ACHING

## 2023-09-21 ENCOUNTER — TELEPHONE (OUTPATIENT)
Dept: NON INVASIVE DIAGNOSTICS | Age: 78
End: 2023-09-21

## 2023-09-21 NOTE — TELEPHONE ENCOUNTER
Patient called to report earlier in the day her HR jumped up to 160 bpm and she was SOB and tired. The patient reports her HR is now in the 80s and she feels better. I advised the patient to go to ED if symptoms worsen and/or HR stays above 110 consistently. I also advise her to call in the morning to get instruction on how to send a remote transmission. Patient verbalized understanding.      Electronically signed by Janene Yates MA on 9/21/2023 at 4:10 PM

## 2023-09-22 ENCOUNTER — HOSPITAL ENCOUNTER (OUTPATIENT)
Age: 78
Discharge: HOME | End: 2023-09-22
Payer: MEDICARE

## 2023-09-22 LAB
ALBUMIN SERPL-MCNC: 4.1 G/DL (ref 3.5–5.2)
ALP SERPL-CCNC: 75 U/L (ref 35–104)
ALT SERPL-CCNC: 16 U/L (ref 0–32)
ANION GAP SERPL CALCULATED.3IONS-SCNC: 10 MMOL/L (ref 7–16)
AST SERPL-CCNC: 23 U/L (ref 0–31)
BASOPHILS # BLD: 0.05 K/UL (ref 0–0.2)
BASOPHILS NFR BLD: 1 % (ref 0–2)
BILIRUB SERPL-MCNC: 0.6 MG/DL (ref 0–1.2)
BUN SERPL-MCNC: 23 MG/DL (ref 6–23)
CALCIUM SERPL-MCNC: 10.1 MG/DL (ref 8.6–10.2)
CHLORIDE SERPL-SCNC: 100 MMOL/L (ref 98–107)
CO2 SERPL-SCNC: 30 MMOL/L (ref 22–29)
CREAT SERPL-MCNC: 1 MG/DL (ref 0.5–1)
EOSINOPHIL # BLD: 0.19 K/UL (ref 0.05–0.5)
EOSINOPHILS RELATIVE PERCENT: 2 % (ref 0–6)
ERYTHROCYTE [DISTWIDTH] IN BLOOD BY AUTOMATED COUNT: 14.2 % (ref 11.5–15)
ERYTHROCYTE [SEDIMENTATION RATE] IN BLOOD BY WESTERGREN METHOD: 44 MM/HR (ref 0–20)
GFR SERPL CREATININE-BSD FRML MDRD: 55 ML/MIN/1.73M2
GLUCOSE SERPL-MCNC: 128 MG/DL (ref 74–99)
HCT VFR BLD AUTO: 37.6 % (ref 34–48)
HGB BLD-MCNC: 11.8 G/DL (ref 11.5–15.5)
IMM GRANULOCYTES # BLD AUTO: 0.04 K/UL (ref 0–0.58)
IMM GRANULOCYTES NFR BLD: 1 % (ref 0–5)
LYMPHOCYTES NFR BLD: 1.61 K/UL (ref 1.5–4)
LYMPHOCYTES RELATIVE PERCENT: 20 % (ref 20–42)
MCH RBC QN AUTO: 31.2 PG (ref 26–35)
MCHC RBC AUTO-ENTMCNC: 31.4 G/DL (ref 32–34.5)
MCV RBC AUTO: 99.5 FL (ref 80–99.9)
MONOCYTES NFR BLD: 0.73 K/UL (ref 0.1–0.95)
MONOCYTES NFR BLD: 9 % (ref 2–12)
NEUTROPHILS NFR BLD: 68 % (ref 43–80)
NEUTS SEG NFR BLD: 5.58 K/UL (ref 1.8–7.3)
PLATELET # BLD AUTO: 194 K/UL (ref 130–450)
PMV BLD AUTO: 10.7 FL (ref 7–12)
POTASSIUM SERPL-SCNC: 4.8 MMOL/L (ref 3.5–5)
PROT SERPL-MCNC: 7.6 G/DL (ref 6.4–8.3)
RBC # BLD AUTO: 3.78 M/UL (ref 3.5–5.5)
SODIUM SERPL-SCNC: 140 MMOL/L (ref 132–146)
WBC OTHER # BLD: 8.2 K/UL (ref 4.5–11.5)

## 2023-09-22 PROCEDURE — 36415 COLL VENOUS BLD VENIPUNCTURE: CPT

## 2023-09-22 PROCEDURE — 85025 COMPLETE CBC W/AUTO DIFF WBC: CPT

## 2023-09-22 PROCEDURE — 85652 RBC SED RATE AUTOMATED: CPT

## 2023-09-22 PROCEDURE — 80053 COMPREHEN METABOLIC PANEL: CPT

## 2023-09-22 PROCEDURE — 86140 C-REACTIVE PROTEIN: CPT

## 2023-09-23 LAB — CRP SERPL HS-MCNC: 14 MG/L (ref 0–5)

## 2023-09-25 PROCEDURE — 93294 REM INTERROG EVL PM/LDLS PM: CPT | Performed by: STUDENT IN AN ORGANIZED HEALTH CARE EDUCATION/TRAINING PROGRAM

## 2023-09-25 PROCEDURE — 93296 REM INTERROG EVL PM/IDS: CPT | Performed by: STUDENT IN AN ORGANIZED HEALTH CARE EDUCATION/TRAINING PROGRAM

## 2023-09-27 NOTE — PROGRESS NOTES
Quit date: 2018     Years since quittin.2    Smokeless tobacco: Never   Substance Use Topics    Alcohol use: Not Currently       Current Outpatient Medications   Medication Sig Dispense Refill    doxycycline hyclate (VIBRAMYCIN) 100 MG capsule Take 1 capsule by mouth 2 times daily      losartan (COZAAR) 100 MG tablet Take 1 tablet by mouth daily      metoprolol (LOPRESSOR) 100 MG tablet Take 1.5 tablets by mouth 2 times daily Take 1 tablet twice daily 270 tablet 2    digoxin (LANOXIN) 125 MCG tablet Take 1 tablet by mouth daily 30 tablet 11    magnesium oxide (MAG-OX) 400 (240 Mg) MG tablet Take 1 tablet by mouth daily      acetaminophen (TYLENOL) 650 MG extended release tablet Take 1 tablet by mouth every 4 hours as needed for Pain      vitamin C (ASCORBIC ACID) 500 MG tablet Take 1 tablet by mouth 2 times daily      ANORO ELLIPTA 62.5-25 MCG/ACT AEPB Inhale 1 puff into the lungs daily Uses in the evening      Cranberry 1000 MG CAPS Take 1 capsule by mouth daily      Vitamin D (CHOLECALCIFEROL) 25 MCG (1000 UT) TABS tablet Take 1 tablet by mouth daily      OXYGEN Inhale 2 L/min into the lungs continuous      dofetilide (TIKOSYN) 500 MCG capsule Take 1 capsule by mouth every 12 hours (Patient taking differently: Take 1 capsule by mouth 2 times daily) 60 capsule 3    levothyroxine (SYNTHROID) 150 MCG tablet 200 mcg      apixaban (ELIQUIS) 5 MG TABS tablet Take 1 tablet by mouth 2 times daily 60 tablet 2    venlafaxine (EFFEXOR XR) 150 MG extended release capsule Take 2 capsules by mouth daily      atorvastatin (LIPITOR) 20 MG tablet Take 1 tablet by mouth daily       No current facility-administered medications for this visit. No Known Allergies    ROS:   Constitutional: Negative for fever, activity change and appetite change. HENT: Negative for epistaxis. Eyes: Negative for diploplia, blurred vision. Respiratory: Negative for cough, chest tightness, shortness of breath and wheezing.

## 2023-09-28 ENCOUNTER — OFFICE VISIT (OUTPATIENT)
Dept: NON INVASIVE DIAGNOSTICS | Age: 78
End: 2023-09-28

## 2023-09-28 VITALS
HEIGHT: 67 IN | SYSTOLIC BLOOD PRESSURE: 126 MMHG | WEIGHT: 251.6 LBS | BODY MASS INDEX: 39.49 KG/M2 | HEART RATE: 73 BPM | DIASTOLIC BLOOD PRESSURE: 84 MMHG

## 2023-09-28 DIAGNOSIS — I48.11 LONGSTANDING PERSISTENT ATRIAL FIBRILLATION (HCC): Primary | ICD-10-CM

## 2023-09-28 NOTE — PATIENT INSTRUCTIONS
No medication changes today. Please inform Dr Tacos Curry office when you have made a choice regarding change from dofetilide to amiodarone OR having AV node ablation with discontinuation of dofetilide. Continue remote monitoring of pacemaker every 91 days. Follow-up with Dr Tacos Curry office in ~3 months.

## 2023-10-02 ENCOUNTER — TELEPHONE (OUTPATIENT)
Dept: NON INVASIVE DIAGNOSTICS | Age: 78
End: 2023-10-02

## 2023-10-11 DIAGNOSIS — I48.19 PERSISTENT ATRIAL FIBRILLATION (HCC): Primary | ICD-10-CM

## 2023-10-31 ENCOUNTER — TELEPHONE (OUTPATIENT)
Dept: NON INVASIVE DIAGNOSTICS | Age: 78
End: 2023-10-31

## 2023-11-02 ENCOUNTER — HOSPITAL ENCOUNTER (OUTPATIENT)
Age: 78
Discharge: HOME | End: 2023-11-02
Payer: MEDICARE

## 2023-11-02 DIAGNOSIS — I48.19 PERSISTENT ATRIAL FIBRILLATION (HCC): ICD-10-CM

## 2023-11-02 LAB
ANION GAP SERPL CALCULATED.3IONS-SCNC: 12 MMOL/L (ref 7–16)
BASOPHILS # BLD: 0.03 K/UL (ref 0–0.2)
BASOPHILS NFR BLD: 0 % (ref 0–2)
BUN SERPL-MCNC: 24 MG/DL (ref 6–23)
CALCIUM SERPL-MCNC: 9.8 MG/DL (ref 8.6–10.2)
CHLORIDE SERPL-SCNC: 100 MMOL/L (ref 98–107)
CO2 SERPL-SCNC: 27 MMOL/L (ref 22–29)
CREAT SERPL-MCNC: 1 MG/DL (ref 0.5–1)
EOSINOPHIL # BLD: 0.16 K/UL (ref 0.05–0.5)
EOSINOPHILS RELATIVE PERCENT: 2 % (ref 0–6)
ERYTHROCYTE [DISTWIDTH] IN BLOOD BY AUTOMATED COUNT: 13.8 % (ref 11.5–15)
GFR SERPL CREATININE-BSD FRML MDRD: 55 ML/MIN/1.73M2
GLUCOSE SERPL-MCNC: 151 MG/DL (ref 74–99)
HCT VFR BLD AUTO: 33.8 % (ref 34–48)
HGB BLD-MCNC: 10.6 G/DL (ref 11.5–15.5)
IMM GRANULOCYTES # BLD AUTO: 0.05 K/UL (ref 0–0.58)
IMM GRANULOCYTES NFR BLD: 1 % (ref 0–5)
LYMPHOCYTES NFR BLD: 1.04 K/UL (ref 1.5–4)
LYMPHOCYTES RELATIVE PERCENT: 14 % (ref 20–42)
MAGNESIUM SERPL-MCNC: 1.5 MG/DL (ref 1.6–2.6)
MCH RBC QN AUTO: 31.4 PG (ref 26–35)
MCHC RBC AUTO-ENTMCNC: 31.4 G/DL (ref 32–34.5)
MCV RBC AUTO: 100 FL (ref 80–99.9)
MONOCYTES NFR BLD: 0.41 K/UL (ref 0.1–0.95)
MONOCYTES NFR BLD: 5 % (ref 2–12)
NEUTROPHILS NFR BLD: 78 % (ref 43–80)
NEUTS SEG NFR BLD: 5.92 K/UL (ref 1.8–7.3)
PLATELET # BLD AUTO: 204 K/UL (ref 130–450)
PMV BLD AUTO: 10.9 FL (ref 7–12)
POTASSIUM SERPL-SCNC: 4.6 MMOL/L (ref 3.5–5)
RBC # BLD AUTO: 3.38 M/UL (ref 3.5–5.5)
SODIUM SERPL-SCNC: 139 MMOL/L (ref 132–146)
WBC OTHER # BLD: 7.6 K/UL (ref 4.5–11.5)

## 2023-11-02 PROCEDURE — 85025 COMPLETE CBC W/AUTO DIFF WBC: CPT

## 2023-11-02 PROCEDURE — 83735 ASSAY OF MAGNESIUM: CPT

## 2023-11-02 PROCEDURE — 80048 BASIC METABOLIC PNL TOTAL CA: CPT

## 2023-11-02 PROCEDURE — 36415 COLL VENOUS BLD VENIPUNCTURE: CPT

## 2023-11-06 ENCOUNTER — TELEPHONE (OUTPATIENT)
Dept: NON INVASIVE DIAGNOSTICS | Age: 78
End: 2023-11-06

## 2023-11-06 NOTE — TELEPHONE ENCOUNTER
Spoke with patient and verbalized understanding. Patient is currently already taking mag ox 400 mg bid.   Should she adjust it further?  ( Was only in her chart as once daily)

## 2023-11-06 NOTE — TELEPHONE ENCOUNTER
----- Message from Jaimie Huggins, [facility] sent at 11/3/2023  2:15 PM EDT -----  Regarding: FW: mag    ----- Message -----  From: Miroslava Basurto DO  Sent: 11/3/2023   1:01 PM EDT  To: Jaimie Huggins MA  Subject: mag                                              Increase mag ox from 400 mg daily to 400 mg BID.     Miroslava Basurto DO

## 2023-11-07 NOTE — TELEPHONE ENCOUNTER
Spoke with patient and verbalized understanding. Patient states she is having frequent bowel movements (several times a day). L/m with Guilherme Irvin NP office to call back with fax number.

## 2023-11-20 ENCOUNTER — TELEPHONE (OUTPATIENT)
Dept: NON INVASIVE DIAGNOSTICS | Age: 78
End: 2023-11-20

## 2023-11-20 NOTE — TELEPHONE ENCOUNTER
Patient LM to see if her insurance will cover procedure. It was explained to the patient, prior Sumeet Kelly is requested if needed but actual cost and coverage has to be discussed between her and her insurance. , patient verbalized understanding.      Electronically signed by González Rhodes MA on 11/20/2023 at 11:36 AM

## 2023-12-06 ENCOUNTER — TELEPHONE (OUTPATIENT)
Dept: NON INVASIVE DIAGNOSTICS | Age: 78
End: 2023-12-06

## 2023-12-06 ENCOUNTER — PREP FOR PROCEDURE (OUTPATIENT)
Dept: NON INVASIVE DIAGNOSTICS | Age: 78
End: 2023-12-06

## 2023-12-06 NOTE — TELEPHONE ENCOUNTER
Spoke with patient and reminded that she is to hold metoprolol 3 days prior to procedure. Patient verbalized understanding and went over instructions for procedure again.

## 2023-12-12 ENCOUNTER — APPOINTMENT (OUTPATIENT)
Dept: GENERAL RADIOLOGY | Age: 78
End: 2023-12-12
Payer: MEDICARE

## 2023-12-12 ENCOUNTER — HOSPITAL ENCOUNTER (EMERGENCY)
Age: 78
Discharge: HOME | End: 2023-12-12
Payer: MEDICARE

## 2023-12-12 VITALS
HEART RATE: 60 BPM | DIASTOLIC BLOOD PRESSURE: 67 MMHG | TEMPERATURE: 97.7 F | RESPIRATION RATE: 16 BRPM | SYSTOLIC BLOOD PRESSURE: 167 MMHG | OXYGEN SATURATION: 96 % | WEIGHT: 210 LBS | BODY MASS INDEX: 32.89 KG/M2

## 2023-12-12 DIAGNOSIS — S52.611A CLOSED DISPLACED FRACTURE OF STYLOID PROCESS OF RIGHT ULNA, INITIAL ENCOUNTER: ICD-10-CM

## 2023-12-12 DIAGNOSIS — S52.571A OTHER CLOSED INTRA-ARTICULAR FRACTURE OF DISTAL END OF RIGHT RADIUS, INITIAL ENCOUNTER: Primary | ICD-10-CM

## 2023-12-12 PROCEDURE — 99284 EMERGENCY DEPT VISIT MOD MDM: CPT

## 2023-12-12 PROCEDURE — 6370000000 HC RX 637 (ALT 250 FOR IP): Performed by: NURSE PRACTITIONER

## 2023-12-12 PROCEDURE — 73110 X-RAY EXAM OF WRIST: CPT

## 2023-12-12 PROCEDURE — 25605 CLTX DST RDL FX/EPHYS SEP W/: CPT

## 2023-12-12 RX ORDER — SODIUM CHLORIDE 0.9 % (FLUSH) 0.9 %
5-40 SYRINGE (ML) INJECTION EVERY 12 HOURS SCHEDULED
Status: CANCELLED | OUTPATIENT
Start: 2023-12-18

## 2023-12-12 RX ORDER — OXYCODONE HYDROCHLORIDE AND ACETAMINOPHEN 5; 325 MG/1; MG/1
1 TABLET ORAL ONCE
Status: COMPLETED | OUTPATIENT
Start: 2023-12-12 | End: 2023-12-12

## 2023-12-12 RX ORDER — SODIUM CHLORIDE 9 MG/ML
INJECTION, SOLUTION INTRAVENOUS PRN
Status: CANCELLED | OUTPATIENT
Start: 2023-12-18

## 2023-12-12 RX ORDER — SODIUM CHLORIDE 0.9 % (FLUSH) 0.9 %
5-40 SYRINGE (ML) INJECTION PRN
Status: CANCELLED | OUTPATIENT
Start: 2023-12-18

## 2023-12-12 RX ORDER — OXYCODONE HYDROCHLORIDE AND ACETAMINOPHEN 5; 325 MG/1; MG/1
1 TABLET ORAL EVERY 6 HOURS PRN
Qty: 12 TABLET | Refills: 0 | Status: SHIPPED | OUTPATIENT
Start: 2023-12-12 | End: 2023-12-15

## 2023-12-12 RX ADMIN — OXYCODONE AND ACETAMINOPHEN 1 TABLET: 5; 325 TABLET ORAL at 14:39

## 2023-12-12 ASSESSMENT — PAIN SCALES - GENERAL
PAINLEVEL_OUTOF10: 10
PAINLEVEL_OUTOF10: 10

## 2023-12-12 ASSESSMENT — PAIN DESCRIPTION - PAIN TYPE: TYPE: ACUTE PAIN

## 2023-12-12 ASSESSMENT — PAIN DESCRIPTION - DESCRIPTORS: DESCRIPTORS: SORE;TENDER

## 2023-12-12 ASSESSMENT — LIFESTYLE VARIABLES: HOW OFTEN DO YOU HAVE A DRINK CONTAINING ALCOHOL: NEVER

## 2023-12-12 ASSESSMENT — PAIN DESCRIPTION - ORIENTATION: ORIENTATION: RIGHT

## 2023-12-12 ASSESSMENT — PAIN DESCRIPTION - LOCATION: LOCATION: WRIST

## 2023-12-12 ASSESSMENT — PAIN - FUNCTIONAL ASSESSMENT: PAIN_FUNCTIONAL_ASSESSMENT: 0-10

## 2023-12-12 NOTE — DISCHARGE INSTRUCTIONS
Leave the splint in place, you may take it out of the sling and elevate your hand is much as possible. You will need to call orthopedics and schedule an appointment. You may apply ice directly over the splint. If you develop any numbness or tingling in your fingers please return to the ER. Do not take the splint off, treat the splint like a cast, do not get it wet. You may use Percocet every 6 hours as needed for pain. Please use extra care while on this medication as it may make you dizzy or sleepy and may increase your risk of falls.

## 2023-12-12 NOTE — ED PROVIDER NOTES
instructed to return to the ER for any new or worsening symptoms. Additional discharge instructions were given verbally. All questions were answered. Patient is comfortable and agreeable with discharge plan. Patient in no acute distress and non-toxic in appearance. Plan of Care/Counseling:  GIO Horowitz CNP reviewed today's visit with the patient in addition to providing specific details for the plan of care and counseling regarding the diagnosis and prognosis. Questions are answered at this time and are agreeable with the plan. Assessment      1. Other closed intra-articular fracture of distal end of right radius, initial encounter    2. Closed displaced fracture of styloid process of right ulna, initial encounter      Plan   Disposition:   Discharged home. Patient condition is stable  Chris Garza MD  [address] [address]  [facility]  [phone]    Schedule an appointment as soon as possible for a visit          New Medications     Discharge Medication List as of 12/12/2023  4:12 PM        START taking these medications    Details   oxyCODONE-acetaminophen (PERCOCET) 5-325 MG per tablet Take 1 tablet by mouth every 6 hours as needed for Pain for up to 3 days. Intended supply: 3 days. Take lowest dose possible to manage pain Max Daily Amount: 4 tablets, Disp-12 tablet, R-0Normal           Electronically signed by GIO Horowitz CNP   DD: 12/12/23  **This report was transcribed using voice recognition software. Every effort was made to ensure accuracy; however, inadvertent computerized transcription errors may be present.   END OF ED PROVIDER NOTE      GIO Horowitz CNP  12/12/23 6241

## 2023-12-13 ENCOUNTER — TELEPHONE (OUTPATIENT)
Dept: NON INVASIVE DIAGNOSTICS | Age: 78
End: 2023-12-13

## 2023-12-13 NOTE — TELEPHONE ENCOUNTER
Patient is scheduled for an ablation on 12/18/23 with Dr. Teetee Strange. The patient broke her arm on 12/12/2023. Orthopedic surgeon wants to schedule surgery for next week and she wants to know if she needs the ablation first or what she should do. Please advise.     Electronically signed by Anthony Garces MA on 12/13/2023 at 1:12 PM

## 2023-12-14 ENCOUNTER — TELEPHONE (OUTPATIENT)
Dept: ORTHOPEDIC SURGERY | Age: 78
End: 2023-12-14

## 2023-12-14 ENCOUNTER — PREP FOR PROCEDURE (OUTPATIENT)
Dept: ORTHOPEDIC SURGERY | Age: 78
End: 2023-12-14

## 2023-12-14 PROBLEM — S52.501A CLOSED FRACTURE OF RIGHT DISTAL RADIUS: Status: ACTIVE | Noted: 2023-12-14

## 2023-12-14 RX ORDER — AMIODARONE HYDROCHLORIDE 200 MG/1
TABLET ORAL
Qty: 174 TABLET | Refills: 0 | Status: SHIPPED | OUTPATIENT
Start: 2023-12-17 | End: 2024-03-16

## 2023-12-14 NOTE — PROGRESS NOTES
Angelia had fall earlier this week and fractured right wrist.  Discussed with patient and she did not fall on her left side at all, fell on her right only, pacemaker on left chest with no issues. She was planned for AVJ ablation on 12/18/2023, she had orthopedic evaluation and is planning to have right wrist fracture surgery on 12/19/2023. Discussed case with Dr. Colin Marcano. Concern for procedure of AVJ ablation very close to orthopedic surgery, also concern for future high RV pacing and risk of heart failure and patient that appears to be declining or at least an acute deconditioning. Also concern for postoperative concern if delays AVJ and continues on dofetilide with intermittent/frequent episodes of RVR and atrial fibrillation. Options of postponing AVJ ablation continuing dofetilide versus continuing AVJ ablation as scheduled versus switching dofetilide to amiodarone with plus or minus AVJ ablation in the future. Will pursue stopping dofetilide and starting amiodarone for at least temporary use, discussed with patient and she is in agreement to do so and is understanding that AVJ ablation will be canceled at this time. She is not interested in being on long-term amiodarone as she is worried about the side effects with already history of lung disease and thyroid disease. Will only plan to use amiodarone in the short-term and reevaluate this after her post orthopedic surgery and recovery when she is stable. Plan:  Stop dofetilide today  Start amiodarone on Sunday, 12/17/2023, 400 mg twice daily x 28 days then 200 mg daily. [facility] [address] [facility] [facility] Discussed above plan with  and patient and confirmed that she will no longer be taking dofetilide, he took out of medication boxes today while on the phone.     Ronal Barraza, APRN - CNP

## 2023-12-14 NOTE — TELEPHONE ENCOUNTER
Per TB, ok to reschedule ablation and have orthopedic surgery next week. Patient notified and verbalized understanding. Ajay Vogel (Dr. Heide Tong) notified as well.      Electronically signed by Daly Sotelo MA on 12/14/2023 at 11:53 AM

## 2023-12-14 NOTE — TELEPHONE ENCOUNTER
Surgical Procedure: ORIF Right distal radius fracture  ICD Code: S52.501A  CPT Code: 36767  Vendor: [employer]  Needs: C-Arm, Block  Anesthesia: General  Block: Yes  Date: 12/19/2023  Time: 11:20 a.m. Place: [address] [place] [address]  Surgeon: Rowdy Gibbs D.O. Medications reviewed with the patient / holding the following medications: D/C Eliquis, Last dose 12/17/2023. Resume after surgery per Dr Ashly Brizuela. Patient verbalized understanding. Pre Op Instructions reviewed with the patient. Informed patient: A hospital nurse will contact her with instructions for the day of surgery. The patient expresses understanding and is in agreement with the plan. Post Op Appointment: Date: 1/3/2024 Time: 10:10 a.m.

## 2023-12-15 ENCOUNTER — TELEPHONE (OUTPATIENT)
Dept: NON INVASIVE DIAGNOSTICS | Age: 78
End: 2023-12-15

## 2023-12-15 NOTE — TELEPHONE ENCOUNTER
Spoke with Josette Park (pharmacist) at [facility] and they verbalized understanding.

## 2023-12-15 NOTE — TELEPHONE ENCOUNTER
25[address] Maikol Murphy calling to confirm patient is not taking digoxin, because of interaction with Amiodarone?

## 2023-12-25 PROCEDURE — 93296 REM INTERROG EVL PM/IDS: CPT | Performed by: STUDENT IN AN ORGANIZED HEALTH CARE EDUCATION/TRAINING PROGRAM

## 2023-12-25 PROCEDURE — 93294 REM INTERROG EVL PM/LDLS PM: CPT | Performed by: STUDENT IN AN ORGANIZED HEALTH CARE EDUCATION/TRAINING PROGRAM

## 2023-12-28 ENCOUNTER — TELEPHONE (OUTPATIENT)
Dept: NON INVASIVE DIAGNOSTICS | Age: 78
End: 2023-12-28

## 2023-12-28 NOTE — TELEPHONE ENCOUNTER
----- Message from GIO Lucero CNP sent at 12/28/2023  1:04 PM EST -----  Are you able to call her and have her stop her digoxin. Her digoxin level was elevated in the hospital and also she is now on amiodarone to control the heart rates and rhythm.  Thank you   GIO Lucero CNP

## 2024-01-03 ENCOUNTER — OFFICE VISIT (OUTPATIENT)
Dept: ORTHOPEDIC SURGERY | Age: 79
End: 2024-01-03

## 2024-01-03 DIAGNOSIS — S52.501A CLOSED FRACTURE OF DISTAL END OF RIGHT RADIUS, UNSPECIFIED FRACTURE MORPHOLOGY, INITIAL ENCOUNTER: Primary | ICD-10-CM

## 2024-01-04 ENCOUNTER — OFFICE VISIT (OUTPATIENT)
Dept: ORTHOPEDIC SURGERY | Age: 79
End: 2024-01-04

## 2024-01-04 DIAGNOSIS — S52.501A CLOSED FRACTURE OF DISTAL END OF RIGHT RADIUS, UNSPECIFIED FRACTURE MORPHOLOGY, INITIAL ENCOUNTER: Primary | ICD-10-CM

## 2024-01-04 PROCEDURE — 99024 POSTOP FOLLOW-UP VISIT: CPT | Performed by: STUDENT IN AN ORGANIZED HEALTH CARE EDUCATION/TRAINING PROGRAM

## 2024-01-04 NOTE — PROGRESS NOTES
Follow Up Post Operative Visit     Surgery: Open reduction internal fixation right distal radius fracture  Date: 12/19/2023    Subjective:    Radha Flynn is here for follow up visit s/p above procedure.  She is doing well.  She has been has been wearing her splint.  Her pain is well-controlled.  She has been compliant with her nonweightbearing    Controlled Substances Monitoring:        Physical Exam:    No data recorded    General: Alert and oriented x3, no acute distress  Cardiovascular/pulmonary: No labored breathing, peripheral perfusion intact  Musculoskeletal:    Right upper extremity: Incision well-approximated sutures removed.  No signs infection or drainage.  She has painless wrist range of motion flexion extension pronation supination radial ulnar deviation.  She is already developing contractures at her MCP joint I can only flex her to about 75 degrees.  PIP joint to about 90 degrees and DIP joint to about 70 degrees.  She has full active extension.  She has pain with attempted passive range of motion of her hand.  Hand is warm well-perfused.  Sensation intact radial ulnar median nerve distribution      Imaging: 3 views of the right wrist independently interpreted by myself discussed with the patient.  Well-placed hardware status post open reduction internal fixation right distal radius with no signs of complication or change in alignment    Assessment and Plan: 2 post open reduction internal fixation right distal radius fracture weeks to    -She is doing well.  Transition to a cock up wrist brace today.  Referral to occupational therapy to work on hand range of motion prevent contracture.  I discussed with her the importance of working on range of motion to have a good outcome.  This should heal uneventfully.  I will see her back in 4 weeks.  Nonweightbearing right upper extremity.  All questions were answered in detail            Jerson Escudero DO   Orthopaedic Surgery   1/4/24  9:20 AM    Note: This

## 2024-01-09 ENCOUNTER — OFFICE VISIT (OUTPATIENT)
Dept: NON INVASIVE DIAGNOSTICS | Age: 79
End: 2024-01-09
Payer: MEDICARE

## 2024-01-09 VITALS
DIASTOLIC BLOOD PRESSURE: 60 MMHG | WEIGHT: 220 LBS | SYSTOLIC BLOOD PRESSURE: 110 MMHG | RESPIRATION RATE: 16 BRPM | HEART RATE: 75 BPM | BODY MASS INDEX: 34.53 KG/M2 | HEIGHT: 67 IN | OXYGEN SATURATION: 98 %

## 2024-01-09 DIAGNOSIS — Z99.81 O2 DEPENDENT: ICD-10-CM

## 2024-01-09 DIAGNOSIS — Z79.01 CHRONIC ANTICOAGULATION: ICD-10-CM

## 2024-01-09 DIAGNOSIS — Z95.0 PRESENCE OF CARDIAC PACEMAKER: ICD-10-CM

## 2024-01-09 DIAGNOSIS — Z79.899 ON AMIODARONE THERAPY: ICD-10-CM

## 2024-01-09 DIAGNOSIS — I48.11 LONGSTANDING PERSISTENT ATRIAL FIBRILLATION (HCC): Primary | ICD-10-CM

## 2024-01-09 DIAGNOSIS — I10 ESSENTIAL HYPERTENSION: ICD-10-CM

## 2024-01-09 DIAGNOSIS — I48.91 ATRIAL FIBRILLATION, UNSPECIFIED TYPE (HCC): ICD-10-CM

## 2024-01-09 PROCEDURE — 3074F SYST BP LT 130 MM HG: CPT | Performed by: NURSE PRACTITIONER

## 2024-01-09 PROCEDURE — 99214 OFFICE O/P EST MOD 30 MIN: CPT | Performed by: NURSE PRACTITIONER

## 2024-01-09 PROCEDURE — 1123F ACP DISCUSS/DSCN MKR DOCD: CPT | Performed by: NURSE PRACTITIONER

## 2024-01-09 PROCEDURE — 3078F DIAST BP <80 MM HG: CPT | Performed by: NURSE PRACTITIONER

## 2024-01-09 RX ORDER — OXYCODONE HYDROCHLORIDE AND ACETAMINOPHEN 5; 325 MG/1; MG/1
1 TABLET ORAL PRN
COMMUNITY

## 2024-01-09 RX ORDER — LEVOTHYROXINE SODIUM 0.2 MG/1
200 TABLET ORAL DAILY
COMMUNITY

## 2024-01-09 RX ORDER — VALSARTAN 160 MG/1
160 TABLET ORAL DAILY
COMMUNITY

## 2024-01-09 RX ORDER — AMIODARONE HYDROCHLORIDE 200 MG/1
200 TABLET ORAL DAILY
COMMUNITY

## 2024-01-09 RX ORDER — M-VIT,TX,IRON,MINS/CALC/FOLIC 27MG-0.4MG
1 TABLET ORAL DAILY
COMMUNITY

## 2024-01-09 NOTE — PROGRESS NOTES
[facility] CARDIOLOGY  CARDIAC ELECTROPHYSIOLOGY DEPARTMENT/DIVISION OF CARDIOLOGY  Outpatient Progress Report  PATIENT: Radha Flynn  MEDICAL RECORD NUMBER: [id number]  DATE OF SERVICE:  1/9/2024  ATTENDING ELECTROPHYSIOLOGIST:  Filiberto Kaba D.O.  REFERRING PHYSICIAN:  Seda Caballero APRN - CNP  CHIEF COMPLAINT: PPM insitu    HPI:  Radha Flynn is a 78 y.o. female with a history of nonvalvular persistent AF, SND sp BSCI dc PPM (DOI: 8/31/22- Dr Kaba), HTN, COPD on nasal cannula O2, and obesity.  She is managed by Dr. Escudero with apixaban 5 mg twice daily, atorvastatin 20 mg daily, digoxin 125 mcg daily, dofetilide 500 mcg twice daily, losartan 100 mg daily, metoprolol 150 mg BID.  In 2020, patient was diagnosed with nonvalvular paroxysmal AF, which was treated with dofetilide 125 mcg twice daily.  In 3/2022, she had AF recurrences, which were managed with increase in dofetilide to 250 mcg twice daily.  In 7/2022, she was started on nadolol for \"rapid heart rate\" by Dr Escudero.  In 8/2022, she developed bradycardia on home monitor and chest pain/pressure, so nadolol discontinued on presentation with bradycardia at that time and Tikosyn increased to 500 mcg twice daily for better A-fib control.  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  She continued to have increased AF burden with symptoms and RVR at times.  Discussed amiodarone versus AV node ablation and planned for AV node ablation in December.  Patient fell at home and broke her right wrist and was scheduled for orthopedic surgery today after AVN ablation scheduled.  AV node ablation was delayed and amiodarone started for better rhythm and rate control until AV node ablation.  She has been loaded on amiodarone and started on daily dosing a few days ago.  On review of her monitor, she did have some episodes of sinus rhythm at the end of December 2

## 2024-01-16 ENCOUNTER — EVALUATION (OUTPATIENT)
Dept: OCCUPATIONAL THERAPY | Age: 79
End: 2024-01-16
Payer: MEDICARE

## 2024-01-16 DIAGNOSIS — S52.531A CLOSED COLLES' FRACTURE OF RIGHT RADIUS, INITIAL ENCOUNTER: Primary | ICD-10-CM

## 2024-01-16 PROCEDURE — 97110 THERAPEUTIC EXERCISES: CPT | Performed by: OCCUPATIONAL THERAPIST

## 2024-01-16 PROCEDURE — 97165 OT EVAL LOW COMPLEX 30 MIN: CPT | Performed by: OCCUPATIONAL THERAPIST

## 2024-01-16 PROCEDURE — 97140 MANUAL THERAPY 1/> REGIONS: CPT | Performed by: OCCUPATIONAL THERAPIST

## 2024-01-16 NOTE — PROGRESS NOTES
x        Bathing:  x        UE Dressing:  x        LE Dressing:  x        Toileting: x         Transfers: x           Comments:    Pain Level: 3 on scale of 1-10, aching    UE Assessment:    3/4 active finger flex/ full active ext   Full thumb opposition.     R Wrist ROM:  Forearm Pron 0-90:    0-90  Forearm Supination  0-90:   0-45  Wrist Flexion 0-80:    0-35  Wrist Extension 0-70:    0-30  Wrist Radial Deviation 0-20:   0-10  Wrist Ulnar Deviation 0-45:   0-15   Comment: Hand Dominance is left    Sensation: R  Able To Sense (Y) / Unable to Sense (N)  SEMMES-KENNA Thumb 2nd Digit 3rd Digit  4th Digit  5th Digit    Normal Touch  Size: 2.83        Diminished Light Touch   Size: 3.61 Y Y Y Y &   Diminished Protective Sense  Size: 4.31        Loss of Protective Sense   Size: 4.56        Loss of Sensation  Size: 6.65            Edema Description/Circumferential Measurements:   7 1/2 inches around R wrist, L wrist 6 1/2    Dynamometer (setting 2) deferred     Left      Right      Pinch (lateral)      Left      Right      Pinch (tripod)      Left      Right         9 Hole Peg test      Left 26 sec     Right 32 sec        QuickDash       68%       Intervention: MHP, wrist arom, small peg activitiy, Beginning hep active rom exercises provided with good understanding.       Eval Complexity: low  Profile and History- low  Assessment of Occupational Performance and Identification of Deficits- low  Clinical Decision Making- low    Rehab Potential:                                 [x] Good  [] Fair  [] Poor        Suggested Professional Referral:       [x] No  [] Yes:  Barriers to Goal Achievement::          [x] No  [] Yes:  Domestic Concerns:                           [x] No  [] Yes:       Patient. Education:  [x] Plans/Goals, Risks/Benefits discussed  [x] Home exercise program  Method of Education: [x] Verbal  [] Demo  [] Written  Comprehension of Education:  [x] Verbalizes understanding.  [x] Demonstrates

## 2024-01-18 ENCOUNTER — TELEPHONE (OUTPATIENT)
Dept: NON INVASIVE DIAGNOSTICS | Age: 79
End: 2024-01-18

## 2024-01-18 DIAGNOSIS — I48.91 ATRIAL FIBRILLATION, UNSPECIFIED TYPE (HCC): Primary | ICD-10-CM

## 2024-01-18 NOTE — TELEPHONE ENCOUNTER
----- Message from GIO Lopez - CNP sent at 1/17/2024  2:23 PM EST -----  Please schedule her for AVJ ablation

## 2024-01-18 NOTE — TELEPHONE ENCOUNTER
Patient is scheduled 2/16/24 for AVJ ablation.  Patient given Instructions and verbalized understanding, copy to be mailed.

## 2024-01-19 ENCOUNTER — PREP FOR PROCEDURE (OUTPATIENT)
Dept: NON INVASIVE DIAGNOSTICS | Age: 79
End: 2024-01-19

## 2024-01-20 RX ORDER — SODIUM CHLORIDE 0.9 % (FLUSH) 0.9 %
5-40 SYRINGE (ML) INJECTION EVERY 12 HOURS SCHEDULED
Status: CANCELLED | OUTPATIENT
Start: 2024-01-20

## 2024-01-20 RX ORDER — SODIUM CHLORIDE 0.9 % (FLUSH) 0.9 %
5-40 SYRINGE (ML) INJECTION PRN
Status: CANCELLED | OUTPATIENT
Start: 2024-01-20

## 2024-01-20 RX ORDER — SODIUM CHLORIDE 9 MG/ML
INJECTION, SOLUTION INTRAVENOUS PRN
Status: CANCELLED | OUTPATIENT
Start: 2024-01-20

## 2024-01-22 ENCOUNTER — TREATMENT (OUTPATIENT)
Dept: OCCUPATIONAL THERAPY | Age: 79
End: 2024-01-22
Payer: MEDICARE

## 2024-01-22 DIAGNOSIS — S52.531A CLOSED COLLES' FRACTURE OF RIGHT RADIUS, INITIAL ENCOUNTER: Primary | ICD-10-CM

## 2024-01-22 PROCEDURE — 97110 THERAPEUTIC EXERCISES: CPT | Performed by: OCCUPATIONAL THERAPIST

## 2024-01-22 PROCEDURE — 97140 MANUAL THERAPY 1/> REGIONS: CPT | Performed by: OCCUPATIONAL THERAPIST

## 2024-01-22 NOTE — PROGRESS NOTES
motion of their affected hand/UE to WF for ADL/IADL completion.  3) Patient will demonstrate increased /pinch strength of at least 10 / 5 pinch pounds of their affected hand/UE. Will measure when protocol permitting.   4) Patient to report decreased pain in their affected hand/ upper extremity from 0-4/10 to 0-2/10 or less with functional use.   5) Patient will complete 9 hole peg test under 30 sec.   6) Therapist will provide splinting as needed  and patient to report 100% compliance with their splint wear, care, and precautions if needed.   7) Patient will be knowledgeable of edema control techniques as evident with decreases from mod to mild/none.   8) Patient will demonstrate a non-tender/non-adherent scar.   9) Patient will report ADL functions as Mod I/I using affected hand/UE   10) Patient will decrease QuickDASH score to 15% or less for increased participation in daily functional activities.          TODAY'S TREATMENT     Pain Level: 0-4 on scale of 1-10, aching    Subjective: \"hand is feeling better, less swelling today.\"     Objective:    Updated POC to be completed by 10 th visit.    INTERVENTION: COMPLETED: SPECIFICS/COMMENTS:   Modality:     MHP x 10 min to increase tissue mobility        AROM:     R wrist  x Active wrist ext/flex/rad/ulnar dev  15x2  Prayer stretches   R hand x Tendon glides 10x  Fine peg activity 10 min  Cygnet activity 5 min   AAROM:               PROM/Stretching:     R wrist  x All planes        Scar Mass/Edema Control:     Soft tissue mobilization x R wrist/hand        Strengthening:               Other:     hep x Wrist rom all planes.  Tendon glides, prayer stretches          Assessment/Comments: less overall swelling is noted hand and wrist. ROM is progressing with improved . Reviewed hep to cont with good understanding.       -Rehab Potential: Good   -Patient Response to Treatment: good    Patient. Education:  [] Plans/Goals, Risks/Benefits discussed  [] Home

## 2024-01-23 ENCOUNTER — TELEPHONE (OUTPATIENT)
Dept: NON INVASIVE DIAGNOSTICS | Age: 79
End: 2024-01-23

## 2024-01-23 NOTE — TELEPHONE ENCOUNTER
----- Message from Nila Young MA sent at 1/18/2024  3:13 PM EST -----  Regarding: Prior Auth  CPT 97835  DX I48.91    2/16/24 with Dr Sesar chang.

## 2024-01-24 RX ORDER — AMIODARONE HYDROCHLORIDE 200 MG/1
200 TABLET ORAL DAILY
Qty: 90 TABLET | Refills: 0 | Status: SHIPPED | OUTPATIENT
Start: 2024-01-24

## 2024-01-25 ENCOUNTER — TREATMENT (OUTPATIENT)
Dept: OCCUPATIONAL THERAPY | Age: 79
End: 2024-01-25

## 2024-01-25 DIAGNOSIS — S52.531A CLOSED COLLES' FRACTURE OF RIGHT RADIUS, INITIAL ENCOUNTER: Primary | ICD-10-CM

## 2024-01-25 NOTE — PROGRESS NOTES
OCCUPATIONAL THERAPY DAILY NOTE  [facility] PHYSICIANS [place] SPECIALTY CARE [facility] OCCUPATIONAL THERAPY  [address]  [address]LO[address]  [facility] [address]  Dept: [phone]  Loc: [phone]   AYLA [place] OT Fax: [phone]      Date:  2024    Initial Evaluation Date: 24                                Evaluating Therapist: Brda Bhandari OT     Patient Name:  Radha Flynn                        :  1945     Restrictions/Precautions:  per protocol, low fall risk  Diagnosis:  Closed fracture of distal end of right radius,  S52.501A                                                             Date of Surgery/Injury: Dec 10th  fell, surgery for orif on Dec 18th     Insurance/Certification information:  [facility] Medicare/[employer]  Plan of care signed (Y/N): electronic signed     Visit# / total visits: 3 / 15  approved by insurance  Referring Practitioner:  Dr. YEHUDA Escudero  Specific Practitioner Orders: Nonweightbearing  R UE, rom/prevent contracture.     Assessment of current deficits   [x] Functional mobility             [x] ADLs           [x] Strength                  [] Cognition   [] Functional transfers           [x] IADLs          [x] Safety Awareness  [x] Endurance   [x] Fine Motor Coordination    [] Balance      [] Vision/perception    [] Sensation     [x] Gross Motor Coordination [x] ROM           [x] Pain                        [x] Edema          [x] Scar Adhesion/Skin Integrity      OT PLAN OF CARE   OT POC based on physician orders, patient diagnosis and results of clinical assessment  Frequency/Duration  2x week for TBD visits. Certification period: from 24 to 24  Reassessment date:                                 GOALS (Long term same as Short term):  1) Patient will demonstrate good understanding of home program (exercises/activities/diagnosis/prognosis/goals) with good accuracy.   2) Patient will demonstrate increased active/passive range of

## 2024-01-29 ENCOUNTER — TREATMENT (OUTPATIENT)
Dept: OCCUPATIONAL THERAPY | Age: 79
End: 2024-01-29
Payer: COMMERCIAL

## 2024-01-29 ENCOUNTER — HOSPITAL ENCOUNTER (OUTPATIENT)
Age: 79
Discharge: HOME | End: 2024-01-29
Payer: COMMERCIAL

## 2024-01-29 DIAGNOSIS — S52.531A CLOSED COLLES' FRACTURE OF RIGHT RADIUS, INITIAL ENCOUNTER: Primary | ICD-10-CM

## 2024-01-29 LAB
ALBUMIN SERPL-MCNC: 4 G/DL (ref 3.5–5.2)
ALP SERPL-CCNC: 104 U/L (ref 35–104)
ALT SERPL-CCNC: 17 U/L (ref 0–32)
ANION GAP SERPL CALCULATED.3IONS-SCNC: 9 MMOL/L (ref 7–16)
AST SERPL-CCNC: 19 U/L (ref 0–31)
BASOPHILS # BLD: 0.04 K/UL (ref 0–0.2)
BASOPHILS NFR BLD: 1 % (ref 0–2)
BILIRUB SERPL-MCNC: 0.5 MG/DL (ref 0–1.2)
BUN SERPL-MCNC: 38 MG/DL (ref 6–23)
CALCIUM SERPL-MCNC: 10.3 MG/DL (ref 8.6–10.2)
CHLORIDE SERPL-SCNC: 98 MMOL/L (ref 98–107)
CO2 SERPL-SCNC: 31 MMOL/L (ref 22–29)
CREAT SERPL-MCNC: 1.5 MG/DL (ref 0.5–1)
CRP SERPL HS-MCNC: 20 MG/L (ref 0–5)
EOSINOPHIL # BLD: 0.13 K/UL (ref 0.05–0.5)
EOSINOPHILS RELATIVE PERCENT: 2 % (ref 0–6)
ERYTHROCYTE [DISTWIDTH] IN BLOOD BY AUTOMATED COUNT: 13.8 % (ref 11.5–15)
ERYTHROCYTE [SEDIMENTATION RATE] IN BLOOD BY WESTERGREN METHOD: 54 MM/HR (ref 0–20)
GFR SERPL CREATININE-BSD FRML MDRD: 35 ML/MIN/1.73M2
GLUCOSE SERPL-MCNC: 106 MG/DL (ref 74–99)
HCT VFR BLD AUTO: 34.7 % (ref 34–48)
HGB BLD-MCNC: 10.7 G/DL (ref 11.5–15.5)
IMM GRANULOCYTES # BLD AUTO: 0.03 K/UL (ref 0–0.58)
IMM GRANULOCYTES NFR BLD: 0 % (ref 0–5)
LYMPHOCYTES NFR BLD: 1.17 K/UL (ref 1.5–4)
LYMPHOCYTES RELATIVE PERCENT: 15 % (ref 20–42)
MCH RBC QN AUTO: 30.6 PG (ref 26–35)
MCHC RBC AUTO-ENTMCNC: 30.8 G/DL (ref 32–34.5)
MCV RBC AUTO: 99.1 FL (ref 80–99.9)
MONOCYTES NFR BLD: 0.58 K/UL (ref 0.1–0.95)
MONOCYTES NFR BLD: 7 % (ref 2–12)
NEUTROPHILS NFR BLD: 76 % (ref 43–80)
NEUTS SEG NFR BLD: 6.02 K/UL (ref 1.8–7.3)
PLATELET # BLD AUTO: 241 K/UL (ref 130–450)
PMV BLD AUTO: 11.6 FL (ref 7–12)
POTASSIUM SERPL-SCNC: 5.2 MMOL/L (ref 3.5–5)
PROT SERPL-MCNC: 7.7 G/DL (ref 6.4–8.3)
RBC # BLD AUTO: 3.5 M/UL (ref 3.5–5.5)
SODIUM SERPL-SCNC: 138 MMOL/L (ref 132–146)
WBC OTHER # BLD: 8 K/UL (ref 4.5–11.5)

## 2024-01-29 PROCEDURE — 97110 THERAPEUTIC EXERCISES: CPT | Performed by: OCCUPATIONAL THERAPIST

## 2024-01-29 PROCEDURE — 80053 COMPREHEN METABOLIC PANEL: CPT

## 2024-01-29 PROCEDURE — 85025 COMPLETE CBC W/AUTO DIFF WBC: CPT

## 2024-01-29 PROCEDURE — 36415 COLL VENOUS BLD VENIPUNCTURE: CPT

## 2024-01-29 PROCEDURE — 86140 C-REACTIVE PROTEIN: CPT

## 2024-01-29 PROCEDURE — 97140 MANUAL THERAPY 1/> REGIONS: CPT | Performed by: OCCUPATIONAL THERAPIST

## 2024-01-29 PROCEDURE — 85652 RBC SED RATE AUTOMATED: CPT

## 2024-01-29 NOTE — PROGRESS NOTES
OCCUPATIONAL THERAPY DAILY NOTE  [facility] PHYSICIANS [place] SPECIALTY CARE [facility] OCCUPATIONAL THERAPY  [address]  [address]LO[address]  [facility] [address]  Dept: [phone]  Loc: [phone]   AYLA [place] OT Fax: [phone]      Date:  2024    Initial Evaluation Date: 24                                Evaluating Therapist: Brad Bhandari OT     Patient Name:  Radha Flynn                        :  1945     Restrictions/Precautions:  per protocol, low fall risk  Diagnosis:  Closed fracture of distal end of right radius,  S52.501A                                                             Date of Surgery/Injury: Dec 10th  fell, surgery for orif on Dec 18th     Insurance/Certification information:  [facility] Medicare/[email or web address]  Plan of care signed (Y/N): electronic signed     Visit# / total visits: 4/ 15  approved by insurance  Referring Practitioner:  Dr. YEHUDA Escudero  Specific Practitioner Orders: Nonweightbearing  R UE, rom/prevent contracture.     Assessment of current deficits   [x] Functional mobility             [x] ADLs           [x] Strength                  [] Cognition   [] Functional transfers           [x] IADLs          [x] Safety Awareness  [x] Endurance   [x] Fine Motor Coordination    [] Balance      [] Vision/perception    [] Sensation     [x] Gross Motor Coordination [x] ROM           [x] Pain                        [x] Edema          [x] Scar Adhesion/Skin Integrity      OT PLAN OF CARE   OT POC based on physician orders, patient diagnosis and results of clinical assessment  Frequency/Duration  2x week for TBD visits. Certification period: from 24 to 24  Reassessment date:                                 GOALS (Long term same as Short term):  1) Patient will demonstrate good understanding of home program (exercises/activities/diagnosis/prognosis/goals) with good accuracy.   2) Patient will demonstrate increased active/passive range of

## 2024-01-31 ENCOUNTER — NURSE ONLY (OUTPATIENT)
Dept: NON INVASIVE DIAGNOSTICS | Age: 79
End: 2024-01-31
Payer: COMMERCIAL

## 2024-01-31 DIAGNOSIS — I48.91 ATRIAL FIBRILLATION, UNSPECIFIED TYPE (HCC): Primary | ICD-10-CM

## 2024-01-31 DIAGNOSIS — I48.91 ATRIAL FIBRILLATION, UNSPECIFIED TYPE (HCC): ICD-10-CM

## 2024-01-31 LAB
ABSOLUTE IMMATURE GRANULOCYTE: 0.03 K/UL (ref 0–0.58)
ALBUMIN SERPL-MCNC: 3.9 G/DL (ref 3.5–5.2)
ALP BLD-CCNC: 101 U/L (ref 35–104)
ALT SERPL-CCNC: 21 U/L (ref 0–32)
ANION GAP SERPL CALCULATED.3IONS-SCNC: 9 MMOL/L (ref 7–16)
AST SERPL-CCNC: 28 U/L (ref 0–31)
BASOPHILS ABSOLUTE: 0.04 K/UL (ref 0–0.2)
BASOPHILS RELATIVE PERCENT: 1 % (ref 0–2)
BILIRUB SERPL-MCNC: 0.6 MG/DL (ref 0–1.2)
BUN BLDV-MCNC: 35 MG/DL (ref 6–23)
CALCIUM SERPL-MCNC: 9.7 MG/DL (ref 8.6–10.2)
CHLORIDE BLD-SCNC: 97 MMOL/L (ref 98–107)
CO2: 30 MMOL/L (ref 22–29)
CREAT SERPL-MCNC: 1.4 MG/DL (ref 0.5–1)
EOSINOPHILS ABSOLUTE: 0.13 K/UL (ref 0.05–0.5)
EOSINOPHILS RELATIVE PERCENT: 2 % (ref 0–6)
GFR SERPL CREATININE-BSD FRML MDRD: 40 ML/MIN/1.73M2
GLUCOSE BLD-MCNC: 99 MG/DL (ref 74–99)
HCT VFR BLD CALC: 34.8 % (ref 34–48)
HEMOGLOBIN: 10.6 G/DL (ref 11.5–15.5)
IMMATURE GRANULOCYTES: 0 % (ref 0–5)
LYMPHOCYTES ABSOLUTE: 1.17 K/UL (ref 1.5–4)
LYMPHOCYTES RELATIVE PERCENT: 15 % (ref 20–42)
MAGNESIUM: 2 MG/DL (ref 1.6–2.6)
MCH RBC QN AUTO: 31 PG (ref 26–35)
MCHC RBC AUTO-ENTMCNC: 30.5 G/DL (ref 32–34.5)
MCV RBC AUTO: 101.8 FL (ref 80–99.9)
MONOCYTES ABSOLUTE: 0.56 K/UL (ref 0.1–0.95)
MONOCYTES RELATIVE PERCENT: 7 % (ref 2–12)
NEUTROPHILS ABSOLUTE: 5.8 K/UL (ref 1.8–7.3)
NEUTROPHILS RELATIVE PERCENT: 75 % (ref 43–80)
PDW BLD-RTO: 13.9 % (ref 11.5–15)
PLATELET # BLD: 225 K/UL (ref 130–450)
PMV BLD AUTO: 11.6 FL (ref 7–12)
POTASSIUM SERPL-SCNC: 6.1 MMOL/L (ref 3.5–5)
RBC # BLD: 3.42 M/UL (ref 3.5–5.5)
SODIUM BLD-SCNC: 136 MMOL/L (ref 132–146)
TOTAL PROTEIN: 7.6 G/DL (ref 6.4–8.3)
WBC # BLD: 7.7 K/UL (ref 4.5–11.5)

## 2024-01-31 PROCEDURE — 36415 COLL VENOUS BLD VENIPUNCTURE: CPT | Performed by: STUDENT IN AN ORGANIZED HEALTH CARE EDUCATION/TRAINING PROGRAM

## 2024-02-01 ENCOUNTER — OFFICE VISIT (OUTPATIENT)
Dept: ORTHOPEDIC SURGERY | Age: 79
End: 2024-02-01

## 2024-02-01 ENCOUNTER — TREATMENT (OUTPATIENT)
Dept: OCCUPATIONAL THERAPY | Age: 79
End: 2024-02-01
Payer: COMMERCIAL

## 2024-02-01 ENCOUNTER — TELEPHONE (OUTPATIENT)
Dept: NON INVASIVE DIAGNOSTICS | Age: 79
End: 2024-02-01

## 2024-02-01 DIAGNOSIS — S52.531A CLOSED COLLES' FRACTURE OF RIGHT RADIUS, INITIAL ENCOUNTER: Primary | ICD-10-CM

## 2024-02-01 DIAGNOSIS — S52.501A CLOSED FRACTURE OF DISTAL END OF RIGHT RADIUS, UNSPECIFIED FRACTURE MORPHOLOGY, INITIAL ENCOUNTER: Primary | ICD-10-CM

## 2024-02-01 DIAGNOSIS — I50.31 DIASTOLIC CHF, ACUTE (HCC): Primary | ICD-10-CM

## 2024-02-01 PROCEDURE — 97110 THERAPEUTIC EXERCISES: CPT | Performed by: OCCUPATIONAL THERAPIST

## 2024-02-01 PROCEDURE — 97022 WHIRLPOOL THERAPY: CPT | Performed by: OCCUPATIONAL THERAPIST

## 2024-02-01 PROCEDURE — 99024 POSTOP FOLLOW-UP VISIT: CPT | Performed by: STUDENT IN AN ORGANIZED HEALTH CARE EDUCATION/TRAINING PROGRAM

## 2024-02-01 PROCEDURE — 97140 MANUAL THERAPY 1/> REGIONS: CPT | Performed by: OCCUPATIONAL THERAPIST

## 2024-02-01 RX ORDER — FUROSEMIDE 20 MG/1
20 TABLET ORAL DAILY
Qty: 30 TABLET | Refills: 1 | Status: SHIPPED | OUTPATIENT
Start: 2024-02-01

## 2024-02-01 NOTE — TELEPHONE ENCOUNTER
----- Message from Filiberto Kaba DO sent at 1/31/2024 10:22 PM EST -----  Regarding: potassium  Elevated K+ on labs.    Recommend she reduce K+ intake.    I don't see a K+ supplement, but she is on some OTC supplements. Can you review and if K+ in them have her stop them.    -Filiberto Kaba DO

## 2024-02-01 NOTE — TELEPHONE ENCOUNTER
Spoke with patient and she verbalized understanding.  Patient went through her OTC medication and the only one that contained potassium is her Multivitamin.  Patient will stop the multivitamin.  Patient would like to know if she should recheck here potassium level after stopping multivitamin.

## 2024-02-01 NOTE — TELEPHONE ENCOUNTER
Called patient to discuss repeat lab work.  She has been more short of breath recently in the past couple weeks.  Reviewed heart rates and rhythm with remote check and she has been mildly elevated on her heart rates as well as being in atrial fibrillation persistently.  She has plans for AV node ablation soon.  She has been unable to exert herself and walk up the steps recently due to shortness of breath.  She denies any swelling in her legs.  She is unsure if she has orthopnea because she has been sleeping in the chair for several months to years due to her back and hip pain.  She also states that her orthopedic physician and physical therapist have said that she looks more winded than normal recently at therapy.  Discussed plan to start Lasix due to likely congestive heart failure due to atrial fibrillation and diastolic dysfunction.  She is agreeable to have lab work repeated on Monday.  Plan to take Lasix 20 mg daily until Monday and we will update her on lab work and see if she is feeling better overall.  Prescription sent to pharmacy.      Alice Pearce, APRN - CNP

## 2024-02-01 NOTE — PROGRESS NOTES
OCCUPATIONAL THERAPY DAILY NOTE  [facility] PHYSICIANS [place] SPECIALTY CARE [facility] OCCUPATIONAL THERAPY  [address]  2[address] FLO[address]  [facility] [address]  Dept: [phone]  Loc: [phone]   AYLA [place] OT Fax: [phone]      Date:  2024    Initial Evaluation Date: 24                                Evaluating Therapist: Brad Bhandari OT     Patient Name:  Radha Flynn                        :  1945     Restrictions/Precautions:  per protocol, low fall risk  Diagnosis:  Closed fracture of distal end of right radius,  S52.501A                                                             Date of Surgery/Injury: Dec 10th  fell, surgery for orif on Dec 18th     Insurance/Certification information:  [facility] Medicare/[employer] med[employer]  Plan of care signed (Y/N): electronic signed     Visit# / total visits: 5/ 15  approved by insurance  Referring Practitioner:  Dr. YEHUDA Escudero  Specific Practitioner Orders: Nonweightbearing  R UE, rom/prevent contracture.     Assessment of current deficits   [x] Functional mobility             [x] ADLs           [x] Strength                  [] Cognition   [] Functional transfers           [x] IADLs          [x] Safety Awareness  [x] Endurance   [x] Fine Motor Coordination    [] Balance      [] Vision/perception    [] Sensation     [x] Gross Motor Coordination [x] ROM           [x] Pain                        [x] Edema          [x] Scar Adhesion/Skin Integrity      OT PLAN OF CARE   OT POC based on physician orders, patient diagnosis and results of clinical assessment  Frequency/Duration  2x week for TBD visits. Certification period: from 24 to 24  Reassessment date:                                 GOALS (Long term same as Short term):  1) Patient will demonstrate good understanding of home program (exercises/activities/diagnosis/prognosis/goals) with good accuracy.   2) Patient will demonstrate increased active/passive range of

## 2024-02-02 ENCOUNTER — TELEPHONE (OUTPATIENT)
Dept: NON INVASIVE DIAGNOSTICS | Age: 79
End: 2024-02-02

## 2024-02-02 NOTE — TELEPHONE ENCOUNTER
I called Radha because of a Latitude alert with increased heart rates in atrial fib. Ventricular rates 118-130. I discussed with Alice Portia POWERS. Alice wanted her to increase her Lopressor to 150 mg bid. I had Radha read off her medication bottles to verify what she is taking. I told her Alice wants her to increase the Metoprolol Tartrate to 150 mg (1.5 tablets) twice daily. Radha repeated the above back to me.    Marija Woods RN, BSN  [facility] [place]   [facility]

## 2024-02-05 ENCOUNTER — NURSE ONLY (OUTPATIENT)
Dept: NON INVASIVE DIAGNOSTICS | Age: 79
End: 2024-02-05
Payer: COMMERCIAL

## 2024-02-05 ENCOUNTER — TREATMENT (OUTPATIENT)
Dept: OCCUPATIONAL THERAPY | Age: 79
End: 2024-02-05
Payer: MEDICARE

## 2024-02-05 DIAGNOSIS — I50.31 DIASTOLIC CHF, ACUTE (HCC): ICD-10-CM

## 2024-02-05 DIAGNOSIS — S52.531A CLOSED COLLES' FRACTURE OF RIGHT RADIUS, INITIAL ENCOUNTER: Primary | ICD-10-CM

## 2024-02-05 DIAGNOSIS — I48.91 ATRIAL FIBRILLATION, UNSPECIFIED TYPE (HCC): Primary | ICD-10-CM

## 2024-02-05 LAB
ABSOLUTE IMMATURE GRANULOCYTE: 0.04 K/UL (ref 0–0.58)
ALBUMIN SERPL-MCNC: 4.1 G/DL (ref 3.5–5.2)
ALP BLD-CCNC: 100 U/L (ref 35–104)
ALT SERPL-CCNC: 23 U/L (ref 0–32)
ANION GAP SERPL CALCULATED.3IONS-SCNC: 12 MMOL/L (ref 7–16)
AST SERPL-CCNC: 27 U/L (ref 0–31)
BASOPHILS ABSOLUTE: 0.05 K/UL (ref 0–0.2)
BASOPHILS RELATIVE PERCENT: 1 % (ref 0–2)
BILIRUB SERPL-MCNC: 0.7 MG/DL (ref 0–1.2)
BUN BLDV-MCNC: 33 MG/DL (ref 6–23)
CALCIUM SERPL-MCNC: 9.6 MG/DL (ref 8.6–10.2)
CHLORIDE BLD-SCNC: 98 MMOL/L (ref 98–107)
CO2: 27 MMOL/L (ref 22–29)
CREAT SERPL-MCNC: 1.3 MG/DL (ref 0.5–1)
EOSINOPHILS ABSOLUTE: 0.08 K/UL (ref 0.05–0.5)
EOSINOPHILS RELATIVE PERCENT: 1 % (ref 0–6)
GFR SERPL CREATININE-BSD FRML MDRD: 44 ML/MIN/1.73M2
GLUCOSE BLD-MCNC: 112 MG/DL (ref 74–99)
HCT VFR BLD CALC: 36.7 % (ref 34–48)
HEMOGLOBIN: 11.2 G/DL (ref 11.5–15.5)
IMMATURE GRANULOCYTES: 1 % (ref 0–5)
LYMPHOCYTES ABSOLUTE: 0.99 K/UL (ref 1.5–4)
LYMPHOCYTES RELATIVE PERCENT: 12 % (ref 20–42)
MAGNESIUM: 2 MG/DL (ref 1.6–2.6)
MCH RBC QN AUTO: 31 PG (ref 26–35)
MCHC RBC AUTO-ENTMCNC: 30.5 G/DL (ref 32–34.5)
MCV RBC AUTO: 101.7 FL (ref 80–99.9)
MONOCYTES ABSOLUTE: 0.5 K/UL (ref 0.1–0.95)
MONOCYTES RELATIVE PERCENT: 6 % (ref 2–12)
NEUTROPHILS ABSOLUTE: 6.36 K/UL (ref 1.8–7.3)
NEUTROPHILS RELATIVE PERCENT: 79 % (ref 43–80)
PDW BLD-RTO: 13.9 % (ref 11.5–15)
PLATELET # BLD: 243 K/UL (ref 130–450)
PMV BLD AUTO: 11.9 FL (ref 7–12)
POTASSIUM SERPL-SCNC: 6.5 MMOL/L (ref 3.5–5)
RBC # BLD: 3.61 M/UL (ref 3.5–5.5)
SODIUM BLD-SCNC: 137 MMOL/L (ref 132–146)
TOTAL PROTEIN: 7.8 G/DL (ref 6.4–8.3)
WBC # BLD: 8 K/UL (ref 4.5–11.5)

## 2024-02-05 PROCEDURE — 97140 MANUAL THERAPY 1/> REGIONS: CPT | Performed by: OCCUPATIONAL THERAPIST

## 2024-02-05 PROCEDURE — 97022 WHIRLPOOL THERAPY: CPT | Performed by: OCCUPATIONAL THERAPIST

## 2024-02-05 PROCEDURE — 97110 THERAPEUTIC EXERCISES: CPT | Performed by: OCCUPATIONAL THERAPIST

## 2024-02-05 PROCEDURE — 36415 COLL VENOUS BLD VENIPUNCTURE: CPT | Performed by: NURSE PRACTITIONER

## 2024-02-05 NOTE — PROGRESS NOTES
OCCUPATIONAL THERAPY DAILY NOTE  [facility] PHYSICIANS [place] SPECIALTY CARE [facility] OCCUPATIONAL THERAPY  [address]  2[address] FLO[address]  [facility] [address]  Dept: [phone]  Loc: [phone]   AYLA [place] OT Fax: [phone]      Date:  2024    Initial Evaluation Date: 24                                Evaluating Therapist: Brad Bhandari OT     Patient Name:  Radha Flynn                        :  1945     Restrictions/Precautions:  per protocol, low fall risk  Diagnosis:  Closed fracture of distal end of right radius,  S52.501A                                                             Date of Surgery/Injury: Dec 10th  fell, surgery for orif on Dec 18th     Insurance/Certification information:  [facility] Medicare/[employer] med[employer]  Plan of care signed (Y/N): electronic signed     Visit# / total visits: 6/ 15  approved by insurance through 4/15/24  Referring Practitioner:  Dr. YEHUDA Escudero  Specific Practitioner Orders: Nonweightbearing  R UE, rom/prevent contracture.     Assessment of current deficits   [x] Functional mobility             [x] ADLs           [x] Strength                  [] Cognition   [] Functional transfers           [x] IADLs          [x] Safety Awareness  [x] Endurance   [x] Fine Motor Coordination    [] Balance      [] Vision/perception    [] Sensation     [x] Gross Motor Coordination [x] ROM           [x] Pain                        [x] Edema          [x] Scar Adhesion/Skin Integrity      OT PLAN OF CARE   OT POC based on physician orders, patient diagnosis and results of clinical assessment  Frequency/Duration  2x week for TBD visits. Certification period: from 24 to 24  Reassessment date:                                 GOALS (Long term same as Short term):  1) Patient will demonstrate good understanding of home program (exercises/activities/diagnosis/prognosis/goals) with good accuracy.   2) Patient will demonstrate increased

## 2024-02-05 NOTE — PROGRESS NOTES
Patient Labs drawn form Right arm   Labs ordered by Alice LA  Patient tolerated well.     Electronically signed by Leelee Lundberg MA on 2/5/2024 at 11:10 AM

## 2024-02-06 ENCOUNTER — TELEPHONE (OUTPATIENT)
Dept: NON INVASIVE DIAGNOSTICS | Age: 79
End: 2024-02-06

## 2024-02-06 ENCOUNTER — HOSPITAL ENCOUNTER (OUTPATIENT)
Age: 79
Discharge: HOME | End: 2024-02-06
Payer: MEDICARE

## 2024-02-06 LAB
ANION GAP SERPL CALCULATED.3IONS-SCNC: 10 MMOL/L (ref 7–16)
BUN SERPL-MCNC: 42 MG/DL (ref 6–23)
CALCIUM SERPL-MCNC: 9.9 MG/DL (ref 8.6–10.2)
CHLORIDE SERPL-SCNC: 98 MMOL/L (ref 98–107)
CO2 SERPL-SCNC: 30 MMOL/L (ref 22–29)
CREAT SERPL-MCNC: 1.5 MG/DL (ref 0.5–1)
GFR SERPL CREATININE-BSD FRML MDRD: 35 ML/MIN/1.73M2
GLUCOSE SERPL-MCNC: 146 MG/DL (ref 74–99)
MAGNESIUM SERPL-MCNC: 1.8 MG/DL (ref 1.6–2.6)
POTASSIUM SERPL-SCNC: 4.9 MMOL/L (ref 3.5–5)
SODIUM SERPL-SCNC: 138 MMOL/L (ref 132–146)

## 2024-02-06 PROCEDURE — 83735 ASSAY OF MAGNESIUM: CPT

## 2024-02-06 PROCEDURE — 80048 BASIC METABOLIC PNL TOTAL CA: CPT

## 2024-02-06 PROCEDURE — 36415 COLL VENOUS BLD VENIPUNCTURE: CPT

## 2024-02-06 NOTE — TELEPHONE ENCOUNTER
----- Message from GIO Lopez - CNP sent at 2/6/2024 11:08 AM EST -----  Please have Radha go the the lab today or go to the ED. Her potassium is still elevated. Her kidney function is improved, so I think that the labs may have sat too long in the office and be incorrect/ mildly hemolyzed. She has a pacemaker and no arrhythmias have been noted.

## 2024-02-06 NOTE — TELEPHONE ENCOUNTER
----- Message from GIO Lopez CNP sent at 2/6/2024  2:18 PM EST -----  Can you please call her and tell her her potassium is back down to normal and she should continue the Lasix 20 mg daily thank you  ----- Message -----  From: Maggie Brar Incoming Lab Results From [facility]  Sent: 2/6/2024   1:39 PM EST  To: GIO Lopez CNP

## 2024-02-06 NOTE — TELEPHONE ENCOUNTER
Spoke with patient and she verbalized understanding.  Patient will go to [facility] to have redrawn today.

## 2024-02-07 NOTE — PROGRESS NOTES
Patient Labs drawn form Right arm   Labs ordered by Dr Filiberto Kaba  Patient tolerated well.     Electronically signed by Leelee Lundberg MA on 2/7/2024 at 1:32 PM

## 2024-02-08 ENCOUNTER — TREATMENT (OUTPATIENT)
Dept: OCCUPATIONAL THERAPY | Age: 79
End: 2024-02-08

## 2024-02-08 DIAGNOSIS — S52.531A CLOSED COLLES' FRACTURE OF RIGHT RADIUS, INITIAL ENCOUNTER: Primary | ICD-10-CM

## 2024-02-08 NOTE — PROGRESS NOTES
OCCUPATIONAL THERAPY DAILY NOTE  [facility] PHYSICIANS [place] SPECIALTY CARE [facility] OCCUPATIONAL THERAPY  [address]  2ND FLO[address]  [facility] [address]  Dept: [phone]  Loc: [phone]   AYLA [place] OT Fax: [phone]      Date:  2024    Initial Evaluation Date: 24                                Evaluating Therapist: Brad Bhandari OT     Patient Name:  Radha Flynn                        :  1945     Restrictions/Precautions:  per protocol, low fall risk  Diagnosis:  Closed fracture of distal end of right radius,  S52.501A                                                             Date of Surgery/Injury: Dec 10th  fell, surgery for orif on Dec 18th     Insurance/Certification information:  [facility] Medicare/[employer] mediblue  Plan of care signed (Y/N): electronic signed     Visit# / total visits: 9/ 15  approved by insurance through 4/15/24  Referring Practitioner:  Dr. YEHUDA Escudero  Specific Practitioner Orders: Nonweightbearing  R UE, rom/prevent contracture.     Assessment of current deficits   [x] Functional mobility             [x] ADLs           [x] Strength                  [] Cognition   [] Functional transfers           [x] IADLs          [x] Safety Awareness  [x] Endurance   [x] Fine Motor Coordination    [] Balance      [] Vision/perception    [] Sensation     [x] Gross Motor Coordination [x] ROM           [x] Pain                        [x] Edema          [x] Scar Adhesion/Skin Integrity      OT PLAN OF CARE   OT POC based on physician orders, patient diagnosis and results of clinical assessment  Frequency/Duration  2x week for TBD visits. Certification period: from 24 to 24  Reassessment date:                                 GOALS (Long term same as Short term):  1) Patient will demonstrate good understanding of home program (exercises/activities/diagnosis/prognosis/goals) with good accuracy.   2) Patient will demonstrate increased

## 2024-02-12 ENCOUNTER — TREATMENT (OUTPATIENT)
Dept: OCCUPATIONAL THERAPY | Age: 79
End: 2024-02-12
Payer: MEDICARE

## 2024-02-12 DIAGNOSIS — S52.531A CLOSED COLLES' FRACTURE OF RIGHT RADIUS, INITIAL ENCOUNTER: Primary | ICD-10-CM

## 2024-02-12 PROCEDURE — 97140 MANUAL THERAPY 1/> REGIONS: CPT | Performed by: OCCUPATIONAL THERAPIST

## 2024-02-12 PROCEDURE — 97110 THERAPEUTIC EXERCISES: CPT | Performed by: OCCUPATIONAL THERAPIST

## 2024-02-12 NOTE — PROGRESS NOTES
completed on 2/12/2024. Next to be completed by last visit.    INTERVENTION: COMPLETED: SPECIFICS/COMMENTS:   Modality:     fluidotherapy  10 min to increase tissue mobility        AROM:     R wrist  x Active light wrist ext/flex/rad/ulnar dev  15x2  Wrist sup/pron with yellow flexbar in hand  15x2  Wrist maze 10x   R hand  Tendon glides 10x  Fine peg activity 10 min  Jerome activity 5 min     AAROM:                    PROM/Stretching:     R wrist  x Light stretching of wrist flex/ext all planes.         Scar Mass/Edema Control:     Soft tissue mobilization x R wrist/hand   Edema Management X Retrograde to ulnar side of wrist   Strengthening:     Putty roll and pegs.   10 min   Putty light gripping  20x   R Wrist/Forearm            X               X               X -6# Flexbar: pronation, supination, twisting x 10 w/ 3 sec holds  -1# isolated wrist ext/flex x 15 ea, deviations x 15 ea  -Hand gripper: 15# level 1 resistance with black spring x 30 reps transfer sponges to bowl   Other:     hep  Wrist rom all planes.  Tendon glides,    Kinesio taping wrist  To reduce swelling and pain     Assessment/Comments: Continued OT is recommended at 2x/ week x 5 more sessions to further progress with ROM and strength to maximize independence in IADLs with focused on improving activity tolerance to obtain optimal rehab potential. Potential for further progression is good. Pain is controlled from the last session. Pt reported that the Dr said she doesn't have any restrictions but reports worry about hurting herself when using the R hand. Therapist reeducated pt on protocol - may wean self into light resistive activities as tolerated. If tasks are uncomfortable, hold on activity and try again in a week. Introduced new exercises to increase strength and activity tolerance of R wrist. Will continue to work on light strengthening to ^ functional use of the R hand.        Progress Update 2/12/2024     3/4 active finger flex/ full

## 2024-02-15 ENCOUNTER — ANESTHESIA EVENT (OUTPATIENT)
Age: 79
End: 2024-02-15
Payer: MEDICARE

## 2024-02-16 ENCOUNTER — ANESTHESIA (OUTPATIENT)
Age: 79
End: 2024-02-16
Payer: MEDICARE

## 2024-02-16 ENCOUNTER — HOSPITAL ENCOUNTER (OUTPATIENT)
Age: 79
Setting detail: OBSERVATION
Discharge: HOME | End: 2024-02-17
Attending: STUDENT IN AN ORGANIZED HEALTH CARE EDUCATION/TRAINING PROGRAM | Admitting: STUDENT IN AN ORGANIZED HEALTH CARE EDUCATION/TRAINING PROGRAM
Payer: MEDICARE

## 2024-02-16 DIAGNOSIS — I48.91 ATRIAL FIBRILLATION, UNSPECIFIED TYPE (HCC): ICD-10-CM

## 2024-02-16 PROBLEM — Z98.890 S/P ATRIOVENTRICULAR NODAL ABLATION: Status: ACTIVE | Noted: 2024-02-16

## 2024-02-16 LAB
ALBUMIN SERPL-MCNC: 4.2 G/DL (ref 3.5–5.2)
ALP SERPL-CCNC: 104 U/L (ref 35–104)
ALT SERPL-CCNC: 23 U/L (ref 0–32)
ANION GAP SERPL CALCULATED.3IONS-SCNC: 12 MMOL/L (ref 7–16)
AST SERPL-CCNC: 23 U/L (ref 0–31)
BILIRUB SERPL-MCNC: 0.7 MG/DL (ref 0–1.2)
BUN SERPL-MCNC: 39 MG/DL (ref 6–23)
CALCIUM SERPL-MCNC: 9.7 MG/DL (ref 8.6–10.2)
CHLORIDE SERPL-SCNC: 96 MMOL/L (ref 98–107)
CO2 SERPL-SCNC: 27 MMOL/L (ref 22–29)
CREAT SERPL-MCNC: 1.4 MG/DL (ref 0.5–1)
ECHO BSA: 2.21 M2
ERYTHROCYTE [DISTWIDTH] IN BLOOD BY AUTOMATED COUNT: 14.3 % (ref 11.5–15)
GFR SERPL CREATININE-BSD FRML MDRD: 38 ML/MIN/1.73M2
GLUCOSE SERPL-MCNC: 126 MG/DL (ref 74–99)
HCT VFR BLD AUTO: 40 % (ref 34–48)
HGB BLD-MCNC: 12.1 G/DL (ref 11.5–15.5)
MAGNESIUM SERPL-MCNC: 1.9 MG/DL (ref 1.6–2.6)
MCH RBC QN AUTO: 30.1 PG (ref 26–35)
MCHC RBC AUTO-ENTMCNC: 30.3 G/DL (ref 32–34.5)
MCV RBC AUTO: 99.5 FL (ref 80–99.9)
PLATELET # BLD AUTO: 254 K/UL (ref 130–450)
PMV BLD AUTO: 11.4 FL (ref 7–12)
POTASSIUM SERPL-SCNC: 5.1 MMOL/L (ref 3.5–5)
PROT SERPL-MCNC: 8.1 G/DL (ref 6.4–8.3)
RBC # BLD AUTO: 4.02 M/UL (ref 3.5–5.5)
SODIUM SERPL-SCNC: 135 MMOL/L (ref 132–146)
WBC OTHER # BLD: 8.1 K/UL (ref 4.5–11.5)

## 2024-02-16 PROCEDURE — 6360000002 HC RX W HCPCS: Performed by: NURSE ANESTHETIST, CERTIFIED REGISTERED

## 2024-02-16 PROCEDURE — 3700000001 HC ADD 15 MINUTES (ANESTHESIA): Performed by: STUDENT IN AN ORGANIZED HEALTH CARE EDUCATION/TRAINING PROGRAM

## 2024-02-16 PROCEDURE — 93650 ICAR CATH ABLTJ AV NODE FUNC: CPT | Performed by: STUDENT IN AN ORGANIZED HEALTH CARE EDUCATION/TRAINING PROGRAM

## 2024-02-16 PROCEDURE — 2580000003 HC RX 258: Performed by: NURSE ANESTHETIST, CERTIFIED REGISTERED

## 2024-02-16 PROCEDURE — C1893 INTRO/SHEATH, FIXED,NON-PEEL: HCPCS | Performed by: STUDENT IN AN ORGANIZED HEALTH CARE EDUCATION/TRAINING PROGRAM

## 2024-02-16 PROCEDURE — 2580000003 HC RX 258: Performed by: STUDENT IN AN ORGANIZED HEALTH CARE EDUCATION/TRAINING PROGRAM

## 2024-02-16 PROCEDURE — 93600 BUNDLE OF HIS RECORDING: CPT | Performed by: STUDENT IN AN ORGANIZED HEALTH CARE EDUCATION/TRAINING PROGRAM

## 2024-02-16 PROCEDURE — 2720000010 HC SURG SUPPLY STERILE: Performed by: STUDENT IN AN ORGANIZED HEALTH CARE EDUCATION/TRAINING PROGRAM

## 2024-02-16 PROCEDURE — G0378 HOSPITAL OBSERVATION PER HR: HCPCS

## 2024-02-16 PROCEDURE — C1732 CATH, EP, DIAG/ABL, 3D/VECT: HCPCS | Performed by: STUDENT IN AN ORGANIZED HEALTH CARE EDUCATION/TRAINING PROGRAM

## 2024-02-16 PROCEDURE — 2500000003 HC RX 250 WO HCPCS: Performed by: STUDENT IN AN ORGANIZED HEALTH CARE EDUCATION/TRAINING PROGRAM

## 2024-02-16 PROCEDURE — 2709999900 HC NON-CHARGEABLE SUPPLY: Performed by: STUDENT IN AN ORGANIZED HEALTH CARE EDUCATION/TRAINING PROGRAM

## 2024-02-16 PROCEDURE — 80053 COMPREHEN METABOLIC PANEL: CPT

## 2024-02-16 PROCEDURE — 3700000000 HC ANESTHESIA ATTENDED CARE: Performed by: STUDENT IN AN ORGANIZED HEALTH CARE EDUCATION/TRAINING PROGRAM

## 2024-02-16 PROCEDURE — 6370000000 HC RX 637 (ALT 250 FOR IP): Performed by: STUDENT IN AN ORGANIZED HEALTH CARE EDUCATION/TRAINING PROGRAM

## 2024-02-16 PROCEDURE — 85027 COMPLETE CBC AUTOMATED: CPT

## 2024-02-16 PROCEDURE — 93005 ELECTROCARDIOGRAM TRACING: CPT | Performed by: STUDENT IN AN ORGANIZED HEALTH CARE EDUCATION/TRAINING PROGRAM

## 2024-02-16 PROCEDURE — 83735 ASSAY OF MAGNESIUM: CPT

## 2024-02-16 PROCEDURE — 7100000010 HC PHASE II RECOVERY - FIRST 15 MIN: Performed by: STUDENT IN AN ORGANIZED HEALTH CARE EDUCATION/TRAINING PROGRAM

## 2024-02-16 PROCEDURE — 7100000011 HC PHASE II RECOVERY - ADDTL 15 MIN: Performed by: STUDENT IN AN ORGANIZED HEALTH CARE EDUCATION/TRAINING PROGRAM

## 2024-02-16 RX ORDER — ACETAMINOPHEN 325 MG/1
650 TABLET ORAL EVERY 4 HOURS PRN
Status: DISCONTINUED | OUTPATIENT
Start: 2024-02-16 | End: 2024-02-17 | Stop reason: HOSPADM

## 2024-02-16 RX ORDER — VENLAFAXINE HYDROCHLORIDE 150 MG/1
300 CAPSULE, EXTENDED RELEASE ORAL DAILY
Status: DISCONTINUED | OUTPATIENT
Start: 2024-02-16 | End: 2024-02-17 | Stop reason: HOSPADM

## 2024-02-16 RX ORDER — LEVOTHYROXINE SODIUM 0.2 MG/1
200 TABLET ORAL DAILY
Status: DISCONTINUED | OUTPATIENT
Start: 2024-02-16 | End: 2024-02-17 | Stop reason: HOSPADM

## 2024-02-16 RX ORDER — LANOLIN ALCOHOL/MO/W.PET/CERES
400 CREAM (GRAM) TOPICAL 3 TIMES DAILY
Status: DISCONTINUED | OUTPATIENT
Start: 2024-02-16 | End: 2024-02-17 | Stop reason: HOSPADM

## 2024-02-16 RX ORDER — VALSARTAN 160 MG/1
160 TABLET ORAL DAILY
Status: DISCONTINUED | OUTPATIENT
Start: 2024-02-16 | End: 2024-02-17 | Stop reason: HOSPADM

## 2024-02-16 RX ORDER — SODIUM CHLORIDE 0.9 % (FLUSH) 0.9 %
5-40 SYRINGE (ML) INJECTION PRN
Status: DISCONTINUED | OUTPATIENT
Start: 2024-02-16 | End: 2024-02-17 | Stop reason: HOSPADM

## 2024-02-16 RX ORDER — SODIUM CHLORIDE 0.9 % (FLUSH) 0.9 %
5-40 SYRINGE (ML) INJECTION EVERY 12 HOURS SCHEDULED
Status: DISCONTINUED | OUTPATIENT
Start: 2024-02-16 | End: 2024-02-17 | Stop reason: HOSPADM

## 2024-02-16 RX ORDER — VITAMIN B COMPLEX
1000 TABLET ORAL DAILY
Status: DISCONTINUED | OUTPATIENT
Start: 2024-02-16 | End: 2024-02-17 | Stop reason: HOSPADM

## 2024-02-16 RX ORDER — MIDAZOLAM HYDROCHLORIDE 1 MG/ML
INJECTION INTRAMUSCULAR; INTRAVENOUS PRN
Status: DISCONTINUED | OUTPATIENT
Start: 2024-02-16 | End: 2024-02-16 | Stop reason: SDUPTHER

## 2024-02-16 RX ORDER — FUROSEMIDE 20 MG/1
20 TABLET ORAL DAILY
Status: DISCONTINUED | OUTPATIENT
Start: 2024-02-16 | End: 2024-02-17 | Stop reason: HOSPADM

## 2024-02-16 RX ORDER — SODIUM CHLORIDE 9 MG/ML
INJECTION, SOLUTION INTRAVENOUS PRN
Status: DISCONTINUED | OUTPATIENT
Start: 2024-02-16 | End: 2024-02-17 | Stop reason: HOSPADM

## 2024-02-16 RX ORDER — FENTANYL CITRATE 50 UG/ML
INJECTION, SOLUTION INTRAMUSCULAR; INTRAVENOUS PRN
Status: DISCONTINUED | OUTPATIENT
Start: 2024-02-16 | End: 2024-02-16 | Stop reason: SDUPTHER

## 2024-02-16 RX ORDER — ATORVASTATIN CALCIUM 20 MG/1
20 TABLET, FILM COATED ORAL DAILY
Status: DISCONTINUED | OUTPATIENT
Start: 2024-02-16 | End: 2024-02-17 | Stop reason: HOSPADM

## 2024-02-16 RX ORDER — SODIUM CHLORIDE 9 MG/ML
INJECTION, SOLUTION INTRAVENOUS CONTINUOUS PRN
Status: DISCONTINUED | OUTPATIENT
Start: 2024-02-16 | End: 2024-02-16 | Stop reason: SDUPTHER

## 2024-02-16 RX ORDER — M-VIT,TX,IRON,MINS/CALC/FOLIC 27MG-0.4MG
1 TABLET ORAL DAILY
Status: DISCONTINUED | OUTPATIENT
Start: 2024-02-16 | End: 2024-02-17 | Stop reason: HOSPADM

## 2024-02-16 RX ORDER — SENNOSIDES 8.6 MG
650 CAPSULE ORAL EVERY 4 HOURS PRN
Status: DISCONTINUED | OUTPATIENT
Start: 2024-02-16 | End: 2024-02-16 | Stop reason: SDUPTHER

## 2024-02-16 RX ORDER — PROPOFOL 10 MG/ML
INJECTION, EMULSION INTRAVENOUS CONTINUOUS PRN
Status: DISCONTINUED | OUTPATIENT
Start: 2024-02-16 | End: 2024-02-16 | Stop reason: SDUPTHER

## 2024-02-16 RX ADMIN — SODIUM CHLORIDE, PRESERVATIVE FREE 10 ML: 5 INJECTION INTRAVENOUS at 20:17

## 2024-02-16 RX ADMIN — FENTANYL CITRATE 25 MCG: 50 INJECTION, SOLUTION INTRAMUSCULAR; INTRAVENOUS at 12:13

## 2024-02-16 RX ADMIN — SODIUM CHLORIDE: 9 INJECTION, SOLUTION INTRAVENOUS at 11:29

## 2024-02-16 RX ADMIN — PROPOFOL 20 MCG/KG/MIN: 10 INJECTION, EMULSION INTRAVENOUS at 11:45

## 2024-02-16 RX ADMIN — Medication 1000 UNITS: at 20:24

## 2024-02-16 RX ADMIN — APIXABAN 5 MG: 5 TABLET, FILM COATED ORAL at 20:17

## 2024-02-16 RX ADMIN — FENTANYL CITRATE 50 MCG: 50 INJECTION, SOLUTION INTRAMUSCULAR; INTRAVENOUS at 11:31

## 2024-02-16 RX ADMIN — MIDAZOLAM 1 MG: 1 INJECTION INTRAMUSCULAR; INTRAVENOUS at 11:31

## 2024-02-16 RX ADMIN — PROPOFOL 21 MG: 10 INJECTION, EMULSION INTRAVENOUS at 11:59

## 2024-02-16 RX ADMIN — FENTANYL CITRATE 25 MCG: 50 INJECTION, SOLUTION INTRAMUSCULAR; INTRAVENOUS at 11:52

## 2024-02-16 RX ADMIN — ATORVASTATIN CALCIUM 20 MG: 20 TABLET, FILM COATED ORAL at 20:23

## 2024-02-16 RX ADMIN — Medication 400 MG: at 20:19

## 2024-02-16 NOTE — DISCHARGE SUMMARY
[facility] Cardiac Electrophysiology Discharge Summary    Radha Flynn  1945    78 y.o.  female  PCP: Seda Caballero APRN - CNP  Primary Electrophysiologist: Filiberto Kaba DO  Attending Electrophysiologist: Paul Castellanos MD     Admission Date:2/16/2024      Discharge Date:  02/17/2024    Patient Active Problem List   Diagnosis    Essential hypertension    Mixed hyperlipidemia    S/P carotid endarterectomy    Hypoxia    CHF (congestive heart failure) ([facility])    Diastolic dysfunction    Asthma    Bilateral carotid artery stenosis    O2 dependent    Primary osteoarthritis of left knee    Lumbar spondylosis    Class 2 obesity due to excess calories with body mass index (BMI) of 38.0 to 38.9 in adult    MCCLELLAND (dyspnea on exertion)    CKD (chronic kidney disease) stage 3, GFR 30-59 ml/min ([facility])    Atrial fibrillation with RVR ([facility])    Shortness of breath    Symptomatic bradycardia    History of ongoing treatment with high-risk medication    Unspecified osteoarthritis, unspecified site    Unspecified injury of head, initial encounter    Hypothyroidism, unspecified    Fall from non-moving wheelchair    Closed head injury    Chronic obstructive pulmonary disease, unspecified ([facility])    AF (atrial fibrillation) ([facility])    Tachy-kyle syndrome ([facility])    Presence of cardiac pacemaker    S/P total knee arthroplasty, left    Ventricular tachycardia ([facility])    Nonsustained paroxysmal ventricular tachycardia ([facility])    S/P revision of total knee, left    History of knee replacement procedure of left knee    Closed fracture of right distal radius    S/P atrioventricular ann ablation    Atrial fibrillation (HCC)          Medication List        CONTINUE taking these medications      acetaminophen 650 MG extended release tablet  Commonly known as: TYLENOL     Anoro Ellipta 62.5-25 MCG/ACT inhaler  Generic drug: umeclidinium-vilanterol     apixaban 5 MG Tabs tablet  Commonly known as: ELIQUIS  Take 1 tablet by mouth 2 times daily      atorvastatin 20 MG tablet  Commonly known as: LIPITOR     Cranberry 1000 MG Caps     doxycycline hyclate 100 MG capsule  Commonly known as: VIBRAMYCIN     furosemide 20 MG tablet  Commonly known as: Lasix  Take 1 tablet by mouth daily     levothyroxine 200 MCG tablet  Commonly known as: SYNTHROID     magnesium oxide 400 (240 Mg) MG tablet  Commonly known as: MAG-OX     OXYGEN     therapeutic multivitamin-minerals tablet     valsartan 160 MG tablet  Commonly known as: DIOVAN     venlafaxine 150 MG extended release capsule  Commonly known as: EFFEXOR XR     Vitamin D 25 MCG (1000 UT) Tabs tablet  Commonly known as: CHOLECALCIFEROL            STOP taking these medications      amiodarone 200 MG tablet  Commonly known as: CORDARONE     metoprolol 100 MG tablet  Commonly known as: LOPRESSOR            ASK your doctor about these medications      oxyCODONE-acetaminophen 5-325 MG per tablet  Commonly known as: PERCOCET            Hospital Course: AV node ablation on 2/16/24 by Dr Kaba. Pacemaker reprogrammed VVIR 90 - 120 bpm. Monitored overnight. She reports no discomfort in her groin nor is there any hematoma, or ecchymosis. Additionally she has no cardiac complaints of chest pain, palpitation, feelings of heart racing, syncope or near syncope. She was concerned regarding her ongoing ability to measure her heart rate at home and was informed she could use a pulse ox. She remains in AF with RV pacing at 90 bpm.     Procedures Performed: AV node ablation on 2/16/24 by Dr Kaba    Consultants: none    Disposition: The patient was discharged to home in stable condition on the above medications.  Clinical follow-up in the office:  -- Pacemaker interrogation: this will be arranged in ~ 1 week.  -- EP appt: this will be arranged in ~1 month.    Discussed with Dr. Castellanos.   Nikita Yates, APRN - CNP   [facility] Cardiac Electrophysiology  [facility] [place]  [facility]    10  minutes was used in

## 2024-02-16 NOTE — H&P
[facility] CARDIOLOGY  CARDIAC ELECTROPHYSIOLOGY DEPARTMENT/DIVISION OF CARDIOLOGY  Outpatient Progress Report  PATIENT: Radha Flynn  MEDICAL RECORD NUMBER: [id number]  DATE OF SERVICE:  2/15/2024  ATTENDING ELECTROPHYSIOLOGIST:  Filiberto Kaba D.O.  REFERRING PHYSICIAN: Filiberto Kaba DO and Seda Caballero APRN - CNP  CHIEF COMPLAINT: PPM insitu    HPI:  Radha Flynn is a 78 y.o. female with a history of nonvalvular permanent AF, SND sp BSCI dc PPM (DOI: 22- Dr Kaba), HTN, COPD on nasal cannula O2, and obesity.  She is managed by Dr Escudero with amiodarone 200 mg daily, apixaban 5 mg twice daily, atorvastatin 20 mg daily, furosemide 20 mg daily, losartan 100 mg daily, metoprolol 150 mg BID, and valsartan 160 mg daily.  In , patient was diagnosed with nonvalvular paroxysmal AF, which was treated with dofetilide 125 mcg twice daily.  In 3/2022, she had AF recurrences, which were managed with increase in dofetilide to 250 mcg twice daily.  In 2022, she was started on nadolol for \"rapid heart rate\", but developed bradycardia on manual palpitation, so discontinued. In 2022, she was referred to Dr Kaba and she was diagnosed tachy-kyle syndrome, which was treated with [employer] dual chamber pacemaker (RV lead is deep septal). In 2022, she had recurrence of AF. In 2023, dofetilide discontinued and amiodarone initiated for rate control until AVJ could be performed. She presents today, 24; for outpatient AVJ ablation with plan to stop amiodarone after. Her device is enrolled in remote monitoring, which most recently reported stable device function, RA pacing 0%, RV pacing 26%, and AT/AF burden = 100%. Her last dose of apixaban was 24. She denies any complaints at this time.     Prior cardiac testin week event monitor (22): SR at 49 - 124 bpm (mean: 68 bpm), 16 patient events which predominantly occurred during AF with ventricular rate of 89 -  Sister         lung     There is no family history of sudden cardiac arrest    Social History     Tobacco Use    Smoking status: Former     Current packs/day: 0.00     Average packs/day: 1 pack/day for 30.0 years (30.0 ttl pk-yrs)     Types: Cigarettes     Start date: 1988     Quit date: 2018     Years since quittin.6    Smokeless tobacco: Never   Substance Use Topics    Alcohol use: Not Currently       No current facility-administered medications for this encounter.     Current Outpatient Medications   Medication Sig Dispense Refill    furosemide (LASIX) 20 MG tablet Take 1 tablet by mouth daily 30 tablet 1    amiodarone (CORDARONE) 200 MG tablet Take 1 tablet by mouth daily 90 tablet 0    Multiple Vitamins-Minerals (THERAPEUTIC MULTIVITAMIN-MINERALS) tablet Take 1 tablet by mouth daily      levothyroxine (SYNTHROID) 200 MCG tablet Take 1 tablet by mouth Daily      oxyCODONE-acetaminophen (PERCOCET) 5-325 MG per tablet Take 1 tablet by mouth as needed for Pain.      valsartan (DIOVAN) 160 MG tablet Take 1 tablet by mouth daily      doxycycline hyclate (VIBRAMYCIN) 100 MG capsule Take 1 capsule by mouth 2 times daily      metoprolol (LOPRESSOR) 100 MG tablet Take 1.5 tablets by mouth 2 times daily Take 1 tablet twice daily (Patient taking differently: Take 1.5 tablets by mouth 2 times daily 24 dose increased to 150 mg 1.5 tablets twice daily) 270 tablet 2    magnesium oxide (MAG-OX) 400 (240 Mg) MG tablet Take 1 tablet by mouth 3 times daily      acetaminophen (TYLENOL) 650 MG extended release tablet Take 1 tablet by mouth every 4 hours as needed for Pain      ANORO ELLIPTA 62.5-25 MCG/ACT AEPB Inhale 1 puff into the lungs daily Uses in the evening      Cranberry 1000 MG CAPS Take 1 capsule by mouth daily      Vitamin D (CHOLECALCIFEROL) 25 MCG (1000 UT) TABS tablet Take 1 tablet by mouth daily      OXYGEN Inhale 2 L/min into the lungs continuous      apixaban (ELIQUIS) 5 MG TABS tablet Take 1 tablet

## 2024-02-16 NOTE — PROGRESS NOTES
Herbal and Nutritional Product Restrictions      The following herbal, alternative, and/or nutritional/dietary supplement product(s) has been discontinued per [facility] approved policy:    Cranberry capsule.    Please reorder upon discharge if appropriate.    Thank you,  Leonard Reilly [facility]  2/16/2024 1:43 PM

## 2024-02-16 NOTE — ANESTHESIA POSTPROCEDURE EVALUATION
Department of Anesthesiology  Postprocedure Note    Patient: Radha Flynn  MRN: [id number]  YOB: 1945  Date of evaluation: 2/16/2024    Procedure Summary       Date: 02/16/24 Room / Location: [facility] EP LAB 1 / [facility] CARDIAC CATH LAB    Anesthesia Start: 1112 Anesthesia Stop: 1241    Procedure: Ablation AV node Diagnosis:       Atrial fibrillation, unspecified type (HCC)      (Atrial fibrillation, unspecified type (HCC) [I48.91])    Providers: Filiberto Kaba DO Responsible Provider: Josiane Fountain MD    Anesthesia Type: MAC ASA Status: 3            Anesthesia Type: No value filed.    Jai Phase I:      Jai Phase II:      Anesthesia Post Evaluation    Patient location during evaluation: PACU  Patient participation: complete - patient participated  Level of consciousness: awake and alert  Airway patency: patent  Nausea & Vomiting: no nausea and no vomiting  Cardiovascular status: blood pressure returned to baseline and hemodynamically stable  Respiratory status: acceptable and spontaneous ventilation  Hydration status: euvolemic  Multimodal analgesia pain management approach  Pain management: adequate    No notable events documented.

## 2024-02-16 NOTE — ANESTHESIA PRE PROCEDURE
Department of Anesthesiology  Preprocedure Note       Name:  Radha Flynn   Age:  78 y.o.  :  1945                                          MRN:  [id number]         Date:  2024      Surgeon: Surgeon(s):  Filiberto Kaba DO    Procedure: Procedure(s):  Ablation AV node, RF    Medications prior to admission:   Prior to Admission medications    Medication Sig Start Date End Date Taking? Authorizing Provider   furosemide (LASIX) 20 MG tablet Take 1 tablet by mouth daily 24   Alice Pearce APRN - CNP   amiodarone (CORDARONE) 200 MG tablet Take 1 tablet by mouth daily 24   Alice Pearce APRN - CNP   Multiple Vitamins-Minerals (THERAPEUTIC MULTIVITAMIN-MINERALS) tablet Take 1 tablet by mouth daily    Jenna Barfield MD   levothyroxine (SYNTHROID) 200 MCG tablet Take 1 tablet by mouth Daily    Jenna Barfield MD   oxyCODONE-acetaminophen (PERCOCET) 5-325 MG per tablet Take 1 tablet by mouth as needed for Pain.  Patient not taking: Reported on 2024    Jenna Barfield MD   valsartan (DIOVAN) 160 MG tablet Take 1 tablet by mouth daily    Jenna Barfield MD   doxycycline hyclate (VIBRAMYCIN) 100 MG capsule Take 1 capsule by mouth 2 times daily    Jenna Barfield MD   metoprolol (LOPRESSOR) 100 MG tablet Take 1.5 tablets by mouth 2 times daily Take 1 tablet twice daily  Patient taking differently: Take 1.5 tablets by mouth 2 times daily 24 dose increased to 150 mg 1.5 tablets twice daily 23   Alice Pearce APRN - CNP   magnesium oxide (MAG-OX) 400 (240 Mg) MG tablet Take 1 tablet by mouth 3 times daily 23   Jenna Barfield MD   acetaminophen (TYLENOL) 650 MG extended release tablet Take 1 tablet by mouth every 4 hours as needed for Pain    Jenna Barfield MD   ANORO ELLIPTA 62.5-25 MCG/ACT AEPB Inhale 1 puff into the lungs daily Uses in the evening 23   Jenna Barfield MD   Cranberry 1000 MG CAPS Take 1 capsule by mouth daily

## 2024-02-16 NOTE — DISCHARGE INSTRUCTIONS
[facility] Ablation Patient Discharge Instructions    Medications: STOP amiodarone and metoprolol. No other medication changes. Resume all other prescribed medications.     Follow-Up:   Wireless Remote Device Check: this will be scheduled to occur in ~1 week.   Dr Kaba office appointment: You will be contacted to schedule an appointment with Dr Sesar donis in 4 weeks for a follow-up evaluation.   If you are not contacted within 3 business days to schedule the above, please call the [facility] office to schedule this appointment; [phone].     Questions/concerns: It is not uncommon for patients to experience brief episodes of palpitations, but please call the [place] [facility] office if you are having frequent symptoms.     Incision Care: Check bilateral groins daily. No soaking in a bathtub, hot tub, or pool for one week. You may shower.  If you find any redness, swelling, drainage, warmth, or have a fever greater than 100 degrees, notify the office immediately at [phone].     Activity: No lifting greater than 5 lbs for 7 days, and no strenuous exercise for 2 weeks.    Driving: No driving or operating heavy machinery for 48 hours. After 48 hours, you may drive if you feel up to it. DO NOT drive until you have stopped taking prescription pain medication.    [facility]:   [address] [place]  [address]  [facility]   [phone]

## 2024-02-17 VITALS
HEIGHT: 65 IN | WEIGHT: 235 LBS | HEART RATE: 115 BPM | RESPIRATION RATE: 17 BRPM | TEMPERATURE: 98.2 F | SYSTOLIC BLOOD PRESSURE: 120 MMHG | OXYGEN SATURATION: 100 % | DIASTOLIC BLOOD PRESSURE: 79 MMHG | BODY MASS INDEX: 39.15 KG/M2

## 2024-02-17 PROCEDURE — G0378 HOSPITAL OBSERVATION PER HR: HCPCS

## 2024-02-17 PROCEDURE — 6370000000 HC RX 637 (ALT 250 FOR IP): Performed by: STUDENT IN AN ORGANIZED HEALTH CARE EDUCATION/TRAINING PROGRAM

## 2024-02-17 PROCEDURE — 2700000000 HC OXYGEN THERAPY PER DAY

## 2024-02-17 PROCEDURE — 2580000003 HC RX 258: Performed by: STUDENT IN AN ORGANIZED HEALTH CARE EDUCATION/TRAINING PROGRAM

## 2024-02-17 RX ADMIN — LEVOTHYROXINE SODIUM 200 MCG: 0.2 TABLET ORAL at 06:30

## 2024-02-17 RX ADMIN — SODIUM CHLORIDE, PRESERVATIVE FREE 10 ML: 5 INJECTION INTRAVENOUS at 09:38

## 2024-02-17 RX ADMIN — ATORVASTATIN CALCIUM 20 MG: 20 TABLET, FILM COATED ORAL at 09:37

## 2024-02-17 RX ADMIN — MULTIPLE VITAMINS W/ MINERALS TAB 1 TABLET: TAB at 09:35

## 2024-02-17 RX ADMIN — APIXABAN 5 MG: 5 TABLET, FILM COATED ORAL at 09:37

## 2024-02-17 RX ADMIN — Medication 400 MG: at 09:36

## 2024-02-17 RX ADMIN — VALSARTAN 160 MG: 160 TABLET, FILM COATED ORAL at 09:36

## 2024-02-17 RX ADMIN — FUROSEMIDE 20 MG: 20 TABLET ORAL at 09:37

## 2024-02-17 RX ADMIN — VENLAFAXINE HYDROCHLORIDE 300 MG: 150 CAPSULE, EXTENDED RELEASE ORAL at 09:35

## 2024-02-17 NOTE — PROGRESS NOTES
Dr. Castellanos notified by [employer] that EKG at 2127 last pm was V paced.  However the CMR interpreters felt it was A fib IVCD with a few V paced beats around 1 am.  There does not seem to be A V pacing.   EKG post ablation was A V pacing.   Prior to ablation EKG was A fib RVR. Josiane López RN

## 2024-02-17 NOTE — PLAN OF CARE
Problem: Chronic Conditions and Co-morbidities  Goal: Patient's chronic conditions and co-morbidity symptoms are monitored and maintained or improved  2/17/2024 1132 by Tree Mckeon RN  Outcome: Adequate for Discharge  2/16/2024 2356 by Josiane López, RN  Outcome: Progressing     Problem: Discharge Planning  Goal: Discharge to home or other facility with appropriate resources  2/17/2024 1132 by Tree Mckeon, RN  Outcome: Adequate for Discharge  2/16/2024 2356 by Josiane López, RN  Outcome: Progressing

## 2024-02-17 NOTE — PROGRESS NOTES
4 Eyes Skin Assessment     NAME:  Radha Flynn  YOB: 1945  MEDICAL RECORD NUMBER:  [id number]    The patient is being assessed for  Admission    I agree that at least one RN has performed a thorough Head to Toe Skin Assessment on the patient. ALL assessment sites listed below have been assessed.      Areas assessed by both nurses:    Other general        Does the Patient have a Wound? No noted wound(s)       Giovanny Prevention initiated by RN: Yes  Wound Care Orders initiated by RN: Yes    Pressure Injury (Stage 3,4, Unstageable, DTI, NWPT, and Complex wounds) if present, place Wound referral order by RN under : No    New Ostomies, if present place, Ostomy referral order under : No     Nurse 1 eSignature: Electronically signed by Waleska Turner RN on 2/16/24 at 9:06 PM EST    **SHARE this note so that the co-signing nurse can place an eSignature**    Nurse 2 eSignature: {Esignature:[id number]}

## 2024-02-17 NOTE — PROGRESS NOTES
Discharge instructions provided to patient and .   Discussed restrictions and precautions to take post ablation.   Discussed groin site care as well as following up with Dr Kaba.   Discussed when to take next dose of meds as well as what meds to stop taking.   Pt and  had no further questions at this time.

## 2024-02-17 NOTE — PROGRESS NOTES
Patient V paced by EKG  Telemetry observers could not observe V pacing  Suggested EKG for possible a fib/flutter with IVCD

## 2024-02-18 LAB
EKG ATRIAL RATE: 127 BPM
EKG ATRIAL RATE: 78 BPM
EKG ATRIAL RATE: 90 BPM
EKG P AXIS: 64 DEGREES
EKG P-R INTERVAL: 192 MS
EKG Q-T INTERVAL: 280 MS
EKG Q-T INTERVAL: 410 MS
EKG Q-T INTERVAL: 424 MS
EKG QRS DURATION: 144 MS
EKG QRS DURATION: 148 MS
EKG QRS DURATION: 84 MS
EKG QTC CALCULATION (BAZETT): 397 MS
EKG QTC CALCULATION (BAZETT): 512 MS
EKG QTC CALCULATION (BAZETT): 518 MS
EKG R AXIS: -11 DEGREES
EKG R AXIS: -25 DEGREES
EKG R AXIS: 42 DEGREES
EKG T AXIS: 144 DEGREES
EKG T AXIS: 153 DEGREES
EKG T AXIS: 170 DEGREES
EKG VENTRICULAR RATE: 121 BPM
EKG VENTRICULAR RATE: 90 BPM
EKG VENTRICULAR RATE: 94 BPM

## 2024-02-18 PROCEDURE — 93010 ELECTROCARDIOGRAM REPORT: CPT | Performed by: INTERNAL MEDICINE

## 2024-02-19 ENCOUNTER — TELEPHONE (OUTPATIENT)
Dept: NON INVASIVE DIAGNOSTICS | Age: 79
End: 2024-02-19

## 2024-02-19 ENCOUNTER — TREATMENT (OUTPATIENT)
Dept: OCCUPATIONAL THERAPY | Age: 79
End: 2024-02-19
Payer: MEDICARE

## 2024-02-19 DIAGNOSIS — S52.531A CLOSED COLLES' FRACTURE OF RIGHT RADIUS, INITIAL ENCOUNTER: Primary | ICD-10-CM

## 2024-02-19 PROCEDURE — 97140 MANUAL THERAPY 1/> REGIONS: CPT | Performed by: OCCUPATIONAL THERAPIST

## 2024-02-19 PROCEDURE — 97110 THERAPEUTIC EXERCISES: CPT | Performed by: OCCUPATIONAL THERAPIST

## 2024-02-19 NOTE — PROGRESS NOTES
OCCUPATIONAL THERAPY DAILY NOTE/  [facility] OCCUPATIONAL THERAPY  [address]  [address] FLO[address]  [facility] [address]  Dept: [phone]  Loc: [phone]   [facility] OT Fax: [phone]      Date:  2024    Initial Evaluation Date: 24                                Evaluating Therapist: Brad Bhandari OT     Patient Name:  Radha Flynn                        :  1945     Restrictions/Precautions:  per protocol, low fall risk  Diagnosis:  Closed fracture of distal end of right radius,  S52.501A                                                             Date of Surgery/Injury: Dec 10th  fell, surgery for orif on Dec 18th     Insurance/Certification information:  [facility] Medicare/[employer] mediblue  Plan of care signed (Y/N): electronic signed     Visit# / total visits: 11/ 15  approved by insurance through 4/15/24  Referring Practitioner:  Dr. YEHUDA Escudero  Specific Practitioner Orders: Nonweightbearing  R UE, rom/prevent contracture.     Assessment of current deficits   [x] Functional mobility             [x] ADLs           [x] Strength                  [] Cognition   [] Functional transfers           [x] IADLs          [x] Safety Awareness  [x] Endurance   [x] Fine Motor Coordination    [] Balance      [] Vision/perception    [] Sensation     [x] Gross Motor Coordination [x] ROM           [x] Pain                        [x] Edema          [x] Scar Adhesion/Skin Integrity      OT PLAN OF CARE   OT POC based on physician orders, patient diagnosis and results of clinical assessment  Frequency/Duration  2x week for TBD visits. Certification period: from 24 to 24  Reassessment date:                                 GOALS (Long term same as Short term):Updated 2024  1) Patient will demonstrate good understanding of home program (exercises/activities/diagnosis/prognosis/goals) with good accuracy. Progressing well, AROM of

## 2024-02-19 NOTE — TELEPHONE ENCOUNTER
----- Message from Nila Young MA sent at 2/16/2024  2:52 PM EST -----  Regarding: FW: remote, appt  OV scheduled just needs remote scheduled.  ----- Message -----  From: Filiberto Kaba DO  Sent: 2/16/2024  12:46 PM EST  To: Susan Gordon RN; Nila Young MA  Subject: remote, appt                                     Remote pacemaker interrogation in 1 week.    EP appt in 1 month. Reprogram device to LRL of 80 bpm at that time.    -DO Sesar Coffey Timothy J, DO Truhan, Tiffany, RN   Device clinic appt in 2 months. Reprogram LRL to 70 bpm at that time.    -Filiberto Kaba DO  
Wireless remote scheduled.     1 month office visit: 3.21.2024: decrease lower rate to 80 bpm  2 month device clinic appointment 4/26/2024: decrease lower rate to 70 bpm     Susan WILHELM,RN   [facility] [place]   Device Clinic          
patient

## 2024-02-22 ENCOUNTER — TREATMENT (OUTPATIENT)
Dept: OCCUPATIONAL THERAPY | Age: 79
End: 2024-02-22

## 2024-02-22 DIAGNOSIS — S52.531A CLOSED COLLES' FRACTURE OF RIGHT RADIUS, INITIAL ENCOUNTER: Primary | ICD-10-CM

## 2024-02-22 NOTE — PROGRESS NOTES
completed by last visit.    INTERVENTION: COMPLETED: SPECIFICS/COMMENTS:   Modality:     fluidotherapy  10 min to increase tissue mobility        AROM:     R wrist  x Active light wrist ext/flex/rad/ulnar dev  15x2  Wrist sup/pron with yellow flexbar in hand  15x2  Wrist maze 10x   R hand  Tendon glides 10x  Fine peg activity 10 min  Nazareth activity 5 min     AAROM:                    PROM/Stretching:     R wrist  x Light stretching of wrist flex/ext all planes.         Scar Mass/Edema Control:     Soft tissue mobilization x R wrist/hand   Edema Management X Retrograde to ulnar side of wrist   Strengthening:     clothespins x Red/green, blue, 2 sets   Putty roll and pegs.  x 10 min   Putty light gripping x 20x   R Wrist/Forearm            X               X                -6# Flexbar: bending, twisting x 10 w/ 3 sec holds  -1# isolated wrist ext/flex x 15 ea, deviations x 15 ea  -Hand gripper: 15# level 1 resistance with black spring x 30 reps transfer sponges to bowl   Other:     hep  Wrist rom all planes.  Tendon glides,    Kinesio taping wrist  To reduce swelling and pain     Assessment/Comments: Good isidoro with exercises. Strength and endurance cont to progress as tolerated. Pt. To cont for 5 more sessions and d/c to hep.     Progress Update 2/12/2024     3/4 active finger flex/ full active ext -- > 2/12: Full composite fist       R Wrist ROM:                                     IE              2/12  Forearm Pron 0-90:                            0-90             90                            Forearm Supination  0-90:                  0-45            55  Wrist Flexion 0-80:                              0-35            49  Wrist Extension 0-70:                          0-30            45  Wrist Radial Deviation 0-20:               0-10             20  Wrist Ulnar Deviation 0-45:                 0-15             20                      Edema Description/Circumferential Measurements:              7 1/2 inches around R

## 2024-02-23 ENCOUNTER — TELEPHONE (OUTPATIENT)
Age: 79
End: 2024-02-23

## 2024-02-23 NOTE — TELEPHONE ENCOUNTER
I called Radha to see how she is doing since her Ablation she had done 2/16/24. There was no answer so I left a message reminding her to check her groins sites daily and report any redness, swelling, drainage or increased pain or fever to the office. I also told her to contact the office right away for chest pain, sob, or frequent palpitations. I left my call back number if she should wish to call me back. ([phone])

## 2024-02-28 ENCOUNTER — OFFICE VISIT (OUTPATIENT)
Dept: ORTHOPEDIC SURGERY | Age: 79
End: 2024-02-28
Payer: MEDICARE

## 2024-02-28 DIAGNOSIS — Z96.652 S/P REVISION OF TOTAL KNEE, LEFT: Primary | ICD-10-CM

## 2024-02-28 PROCEDURE — 99213 OFFICE O/P EST LOW 20 MIN: CPT | Performed by: STUDENT IN AN ORGANIZED HEALTH CARE EDUCATION/TRAINING PROGRAM

## 2024-02-28 PROCEDURE — 1123F ACP DISCUSS/DSCN MKR DOCD: CPT | Performed by: STUDENT IN AN ORGANIZED HEALTH CARE EDUCATION/TRAINING PROGRAM

## 2024-02-28 NOTE — PROGRESS NOTES
improved.  However she was concerned that she may have disrupted the hardware from her total knee arthroplasty that was completed in 2023.  Imaging shows no loosening or failure of hardware, acute fractures or dislocations.  At this time I recommend ice, Tylenol, and elevation to help provide her some relief.  We discussed that this may take some time to heal especially given that she is on Eliquis. She is understanding of this, all questions and concerns were answered in detail. She can follow up with us as needed.      GIO Koch - CNP  Orthopaedic Surgery   2/28/24   2:01 PM EST       Left knee contusion after fall.  X-rays look good with no signs of complication.  She is starting to improve.  Conservative treatment with ice elevation and compression.  Will see her back for scheduled follow-up appointment for distal radius fracture in April.  Reassurance provided            Jerson Escudero DO   Orthopaedic Surgery   2/28/24  2:12 PM    Note: This report was completed using computerConcert Window voiced recognition software.  Every effort has been made to ensure accuracy; however, inadvertent computerized transcription errors may be present.

## 2024-03-21 ENCOUNTER — OFFICE VISIT (OUTPATIENT)
Dept: NON INVASIVE DIAGNOSTICS | Age: 79
End: 2024-03-21
Payer: MEDICARE

## 2024-03-21 VITALS
BODY MASS INDEX: 39.15 KG/M2 | WEIGHT: 235 LBS | HEIGHT: 65 IN | SYSTOLIC BLOOD PRESSURE: 122 MMHG | RESPIRATION RATE: 18 BRPM | DIASTOLIC BLOOD PRESSURE: 74 MMHG | HEART RATE: 83 BPM

## 2024-03-21 DIAGNOSIS — E66.01 CLASS 2 SEVERE OBESITY DUE TO EXCESS CALORIES WITH SERIOUS COMORBIDITY AND BODY MASS INDEX (BMI) OF 38.0 TO 38.9 IN ADULT (HCC): ICD-10-CM

## 2024-03-21 DIAGNOSIS — I49.5 TACHY-BRADY SYNDROME (HCC): ICD-10-CM

## 2024-03-21 DIAGNOSIS — I48.21 PERMANENT ATRIAL FIBRILLATION (HCC): Primary | ICD-10-CM

## 2024-03-21 DIAGNOSIS — Z98.890 S/P ATRIOVENTRICULAR NODAL ABLATION: ICD-10-CM

## 2024-03-21 DIAGNOSIS — Z95.0 PACEMAKER: ICD-10-CM

## 2024-03-21 PROCEDURE — 3074F SYST BP LT 130 MM HG: CPT | Performed by: NURSE PRACTITIONER

## 2024-03-21 PROCEDURE — 1123F ACP DISCUSS/DSCN MKR DOCD: CPT | Performed by: NURSE PRACTITIONER

## 2024-03-21 PROCEDURE — 93000 ELECTROCARDIOGRAM COMPLETE: CPT | Performed by: STUDENT IN AN ORGANIZED HEALTH CARE EDUCATION/TRAINING PROGRAM

## 2024-03-21 PROCEDURE — 3078F DIAST BP <80 MM HG: CPT | Performed by: NURSE PRACTITIONER

## 2024-03-21 PROCEDURE — 99214 OFFICE O/P EST MOD 30 MIN: CPT | Performed by: NURSE PRACTITIONER

## 2024-03-21 NOTE — PROGRESS NOTES
HPI  Gastrointestinal: Negative for abdominal pain and blood in stool.     PHYSICAL EXAM:      Vitals:    03/21/24 1055   BP: 122/74   Pulse: 83   Resp: 18   Weight: 106.6 kg (235 lb)   Height: 1.651 m (5' 5\")     Constitutional: well-developed, no acute distress  Eyes: conjunctivae normal, no xanthelasma   Ears, Nose, Throat: oral mucosa moist, no cyanosis   CV: no JVD. Regular rate and rhythm. Normal S1S2 and no S3. No murmurs, rubs, or gallops. PMI is nondisplaced  Lungs: clear to auscultation bilaterally, normal respiratory effort without used of accessory muscles  Abdomen: soft, non-tender, bowel sounds present  Musculoskeletal: no digital clubbing, no edema   Skin: warm, no rashes     Data:    Lab Results   Component Value Date    WBC 8.1 02/16/2024    HGB 12.1 02/16/2024    HCT 40.0 02/16/2024    MCV 99.5 02/16/2024     02/16/2024     Lab Results   Component Value Date/Time     02/16/2024 09:50 AM    K 5.1 02/16/2024 09:50 AM    K 4.7 03/23/2023 02:12 PM    CL 96 02/16/2024 09:50 AM    CO2 27 02/16/2024 09:50 AM    BUN 39 02/16/2024 09:50 AM    CREATININE 1.4 02/16/2024 09:50 AM    GLUCOSE 126 02/16/2024 09:50 AM    GLUCOSE 156 04/28/2012 02:00 AM    CALCIUM 9.7 02/16/2024 09:50 AM    LABGLOM 38 02/16/2024 09:50 AM      Lab Results   Component Value Date/Time    MG 1.9 02/16/2024 09:50 AM     Lab Results   Component Value Date    TSH 7.550 (H) 02/28/2023     EKG: V pacing   please see scan in Cardiology.    Device Interrogation/Reprogramming  Make/Model: MoFuse L3 3 1  DOI: 8/31/2022  Battery: 12.5 years  Reji therapy: VVIR 90 ppm  Pacing %: V = 100%  Lead function:  RV lead: sensing = paced at 30 bpm, impedance = 829 ohms, threshold = 0.6 V @ 0.4 msec  Lead programming:  RV lead: sensitivity = 2.5 mV, output = 2.0 V @ 0.4 msec  Arrhythmias: No new events since ablation  Reprogramming included: Lower limit decreased to 80 today.  Overall device function is normal  All device

## 2024-04-04 ENCOUNTER — OFFICE VISIT (OUTPATIENT)
Dept: ORTHOPEDIC SURGERY | Age: 79
End: 2024-04-04
Payer: MEDICARE

## 2024-04-04 ENCOUNTER — HOSPITAL ENCOUNTER (OUTPATIENT)
Age: 79
Discharge: HOME | End: 2024-04-04
Payer: MEDICARE

## 2024-04-04 DIAGNOSIS — S52.501A CLOSED FRACTURE OF DISTAL END OF RIGHT RADIUS, UNSPECIFIED FRACTURE MORPHOLOGY, INITIAL ENCOUNTER: Primary | ICD-10-CM

## 2024-04-04 LAB
ALBUMIN SERPL-MCNC: 3.8 G/DL (ref 3.5–5.2)
ALP SERPL-CCNC: 86 U/L (ref 35–104)
ALT SERPL-CCNC: 13 U/L (ref 0–32)
ANION GAP SERPL CALCULATED.3IONS-SCNC: 13 MMOL/L (ref 7–16)
AST SERPL-CCNC: 16 U/L (ref 0–31)
BASOPHILS # BLD: 0.05 K/UL (ref 0–0.2)
BASOPHILS NFR BLD: 1 % (ref 0–2)
BILIRUB SERPL-MCNC: 0.6 MG/DL (ref 0–1.2)
BUN SERPL-MCNC: 32 MG/DL (ref 6–23)
CALCIUM SERPL-MCNC: 9.5 MG/DL (ref 8.6–10.2)
CHLORIDE SERPL-SCNC: 99 MMOL/L (ref 98–107)
CO2 SERPL-SCNC: 26 MMOL/L (ref 22–29)
CREAT SERPL-MCNC: 1.3 MG/DL (ref 0.5–1)
CRP SERPL HS-MCNC: 23 MG/L (ref 0–5)
EOSINOPHIL # BLD: 0.29 K/UL (ref 0.05–0.5)
EOSINOPHILS RELATIVE PERCENT: 4 % (ref 0–6)
ERYTHROCYTE [DISTWIDTH] IN BLOOD BY AUTOMATED COUNT: 14.1 % (ref 11.5–15)
ERYTHROCYTE [SEDIMENTATION RATE] IN BLOOD BY WESTERGREN METHOD: 48 MM/HR (ref 0–20)
GFR SERPL CREATININE-BSD FRML MDRD: 42 ML/MIN/1.73M2
GLUCOSE SERPL-MCNC: 138 MG/DL (ref 74–99)
HCT VFR BLD AUTO: 34.8 % (ref 34–48)
HGB BLD-MCNC: 10.5 G/DL (ref 11.5–15.5)
IMM GRANULOCYTES # BLD AUTO: <0.03 K/UL (ref 0–0.58)
IMM GRANULOCYTES NFR BLD: 0 % (ref 0–5)
LYMPHOCYTES NFR BLD: 0.79 K/UL (ref 1.5–4)
LYMPHOCYTES RELATIVE PERCENT: 12 % (ref 20–42)
MCH RBC QN AUTO: 29 PG (ref 26–35)
MCHC RBC AUTO-ENTMCNC: 30.2 G/DL (ref 32–34.5)
MCV RBC AUTO: 96.1 FL (ref 80–99.9)
MONOCYTES NFR BLD: 0.55 K/UL (ref 0.1–0.95)
MONOCYTES NFR BLD: 8 % (ref 2–12)
NEUTROPHILS NFR BLD: 74 % (ref 43–80)
NEUTS SEG NFR BLD: 4.94 K/UL (ref 1.8–7.3)
PLATELET # BLD AUTO: 208 K/UL (ref 130–450)
PMV BLD AUTO: 10.7 FL (ref 7–12)
POTASSIUM SERPL-SCNC: 4.7 MMOL/L (ref 3.5–5)
PROT SERPL-MCNC: 7.2 G/DL (ref 6.4–8.3)
RBC # BLD AUTO: 3.62 M/UL (ref 3.5–5.5)
SODIUM SERPL-SCNC: 138 MMOL/L (ref 132–146)
WBC OTHER # BLD: 6.6 K/UL (ref 4.5–11.5)

## 2024-04-04 PROCEDURE — 80053 COMPREHEN METABOLIC PANEL: CPT

## 2024-04-04 PROCEDURE — 1123F ACP DISCUSS/DSCN MKR DOCD: CPT | Performed by: STUDENT IN AN ORGANIZED HEALTH CARE EDUCATION/TRAINING PROGRAM

## 2024-04-04 PROCEDURE — 86140 C-REACTIVE PROTEIN: CPT

## 2024-04-04 PROCEDURE — 36415 COLL VENOUS BLD VENIPUNCTURE: CPT

## 2024-04-04 PROCEDURE — 85652 RBC SED RATE AUTOMATED: CPT

## 2024-04-04 PROCEDURE — 99213 OFFICE O/P EST LOW 20 MIN: CPT | Performed by: STUDENT IN AN ORGANIZED HEALTH CARE EDUCATION/TRAINING PROGRAM

## 2024-04-04 PROCEDURE — 85025 COMPLETE CBC W/AUTO DIFF WBC: CPT

## 2024-04-04 NOTE — PROGRESS NOTES
Follow Up Post Operative Visit     Surgery: Open reduction internal fixation right distal radius fracture  Date: 12/19/2023    Subjective:    Radha Flynn is here for follow up visit s/p above procedure.  She is doing very well.  She has no pain and has regained range of motion.  She is using the wrist normally without issue.    Controlled Substances Monitoring:        Physical Exam:    No data recorded    General: Alert and oriented x3, no acute distress  Cardiovascular/pulmonary: No labored breathing, peripheral perfusion intact  Musculoskeletal:    Right upper extremity: Incision healed appropriately with good mature scar tissue.  She has full range of motion of her wrist in flexion extension pronation supination radial ulnar deviation.  She cannot make a full fist and actively extend her fingers.  She has pain-free arc of motion.  Sensation intact radial ulnar median nerve distribution.      Imaging: 3 views of the right wrist independently interpreted by myself discussed with the patient.  Well-placed hardware status post open reduction internal fixation right distal radius with no signs of complication or change in alignment.  Fracture appears to be healed    Assessment and Plan: 4 months post open reduction internal fixation right distal radius fracture weeks to    -Fracture is healed appropriately without signs of complication.  She is happy with her results.  She is using her wrist normally in a pain-free manner.  She can follow-up with me on an as-needed basis moving forward          Jerson Escudero DO   Orthopaedic Surgery   4/4/24  8:50 AM    Note: This report was completed using Kanga[employer] voiced recognition software.  Every effort has been made to ensure accuracy; however, inadvertent computerized transcription errors may be present.

## 2024-04-25 ENCOUNTER — NURSE ONLY (OUTPATIENT)
Dept: NON INVASIVE DIAGNOSTICS | Age: 79
End: 2024-04-25

## 2024-04-25 DIAGNOSIS — Z98.890 S/P ATRIOVENTRICULAR NODAL ABLATION: ICD-10-CM

## 2024-04-25 DIAGNOSIS — Z95.0 PACEMAKER: Primary | ICD-10-CM

## 2024-04-25 NOTE — PROGRESS NOTES
See Murj report.    Marija Woods RN, BSN  [facility] [place]   [facility]

## 2024-07-09 ENCOUNTER — HOSPITAL ENCOUNTER (OUTPATIENT)
Age: 79
Discharge: HOME | End: 2024-07-09
Payer: MEDICARE

## 2024-07-09 LAB
ALBUMIN SERPL-MCNC: 4.1 G/DL (ref 3.5–5.2)
ALP SERPL-CCNC: 85 U/L (ref 35–104)
ALT SERPL-CCNC: 15 U/L (ref 0–32)
ANION GAP SERPL CALCULATED.3IONS-SCNC: 8 MMOL/L (ref 7–16)
AST SERPL-CCNC: 17 U/L (ref 0–31)
BASOPHILS # BLD: 0.03 K/UL (ref 0–0.2)
BASOPHILS NFR BLD: 1 % (ref 0–2)
BILIRUB SERPL-MCNC: 0.7 MG/DL (ref 0–1.2)
BUN SERPL-MCNC: 23 MG/DL (ref 6–23)
CALCIUM SERPL-MCNC: 9.9 MG/DL (ref 8.6–10.2)
CHLORIDE SERPL-SCNC: 101 MMOL/L (ref 98–107)
CO2 SERPL-SCNC: 30 MMOL/L (ref 22–29)
CREAT SERPL-MCNC: 1.2 MG/DL (ref 0.5–1)
CRP SERPL HS-MCNC: 4 MG/L (ref 0–5)
EOSINOPHIL # BLD: 0.14 K/UL (ref 0.05–0.5)
EOSINOPHILS RELATIVE PERCENT: 2 % (ref 0–6)
ERYTHROCYTE [DISTWIDTH] IN BLOOD BY AUTOMATED COUNT: 15.1 % (ref 11.5–15)
ERYTHROCYTE [SEDIMENTATION RATE] IN BLOOD BY WESTERGREN METHOD: 20 MM/HR (ref 0–20)
GFR, ESTIMATED: 44 ML/MIN/1.73M2
GLUCOSE SERPL-MCNC: 113 MG/DL (ref 74–99)
HCT VFR BLD AUTO: 34.5 % (ref 34–48)
HGB BLD-MCNC: 10.5 G/DL (ref 11.5–15.5)
IMM GRANULOCYTES # BLD AUTO: <0.03 K/UL (ref 0–0.58)
IMM GRANULOCYTES NFR BLD: 0 % (ref 0–5)
LYMPHOCYTES NFR BLD: 0.9 K/UL (ref 1.5–4)
LYMPHOCYTES RELATIVE PERCENT: 15 % (ref 20–42)
MCH RBC QN AUTO: 29.2 PG (ref 26–35)
MCHC RBC AUTO-ENTMCNC: 30.4 G/DL (ref 32–34.5)
MCV RBC AUTO: 96.1 FL (ref 80–99.9)
MONOCYTES NFR BLD: 0.52 K/UL (ref 0.1–0.95)
MONOCYTES NFR BLD: 9 % (ref 2–12)
NEUTROPHILS NFR BLD: 73 % (ref 43–80)
NEUTS SEG NFR BLD: 4.33 K/UL (ref 1.8–7.3)
PLATELET # BLD AUTO: 188 K/UL (ref 130–450)
PMV BLD AUTO: 10.7 FL (ref 7–12)
POTASSIUM SERPL-SCNC: 5.5 MMOL/L (ref 3.5–5)
PROT SERPL-MCNC: 7.3 G/DL (ref 6.4–8.3)
RBC # BLD AUTO: 3.59 M/UL (ref 3.5–5.5)
SODIUM SERPL-SCNC: 139 MMOL/L (ref 132–146)
WBC OTHER # BLD: 5.9 K/UL (ref 4.5–11.5)

## 2024-07-09 PROCEDURE — 36415 COLL VENOUS BLD VENIPUNCTURE: CPT

## 2024-07-09 PROCEDURE — 86140 C-REACTIVE PROTEIN: CPT

## 2024-07-09 PROCEDURE — 80053 COMPREHEN METABOLIC PANEL: CPT

## 2024-07-09 PROCEDURE — 85025 COMPLETE CBC W/AUTO DIFF WBC: CPT

## 2024-07-09 PROCEDURE — 85652 RBC SED RATE AUTOMATED: CPT

## 2024-09-15 ENCOUNTER — HOSPITAL ENCOUNTER (EMERGENCY)
Age: 79
Discharge: HOME | End: 2024-09-15
Attending: EMERGENCY MEDICINE
Payer: MEDICARE

## 2024-09-15 VITALS
WEIGHT: 230 LBS | HEIGHT: 63 IN | BODY MASS INDEX: 40.75 KG/M2 | DIASTOLIC BLOOD PRESSURE: 70 MMHG | SYSTOLIC BLOOD PRESSURE: 139 MMHG | HEART RATE: 71 BPM | OXYGEN SATURATION: 95 % | RESPIRATION RATE: 20 BRPM

## 2024-09-15 DIAGNOSIS — R04.0 EPISTAXIS: Primary | ICD-10-CM

## 2024-09-15 LAB
ALBUMIN SERPL-MCNC: 3.9 G/DL (ref 3.5–5.2)
ALP SERPL-CCNC: 84 U/L (ref 35–104)
ALT SERPL-CCNC: 6 U/L (ref 0–32)
ANION GAP SERPL CALCULATED.3IONS-SCNC: 10 MMOL/L (ref 7–16)
AST SERPL-CCNC: 18 U/L (ref 0–31)
BASOPHILS # BLD: 0.04 K/UL (ref 0–0.2)
BASOPHILS NFR BLD: 0 % (ref 0–2)
BILIRUB SERPL-MCNC: 0.9 MG/DL (ref 0–1.2)
BUN SERPL-MCNC: 27 MG/DL (ref 6–23)
CALCIUM SERPL-MCNC: 9.6 MG/DL (ref 8.6–10.2)
CHLORIDE SERPL-SCNC: 98 MMOL/L (ref 98–107)
CO2 SERPL-SCNC: 30 MMOL/L (ref 22–29)
CREAT SERPL-MCNC: 1.1 MG/DL (ref 0.5–1)
EOSINOPHIL # BLD: 0.13 K/UL (ref 0.05–0.5)
EOSINOPHILS RELATIVE PERCENT: 1 % (ref 0–6)
ERYTHROCYTE [DISTWIDTH] IN BLOOD BY AUTOMATED COUNT: 14.4 % (ref 11.5–15)
GFR, ESTIMATED: 52 ML/MIN/1.73M2
GLUCOSE SERPL-MCNC: 120 MG/DL (ref 74–99)
HCT VFR BLD AUTO: 36.3 % (ref 34–48)
HGB BLD-MCNC: 11.2 G/DL (ref 11.5–15.5)
IMM GRANULOCYTES # BLD AUTO: 0.03 K/UL (ref 0–0.58)
IMM GRANULOCYTES NFR BLD: 0 % (ref 0–5)
LYMPHOCYTES NFR BLD: 0.59 K/UL (ref 1.5–4)
LYMPHOCYTES RELATIVE PERCENT: 6 % (ref 20–42)
MCH RBC QN AUTO: 29.2 PG (ref 26–35)
MCHC RBC AUTO-ENTMCNC: 30.9 G/DL (ref 32–34.5)
MCV RBC AUTO: 94.8 FL (ref 80–99.9)
MONOCYTES NFR BLD: 0.67 K/UL (ref 0.1–0.95)
MONOCYTES NFR BLD: 7 % (ref 2–12)
NEUTROPHILS NFR BLD: 84 % (ref 43–80)
NEUTS SEG NFR BLD: 7.75 K/UL (ref 1.8–7.3)
PLATELET # BLD AUTO: 175 K/UL (ref 130–450)
PMV BLD AUTO: 10.7 FL (ref 7–12)
POTASSIUM SERPL-SCNC: 4.9 MMOL/L (ref 3.5–5)
PROT SERPL-MCNC: 7.7 G/DL (ref 6.4–8.3)
RBC # BLD AUTO: 3.83 M/UL (ref 3.5–5.5)
SODIUM SERPL-SCNC: 138 MMOL/L (ref 132–146)
WBC OTHER # BLD: 9.2 K/UL (ref 4.5–11.5)

## 2024-09-15 PROCEDURE — 30903 CONTROL OF NOSEBLEED: CPT

## 2024-09-15 PROCEDURE — 99284 EMERGENCY DEPT VISIT MOD MDM: CPT

## 2024-09-15 PROCEDURE — 80053 COMPREHEN METABOLIC PANEL: CPT

## 2024-09-15 PROCEDURE — 85025 COMPLETE CBC W/AUTO DIFF WBC: CPT

## 2024-09-15 PROCEDURE — 2500000003 HC RX 250 WO HCPCS: Performed by: EMERGENCY MEDICINE

## 2024-09-15 RX ORDER — TRANEXAMIC ACID 100 MG/ML
100 INJECTION, SOLUTION INTRAVENOUS ONCE
Status: COMPLETED | OUTPATIENT
Start: 2024-09-15 | End: 2024-09-15

## 2024-09-15 RX ADMIN — TRANEXAMIC ACID 100 MG: 100 INJECTION, SOLUTION INTRAVENOUS at 08:12

## 2024-09-15 ASSESSMENT — PAIN - FUNCTIONAL ASSESSMENT: PAIN_FUNCTIONAL_ASSESSMENT: NONE - DENIES PAIN

## 2024-09-19 ENCOUNTER — OFFICE VISIT (OUTPATIENT)
Dept: ENT CLINIC | Age: 79
End: 2024-09-19
Payer: MEDICARE

## 2024-09-19 VITALS
BODY MASS INDEX: 40.75 KG/M2 | DIASTOLIC BLOOD PRESSURE: 78 MMHG | HEIGHT: 63 IN | HEART RATE: 70 BPM | SYSTOLIC BLOOD PRESSURE: 160 MMHG

## 2024-09-19 DIAGNOSIS — J34.89 NASAL MUCOSA DRY: ICD-10-CM

## 2024-09-19 DIAGNOSIS — Z79.01 ANTICOAGULATED: ICD-10-CM

## 2024-09-19 DIAGNOSIS — R04.0 EPISTAXIS: Primary | ICD-10-CM

## 2024-09-19 PROCEDURE — 3078F DIAST BP <80 MM HG: CPT | Performed by: OTOLARYNGOLOGY

## 2024-09-19 PROCEDURE — 1123F ACP DISCUSS/DSCN MKR DOCD: CPT | Performed by: OTOLARYNGOLOGY

## 2024-09-19 PROCEDURE — 99203 OFFICE O/P NEW LOW 30 MIN: CPT | Performed by: OTOLARYNGOLOGY

## 2024-09-19 PROCEDURE — 3077F SYST BP >= 140 MM HG: CPT | Performed by: OTOLARYNGOLOGY

## 2024-09-19 ASSESSMENT — ENCOUNTER SYMPTOMS
EYES NEGATIVE: 1
RESPIRATORY NEGATIVE: 1
SORE THROAT: 0
ALLERGIC/IMMUNOLOGIC NEGATIVE: 1
RHINORRHEA: 0

## 2024-09-26 ENCOUNTER — NURSE ONLY (OUTPATIENT)
Dept: NON INVASIVE DIAGNOSTICS | Age: 79
End: 2024-09-26
Payer: MEDICARE

## 2024-09-26 ENCOUNTER — OFFICE VISIT (OUTPATIENT)
Dept: NON INVASIVE DIAGNOSTICS | Age: 79
End: 2024-09-26
Payer: MEDICARE

## 2024-09-26 VITALS
HEIGHT: 62 IN | WEIGHT: 220 LBS | RESPIRATION RATE: 18 BRPM | DIASTOLIC BLOOD PRESSURE: 78 MMHG | OXYGEN SATURATION: 92 % | SYSTOLIC BLOOD PRESSURE: 144 MMHG | BODY MASS INDEX: 40.48 KG/M2 | HEART RATE: 71 BPM

## 2024-09-26 DIAGNOSIS — Z95.0 PACEMAKER: Primary | ICD-10-CM

## 2024-09-26 DIAGNOSIS — I48.21 PERMANENT ATRIAL FIBRILLATION (HCC): Primary | ICD-10-CM

## 2024-09-26 PROCEDURE — 99214 OFFICE O/P EST MOD 30 MIN: CPT | Performed by: STUDENT IN AN ORGANIZED HEALTH CARE EDUCATION/TRAINING PROGRAM

## 2024-09-26 PROCEDURE — 3078F DIAST BP <80 MM HG: CPT | Performed by: STUDENT IN AN ORGANIZED HEALTH CARE EDUCATION/TRAINING PROGRAM

## 2024-09-26 PROCEDURE — 93000 ELECTROCARDIOGRAM COMPLETE: CPT | Performed by: STUDENT IN AN ORGANIZED HEALTH CARE EDUCATION/TRAINING PROGRAM

## 2024-09-26 PROCEDURE — 1123F ACP DISCUSS/DSCN MKR DOCD: CPT | Performed by: STUDENT IN AN ORGANIZED HEALTH CARE EDUCATION/TRAINING PROGRAM

## 2024-09-26 PROCEDURE — 3077F SYST BP >= 140 MM HG: CPT | Performed by: STUDENT IN AN ORGANIZED HEALTH CARE EDUCATION/TRAINING PROGRAM

## 2024-09-27 PROCEDURE — 93280 PM DEVICE PROGR EVAL DUAL: CPT | Performed by: STUDENT IN AN ORGANIZED HEALTH CARE EDUCATION/TRAINING PROGRAM

## 2024-10-07 ENCOUNTER — OFFICE VISIT (OUTPATIENT)
Dept: ENT CLINIC | Age: 79
End: 2024-10-07
Payer: MEDICARE

## 2024-10-07 VITALS
DIASTOLIC BLOOD PRESSURE: 84 MMHG | HEIGHT: 62 IN | BODY MASS INDEX: 40.23 KG/M2 | HEART RATE: 72 BPM | SYSTOLIC BLOOD PRESSURE: 180 MMHG

## 2024-10-07 DIAGNOSIS — Z79.01 ANTICOAGULATED: ICD-10-CM

## 2024-10-07 DIAGNOSIS — J34.89 NASAL MUCOSA DRY: ICD-10-CM

## 2024-10-07 DIAGNOSIS — R04.0 EPISTAXIS: Primary | ICD-10-CM

## 2024-10-07 PROCEDURE — 99212 OFFICE O/P EST SF 10 MIN: CPT | Performed by: OTOLARYNGOLOGY

## 2024-10-07 PROCEDURE — 3079F DIAST BP 80-89 MM HG: CPT | Performed by: OTOLARYNGOLOGY

## 2024-10-07 PROCEDURE — 3077F SYST BP >= 140 MM HG: CPT | Performed by: OTOLARYNGOLOGY

## 2024-10-07 PROCEDURE — 1123F ACP DISCUSS/DSCN MKR DOCD: CPT | Performed by: OTOLARYNGOLOGY

## 2024-10-07 ASSESSMENT — ENCOUNTER SYMPTOMS
RESPIRATORY NEGATIVE: 1
ALLERGIC/IMMUNOLOGIC NEGATIVE: 1
SORE THROAT: 0
RHINORRHEA: 0

## 2024-10-07 NOTE — PROGRESS NOTES
status: Former     Current packs/day: 0.00     Average packs/day: 1 pack/day for 30.0 years (30.0 ttl pk-yrs)     Types: Cigarettes     Start date: 1988     Quit date: 2018     Years since quittin.2    Smokeless tobacco: Never   Vaping Use    Vaping status: Never Used    Passive vaping exposure: Yes   Substance Use Topics    Alcohol use: Not Currently    Drug use: No     Family History   Problem Relation Age of Onset    Other Mother         rheumatic fever    Cancer Sister         lung           Objective:   BP (!) 180/84 (Site: Left Upper Arm, Position: Sitting, Cuff Size: Large Adult) Comment: PCP OBSERVING BP READINGS  Pulse 72   Ht 1.575 m (5' 2.01\")   BMI 40.23 kg/m²     Physical Exam  Vitals reviewed.   Constitutional:       General: She is not in acute distress.     Appearance: She is well-developed.   HENT:      Head: Normocephalic and atraumatic.      Right Ear: Tympanic membrane, ear canal and external ear normal.      Left Ear: Tympanic membrane, ear canal and external ear normal.      Nose: Nose normal. No nasal deformity, septal deviation or rhinorrhea.      Comments: No prominent vessels, active or posterior nasal bleeding      Mouth/Throat:      Mouth: Mucous membranes are moist.      Dentition: Normal dentition.      Pharynx: Uvula midline. No oropharyngeal exudate.   Eyes:      Pupils: Pupils are equal, round, and reactive to light.   Neck:      Thyroid: No thyromegaly.      Trachea: No tracheal deviation.   Cardiovascular:      Rate and Rhythm: Normal rate.      Heart sounds: Normal heart sounds.   Pulmonary:      Effort: Pulmonary effort is normal. No respiratory distress.      Breath sounds: Normal breath sounds.   Musculoskeletal:      Cervical back: Normal range of motion and neck supple.   Lymphadenopathy:      Cervical: No cervical adenopathy.   Skin:     General: Skin is warm and dry.      Capillary Refill: Capillary refill takes less than 2 seconds.   Neurological:

## 2024-11-27 ENCOUNTER — APPOINTMENT (OUTPATIENT)
Dept: CT IMAGING | Age: 79
End: 2024-11-27
Payer: MEDICARE

## 2024-11-27 ENCOUNTER — HOSPITAL ENCOUNTER (EMERGENCY)
Age: 79
Discharge: HOME | End: 2024-11-27
Attending: STUDENT IN AN ORGANIZED HEALTH CARE EDUCATION/TRAINING PROGRAM
Payer: MEDICARE

## 2024-11-27 VITALS
SYSTOLIC BLOOD PRESSURE: 170 MMHG | DIASTOLIC BLOOD PRESSURE: 92 MMHG | BODY MASS INDEX: 31.39 KG/M2 | HEART RATE: 70 BPM | WEIGHT: 200 LBS | TEMPERATURE: 97.7 F | RESPIRATION RATE: 16 BRPM | HEIGHT: 67 IN | OXYGEN SATURATION: 98 %

## 2024-11-27 DIAGNOSIS — I10 ASYMPTOMATIC HYPERTENSION: Primary | ICD-10-CM

## 2024-11-27 LAB
ALBUMIN SERPL-MCNC: 3.9 G/DL (ref 3.5–5.2)
ALP SERPL-CCNC: 84 U/L (ref 35–104)
ALT SERPL-CCNC: 10 U/L (ref 0–32)
ANION GAP SERPL CALCULATED.3IONS-SCNC: 11 MMOL/L (ref 7–16)
AST SERPL-CCNC: 22 U/L (ref 0–31)
BASOPHILS # BLD: 0.05 K/UL (ref 0–0.2)
BASOPHILS NFR BLD: 1 % (ref 0–2)
BILIRUB SERPL-MCNC: 0.8 MG/DL (ref 0–1.2)
BILIRUB UR QL STRIP: NEGATIVE
BUN SERPL-MCNC: 25 MG/DL (ref 6–23)
CALCIUM SERPL-MCNC: 9.9 MG/DL (ref 8.6–10.2)
CHLORIDE SERPL-SCNC: 100 MMOL/L (ref 98–107)
CLARITY UR: CLEAR
CO2 SERPL-SCNC: 28 MMOL/L (ref 22–29)
COLOR UR: YELLOW
CREAT SERPL-MCNC: 1.1 MG/DL (ref 0.5–1)
EOSINOPHIL # BLD: 0.26 K/UL (ref 0.05–0.5)
EOSINOPHILS RELATIVE PERCENT: 5 % (ref 0–6)
ERYTHROCYTE [DISTWIDTH] IN BLOOD BY AUTOMATED COUNT: 15.2 % (ref 11.5–15)
GFR, ESTIMATED: 52 ML/MIN/1.73M2
GLUCOSE SERPL-MCNC: 98 MG/DL (ref 74–99)
GLUCOSE UR STRIP-MCNC: NEGATIVE MG/DL
HCT VFR BLD AUTO: 36.4 % (ref 34–48)
HGB BLD-MCNC: 11 G/DL (ref 11.5–15.5)
HGB UR QL STRIP.AUTO: NEGATIVE
IMM GRANULOCYTES # BLD AUTO: <0.03 K/UL (ref 0–0.58)
IMM GRANULOCYTES NFR BLD: 0 % (ref 0–5)
KETONES UR STRIP-MCNC: NEGATIVE MG/DL
LEUKOCYTE ESTERASE UR QL STRIP: NEGATIVE
LYMPHOCYTES NFR BLD: 0.86 K/UL (ref 1.5–4)
LYMPHOCYTES RELATIVE PERCENT: 16 % (ref 20–42)
MCH RBC QN AUTO: 28.6 PG (ref 26–35)
MCHC RBC AUTO-ENTMCNC: 30.2 G/DL (ref 32–34.5)
MCV RBC AUTO: 94.5 FL (ref 80–99.9)
MONOCYTES NFR BLD: 0.5 K/UL (ref 0.1–0.95)
MONOCYTES NFR BLD: 9 % (ref 2–12)
NEUTROPHILS NFR BLD: 69 % (ref 43–80)
NEUTS SEG NFR BLD: 3.79 K/UL (ref 1.8–7.3)
NITRITE UR QL STRIP: NEGATIVE
PH UR STRIP: 6 [PH] (ref 5–9)
PLATELET # BLD AUTO: 210 K/UL (ref 130–450)
PMV BLD AUTO: 10.7 FL (ref 7–12)
POTASSIUM SERPL-SCNC: 4.6 MMOL/L (ref 3.5–5)
POTASSIUM SERPL-SCNC: 5.3 MMOL/L (ref 3.5–5)
PROT SERPL-MCNC: 7.7 G/DL (ref 6.4–8.3)
PROT UR STRIP-MCNC: ABNORMAL MG/DL
RBC # BLD AUTO: 3.85 M/UL (ref 3.5–5.5)
RBC #/AREA URNS HPF: ABNORMAL /HPF
SODIUM SERPL-SCNC: 139 MMOL/L (ref 132–146)
SP GR UR STRIP: 1.02 (ref 1–1.03)
TROPONIN I SERPL HS-MCNC: 16 NG/L (ref 0–9)
UROBILINOGEN UR STRIP-ACNC: 0.2 EU/DL (ref 0–1)
WBC #/AREA URNS HPF: ABNORMAL /HPF
WBC OTHER # BLD: 5.5 K/UL (ref 4.5–11.5)

## 2024-11-27 PROCEDURE — 2580000003 HC RX 258: Performed by: STUDENT IN AN ORGANIZED HEALTH CARE EDUCATION/TRAINING PROGRAM

## 2024-11-27 PROCEDURE — 81001 URINALYSIS AUTO W/SCOPE: CPT

## 2024-11-27 PROCEDURE — 96374 THER/PROPH/DIAG INJ IV PUSH: CPT

## 2024-11-27 PROCEDURE — 93005 ELECTROCARDIOGRAM TRACING: CPT | Performed by: STUDENT IN AN ORGANIZED HEALTH CARE EDUCATION/TRAINING PROGRAM

## 2024-11-27 PROCEDURE — 70450 CT HEAD/BRAIN W/O DYE: CPT

## 2024-11-27 PROCEDURE — 80053 COMPREHEN METABOLIC PANEL: CPT

## 2024-11-27 PROCEDURE — 84132 ASSAY OF SERUM POTASSIUM: CPT

## 2024-11-27 PROCEDURE — 6360000002 HC RX W HCPCS: Performed by: STUDENT IN AN ORGANIZED HEALTH CARE EDUCATION/TRAINING PROGRAM

## 2024-11-27 PROCEDURE — 84484 ASSAY OF TROPONIN QUANT: CPT

## 2024-11-27 PROCEDURE — 85025 COMPLETE CBC W/AUTO DIFF WBC: CPT

## 2024-11-27 PROCEDURE — 99284 EMERGENCY DEPT VISIT MOD MDM: CPT

## 2024-11-27 RX ORDER — 0.9 % SODIUM CHLORIDE 0.9 %
250 INTRAVENOUS SOLUTION INTRAVENOUS ONCE
Status: COMPLETED | OUTPATIENT
Start: 2024-11-27 | End: 2024-11-27

## 2024-11-27 RX ORDER — HYDRALAZINE HYDROCHLORIDE 20 MG/ML
5 INJECTION INTRAMUSCULAR; INTRAVENOUS ONCE
Status: COMPLETED | OUTPATIENT
Start: 2024-11-27 | End: 2024-11-27

## 2024-11-27 RX ADMIN — SODIUM CHLORIDE 250 ML: 9 INJECTION, SOLUTION INTRAVENOUS at 19:51

## 2024-11-27 RX ADMIN — HYDRALAZINE HYDROCHLORIDE 5 MG: 20 INJECTION INTRAMUSCULAR; INTRAVENOUS at 20:27

## 2024-11-27 ASSESSMENT — ENCOUNTER SYMPTOMS
WHEEZING: 0
EYE REDNESS: 0
COUGH: 0
SORE THROAT: 0
ABDOMINAL DISTENTION: 0
NAUSEA: 0
EYE DISCHARGE: 0
DIARRHEA: 0
SINUS PRESSURE: 0
VOMITING: 0
BACK PAIN: 0
EYE PAIN: 0
SHORTNESS OF BREATH: 0

## 2024-11-27 ASSESSMENT — LIFESTYLE VARIABLES
HOW OFTEN DO YOU HAVE A DRINK CONTAINING ALCOHOL: NEVER
HOW MANY STANDARD DRINKS CONTAINING ALCOHOL DO YOU HAVE ON A TYPICAL DAY: PATIENT DOES NOT DRINK

## 2024-11-27 ASSESSMENT — PAIN - FUNCTIONAL ASSESSMENT: PAIN_FUNCTIONAL_ASSESSMENT: NONE - DENIES PAIN

## 2024-11-27 NOTE — ED PROVIDER NOTES
abnormality.             ------------------------- NURSING NOTES AND VITALS REVIEWED ---------------------------  Date / Time Roomed:  11/27/2024  6:22 PM  ED Bed Assignment:  13/13    The nursing notes within the ED encounter and vital signs as below have been reviewed.   BP (!) 170/92   Pulse 70   Temp 97.7 °F (36.5 °C) (Oral)   Resp 16   Ht 1.702 m (5' 7\")   Wt 90.7 kg (200 lb)   SpO2 98%   BMI 31.32 kg/m²   Oxygen Saturation Interpretation: Normal      ------------------------------------------ PROGRESS NOTES ------------------------------------------  12:18 AM EST  I have spoken with the patient and discussed today’s results, in addition to providing specific details for the plan of care and counseling regarding the diagnosis and prognosis.  Their questions are answered at this time and they are agreeable with the plan. I discussed at length with them reasons for immediate return here for re evaluation. They will followup with their primary care physician by calling their office tomorrow.      --------------------------------- ADDITIONAL PROVIDER NOTES ---------------------------------  At this time the patient is without objective evidence of an acute process requiring hospitalization or inpatient management.  They have remained hemodynamically stable throughout their entire ED visit and are stable for discharge with outpatient follow-up.     The plan has been discussed in detail and they are aware of the specific conditions for emergent return, as well as the importance of follow-up.      Discharge Medication List as of 11/27/2024  9:42 PM          Diagnosis:  1. Asymptomatic hypertension        Disposition:  Patient's disposition: Discharge to home  Patient's condition is stable.       Randall Cole,   11/29/24 0020

## 2024-11-28 NOTE — DISCHARGE INSTRUCTIONS
CT HEAD WO CONTRAST   Final Result   No acute intracranial abnormality.           Follow up with your family physician  If symptoms worsen or concern arises return for re-evaluation.

## 2024-11-29 LAB
EKG ATRIAL RATE: 84 BPM
EKG Q-T INTERVAL: 424 MS
EKG QRS DURATION: 130 MS
EKG QTC CALCULATION (BAZETT): 457 MS
EKG R AXIS: -6 DEGREES
EKG T AXIS: 94 DEGREES
EKG VENTRICULAR RATE: 70 BPM

## 2024-11-29 PROCEDURE — 93010 ELECTROCARDIOGRAM REPORT: CPT | Performed by: INTERNAL MEDICINE

## 2025-01-08 ENCOUNTER — HOSPITAL ENCOUNTER (OUTPATIENT)
Age: 80
Discharge: HOME | End: 2025-01-08
Payer: MEDICARE

## 2025-01-08 LAB
ALBUMIN SERPL-MCNC: 4 G/DL (ref 3.5–5.2)
ALP SERPL-CCNC: 77 U/L (ref 35–104)
ALT SERPL-CCNC: 15 U/L (ref 0–32)
ANION GAP SERPL CALCULATED.3IONS-SCNC: 10 MMOL/L (ref 7–16)
ANION GAP SERPL CALCULATED.3IONS-SCNC: 9 MMOL/L (ref 7–16)
AST SERPL-CCNC: 23 U/L (ref 0–31)
BASOPHILS # BLD: 0.04 K/UL (ref 0–0.2)
BASOPHILS NFR BLD: 1 % (ref 0–2)
BILIRUB SERPL-MCNC: 0.7 MG/DL (ref 0–1.2)
BUN SERPL-MCNC: 25 MG/DL (ref 6–23)
BUN SERPL-MCNC: 26 MG/DL (ref 6–23)
CALCIUM SERPL-MCNC: 9.5 MG/DL (ref 8.6–10.2)
CALCIUM SERPL-MCNC: 9.7 MG/DL (ref 8.6–10.2)
CHLORIDE SERPL-SCNC: 98 MMOL/L (ref 98–107)
CHLORIDE SERPL-SCNC: 99 MMOL/L (ref 98–107)
CO2 SERPL-SCNC: 30 MMOL/L (ref 22–29)
CO2 SERPL-SCNC: 30 MMOL/L (ref 22–29)
CREAT SERPL-MCNC: 1.1 MG/DL (ref 0.5–1)
CREAT SERPL-MCNC: 1.1 MG/DL (ref 0.5–1)
CRP SERPL HS-MCNC: 3 MG/L (ref 0–5)
EOSINOPHIL # BLD: 0.16 K/UL (ref 0.05–0.5)
EOSINOPHILS RELATIVE PERCENT: 3 % (ref 0–6)
ERYTHROCYTE [DISTWIDTH] IN BLOOD BY AUTOMATED COUNT: 14.6 % (ref 11.5–15)
ERYTHROCYTE [SEDIMENTATION RATE] IN BLOOD BY WESTERGREN METHOD: 23 MM/HR (ref 0–20)
GFR, ESTIMATED: 49 ML/MIN/1.73M2
GFR, ESTIMATED: 50 ML/MIN/1.73M2
GLUCOSE SERPL-MCNC: 121 MG/DL (ref 74–99)
GLUCOSE SERPL-MCNC: 123 MG/DL (ref 74–99)
HCT VFR BLD AUTO: 37 % (ref 34–48)
HGB BLD-MCNC: 11.4 G/DL (ref 11.5–15.5)
IMM GRANULOCYTES # BLD AUTO: 0.03 K/UL (ref 0–0.58)
IMM GRANULOCYTES NFR BLD: 1 % (ref 0–5)
LYMPHOCYTES NFR BLD: 0.82 K/UL (ref 1.5–4)
LYMPHOCYTES RELATIVE PERCENT: 14 % (ref 20–42)
MAGNESIUM SERPL-MCNC: 1.8 MG/DL (ref 1.6–2.6)
MCH RBC QN AUTO: 29.3 PG (ref 26–35)
MCHC RBC AUTO-ENTMCNC: 30.8 G/DL (ref 32–34.5)
MCV RBC AUTO: 95.1 FL (ref 80–99.9)
MONOCYTES NFR BLD: 0.47 K/UL (ref 0.1–0.95)
MONOCYTES NFR BLD: 8 % (ref 2–12)
NEUTROPHILS NFR BLD: 74 % (ref 43–80)
NEUTS SEG NFR BLD: 4.24 K/UL (ref 1.8–7.3)
PLATELET # BLD AUTO: 203 K/UL (ref 130–450)
PMV BLD AUTO: 9.8 FL (ref 7–12)
POTASSIUM SERPL-SCNC: 4.9 MMOL/L (ref 3.5–5)
POTASSIUM SERPL-SCNC: 4.9 MMOL/L (ref 3.5–5)
PROT SERPL-MCNC: 7.2 G/DL (ref 6.4–8.3)
RBC # BLD AUTO: 3.89 M/UL (ref 3.5–5.5)
SODIUM SERPL-SCNC: 138 MMOL/L (ref 132–146)
SODIUM SERPL-SCNC: 138 MMOL/L (ref 132–146)
WBC OTHER # BLD: 5.8 K/UL (ref 4.5–11.5)

## 2025-01-08 PROCEDURE — 36415 COLL VENOUS BLD VENIPUNCTURE: CPT

## 2025-01-08 PROCEDURE — 85025 COMPLETE CBC W/AUTO DIFF WBC: CPT

## 2025-01-08 PROCEDURE — 83735 ASSAY OF MAGNESIUM: CPT

## 2025-01-08 PROCEDURE — 86140 C-REACTIVE PROTEIN: CPT

## 2025-01-08 PROCEDURE — 80048 BASIC METABOLIC PNL TOTAL CA: CPT

## 2025-01-08 PROCEDURE — 80053 COMPREHEN METABOLIC PANEL: CPT

## 2025-01-08 PROCEDURE — 85652 RBC SED RATE AUTOMATED: CPT

## 2025-04-14 DIAGNOSIS — R06.02 SHORTNESS OF BREATH: Primary | ICD-10-CM

## 2025-04-21 ENCOUNTER — TELEPHONE (OUTPATIENT)
Dept: NON INVASIVE DIAGNOSTICS | Age: 80
End: 2025-04-21

## 2025-04-21 NOTE — TELEPHONE ENCOUNTER
----- Message from Alice MERLOS sent at 4/14/2025  1:58 PM EDT -----  Can you please call her and have her obtain an echocardiogram due to high RV pacing and please obtain recent office notes from her PCP.   She has complained of recent increased SOB.   Thanks  Alice Pearce, APRN - CNP

## 2025-05-07 ENCOUNTER — HOSPITAL ENCOUNTER (OUTPATIENT)
Dept: CARDIOLOGY | Age: 80
Discharge: HOME | End: 2025-05-09
Payer: MEDICARE

## 2025-05-07 VITALS
SYSTOLIC BLOOD PRESSURE: 141 MMHG | HEIGHT: 67 IN | BODY MASS INDEX: 31.39 KG/M2 | WEIGHT: 200 LBS | DIASTOLIC BLOOD PRESSURE: 67 MMHG

## 2025-05-07 DIAGNOSIS — R06.02 SHORTNESS OF BREATH: ICD-10-CM

## 2025-05-07 PROCEDURE — 93306 TTE W/DOPPLER COMPLETE: CPT

## 2025-05-08 ENCOUNTER — RESULTS FOLLOW-UP (OUTPATIENT)
Dept: NON INVASIVE DIAGNOSTICS | Age: 80
End: 2025-05-08

## 2025-05-08 LAB
ECHO AO ASC DIAM: 3 CM
ECHO AO ASCENDING AORTA INDEX: 1.49 CM/M2
ECHO AV AREA PEAK VELOCITY: 2.7 CM2
ECHO AV AREA VTI: 2.8 CM2
ECHO AV AREA/BSA PEAK VELOCITY: 1.3 CM2/M2
ECHO AV AREA/BSA VTI: 1.4 CM2/M2
ECHO AV CUSP MM: 1.9 CM
ECHO AV MEAN GRADIENT: 6 MMHG
ECHO AV MEAN VELOCITY: 1.1 M/S
ECHO AV PEAK GRADIENT: 9 MMHG
ECHO AV PEAK VELOCITY: 1.5 M/S
ECHO AV VELOCITY RATIO: 0.87
ECHO AV VTI: 31.5 CM
ECHO BSA: 2.07 M2
ECHO EST RA PRESSURE: 3 MMHG
ECHO LA DIAMETER INDEX: 2.23 CM/M2
ECHO LA DIAMETER: 4.5 CM
ECHO LA VOL A-L A2C: 49 ML (ref 22–52)
ECHO LA VOL A-L A4C: 52 ML (ref 22–52)
ECHO LA VOL MOD A2C: 48 ML (ref 22–52)
ECHO LA VOL MOD A4C: 48 ML (ref 22–52)
ECHO LA VOLUME AREA LENGTH: 52 ML
ECHO LA VOLUME INDEX A-L A2C: 24 ML/M2 (ref 16–34)
ECHO LA VOLUME INDEX A-L A4C: 26 ML/M2 (ref 16–34)
ECHO LA VOLUME INDEX AREA LENGTH: 26 ML/M2 (ref 16–34)
ECHO LA VOLUME INDEX MOD A2C: 24 ML/M2 (ref 16–34)
ECHO LA VOLUME INDEX MOD A4C: 24 ML/M2 (ref 16–34)
ECHO LV EJECTION FRACTION 3D: 60 %
ECHO LV FRACTIONAL SHORTENING: 27 % (ref 28–44)
ECHO LV INTERNAL DIMENSION DIASTOLE INDEX: 2.38 CM/M2
ECHO LV INTERNAL DIMENSION DIASTOLIC: 4.8 CM (ref 3.9–5.3)
ECHO LV INTERNAL DIMENSION SYSTOLIC INDEX: 1.73 CM/M2
ECHO LV INTERNAL DIMENSION SYSTOLIC: 3.5 CM
ECHO LV IVSD: 1.1 CM (ref 0.6–0.9)
ECHO LV IVSS: 1.5 CM
ECHO LV MASS 2D: 194 G (ref 67–162)
ECHO LV MASS INDEX 2D: 96 G/M2 (ref 43–95)
ECHO LV POSTERIOR WALL DIASTOLIC: 1.1 CM (ref 0.6–0.9)
ECHO LV POSTERIOR WALL SYSTOLIC: 1.5 CM
ECHO LV RELATIVE WALL THICKNESS RATIO: 0.46
ECHO LVOT AREA: 3.1 CM2
ECHO LVOT AV VTI INDEX: 0.91
ECHO LVOT DIAM: 2 CM
ECHO LVOT MEAN GRADIENT: 5 MMHG
ECHO LVOT PEAK GRADIENT: 7 MMHG
ECHO LVOT PEAK VELOCITY: 1.3 M/S
ECHO LVOT STROKE VOLUME INDEX: 44.5 ML/M2
ECHO LVOT SV: 89.8 ML
ECHO LVOT VTI: 28.6 CM
ECHO MV AREA PHT: 3.4 CM2
ECHO MV AREA VTI: 2.6 CM2
ECHO MV E DECELERATION TIME (DT): 266.5 MS
ECHO MV LVOT VTI INDEX: 1.22
ECHO MV MAX VELOCITY: 1.7 M/S
ECHO MV MEAN GRADIENT: 3 MMHG
ECHO MV MEAN VELOCITY: 0.7 M/S
ECHO MV PEAK GRADIENT: 12 MMHG
ECHO MV PRESSURE HALF TIME (PHT): 65.4 MS
ECHO MV VTI: 34.8 CM
ECHO PULMONARY ARTERY END DIASTOLIC PRESSURE: 9 MMHG
ECHO PV MAX VELOCITY: 1.5 M/S
ECHO PV MEAN GRADIENT: 4 MMHG
ECHO PV MEAN VELOCITY: 0.9 M/S
ECHO PV PEAK GRADIENT: 9 MMHG
ECHO PV REGURGITANT MAX VELOCITY: 1.5 M/S
ECHO PV VTI: 25.7 CM
ECHO RIGHT VENTRICULAR SYSTOLIC PRESSURE (RVSP): 44 MMHG
ECHO RV INTERNAL DIMENSION: 3.3 CM
ECHO TV REGURGITANT MAX VELOCITY: 3.21 M/S
ECHO TV REGURGITANT PEAK GRADIENT: 41 MMHG

## 2025-05-08 NOTE — TELEPHONE ENCOUNTER
Spoke to patient about test results. Patient agreed and understood. Patient has no questions or concerns.

## 2025-05-08 NOTE — TELEPHONE ENCOUNTER
----- Message from Alice MERLOS sent at 5/8/2025  3:31 PM EDT -----  Patient had a echo done due to shortness of breath on exertion.  You please let her know that her heart function is normal.  Thanks, Alice  ----- Message -----  From: Oskar Cuellar MD  Sent: 5/8/2025   3:30 PM EDT  To: Alice Pearce, GIO - CNP

## 2025-07-17 ENCOUNTER — HOSPITAL ENCOUNTER (OUTPATIENT)
Age: 80
Discharge: HOME | End: 2025-07-17
Payer: MEDICARE

## 2025-07-17 LAB
ALBUMIN SERPL-MCNC: 4 G/DL (ref 3.5–5.2)
ALP SERPL-CCNC: 76 U/L (ref 35–104)
ALT SERPL-CCNC: 15 U/L (ref 0–35)
ANION GAP SERPL CALCULATED.3IONS-SCNC: 11 MMOL/L (ref 7–16)
AST SERPL-CCNC: 21 U/L (ref 0–35)
BASOPHILS # BLD: 0.05 K/UL (ref 0–0.2)
BASOPHILS NFR BLD: 1 % (ref 0–2)
BILIRUB SERPL-MCNC: 0.7 MG/DL (ref 0–1.2)
BUN SERPL-MCNC: 24 MG/DL (ref 8–23)
CALCIUM SERPL-MCNC: 9.5 MG/DL (ref 8.8–10.2)
CHLORIDE SERPL-SCNC: 102 MMOL/L (ref 98–107)
CO2 SERPL-SCNC: 28 MMOL/L (ref 22–29)
CREAT SERPL-MCNC: 1.2 MG/DL (ref 0.5–1)
CRP SERPL HS-MCNC: <3 MG/L (ref 0–5)
EOSINOPHIL # BLD: 0.14 K/UL (ref 0.05–0.5)
EOSINOPHILS RELATIVE PERCENT: 2 % (ref 0–6)
ERYTHROCYTE [DISTWIDTH] IN BLOOD BY AUTOMATED COUNT: 14.3 % (ref 11.5–15)
ERYTHROCYTE [SEDIMENTATION RATE] IN BLOOD BY WESTERGREN METHOD: 25 MM/HR (ref 0–20)
GFR, ESTIMATED: 46 ML/MIN/1.73M2
GLUCOSE SERPL-MCNC: 117 MG/DL (ref 74–99)
HCT VFR BLD AUTO: 33.6 % (ref 34–48)
HGB BLD-MCNC: 10.4 G/DL (ref 11.5–15.5)
IMM GRANULOCYTES # BLD AUTO: 0.03 K/UL (ref 0–0.58)
IMM GRANULOCYTES NFR BLD: 0 % (ref 0–5)
LYMPHOCYTES NFR BLD: 0.73 K/UL (ref 1.5–4)
LYMPHOCYTES RELATIVE PERCENT: 8 % (ref 20–42)
MCH RBC QN AUTO: 29.5 PG (ref 26–35)
MCHC RBC AUTO-ENTMCNC: 31 G/DL (ref 32–34.5)
MCV RBC AUTO: 95.5 FL (ref 80–99.9)
MONOCYTES NFR BLD: 0.7 K/UL (ref 0.1–0.95)
MONOCYTES NFR BLD: 8 % (ref 2–12)
NEUTROPHILS NFR BLD: 81 % (ref 43–80)
NEUTS SEG NFR BLD: 7.13 K/UL (ref 1.8–7.3)
PLATELET # BLD AUTO: 185 K/UL (ref 130–450)
PMV BLD AUTO: 10.9 FL (ref 7–12)
POTASSIUM SERPL-SCNC: 4.7 MMOL/L (ref 3.5–5.1)
PROT SERPL-MCNC: 7 G/DL (ref 6.4–8.3)
RBC # BLD AUTO: 3.52 M/UL (ref 3.5–5.5)
SODIUM SERPL-SCNC: 140 MMOL/L (ref 136–145)
WBC OTHER # BLD: 8.8 K/UL (ref 4.5–11.5)

## 2025-07-17 PROCEDURE — 86140 C-REACTIVE PROTEIN: CPT

## 2025-07-17 PROCEDURE — 85652 RBC SED RATE AUTOMATED: CPT

## 2025-07-17 PROCEDURE — 36415 COLL VENOUS BLD VENIPUNCTURE: CPT

## 2025-07-17 PROCEDURE — 85025 COMPLETE CBC W/AUTO DIFF WBC: CPT

## 2025-07-17 PROCEDURE — 80053 COMPREHEN METABOLIC PANEL: CPT

## 2025-08-05 DIAGNOSIS — I65.23 BILATERAL CAROTID ARTERY STENOSIS: Primary | ICD-10-CM

## 2025-08-21 ENCOUNTER — HOSPITAL ENCOUNTER (OUTPATIENT)
Dept: CARDIOLOGY | Age: 80
Discharge: HOME | End: 2025-08-23
Payer: MEDICARE

## 2025-08-21 ENCOUNTER — OFFICE VISIT (OUTPATIENT)
Dept: VASCULAR SURGERY | Age: 80
End: 2025-08-21
Payer: MEDICARE

## 2025-08-21 VITALS — WEIGHT: 175 LBS | BODY MASS INDEX: 27.41 KG/M2

## 2025-08-21 DIAGNOSIS — I65.23 BILATERAL CAROTID ARTERY STENOSIS: ICD-10-CM

## 2025-08-21 DIAGNOSIS — Z99.81 O2 DEPENDENT: Primary | ICD-10-CM

## 2025-08-21 PROBLEM — R09.02 HYPOXIA: Status: RESOLVED | Noted: 2018-01-08 | Resolved: 2025-08-21

## 2025-08-21 LAB
VAS LEFT CCA DIST EDV: 16.8 CM/S
VAS LEFT CCA DIST PSV: 66.5 CM/S
VAS LEFT CCA PROX EDV: 15.8 CM/S
VAS LEFT CCA PROX PSV: 85.6 CM/S
VAS LEFT ECA EDV: 0 CM/S
VAS LEFT ECA PSV: 105.7 CM/S
VAS LEFT ICA DIST EDV: 18.2 CM/S
VAS LEFT ICA DIST PSV: 104 CM/S
VAS LEFT ICA MID EDV: 29 CM/S
VAS LEFT ICA MID PSV: 116.6 CM/S
VAS LEFT ICA PROX EDV: 23.5 CM/S
VAS LEFT ICA PROX PSV: 102 CM/S
VAS LEFT ICA/CCA PSV: 1.4 NO UNITS
VAS LEFT VERTEBRAL EDV: 19.7 CM/S
VAS LEFT VERTEBRAL PSV: 54.8 CM/S
VAS RIGHT CCA DIST EDV: 16.1 CM/S
VAS RIGHT CCA DIST PSV: 128.6 CM/S
VAS RIGHT CCA PROX EDV: 15.4 CM/S
VAS RIGHT CCA PROX PSV: 80.2 CM/S
VAS RIGHT ECA EDV: 0 CM/S
VAS RIGHT ECA PSV: 111.2 CM/S
VAS RIGHT ICA DIST EDV: 28.8 CM/S
VAS RIGHT ICA DIST PSV: 119.6 CM/S
VAS RIGHT ICA MID EDV: 25.4 CM/S
VAS RIGHT ICA MID PSV: 114.8 CM/S
VAS RIGHT ICA PROX EDV: 14.1 CM/S
VAS RIGHT ICA PROX PSV: 61.1 CM/S
VAS RIGHT ICA/CCA PSV: 0.9 NO UNITS
VAS RIGHT VERTEBRAL EDV: 0 CM/S
VAS RIGHT VERTEBRAL PSV: 23.3 CM/S

## 2025-08-21 PROCEDURE — 93880 EXTRACRANIAL BILAT STUDY: CPT | Performed by: SURGERY

## 2025-08-21 PROCEDURE — 1159F MED LIST DOCD IN RCRD: CPT | Performed by: SURGERY

## 2025-08-21 PROCEDURE — 1123F ACP DISCUSS/DSCN MKR DOCD: CPT | Performed by: SURGERY

## 2025-08-21 PROCEDURE — 93880 EXTRACRANIAL BILAT STUDY: CPT

## 2025-08-21 PROCEDURE — 99214 OFFICE O/P EST MOD 30 MIN: CPT | Performed by: SURGERY

## (undated) DEVICE — CONTAINER SPEC ANAERB VACTNR

## (undated) DEVICE — PACK PROCEDURE SURG GEN CUST

## (undated) DEVICE — KIT DRP FOR RIO ROBOTIC ARM ASST SYS

## (undated) DEVICE — GLOVE ORANGE PI 8 1/2   MSG9085

## (undated) DEVICE — BNDG,ELSTC,MATRIX,STRL,6"X5YD,LF,HOOK&LP: Brand: MEDLINE

## (undated) DEVICE — SPONGE LAP W18XL18IN WHT COT 4 PLY FLD STRUNG RADPQ DISP ST

## (undated) DEVICE — KIT TRK KNEE PROC VIZADISC

## (undated) DEVICE — STRIP,CLOSURE,WOUND,MEDI-STRIP,1/2X4: Brand: MEDLINE

## (undated) DEVICE — ADHESIVE SKIN CLSR 0.7ML TOP DERMBND ADV

## (undated) DEVICE — STERILE PVP: Brand: MEDLINE INDUSTRIES, INC.

## (undated) DEVICE — PADDING,UNDERCAST,COTTON, 4"X4YD STERILE: Brand: MEDLINE

## (undated) DEVICE — PATCH REF EXT FOR CARTO 3 SYS (EA = 6 PACKS)

## (undated) DEVICE — SWABSTICK MEDICATED L4IN BENZ TINC SKIN PREP APLICARE

## (undated) DEVICE — GOWN,SIRUS,POLYRNF,BRTHSLV,XL,30/CS: Brand: MEDLINE

## (undated) DEVICE — GLOVE SURG SZ 85 L12IN FNGR ORTHO 126MIL CRM LTX FREE

## (undated) DEVICE — CLOTH SURG PREP PREOPERATIVE CHLORHEXIDINE GLUC 2% READYPREP

## (undated) DEVICE — DRESSING HYDROFIBER AQUACEL AG ADVANTAGE 3.5X12 IN

## (undated) DEVICE — TOTAL KNEE PK

## (undated) DEVICE — NEEDLE HYPO 18GA L1.5IN PNK POLYPR HUB S STL REG BVL STR

## (undated) DEVICE — SOLUTION IRRIG 3000ML 0.9% SOD CHL USP UROMATIC PLAS CONT

## (undated) DEVICE — SURGICAL PROCEDURE TRAY EPIDURAL CUST

## (undated) DEVICE — GLOVE ORANGE PI 7 1/2   MSG9075

## (undated) DEVICE — SOLUTION IV IRRIG POUR BRL 0.9% SODIUM CHL 2F7124

## (undated) DEVICE — APPLICATOR PREP 26ML 0.7% IOD POVACRYLEX 74% ISO ALC ST

## (undated) DEVICE — TOWEL,OR,DSP,ST,BLUE,STD,6/PK,12PK/CS: Brand: MEDLINE

## (undated) DEVICE — STANDARD HYPODERMIC NEEDLE,POLYPROPYLENE HUB: Brand: MONOJECT

## (undated) DEVICE — INTRODUCER SHTH 8.5FR L63CM 8.5FR DIL GWIRE L145CM

## (undated) DEVICE — DRAPE,REIN 53X77,STERILE: Brand: MEDLINE

## (undated) DEVICE — BLADE SURG SAW STD S STL OSC W/ SERR EDGE DISP

## (undated) DEVICE — PEEL-AWAY HOOD: Brand: FLYTE, SURGICOOL

## (undated) DEVICE — BLADE SAW SAG SYS 6 18X90X1.27MM

## (undated) DEVICE — SOLUTION IV IRRIG WATER 1000ML POUR BRL 2F7114

## (undated) DEVICE — TAPE ADH W3INXL10YD WHT COT WVN BK POWERFUL RUB BASE HIGHLY

## (undated) DEVICE — TUBING PRSS MON L48IN PVC RIG NONEXPANDING M TO FEM CONN

## (undated) DEVICE — PIN BNE FIX TEMP L110MM DIA4MM MAKO

## (undated) DEVICE — PIN BNE FIX TEMP L140MM DIA4MM MAKO

## (undated) DEVICE — DOUBLE BASIN SET: Brand: MEDLINE INDUSTRIES, INC.

## (undated) DEVICE — KIT INT FIX FEM TIB CKPT MAKOPLASTY

## (undated) DEVICE — CATHETER ABLAT 8FR L115CM 1-6-2MM SPC TIP 3.5MM DF CRV

## (undated) DEVICE — SYRINGE MED 50ML LUERLOCK TIP

## (undated) DEVICE — PREVENA PLUS PEEL & PLACE SYSTEM KIT- 35 CM: Brand: PREVENA  PLUS™ PEEL & PLACE™

## (undated) DEVICE — LOWER EXT KNEE DRAPE: Brand: MEDLINE INDUSTRIES, INC.

## (undated) DEVICE — 4-PORT MANIFOLD: Brand: NEPTUNE 2

## (undated) DEVICE — ELECTRODE PT RET AD L9FT HI MOIST COND ADH HYDRGEL CORDED

## (undated) DEVICE — DRESSING HYDROFIBER AQUACEL AG ADVANTAGE 3.5X6 IN

## (undated) DEVICE — GLOVE ORANGE PI 8   MSG9080

## (undated) DEVICE — SWAB SPEC COLL SHFT L5.25IN POLYUR FOAM TIP SFT DBL MEDIA

## (undated) DEVICE — PADDING CAST W6INXL4YD COT LO LINTING WYTEX

## (undated) DEVICE — 3M™ STERI-DRAPE™ U-DRAPE 1015: Brand: STERI-DRAPE™

## (undated) DEVICE — DRESSING,GAUZE,XEROFORM,CURAD,5"X9",ST: Brand: CURAD

## (undated) DEVICE — ZIMMER® STERILE DISPOSABLE TOURNIQUET CUFF WITH PLC, DUAL PORT, SINGLE BLADDER, 42 IN. (107 CM)

## (undated) DEVICE — 3M™ IOBAN™ 2 ANTIMICROBIAL INCISE DRAPE 6650EZ: Brand: IOBAN™ 2

## (undated) DEVICE — TUBE IRRIG HNDPC HI FLO TP INTRPULS W/SUCTION TUBE

## (undated) DEVICE — GLOVE ORANGE PI 7   MSG9070

## (undated) DEVICE — SYRINGE MED 30ML STD CLR PLAS LUERLOCK TIP N CTRL DISP

## (undated) DEVICE — WRAP LEG DEMAYO ST

## (undated) DEVICE — TUBING PMP FOR CARTO SYS SMARTABLATE

## (undated) DEVICE — SPLINT ORTH CLOSED PAT UNIV 24 IN BLK PROCARE QUICK-FIT

## (undated) DEVICE — BANDAGE COMPR W6INXL12FT SMOOTH FOR LIMB EXSANG ESMARCH

## (undated) DEVICE — TRAP,MUCUS SPECIMEN,40CC: Brand: MEDLINE

## (undated) DEVICE — 450 ML BOTTLE OF 0.05% CHLORHEXIDINE GLUCONATE IN 99.95% STERILE WATER FOR IRRIGATION, USP AND APPLICATOR.: Brand: IRRISEPT ANTIMICROBIAL WOUND LAVAGE

## (undated) DEVICE — TUBING, SUCTION, 9/32" X 10', STRAIGHT: Brand: MEDLINE

## (undated) DEVICE — CORD RETRCT SIL

## (undated) DEVICE — PAD, DEFIB, ADULT, RADIOTRAN, PHYSIO, LO: Brand: MEDLINE